# Patient Record
Sex: FEMALE | Race: WHITE | NOT HISPANIC OR LATINO | Employment: OTHER | URBAN - METROPOLITAN AREA
[De-identification: names, ages, dates, MRNs, and addresses within clinical notes are randomized per-mention and may not be internally consistent; named-entity substitution may affect disease eponyms.]

---

## 2019-04-24 RX ORDER — PHYTONADIONE 5 MG/1
TABLET ORAL
COMMUNITY
Start: 2017-04-24 | End: 2019-04-25 | Stop reason: SDUPTHER

## 2019-04-24 RX ORDER — CETIRIZINE HYDROCHLORIDE 10 MG/1
TABLET ORAL
COMMUNITY
Start: 2018-05-20 | End: 2019-04-25

## 2019-04-24 RX ORDER — DOXYCYCLINE 100 MG/1
TABLET ORAL
COMMUNITY
Start: 2018-08-15 | End: 2019-04-25

## 2019-04-24 RX ORDER — FLUTICASONE PROPIONATE 50 MCG
1 SPRAY, SUSPENSION (ML) NASAL
COMMUNITY
Start: 2017-02-03 | End: 2020-02-06

## 2019-04-24 RX ORDER — FAMOTIDINE 20 MG/1
TABLET, FILM COATED ORAL
COMMUNITY
Start: 2018-05-20 | End: 2019-04-25

## 2019-04-24 RX ORDER — FLUCONAZOLE 100 MG/1
TABLET ORAL
COMMUNITY
Start: 2018-05-29 | End: 2019-04-25

## 2019-04-24 RX ORDER — SODIUM CHLORIDE FOR INHALATION 7 %
VIAL, NEBULIZER (ML) INHALATION
COMMUNITY
Start: 2018-08-31 | End: 2019-06-13 | Stop reason: SDUPTHER

## 2019-04-24 RX ORDER — AZELASTINE 1 MG/ML
SPRAY, METERED NASAL
COMMUNITY
Start: 2018-05-16 | End: 2019-04-25

## 2019-04-24 RX ORDER — ACYCLOVIR 400 MG/1
TABLET ORAL
COMMUNITY
Start: 2018-08-24 | End: 2019-07-16 | Stop reason: SDUPTHER

## 2019-04-24 RX ORDER — SODIUM CHLORIDE FOR INHALATION 3 %
VIAL, NEBULIZER (ML) INHALATION
COMMUNITY
Start: 2017-11-21 | End: 2019-07-13

## 2019-04-24 RX ORDER — ALBUTEROL SULFATE 2.5 MG/3ML
SOLUTION RESPIRATORY (INHALATION)
COMMUNITY
Start: 2018-04-06 | End: 2019-06-13 | Stop reason: SDUPTHER

## 2019-04-24 RX ORDER — PREDNISONE 10 MG/1
TABLET ORAL
COMMUNITY
Start: 2018-05-20 | End: 2019-04-25

## 2019-04-24 RX ORDER — LEVALBUTEROL TARTRATE 45 UG/1
AEROSOL, METERED ORAL
COMMUNITY
Start: 2019-01-30 | End: 2019-04-25

## 2019-04-25 ENCOUNTER — OFFICE VISIT (OUTPATIENT)
Dept: PEDIATRIC PULMONOLOGY | Facility: MEDICAL CENTER | Age: 59
End: 2019-04-25
Payer: COMMERCIAL

## 2019-04-25 ENCOUNTER — HOSPITAL ENCOUNTER (OUTPATIENT)
Facility: MEDICAL CENTER | Age: 59
End: 2019-04-25
Attending: PEDIATRICS
Payer: COMMERCIAL

## 2019-04-25 ENCOUNTER — TELEPHONE (OUTPATIENT)
Dept: PEDIATRIC PULMONOLOGY | Facility: MEDICAL CENTER | Age: 59
End: 2019-04-25

## 2019-04-25 VITALS
RESPIRATION RATE: 9 BRPM | HEIGHT: 65 IN | OXYGEN SATURATION: 95 % | WEIGHT: 128.97 LBS | TEMPERATURE: 98.8 F | HEART RATE: 95 BPM | BODY MASS INDEX: 21.49 KG/M2

## 2019-04-25 DIAGNOSIS — E84.9 CF (CYSTIC FIBROSIS) (HCC): ICD-10-CM

## 2019-04-25 DIAGNOSIS — M85.80 OSTEOPENIA, UNSPECIFIED LOCATION: ICD-10-CM

## 2019-04-25 DIAGNOSIS — K86.81 EXOCRINE PANCREATIC INSUFFICIENCY: ICD-10-CM

## 2019-04-25 DIAGNOSIS — J32.9 SINUSITIS, UNSPECIFIED CHRONICITY, UNSPECIFIED LOCATION: ICD-10-CM

## 2019-04-25 DIAGNOSIS — Z22.8 CARRIER OF OTHER INFECTIOUS DISEASES: ICD-10-CM

## 2019-04-25 DIAGNOSIS — E84.0 CYSTIC FIBROSIS WITH PULMONARY EXACERBATION (HCC): ICD-10-CM

## 2019-04-25 PROCEDURE — 87070 CULTURE OTHR SPECIMN AEROBIC: CPT

## 2019-04-25 PROCEDURE — 99354 PR PROLONGED SVC OUTPATIENT SETTING 1ST HOUR: CPT | Mod: 25 | Performed by: PEDIATRICS

## 2019-04-25 PROCEDURE — 94010 BREATHING CAPACITY TEST: CPT | Performed by: PEDIATRICS

## 2019-04-25 PROCEDURE — 87181 SC STD AGAR DILUTION PER AGT: CPT

## 2019-04-25 PROCEDURE — 87015 SPECIMEN INFECT AGNT CONCNTJ: CPT

## 2019-04-25 PROCEDURE — 99245 OFF/OP CONSLTJ NEW/EST HI 55: CPT | Mod: 25 | Performed by: PEDIATRICS

## 2019-04-25 PROCEDURE — 99401 PREV MED CNSL INDIV APPRX 15: CPT | Mod: 25 | Performed by: PEDIATRICS

## 2019-04-25 PROCEDURE — 87205 SMEAR GRAM STAIN: CPT

## 2019-04-25 PROCEDURE — 87116 MYCOBACTERIA CULTURE: CPT

## 2019-04-25 PROCEDURE — 87102 FUNGUS ISOLATION CULTURE: CPT

## 2019-04-25 PROCEDURE — 87077 CULTURE AEROBIC IDENTIFY: CPT

## 2019-04-25 PROCEDURE — 87206 SMEAR FLUORESCENT/ACID STAI: CPT

## 2019-04-25 RX ORDER — CEFDINIR 300 MG/1
300 CAPSULE ORAL 2 TIMES DAILY
Qty: 28 CAP | Refills: 0 | Status: SHIPPED | OUTPATIENT
Start: 2019-04-25 | End: 2019-05-09

## 2019-04-25 RX ORDER — PHYTONADIONE 5 MG/1
5 TABLET ORAL DAILY
Qty: 30 TAB | Refills: 6 | Status: SHIPPED | OUTPATIENT
Start: 2019-04-25 | End: 2019-12-01 | Stop reason: SDUPTHER

## 2019-04-25 NOTE — PROGRESS NOTES
"Nutrition Screen & Progress Note for Adult CF Clinic  BMI: 21.28        Points: 1  IBW (kg): 56.8  % IBW: 103        Points: 0  Current weight (kg): 58.5   Weight last clinic visit: first clinic visit today  % weight change: up 0.6 kg compared to last clinic ([place]) Points: 0  FEV1 % predicted: 55       Points: 1              Total Points: 2              Nutrition Risk: moderate    BM: 1-2 per day, usually soft   PERT: Creon 6 (1-3 with meals, 0-1 with snacks) = avg of 6 per day  Lipase units/kg/meal: 103 - 308  CFTR modulator: Kalydeco (started about 6 years ago)  Typical diet: 3 meals and 0-2 snacks  Vitamins: general MVi, magnesium (400 mg), vitamin D (20,000 IU), vitamin K (5 mg), \"bone health supplement\" with extra K, vitamin A (10,000 IU), Pharmanac  Calorie supplements: -  Visit details: Kenia used to be seen at [place] [facility], moved to [place] and now recently moved to [place] about 2 weeks ago.  She was sick a lot in [place], it turns out her home was infested with mold.  She was having a very hard time eating d/t throat/mouth soreness and pain (r/t mold?) and her weight got down to 52.3 kg (her high school wt). She now reports that her weigh is \"the highest it has ever been\".  When she moved to [facility] she immediately felt better and started putting wt back on.  She is not currently exercising but does love to walk (likes to walk half marathons).    Her PERT dose is so low; however, she says it is adequate.  She does report occasional constipation - unsure if fluid/food related or may need more PERT.  She has been on this dose of PERT for quite some time.  She does take three of the Creon 6's when she takes her Kalydeco (takes it with a big serving of almonds).  Reports that she has always had a hard time with her vitamin A.  She used to take vitamin E supplement as well but was having \"lung bleeds\" so stopped the E and it has seemed to help.  Her only concern is getting current vitamin levels " checked, wants to update her supplements.    Discussed BMI and healthful diet.  May want to track food intake temporarily to help figure out constipation.    Ensure adequate salt and fluids.  May need a CF vitamin.     Recommendations/Plan: continue with healthful diet and increase daily activity.  Annual labs today  Follow-up: 3 months    Time spent: 23 minutes

## 2019-04-25 NOTE — PROCEDURES
"Pulmonary Function Test Results (PFT)    Spirometry Actual Predicted % Predicted   FVC (L) 2.56 3.50 73   FEV1 ((L) 1.51 2.72 55   FEV1/FVC (%) 59 78 75   FEF 25-75% (L/sec) 0.61 2.49 24       Please see  PFT in \"Media Tab\" of Notes activity  (EMR)    Provider Interpretation    Respiratory -  Cystic Fibrosis Airway Clearance Therapy (ACT)   Marina seen today at [facility]. Testing today included PFT and sputum culture.  Current plan for Respiratory medications and ACT is:  AM  Albuterol   Hypertonic Saline Mix 7%& 3% mixed to make 5%   ACT  Aerobika  PM  Albuterol   Hypertonic Saline  Mix 7%& 3% mixed to make 5%   ACT: Aerobika  Pt on Kalydeco  Exercise: walks  Changes to above plan:   After review with Physician / Nurse Practitioner, the following changes.    Goals: Stay healthy    Copy of PFT results given to client.    "

## 2019-04-25 NOTE — TELEPHONE ENCOUNTER
Call received from Kandice at [facility] requesting clarification on the dispense quantity for Cayston Rx.  Per Kandice, each box of Cayston contains 84 vials and they cannot separate boxes.  Kandice states each vial contains 75mg/75mL of Cayston.  Confirmed per Dr. Fleming that the dispense quantity on the Rx should be 84 vials which equals a 28 day supply.  Kandice states she will update the Rx and nothing else is needed from office at this time.

## 2019-04-25 NOTE — PROGRESS NOTES
CC: Cystic fibrosis, establish care at [facility]    ALLERGIES:  Levofloxacin and Tobramycin    Patient referred by:   Pcp Pt States None   No address on file     SUBJECTIVE:   Kenia Soto is a 58 y.o. female with Cystic Fibrosis,     There are no active problems to display for this patient.      HPI:  Kenia has experienced problems - Marina has had fever an stomach ache x 3 days. 2 days ago she started with dry cough and has a headache as well.     Previous cystic fibrosis center/doctor: [facility]  Last hospitalization: 2013  CF mutations: 3849+10kb C to T                          [id number]    Respiratory:   Cough frequency: frequent, increased  Cough character: productive  Sputum quantity: increased  Sputum color: green  Shortness of breath:intermittent  Chest Pain:rare  Hemoptysis:occasional but has had severe hemoptysis in the past   Doctor visits: none   Antibiotics:none  Pulmonary toilet:  Albuterol: 2/day  7%+3% (~5%) hypertonic saline: 2/day due to hemoptysis  Pulmozyme: 0/day  Chest Physiotherapy: flutter 2/day or aerobika 2/day  Oxygen: none  Respiratory assist: none   [facility]    Compliance: compliant most of the time     Sinus Symptoms:  Nasal Drainage: clear nasal discharge  Headache or sinus pressure: never   Treatments: nasal rinses twice a day with nutty pot      Activity / Energy: normal for age   Change in activity/energy: none  Emotional assessment: positive    GI: no problem   Appetite: normal  Enzymes:  daily  Creon 6K units1 per meal, 0-1 units per snack  Stool: 1-2/day, characteristics: formed      Medications:     Current Outpatient Prescriptions:   •  Ivacaftor, 150 mg, Oral, BID  •  aztreonam lysine, 75 mg, Inhalation, TID  •  phytonadione, 5 mg, Oral, DAILY  •  cefdinir, 300 mg, Oral, BID  •  albuterol, Inhale 1 vial via nebulizer every 4 hours as directed when needed for shortness of breath, Taking  •  pancrelipase (Lip-Prot-Amyl), 1 Cap, Oral, TID WITH MEALS,  "Taking  •  fluticasone, Spray  in nose., Taking  •  sodium chloride, Use 8 ml via nebulizer 2 times daily., Taking  •  sodium chloride 3%, Use 2 vials via nebulizer twice daily., Taking  •  acyclovir, Take 1 tablet orally 4 times a day for 14 days, then twice daily for 3 months, PRN  Other Medications: has cyaston for use when sick      No birth history on file.   History reviewed. No pertinent past medical history.   Respiratory hospitalizations?  Jan 2013      Past Surgical History:   Procedure Laterality Date   • DENTAL SURGERY     • SINUS WASHING          Family History   Problem Relation Age of Onset   • Other Daughter         CF carrier        Social History   Substance Use Topics   • Smoking status: Never Smoker   • Smokeless tobacco: Never Used   • Alcohol use Not on file       Home Environment   Lives with     Pet Exposures   • Dogs Yes    • Reptiles Yes      Tobacco use: never    Review of System:  Ears, nose, mouth, throat, and face: positive for nasal congestion  Cardiovascular: Negative  Gastrointestinal: Negative  Allergic/Immunologic: negative    All other systems reviewed and negative    OBJECTIVE:    Physical Exam:  Pulse 95   Temp 37.1 °C (98.8 °F) (Temporal)   Resp (!) 9   Ht 1.658 m (5' 5.28\")   Wt 58.5 kg (128 lb 15.5 oz)   SpO2 95%   BMI 21.28 kg/m²      GENERAL: well appearing, well nourished, no respiratory distress and normal affect   EYES: PERRL, EOMI, normal conjunctiva  EARS: bilateral TM's and external ear canals normal   NOSE: congested and clear discharge   MOUTH/THROAT: normal oropharynx   NECK: normal   CHEST: no chest wall deformities and normal A-P diameter   LUNGS: normal air exchange and coarse crackles present   HEART: regular rate and rhythm and no murmurs   ABDOMEN: soft, non-tender, non-distended and no hepatosplenomegaly  : not examined  BACK: not examined   SKIN: normal color   EXTREMITIES: clubbing 3+  NEURO: gross motor exam normal by " observation    Imaging:   CT on 1/1/17: I personally reviewed the image and per my personal interpretation: Bronchiectasis, worse in upper lobes bilaterally, Diffuse peribronchial thickening    Labs reviewed:   Review of Sputum cx: PA and MSSA in the past  No results found for: SIGIND, SOURCE, SITE, RESPCULTU]    ASSESSMENT:   1. CF (cystic fibrosis) (HCC)  - AFB Culture; Future  - Spirometry  - Fungal Culture; Future  - CF Respiratory Culture W/ Gram Stain; Future  - Ivacaftor (KALYDECO) 150 MG Tab; Take 150 mg by mouth 2 Times a Day for 90 days.  Dispense: 180 Tab; Refill: 2  - aztreonam lysine (CAYSTON) 75 MG Recon Soln; Inhale 75 mg by mouth 3 times a day for 28 days.  Dispense: 150 mL; Refill: 3  - phytonadione (MEPHYTON) 5 MG Tab; Take 1 Tab by mouth every day for 30 days.  Dispense: 30 Tab; Refill: 6    2. Sinusitis, unspecified chronicity, unspecified location  Will treat with cefdinir x 14 days  Continue flonase and nasal saline rinses  - cefdinir (OMNICEF) 300 MG Cap; Take 1 Cap by mouth 2 times a day for 14 days.  Dispense: 28 Cap; Refill: 0    3. Cystic fibrosis with pulmonary exacerbation (HCC)  Will increase airway clearance to 3 times/day   Currently on cayston, will continue that      4. Exocrine pancreatic insufficiency  Stable  Continue creon before meals and snacks    5. 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  Has grown pseudomonas most recently but has grown MSSA in the past.   Sputum cx obtained today, will f/u results    6. Osteopenia, unspecified location  Had dexa scan in Feb 2014.   Will need dexa scan this year. Discussed in depth with patient.   Will order at next visit after treating this pulmonary exacerbation.     Pulmonary Exacerbation: present moderate    Seen by Dietician:  Yes  Seen by Respiratory Therapy: Yes  Seen by :  Yes    Patient was seen today from 9.30am to 10.40am. Entire time spent in face to face contact. Counseling and coordination of care took place from  9.50am to the end of our visit. Topics reviewed as mentioned in the plan above. Discussed the sick and well plan for CF care. Also discussed all the medications with patient.     Follow Up:  Return in about 3 months (around 7/25/2019).    Electronically signed by   Arelis Fleming   Pediatric Pulmonology

## 2019-04-26 LAB
GRAM STN SPEC: NORMAL
RHODAMINE-AURAMINE STN SPEC: NORMAL
SIGNIFICANT IND 70042: NORMAL
SIGNIFICANT IND 70042: NORMAL
SITE SITE: NORMAL
SITE SITE: NORMAL
SOURCE SOURCE: NORMAL
SOURCE SOURCE: NORMAL

## 2019-05-03 ENCOUNTER — TELEPHONE (OUTPATIENT)
Dept: PEDIATRIC PULMONOLOGY | Facility: MEDICAL CENTER | Age: 59
End: 2019-05-03

## 2019-05-03 LAB
BACTERIA WND AEROBE CULT: ABNORMAL
BACTERIA WND AEROBE CULT: ABNORMAL
ETEST SENSITIVITY ETEST: NORMAL
GRAM STN SPEC: ABNORMAL
SIGNIFICANT IND 70042: ABNORMAL
SITE SITE: ABNORMAL
SOURCE SOURCE: ABNORMAL

## 2019-05-03 NOTE — TELEPHONE ENCOUNTER
"Message received yesterday from kim Chavez's [facility] .  Kathy states pt was previously seen in [place] before seeing Dr. Fleming for the first time last week on 4/25/19.  Kathy states she spoke with pt and \"she was really happy and very impressed\" with this clinic and will continue to be following up on an ongoing basis.  Per Kathy, pt did not have any needs for assistance with any services or issues with equipment at this time.  Kathy states pt \"is stable\" and Ktahy is planning to \"close out her file\" in a week or two.  Kathy states this office can contact her with any needs.  Kathy's phone number is [phone] ext. [phone].  Per Kathy, pt also has her number, and she knows she can call Kathy directly if she needs any assistance.  Kathy states they need a treatment plan completed which she will fax to this office for completion.    [facility]  Care Plan documentation completed per Dr. Fleming.  Faxed Care Plan back to Kathy at [facility] (see Media).     Nothing else needed from office at this time.  "

## 2019-05-07 ENCOUNTER — HOSPITAL ENCOUNTER (OUTPATIENT)
Dept: RADIOLOGY | Facility: MEDICAL CENTER | Age: 59
End: 2019-05-07

## 2019-05-08 ENCOUNTER — HOSPITAL ENCOUNTER (OUTPATIENT)
Dept: RADIOLOGY | Facility: MEDICAL CENTER | Age: 59
End: 2019-05-08

## 2019-05-22 LAB
FUNGUS SPEC CULT: NORMAL
SIGNIFICANT IND 70042: NORMAL
SITE SITE: NORMAL
SOURCE SOURCE: NORMAL

## 2019-05-31 ENCOUNTER — TELEPHONE (OUTPATIENT)
Dept: SCHEDULING | Facility: IMAGING CENTER | Age: 59
End: 2019-05-31

## 2019-05-31 ENCOUNTER — TELEPHONE (OUTPATIENT)
Dept: PEDIATRIC PULMONOLOGY | Facility: MEDICAL CENTER | Age: 59
End: 2019-05-31

## 2019-05-31 NOTE — TELEPHONE ENCOUNTER
Received notice from [facility] that [facility] required prior authorization.  PA request was submitted via fax to [employer] (see Media).    Received response from [employer] that Cayston does not require a prior authorization (see Media).  Called [facility] and spoke to Lissett.  Notified her that Cayston does not require prior authorization per [employer] [facility].  Lissett states they ran the Rx again yesterday and got a payable claim.  Lissett states it wasn't going through insurance before, but now it is.  Nothing else needed from office at this time per Lissett.

## 2019-06-07 ENCOUNTER — TELEPHONE (OUTPATIENT)
Dept: PEDIATRIC PULMONOLOGY | Facility: MEDICAL CENTER | Age: 59
End: 2019-06-07

## 2019-06-07 NOTE — TELEPHONE ENCOUNTER
"Called pt back.  Pt states she had \"another small bleed during lunch,\" but she is okay.  Explained Dr. Payne's recommendations in detail to pt.  Pt states she is not currently on Cayston, so she will start it this weekend per Dr. Payne's recommendation.  Pt will also start 3% sodium chloride treatments by tomorrow morning unless hemoptysis becomes more severe.  Pt states she has been taking Mephyton 5mg daily but today she doubled the dose and took 10mg because of the bleeding.  Pt verbalizes understanding that Dr. Payne doesn't prescribe tranexamic acid and states, \"I was nervous to try it anyway.\"  Scheduled an appt for pt next week during CF clinic with Dr. Payne.  Encouraged pt to please call this RN if she needs to come in sooner than Thursday next week.  Also encouraged pt to go to the ER and/or call the [phone] number she was given for the on-call pediatric pulmonologist over the weekend if needed.  Pt verbalizes understanding, has no questions, and agrees with plan.  "

## 2019-06-07 NOTE — TELEPHONE ENCOUNTER
"1124 - Message received from pt stating, \"last night I had a lung bleed and then another one again this morning.\"  Pt reports she is not doing treatments today due to the bleeding.  She wanted to let pulmonary MD's know just in case she needed to come in over the weekend.  Pt states, \"usually I'm pretty good at getting these things stopped, but it doesn't seem to want to stop today.\"  Pt reports she usually gets them when it is windy and dry, so she is \"really trying to stay hydrated today too.\"    1319 - Called pt back and discussed.  Pt states she was \"coughing up pure blood last night and did an albuterol treatment but nothing else.\"  Then pt states this morning she \"started bleeding again.\"  Pt reports she has not been doing any treatments today and she has been trying to drink \"a ton of water\" because she thinks she is dehydrated.  Pt states the blood is a little better now than earlier this morning.  Asked pt if she has been drinking any Gatorade, and she declined but stated she would have her  get her some to improve hydration.  Asked pt if she feels she needs to be seen today by pulmonary MDs, and she stated she did not think she needed to be seen but \"just wanted them to know in case I have to come to the ER this weekend.\"  Pt has the phone number to call and request the on-call pulmonologist over the weekend if needed.  Pt reports she has been feeling good since her last appt with Dr. Fleming and she thinks she may have been feeling badly during that appt due to the flu.  Pt states the [facility] \"cleared me up.\"  Pt reports she has not had any issues and has been feeling great until these hemoptysis episodes.  Pt also states she has a friend in [place] with CF who also gets \"lung bleeds,\" and she has a Rx for tranexamic acid which helps her bleeds.  Pt stated her previous CF center would not prescribe that for her, but she wanted this RN to ask Dr. Payne or Dr. Fleming about it.    Told pt this RN will " discuss with Dr. Payne and call her back today.

## 2019-06-07 NOTE — TELEPHONE ENCOUNTER
Most cases of hemoptysis will resolve, she should only need to go to ED if feeling a lot worse, SOB or chest pain or coughing up more than 8 oz of blood in one day.   Most cases of hemoptysis are due to airway infection.  Is she on her cayston currently? If not, restart it this weekend.  Don't stop CPT entirely, just make it more gentle. So maybe decreased vest settings, even more dilute on the sodium chloride, start that back up by tomorrow morning unless hemoptysis becomes more severe.  Has she ever been on vitamin K? Those treatments may help. I have never prescribed tranexamic acid.  We're happy to see her next week if needed.

## 2019-06-10 NOTE — TELEPHONE ENCOUNTER
"Called pt to check on her current status and confirm her CF appt on Thursday.  Pt reports, \"the bleeding stopped.\"  Pt states she started the sodium chloride 3% on Friday night, and she \"did not have any bleeds other than just streaking over the weekend.\"  Pt reports she is now back to using sodium chloride 7% last night and today with no problems.    Pt confirmed her CF appt on Thursday 6/13/19 at 1:00pm.  Asked pt if there is anything specific she would like to discuss at the visit.  Pt states she knows she is due for labs soon.  Since pt has never had labs done during her care with this clinic, explained to her that sometimes patients choose to get their OGTT and annual labs completed at a lab and other times patients get them done by this RN during their clinic visit.  Pt will think about her preference and discuss with this RN and CF team on Thursday.  No further needs at this time.  "

## 2019-06-13 ENCOUNTER — HOSPITAL ENCOUNTER (OUTPATIENT)
Dept: RADIOLOGY | Facility: MEDICAL CENTER | Age: 59
End: 2019-06-13
Attending: PEDIATRICS
Payer: COMMERCIAL

## 2019-06-13 ENCOUNTER — HOSPITAL ENCOUNTER (OUTPATIENT)
Facility: MEDICAL CENTER | Age: 59
End: 2019-06-13
Attending: PEDIATRICS
Payer: COMMERCIAL

## 2019-06-13 ENCOUNTER — OFFICE VISIT (OUTPATIENT)
Dept: PEDIATRIC PULMONOLOGY | Facility: MEDICAL CENTER | Age: 59
End: 2019-06-13
Payer: COMMERCIAL

## 2019-06-13 VITALS
BODY MASS INDEX: 21.82 KG/M2 | WEIGHT: 130.95 LBS | TEMPERATURE: 98.3 F | HEART RATE: 88 BPM | RESPIRATION RATE: 10 BRPM | HEIGHT: 65 IN | OXYGEN SATURATION: 97 %

## 2019-06-13 DIAGNOSIS — R04.2 HEMOPTYSIS: ICD-10-CM

## 2019-06-13 DIAGNOSIS — E84.9 CF (CYSTIC FIBROSIS) (HCC): ICD-10-CM

## 2019-06-13 DIAGNOSIS — R94.2 ABNORMAL LUNG FUNCTION TEST: ICD-10-CM

## 2019-06-13 DIAGNOSIS — K86.81 EXOCRINE PANCREATIC INSUFFICIENCY: ICD-10-CM

## 2019-06-13 DIAGNOSIS — B96.5 PSEUDOMONAS RESPIRATORY INFECTION: ICD-10-CM

## 2019-06-13 DIAGNOSIS — J98.8 PSEUDOMONAS RESPIRATORY INFECTION: ICD-10-CM

## 2019-06-13 DIAGNOSIS — Z87.39 HISTORY OF OSTEOPENIA: ICD-10-CM

## 2019-06-13 DIAGNOSIS — E56.1 VITAMIN K DEFICIENCY: ICD-10-CM

## 2019-06-13 PROCEDURE — 99215 OFFICE O/P EST HI 40 MIN: CPT | Mod: 25 | Performed by: PEDIATRICS

## 2019-06-13 PROCEDURE — 99401 PREV MED CNSL INDIV APPRX 15: CPT | Performed by: PEDIATRICS

## 2019-06-13 PROCEDURE — 87181 SC STD AGAR DILUTION PER AGT: CPT

## 2019-06-13 PROCEDURE — 71046 X-RAY EXAM CHEST 2 VIEWS: CPT

## 2019-06-13 PROCEDURE — 87077 CULTURE AEROBIC IDENTIFY: CPT

## 2019-06-13 PROCEDURE — 94010 BREATHING CAPACITY TEST: CPT | Performed by: PEDIATRICS

## 2019-06-13 PROCEDURE — 87070 CULTURE OTHR SPECIMN AEROBIC: CPT

## 2019-06-13 PROCEDURE — 87205 SMEAR GRAM STAIN: CPT

## 2019-06-13 PROCEDURE — 87186 SC STD MICRODIL/AGAR DIL: CPT

## 2019-06-13 RX ORDER — PHYTONADIONE 5 MG/1
5 TABLET ORAL DAILY
COMMUNITY
Start: 2019-06-12 | End: 2019-06-13 | Stop reason: SDUPTHER

## 2019-06-13 RX ORDER — PANCRELIPASE 30000; 6000; 19000 [USP'U]/1; [USP'U]/1; [USP'U]/1
1-2 CAPSULE, DELAYED RELEASE PELLETS ORAL 4 TIMES DAILY PRN
Qty: 540 CAP | Refills: 3 | Status: SHIPPED | OUTPATIENT
Start: 2019-06-13 | End: 2020-06-22

## 2019-06-13 RX ORDER — SODIUM CHLORIDE FOR INHALATION 7 %
4 VIAL, NEBULIZER (ML) INHALATION 4 TIMES DAILY
Qty: 1440 ML | Refills: 3 | Status: SHIPPED | OUTPATIENT
Start: 2019-06-13 | End: 2019-06-27 | Stop reason: SDUPTHER

## 2019-06-13 RX ORDER — ALBUTEROL SULFATE 2.5 MG/3ML
2.5 SOLUTION RESPIRATORY (INHALATION) 2 TIMES DAILY
Qty: 450 ML | Refills: 3 | Status: SHIPPED | OUTPATIENT
Start: 2019-06-13 | End: 2020-03-12 | Stop reason: SDUPTHER

## 2019-06-13 RX ORDER — PHYTONADIONE 5 MG/1
5 TABLET ORAL DAILY
Qty: 90 TAB | Refills: 3 | Status: SHIPPED | OUTPATIENT
Start: 2019-06-13 | End: 2019-07-13

## 2019-06-13 NOTE — PROGRESS NOTES
"Nutrition Screen & Progress Note for Adult CF Clinic  BMI: 21.58       Points: 1  IBW (kg): 56.8  % IBW: 105       Points: 0  Current weight (kg): 59.4   Weight last clinic visit: 58.5 kg on 4/25/19  % weight change: stable (up 0.9 kg)    Points: 0  FEV1 % predicted: 62      Points: 1             Total Points: 2             Nutrition Risk: moderate    BM: 1-2 per day, soft  PERT: Creon 6 (2 with B, 1 with L and 1-2 with D; 0-1 with snacks) = avg of 7 per day  Lipase units/kg/meal: 101 - 202  CFTR modulator: Kalydeco  Typical diet: 3 meals and 0-2 snacks  Vitamins: general MVi (from [employer]), magnesium (400 mg), vitamin D (20,000 IU), vitamin K (5 mg), vitamin A (10,000 IU), \"bone health\" supplement with extra K.  Not currently taking Pharmanac (out of stock)  Calorie supplements: -  Visit details: Kenia is here today for follow up. She is feeling good and looking great.  She says she has never weighed this much.  In the past when she used to try and eat, she would get full and have a hard time breathing.  This does not happen to her anymore so she eats more and probably snacks more.  Selling house in [facility], now living in [place] since April and is happy here.   Not currently exercising as still moving stuff into the new home (from storage in [place]).  However, will have her treadmill soon. Also hopes to walk outdoors this summer.  She used to walk half marathons.   Makes sure she gets enough fluids and salt in the hot weather.     Recommendations/Plan: continue with adequate diet for wt maintenance.  Begin regular exercise program or simply walk daily.   Follow-up: 3 months    Time spent: 17 minutes    "

## 2019-06-13 NOTE — PROCEDURES
"Pulmonary Function Test Results (PFT)    Spirometry Actual Predicted % Predicted   FVC (L) 2.85 3.50 81   FEV1 ((L) 1.69 2.72 62   FEV1/FVC (%) 59 78 75   FEF 25-75% (L/sec) 0.67 2.48 26     Please see  PFT in \"Media Tab\" of Notes activity  (EMR)    Respiratory -  Cystic Fibrosis Airway Clearance Therapy (ACT)     Marina seen today at [facility]. Testing today included PFT and sputum culture.  Current plan for Respiratory medications and ACT is:    AM  Albuterol   Hypertonic Saline  ACT  Aerobika  PM  Albuterol   Hypertonic Saline  Mix 7%& 3% mixed to make 5%   ACT: Aerobika  Pt on Kalydeco    Exercise: walks    Changes to above plan:   After review with Physician / Nurse Practitioner, the following changes.     Goals: Stay healthy     Copy of PFT results given to client.  Provider Interpretation moderate obstruction   "

## 2019-06-13 NOTE — PROGRESS NOTES
PCP:  Pcp Pt States None   No address on file     SUBJECTIVE:   Kenia Soto is a 58 y.o. female with Cystic Fibrosis    Patient Active Problem List   Diagnosis   • CF (3849+10k bC>T, L7650E) ([facility])   • Exocrine pancreatic insufficiency   • Hemoptysis   • Vitamin K deficiency       Since the last CF clinic visit chief complaint:  Kenia has experienced hemoptysis last week and 3 weeks ago. Now better.  Last hospitalization:   Mutations: 3849+10k bC>T, [id number]    Respiratory:   Cough frequency: episodic, baseline mostly with treatments and 1-2 times sporadically during the day  Cough character: productive  Sputum quantity: baseline  Sputum color: yellow not green last week  Shortness of breath:never  Chest Pain: occasional.   Hemoptysis:yes, last week, occured randomly with no other signs of illness. Several days last week, up to several tablespoons.  Drinking ice water always helps bleeding.   Associated with dry mouth and using a cream lotion on hands and feet.   Skipped saline treatments x 24 hours then slowly to 3% saline, then 5% the 7%. Used 10 mg of vit K x 1 day  Doctor visits: previous CF Center: [facility]   Antibiotics: cayston prn with mild-moderate exacerbations, used in April. Used only 2 rounds last year.  Pulmonary toilet:   Albuterol: 2/day  7% hypertonic saline: 2/day uses 8 ml with each treatment  Pulmozyme: none/day, had more hemoptysis on it  Chest Physiotherapy: Vest: 2/day and aerobika  Oxygen: none  Respiratory assist: none   Kalydeco: now on 6 years, no IV antibiotics since then. Prior to kalydeco, FEV1 was in 50's, now in 60's. Goal is 65    Compliance: compliant most of the time     Sinus symptoms:  Nasal Congestion: never   Nasal Drainage: clear nasal discharge  Headache/sinus pressure: never   Does sinus rinses    Activity / Energy: limited   Change in activity/energy: none not currently exercising regularly but has done walking half marathons in the past.  School/ Work  "attendance: not in school, not working     GI: no problem   Appetite: normal  Enzymes:  daily  6000 units 1-2 per meal, 1 per snack  Stool: 1-2/day, characteristics: soft  G-Tube: No      Medications:     Current Outpatient Prescriptions:   •  phytonadione, 5 mg, Oral, DAILY, Taking  •  Ivacaftor, 150 mg, Oral, BID, Taking  •  albuterol, Inhale 1 vial via nebulizer every 4 hours as directed when needed for shortness of breath, Taking  •  pancrelipase (Lip-Prot-Amyl), 1 Cap, Oral, TID WITH MEALS, Taking  •  fluticasone, Spray  in nose., Taking  •  sodium chloride, Use 8 ml via nebulizer 2 times daily., Taking  •  acyclovir, Take 1 tablet orally 4 times a day for 14 days, then twice daily for 3 months, PRN  •  sodium chloride 3%, Use 2 vials via nebulizer twice daily., PRN    ALLERGIES:  Levofloxacin and Tobramycin    Review of System:    Cardiovascular: Negative  Gastrointestinal: Negative  Allergic/Immunologic: none, per patient tested previously for ABPA, negative  Hives to tobramycin  All other systems reviewed and negative or as above    Social/Environmental:    Tobacco use: no  Pets: 2 dogs    OBJECTIVE:  Physical Exam:  Pulse 88   Temp 36.8 °C (98.3 °F) (Temporal)   Resp (!) 10   Ht 1.659 m (5' 5.32\")   Wt 59.4 kg (130 lb 15.3 oz)   SpO2 97%   BMI 21.58 kg/m²      GENERAL: well appearing, well nourished, no respiratory distress and normal affect   EARS: bilateral TM's and external ear canals normal   NOSE: clear discharge   MOUTH/THROAT: normal oropharynx, normal tonsils and mucous membranes moist   NECK: normal, supple full range of motion and no thyroid enlargement   CHEST: no chest wall deformities and normal A-P diameter   LUNGS: clear to auscultation   HEART: regular rate and rhythm and no murmurs   ABDOMEN: soft, non-tender, non-distended and no hepatosplenomegaly  : not examined  BACK: normal   SKIN: normal color   EXTREMITIES: clubbing noted: 1+   NEURO: gross motor exam normal by observation " "    PFT's:  Pulmonary Function Test Results (PFT)    Spirometry Actual Predicted % Predicted   FVC (L) 2.85 3.50 81   FEV1 ((L) 1.69 2.72 62   FEV1/FVC (%) 59 78 75   FEF 25-75% (L/sec) 0.67 2.48 26     Please see  PFT in \"Media Tab\" of Notes activity  (EMR)    Respiratory -  Cystic Fibrosis Airway Clearance Therapy (ACT)     Marina seen today at [facility]. Testing today included PFT and sputum culture.  Current plan for Respiratory medications and ACT is:    AM  Albuterol   Hypertonic Saline  ACT  Aerobika  PM  Albuterol   Hypertonic Saline  Mix 7%& 3% mixed to make 5%   ACT: Aerobika  Pt on Kalydeco    Exercise: walks    Changes to above plan:   After review with Physician / Nurse Practitioner, the following changes.     Goals: Stay healthy     Copy of PFT results given to client.  Provider Interpretation moderate obstruction         Labs reviewed:     Last sputum culture date: 4/25/19  Chronic colonization of:  Pseudomonas aeruginosa light growth, mucoid    Respiratory Culture: Lab Results   Component Value Date/Time    SIGIND NEG 04/25/2019 10:12 AM    SIGIND POS (POS) 04/25/2019 10:12 AM    SIGIND NEG 04/25/2019 10:12 AM    SIGIND . 04/25/2019 10:12 AM    SIGIND NEG 04/25/2019 10:12 AM    SOURCE RESP 04/25/2019 10:12 AM    SOURCE RESP 04/25/2019 10:12 AM    SOURCE RESP 04/25/2019 10:12 AM    SOURCE RESP 04/25/2019 10:12 AM    SOURCE RESP 04/25/2019 10:12 AM    SITE Sputum (coughed) 04/25/2019 10:12 AM    SITE Sputum (coughed) 04/25/2019 10:12 AM    SITE Sputum (coughed) 04/25/2019 10:12 AM    SITE Sputum (coughed) 04/25/2019 10:12 AM    SITE Sputum (coughed) 04/25/2019 10:12 AM   ]    Annual labs done date: due at next visit      ASSESSMENT/PLAN:   1. CF (3849+10k bC>T, H1097K) ([facility])  All annual labs ordered, will be done by patient before next visit  This includes CXR and DXA scan  Will continue BID pulmonary toilet, increase to 3-4 times per day if sick with more cough or sputum production.  Continue " "BID kalydeco  Goal FEV1 is 60-65%    - Spirometry; Future  - [facility] - CF Resp Culture w/ Gram Stain; Future  - Spirometry  - [facility] - CF Resp Culture w/ Gram Stain  - DS-BONE DENSITY STUDY (DEXA); Future  - DX-CHEST-2 VIEWS; Future  - sodium chloride (HYPER-SAL) 7 % Nebu Soln; 4 mL by Nebulization route 4 times a day.  Dispense: 1440 mL; Refill: 3  - albuterol (PROVENTIL) 2.5mg/3ml Nebu Soln solution for nebulization; 3 mL by Nebulization route 2 Times a Day.  Dispense: 450 mL; Refill: 3  - CBC WITH DIFFERENTIAL; Future  - Comp Metabolic Panel; Future  - GAMMA GT (GGT); Future  - HEMOGLOBIN A1C; Future  - Prothrombin Time; Future  - APTT; Future  - VITAMIN A; Future  - VITAMIN D,25 HYDROXY; Future  - VITAMIN E; Future  - Lipid Profile; Future  - MISCELLANEOUS LAB TEST ([facility]/Other); Future    2. History of osteopenia  DXA scan ordered    - DS-BONE DENSITY STUDY (DEXA); Future    3. Exocrine pancreatic insufficiency  Continue current dosing of creon    - CREON 6000 units Cap DR Particles; Take 1-2 Caps by mouth 4 times a day as needed. With meals or snacks  Dispense: 540 Cap; Refill: 3    4. Vitamin K deficiency  Continue daily mephyton, PT will be checked before next visit.    - phytonadione (MEPHYTON) 5 MG Tab; Take 1 Tab by mouth every day.  Dispense: 90 Tab; Refill: 3    5. Hemoptysis  Recurrent problem, had significant hemoptysis last week, now resolved.    6. Abnormal lung function test  Improved from last testing, results discussed and reviewed.     7. Pseudomonas respiratory infection  Continue using cayston \"prn\", will follow sputum cultures and lung function closely.      Pulmonary Exacerbation: absent    Seen by Dietician:  Yes  Seen by Respiratory Therapy: Yes  Seen by :  No    Face-to-face total Attending time: 50 minutes  Care coordination and counseling time:  40 minutes regarding all above issues    Follow up in 3 month(s)    Electronically signed by   Marjan Payne "   Pediatric Pulmonology

## 2019-06-14 LAB
GRAM STN SPEC: NORMAL
SIGNIFICANT IND 70042: NORMAL
SITE SITE: NORMAL
SOURCE SOURCE: NORMAL

## 2019-06-18 LAB
BACTERIA WND AEROBE CULT: ABNORMAL
ETEST SENSITIVITY ETEST: NORMAL
GRAM STN SPEC: ABNORMAL
SIGNIFICANT IND 70042: ABNORMAL
SITE SITE: ABNORMAL
SOURCE SOURCE: ABNORMAL

## 2019-06-21 LAB
MYCOBACTERIUM SPEC CULT: NORMAL
RHODAMINE-AURAMINE STN SPEC: NORMAL
SIGNIFICANT IND 70042: NORMAL
SITE SITE: NORMAL
SOURCE SOURCE: NORMAL

## 2019-06-27 DIAGNOSIS — E84.9 CF (CYSTIC FIBROSIS) (HCC): ICD-10-CM

## 2019-06-27 RX ORDER — SODIUM CHLORIDE FOR INHALATION 7 %
4 VIAL, NEBULIZER (ML) INHALATION 4 TIMES DAILY
Qty: 1440 ML | Refills: 3 | Status: SHIPPED | OUTPATIENT
Start: 2019-06-27 | End: 2020-03-12 | Stop reason: SDUPTHER

## 2019-07-10 ENCOUNTER — HOSPITAL ENCOUNTER (OUTPATIENT)
Dept: RADIOLOGY | Facility: MEDICAL CENTER | Age: 59
End: 2019-07-10
Attending: FAMILY MEDICINE
Payer: COMMERCIAL

## 2019-07-10 ENCOUNTER — HOSPITAL ENCOUNTER (OUTPATIENT)
Dept: RADIOLOGY | Facility: MEDICAL CENTER | Age: 59
End: 2019-07-10
Attending: PEDIATRICS
Payer: COMMERCIAL

## 2019-07-10 DIAGNOSIS — E84.9 CF (CYSTIC FIBROSIS) (HCC): ICD-10-CM

## 2019-07-10 DIAGNOSIS — Z12.31 VISIT FOR SCREENING MAMMOGRAM: ICD-10-CM

## 2019-07-10 DIAGNOSIS — Z87.39 HISTORY OF OSTEOPENIA: ICD-10-CM

## 2019-07-10 PROCEDURE — 77067 SCR MAMMO BI INCL CAD: CPT

## 2019-07-10 PROCEDURE — 77080 DXA BONE DENSITY AXIAL: CPT

## 2019-07-12 ENCOUNTER — TELEPHONE (OUTPATIENT)
Dept: MEDICAL GROUP | Facility: LAB | Age: 59
End: 2019-07-12

## 2019-07-12 NOTE — TELEPHONE ENCOUNTER
NEW PATIENT VISIT PRE-VISIT PLANNING    1.  EpicCare Patient is checked in Patient Demographics? YES    2.  Immunizations were updated in Epic using WebIZ?: No WebIZ record       •  Web Iz Recommendations:     3.  Is this appointment scheduled as a Hospital Follow-Up? No    4.  Patient is due for the following Health Maintenance Topics:   Health Maintenance Due   Topic Date Due   • HEPATITIS C SCREENING  1960   • PAP SMEAR  10/20/1981   • COLONOSCOPY  10/20/2010   • IMM ZOSTER VACCINES (2 of 3) 06/17/2015           5. Orders for overdue Health Maintenance topics pended in Pre-Charting? N\A    6.  Reviewed/Updated the following with patient:   •   Preferred Pharmacy? NO       •   Preferred Lab? NO       •   Preferred Communication? NO       •   Allergies? NO       •   Medications? NO       •   Social History? NO       •   Family History (document living status of immediate family members and if + hx of cancer, diabetes, hypertension, hyperlipidemia, heart attack, stroke) NO    7.  Updated Care Team?       •   DME Company (gait device, O2, CPAP, etc.) NO       •   Other Specialists (eye doctor, derm, GYN, cardiology, endo, etc): NO    8.  Patient was informed to arrive 15 min prior to their   scheduled appointment and bring in their medication bottles.

## 2019-07-13 ENCOUNTER — HOSPITAL ENCOUNTER (EMERGENCY)
Facility: MEDICAL CENTER | Age: 59
End: 2019-07-13
Attending: EMERGENCY MEDICINE
Payer: COMMERCIAL

## 2019-07-13 ENCOUNTER — APPOINTMENT (OUTPATIENT)
Dept: RADIOLOGY | Facility: MEDICAL CENTER | Age: 59
End: 2019-07-13
Attending: EMERGENCY MEDICINE
Payer: COMMERCIAL

## 2019-07-13 VITALS
DIASTOLIC BLOOD PRESSURE: 70 MMHG | OXYGEN SATURATION: 92 % | TEMPERATURE: 97.9 F | SYSTOLIC BLOOD PRESSURE: 128 MMHG | BODY MASS INDEX: 21.19 KG/M2 | WEIGHT: 131.84 LBS | RESPIRATION RATE: 16 BRPM | HEIGHT: 66 IN | HEART RATE: 83 BPM

## 2019-07-13 DIAGNOSIS — R04.2 HEMOPTYSIS: ICD-10-CM

## 2019-07-13 LAB
ALBUMIN SERPL BCP-MCNC: 4.2 G/DL (ref 3.2–4.9)
ALBUMIN/GLOB SERPL: 1.8 G/DL
ALP SERPL-CCNC: 130 U/L (ref 30–99)
ALT SERPL-CCNC: 20 U/L (ref 2–50)
ANION GAP SERPL CALC-SCNC: 7 MMOL/L (ref 0–11.9)
APTT PPP: 38.4 SEC (ref 24.7–36)
AST SERPL-CCNC: 22 U/L (ref 12–45)
BASOPHILS # BLD AUTO: 1 % (ref 0–1.8)
BASOPHILS # BLD: 0.07 K/UL (ref 0–0.12)
BILIRUB SERPL-MCNC: 0.3 MG/DL (ref 0.1–1.5)
BUN SERPL-MCNC: 13 MG/DL (ref 8–22)
CALCIUM SERPL-MCNC: 9.3 MG/DL (ref 8.5–10.5)
CHLORIDE SERPL-SCNC: 103 MMOL/L (ref 96–112)
CO2 SERPL-SCNC: 27 MMOL/L (ref 20–33)
CREAT SERPL-MCNC: 0.61 MG/DL (ref 0.5–1.4)
EOSINOPHIL # BLD AUTO: 0.35 K/UL (ref 0–0.51)
EOSINOPHIL NFR BLD: 5 % (ref 0–6.9)
ERYTHROCYTE [DISTWIDTH] IN BLOOD BY AUTOMATED COUNT: 42.9 FL (ref 35.9–50)
GLOBULIN SER CALC-MCNC: 2.4 G/DL (ref 1.9–3.5)
GLUCOSE SERPL-MCNC: 129 MG/DL (ref 65–99)
HCT VFR BLD AUTO: 42.5 % (ref 37–47)
HGB BLD-MCNC: 13.7 G/DL (ref 12–16)
IMM GRANULOCYTES # BLD AUTO: 0.02 K/UL (ref 0–0.11)
IMM GRANULOCYTES NFR BLD AUTO: 0.3 % (ref 0–0.9)
INR PPP: 0.98 (ref 0.87–1.13)
LYMPHOCYTES # BLD AUTO: 1.95 K/UL (ref 1–4.8)
LYMPHOCYTES NFR BLD: 27.9 % (ref 22–41)
MCH RBC QN AUTO: 29.5 PG (ref 27–33)
MCHC RBC AUTO-ENTMCNC: 32.2 G/DL (ref 33.6–35)
MCV RBC AUTO: 91.6 FL (ref 81.4–97.8)
MONOCYTES # BLD AUTO: 0.71 K/UL (ref 0–0.85)
MONOCYTES NFR BLD AUTO: 10.1 % (ref 0–13.4)
NEUTROPHILS # BLD AUTO: 3.9 K/UL (ref 2–7.15)
NEUTROPHILS NFR BLD: 55.7 % (ref 44–72)
NRBC # BLD AUTO: 0 K/UL
NRBC BLD-RTO: 0 /100 WBC
PLATELET # BLD AUTO: 357 K/UL (ref 164–446)
PMV BLD AUTO: 10 FL (ref 9–12.9)
POTASSIUM SERPL-SCNC: 4.2 MMOL/L (ref 3.6–5.5)
PROT SERPL-MCNC: 6.6 G/DL (ref 6–8.2)
PROTHROMBIN TIME: 13.1 SEC (ref 12–14.6)
RBC # BLD AUTO: 4.64 M/UL (ref 4.2–5.4)
SODIUM SERPL-SCNC: 137 MMOL/L (ref 135–145)
WBC # BLD AUTO: 7 K/UL (ref 4.8–10.8)

## 2019-07-13 PROCEDURE — 96374 THER/PROPH/DIAG INJ IV PUSH: CPT

## 2019-07-13 PROCEDURE — 99285 EMERGENCY DEPT VISIT HI MDM: CPT

## 2019-07-13 PROCEDURE — 700111 HCHG RX REV CODE 636 W/ 250 OVERRIDE (IP): Performed by: EMERGENCY MEDICINE

## 2019-07-13 PROCEDURE — 80053 COMPREHEN METABOLIC PANEL: CPT

## 2019-07-13 PROCEDURE — 85025 COMPLETE CBC W/AUTO DIFF WBC: CPT

## 2019-07-13 PROCEDURE — 71275 CT ANGIOGRAPHY CHEST: CPT

## 2019-07-13 PROCEDURE — 85730 THROMBOPLASTIN TIME PARTIAL: CPT

## 2019-07-13 PROCEDURE — 85610 PROTHROMBIN TIME: CPT

## 2019-07-13 PROCEDURE — 71045 X-RAY EXAM CHEST 1 VIEW: CPT

## 2019-07-13 PROCEDURE — 700117 HCHG RX CONTRAST REV CODE 255: Performed by: EMERGENCY MEDICINE

## 2019-07-13 RX ORDER — DIPHENHYDRAMINE HYDROCHLORIDE 50 MG/ML
50 INJECTION INTRAMUSCULAR; INTRAVENOUS ONCE
Status: COMPLETED | OUTPATIENT
Start: 2019-07-13 | End: 2019-07-13

## 2019-07-13 RX ORDER — PHYTONADIONE 5 MG/1
5-10 TABLET ORAL DAILY
COMMUNITY
End: 2020-02-06

## 2019-07-13 RX ORDER — SODIUM CHLORIDE FOR INHALATION 3 %
3 VIAL, NEBULIZER (ML) INHALATION 2 TIMES DAILY
COMMUNITY
End: 2020-04-19

## 2019-07-13 RX ORDER — MAGNESIUM OXIDE 400 MG/1
200 TABLET ORAL 2 TIMES DAILY
COMMUNITY

## 2019-07-13 RX ORDER — MULTIVIT WITH MINERALS/LUTEIN
1000 TABLET ORAL 2 TIMES DAILY
COMMUNITY

## 2019-07-13 RX ORDER — DOXYCYCLINE HYCLATE 100 MG
100 TABLET ORAL 2 TIMES DAILY
COMMUNITY
End: 2019-07-16

## 2019-07-13 RX ADMIN — IOHEXOL 56 ML: 350 INJECTION, SOLUTION INTRAVENOUS at 22:30

## 2019-07-13 RX ADMIN — DIPHENHYDRAMINE HYDROCHLORIDE 50 MG: 50 INJECTION INTRAMUSCULAR; INTRAVENOUS at 22:09

## 2019-07-14 NOTE — ED NOTES
Pt sitting up in bed talking to  at bedside. No distress is noted at this time and pt's breaths are even and unlabored. Pt pre-medicated for CT scan contrast dye with Benadryl, tolerated well. Will continue to monitor pt while awaiting test results and further orders.

## 2019-07-14 NOTE — ED PROVIDER NOTES
ED Provider Note    CHIEF COMPLAINT  Chief Complaint   Patient presents with   • Blood in Sputum     since yesterday, bright red blood with clots, hx. CF       HPI  Kenia Soto is a 58 y.o. female who presents with hemoptysis for the past day and a half.  Has a known history of cystic fibrosis.  Is followed by a pediatric pulmonology clinic here in [place].  She states that she moved here in April and that was the last time she had any sort of pulmonary infection.  She states that she has obvious streaks of blood in her sputum and some clots.  Denies any lightheadedness or weakness.  No chest pain or trouble breathing.  Has had some hemoptysis intermittently in the past however it has typically been transient lasting a few hours and then spontaneously resolving.  Patient is not on any anticoagulation medications.  Denies taking any NSAIDs or other medications such as Plavix.    The patient was in contact with her physician, Dr. Fleming who is on-call for pulmonology and it was suggested that she reports to the emergency department and that we contact her regarding further work-up for this patient.    REVIEW OF SYSTEMS  See HPI for further details. All other systems are negative.     PAST MEDICAL HISTORY   has a past medical history of CF (cystic fibrosis) (HCC); Herpes simplex; and Shoulder fracture.    SOCIAL HISTORY  Social History     Social History Main Topics   • Smoking status: Never Smoker   • Smokeless tobacco: Never Used   • Alcohol use No   • Drug use: No   • Sexual activity: Not on file       SURGICAL HISTORY   has a past surgical history that includes sinus washing and dental surgery.    CURRENT MEDICATIONS  Home Medications     Reviewed by Soni Em R.N. (Registered Nurse) on 07/13/19 at 1942  Med List Status: Complete   Medication Last Dose Status   acyclovir (ZOVIRAX) 400 MG tablet  Active   albuterol (PROVENTIL) 2.5mg/3ml Nebu Soln solution for nebulization  Active   Ascorbic Acid  "(VITAMIN C) 1000 MG Tab  Active   CREON 6000 units Cap DR Particles  Active   fluticasone (FLONASE) 50 MCG/ACT nasal spray  Active   Ivacaftor (KALYDECO) 150 MG Tab  Active   magnesium oxide (MAG-OX) 400 MG Tab  Active   multivitamin (THERAGRAN) Tab  Active   phytonadione (MEPHYTON) 5 MG Tab  Active   Probiotic Product (PROBIOTIC-10 PO)  Active   sodium chloride (HYPER-SAL) 7 % Nebu Soln  Active   sodium chloride 3% 3 % nebulizer solution  Active   vitamin A 09061 UNIT capsule  Active   vitamin D (CHOLECALCIFEROL) 1000 UNIT Tab  Active                ALLERGIES  Allergies   Allergen Reactions   • Levofloxacin      Unknown reaction  Possibly myalgias, arthralgias, nightmares   • Tobramycin Hives     Hives       PHYSICAL EXAM  VITAL SIGNS: /70   Pulse 81   Temp 36.6 °C (97.9 °F) (Temporal)   Resp 17   Ht 1.676 m (5' 6\")   Wt 59.8 kg (131 lb 13.4 oz)   SpO2 95%   BMI 21.28 kg/m²   Pulse ox interpretation: I interpret this pulse ox as normal.  Constitutional: Alert in no apparent distress.  HENT: No signs of trauma, Bilateral external ears normal, Nose normal.   Eyes: Pupils are equal and reactive, Conjunctiva normal, Non-icteric.   Neck: Normal range of motion, No tenderness, Supple, No stridor.   Cardiovascular: Regular rate and rhythm.   Thorax & Lungs: Normal breath sounds, No respiratory distress, No wheezing, No chest tenderness.   Abdomen: Bowel sounds normal, Soft, No tenderness, No masses, No pulsatile masses. No peritoneal signs.  Skin: Warm, Dry, No erythema, No rash.   Back: No bony tenderness, No CVA tenderness.   Extremities: Intact distal pulses, No edema, No tenderness, No cyanosis  Musculoskeletal: Good range of motion in all major joints. No tenderness to palpation or major deformities noted.   Neurologic: Alert, Normal motor function and gait, Normal sensory function, No focal deficits noted.       DIAGNOSTIC STUDIES / PROCEDURES    EKG - Physician interpretation  No results found for this " or any previous visit.      LABS  Labs Reviewed   CBC WITH DIFFERENTIAL - Abnormal; Notable for the following:        Result Value    MCHC 32.2 (*)     All other components within normal limits    Narrative:     Indicate which anticoagulants the patient is on:->NONE   COMP METABOLIC PANEL - Abnormal; Notable for the following:     Glucose 129 (*)     Alkaline Phosphatase 130 (*)     All other components within normal limits    Narrative:     Indicate which anticoagulants the patient is on:->NONE   APTT - Abnormal; Notable for the following:     APTT 38.4 (*)     All other components within normal limits    Narrative:     Indicate which anticoagulants the patient is on:->NONE   PROTHROMBIN TIME    Narrative:     Indicate which anticoagulants the patient is on:->NONE   ESTIMATED GFR    Narrative:     Indicate which anticoagulants the patient is on:->NONE       RADIOLOGY  CT-CTA CHEST PULMONARY ARTERY W/ RECONS   Final Result         1.  Bilateral bronchiectasis in keeping with history of cystic fibrosis. Increased mucous plugging in comparison to prior CT. Correlate clinically for evidence of infection.   2.  No evidence of pulmonary embolism.   3.  Hepatic steatosis.   4.  Bilateral hilar adenopathy which is likely reactive.   5.  Fatty pancreatic atrophy.            DX-CHEST-PORTABLE (1 VIEW)   Final Result      Stable findings of cystic fibrosis. No new abnormality.            COURSE & MEDICAL DECISION MAKING    Medications   diphenhydrAMINE (BENADRYL) injection 50 mg (50 mg Intravenous Given 7/13/19 2209)   iohexol (OMNIPAQUE) 350 mg/mL (56 mL Intravenous Given 7/13/19 2230)       Pertinent Labs & Imaging studies reviewed. (See chart for details)  58 y.o. female presented with hemoptysis.  She has a history of cystic fibrosis.  No shortness of breath.  No fevers.  Has occasional hemoptysis from time to time however never lasting as long as this.  Has had hemoptysis for over a day now.  Notes blood-streaked sputum.  " No historical factors to suspect massive hemoptysis.  No hematemesis.  No bloody stool or black stool.  No vaginal bleeding or hematuria.  No anticoagulation.  No NSAID use.    Patient is followed by Dr. Fleming from pediatric pulmonology.  I was able to contact her at the patient's request.  She is recommending CT pulmonary angiogram for further evaluation.  If unremarkable for acute hemorrhage, she will schedule an outpatient bronchoscopy for further evaluation.    CT pulmonary Darleen Lucian was performed that was found to be largely unremarkable.  No obvious signs of pneumonia.  Has bronchiectasis consistent with her cystic fibrosis history.  No significant clinical factors to suggest pneumonia.  No leukocytosis.  No hypoxia.  No tachycardia.  No hypotension.  No respiratory distress.  No active cough with productive sputum other than streaky hemoptysis.    Patient was informed of the results.  Remains hemodynamically stable.  No anemia.  No coagulopathy.  Patient appears stable for discharge at this time.  Given strict return precautions for any signs of massive hemoptysis or worsening shortness of breath or fevers.    The patient was instructed to follow-up with primary care physician for further management.  To return immediately for any worsening symptoms or development of any other concerning signs or symptoms. The patient verbalizes understanding in their own words.    /70   Pulse 81   Temp 36.6 °C (97.9 °F) (Temporal)   Resp 17   Ht 1.676 m (5' 6\")   Wt 59.8 kg (131 lb 13.4 oz)   SpO2 95%   BMI 21.28 kg/m²     The patient was referred to primary care where they will receive further BP management.      [facility], Emergency Dept  [address] [address] [phone]    As needed, If symptoms worsen    Arelis Fleming M.D.  [address] [address] [phone]    Schedule an appointment as soon as possible for a visit         FINAL " IMPRESSION  1. Hemoptysis            Electronically signed by: Fito Olivarez, 7/13/2019 7:56 PM

## 2019-07-14 NOTE — ED NOTES
The Medication Reconciliation process has been completed by interviewing the patient    Allergies have been reviewed  Antibiotic use in 30 days - doxycycline completed Monday 7/8    Home Pharmacy:  [facility]

## 2019-07-14 NOTE — ED NOTES
Pt and  educated regarding discharge instructions and demonstrated understanding. Pt ambulatory upon discharge and does not appear to be in any distress at this time. Pt's discharge paperwork and belongings sent home with pt and .

## 2019-07-14 NOTE — ED NOTES
Pt sitting up in bed talking to  at bedside with even and unlabored breaths. No distress noted and will continue to monitor.

## 2019-07-14 NOTE — ED NOTES
Pt resting in bed with eyes closed. No distress is noted at this time and breaths are even and unlabored. Will continue to monitor.

## 2019-07-14 NOTE — ED TRIAGE NOTES
Kenia Soto  58 y.o.  Chief Complaint   Patient presents with   • Blood in Sputum     since yesterday, bright red blood with clots, hx. CF     Patient ambulatory to triage with steady gait for above. NO SOB/dyspnea noted with ambulation.    States that symptoms started yesterday, with intermittent bright red blood in sputum. Has progressed to continual coughing with bright red blood in sputum with clots. Denies SOB/nausea/dizziness/lightheadedness.    Patient with a history of cystic fibrosis. Takes Vitamin K 5 mg PO daily at home - took an extra 5 mg PO yesterday and today for a total of Vitamin K 10 mg PO daily x 2 days.    Spoke with Dr. Arelis Fleming, Pediatric Pulmonologist [phone] who recommended that she present to the ED for a chest x-ray and CT chest to rule out a possible arterial bleed in her lungs.     History of prior bleeding in lungs related to CF.    Charge SHEELA Panda notified of patient.    Patient roomed from triage to [facility].

## 2019-07-15 ENCOUNTER — TELEPHONE (OUTPATIENT)
Dept: PEDIATRIC PULMONOLOGY | Facility: MEDICAL CENTER | Age: 59
End: 2019-07-15

## 2019-07-15 NOTE — TELEPHONE ENCOUNTER
"Per Dr. Fleming's request, I called patient to ask her if she was still spitting up blood.  Patient states she is no longer spitting up \"pure blood\" but is still spitting some.    Per Dr. Fleming (verbal), Informed patient to continue with the ice cold water, continue using the vest and albuterol. Also informed patient to not take hypertonic saline 7% and pulmozyme, patient understood.    Patient also requested if she could schedule a bronchoscopy?   "

## 2019-07-16 ENCOUNTER — TELEPHONE (OUTPATIENT)
Dept: PEDIATRIC PULMONOLOGY | Facility: MEDICAL CENTER | Age: 59
End: 2019-07-16

## 2019-07-16 ENCOUNTER — OFFICE VISIT (OUTPATIENT)
Dept: MEDICAL GROUP | Facility: LAB | Age: 59
End: 2019-07-16
Payer: COMMERCIAL

## 2019-07-16 VITALS
HEIGHT: 66 IN | HEART RATE: 84 BPM | RESPIRATION RATE: 14 BRPM | TEMPERATURE: 98.6 F | WEIGHT: 130 LBS | DIASTOLIC BLOOD PRESSURE: 54 MMHG | OXYGEN SATURATION: 95 % | SYSTOLIC BLOOD PRESSURE: 106 MMHG | BODY MASS INDEX: 20.89 KG/M2

## 2019-07-16 DIAGNOSIS — M85.89 OSTEOPENIA OF MULTIPLE SITES: ICD-10-CM

## 2019-07-16 DIAGNOSIS — Z11.59 NEED FOR HEPATITIS C SCREENING TEST: ICD-10-CM

## 2019-07-16 DIAGNOSIS — B00.9 HERPES SIMPLEX: ICD-10-CM

## 2019-07-16 DIAGNOSIS — E84.9 CF (CYSTIC FIBROSIS) (HCC): ICD-10-CM

## 2019-07-16 DIAGNOSIS — Z91.09 ENVIRONMENTAL ALLERGIES: ICD-10-CM

## 2019-07-16 PROCEDURE — 99202 OFFICE O/P NEW SF 15 MIN: CPT | Performed by: FAMILY MEDICINE

## 2019-07-16 RX ORDER — ACYCLOVIR 400 MG/1
TABLET ORAL
Qty: 90 TAB | Refills: 3 | Status: SHIPPED | OUTPATIENT
Start: 2019-07-16 | End: 2020-04-19

## 2019-07-16 NOTE — PROGRESS NOTES
Subjective:     CC: Est Care    HPI:   Kenia presents today with     CF:  This is a chronic controlled issue, new to me.  Patient was diagnosed with cystic fibrosis in her teenage years.  Of note she had a sister diagnosed with cystic fibrosis at age 19 who was passed on.  She was seen by [place] [facility] and has transferred over to the cystic fibrosis clinic here in [place] and is seen by Dr. Fleming and Dr. Payne.  She is currently on Kalydeco and was part of that trial for some time.  She is on vitamin supplementations along with Creon for pancreatic insufficiency.  Patient states she actually gets very limited respiratory infections but is colonized by pseudomonas.  She states that some of her vitamins tend to cause hemoptysis so she tries to limit them.  She does a respiratory regimen including aerobic and HyperSal.  She has moderate obstruction on her PFTs.  She is not on antibiotics currently.  She believes she has her cystic fibrosis stable.    Osteopenia:  This is a chronic controlled issue, new to me.  Patient's most recent DEXA scan shows osteopenia which is stable from previous DEXA scans.  She is currently on vitamin D and she does exercise however since moving she has been putting off exercising at this time.  She denies any pathologic fractures or falls.    Herpes Simplex:  This is a chronic controlled issue, new to me.  Patient has a history of herpes simplex outbreaks orolabial.  She understands prodromal symptoms.  She uses acyclovir as needed.    Allergies:  This is a chronic controlled issue, new to me.  Patient has a history of seasonal allergies and a history of atopic allergies.  She states once moving to [place] her allergies have not been bothering her as much.  She uses nasal spray as needed.    Fleabites:  This is an acute issue.  Patient states that bug bites on her lower legs.  She had her dogs treated for fleabites and she is washed all her sheets and clean her  home.  She states that they are itchy however it is improving.  She denies any systemic issues.    Past Medical History:   Diagnosis Date   • CF (cystic fibrosis) (HCC)    • Herpes simplex    • Shoulder fracture        Social History   Substance Use Topics   • Smoking status: Never Smoker   • Smokeless tobacco: Never Used   • Alcohol use No       Current Outpatient Prescriptions Ordered in [facility]   Medication Sig Dispense Refill   • acyclovir (ZOVIRAX) 400 MG tablet Take 1 tablet orally 4 times a day for 14 days, then twice daily for 3 months 90 Tab 3   • multivitamin (THERAGRAN) Tab Take 1 Tab by mouth every day.     • magnesium oxide (MAG-OX) 400 MG Tab Take 400 mg by mouth 2 times a day.     • Ascorbic Acid (VITAMIN C) 1000 MG Tab Take 1,000 mg by mouth.     • Probiotic Product (PROBIOTIC-10 PO) Take  by mouth.     • phytonadione (MEPHYTON) 5 MG Tab Take 5-10 mg by mouth every day. Daily dose is 5 mg but at times she takes an extra dose     • sodium chloride 3% 3 % nebulizer solution 3 mL by Nebulization route 2 Times a Day. 4 ml mixed with 4 ml of the 7% sodium chloride     • Vitamin A 86946 UNIT Cap Take 25,000 Units by mouth every day.     • Cholecalciferol 5000 units Tab Take 20,000 Units by mouth every day.     • sodium chloride (HYPER-SAL) 7 % Nebu Soln 4 mL by Nebulization route 4 times a day. (Patient taking differently: 4 mL by Nebulization route 2 Times a Day. Mixes with 4 ml's of the 3% saline) 1440 mL 3   • albuterol (PROVENTIL) 2.5mg/3ml Nebu Soln solution for nebulization 3 mL by Nebulization route 2 Times a Day. 450 mL 3   • CREON 6000 units Cap DR Particles Take 1-2 Caps by mouth 4 times a day as needed. With meals or snacks 540 Cap 3   • Ivacaftor (KALYDECO) 150 MG Tab Take 150 mg by mouth 2 Times a Day for 90 days. 180 Tab 2   • ASCORBIC ACID PO Take 500 mg by mouth 2 Times a Day. Powder from     • fluticasone (FLONASE) 50 MCG/ACT nasal spray Spray 1 Spray in nose 1 time daily as needed.       No  "current [facility]-ordered facility-administered medications on file.        Allergies:  Levofloxacin and Tobramycin    ROS:  Gen: no fevers/chill, no changes in weight  Eyes: no changes in vision  ENT: no sore throat, no hearing loss, no bloody nose  Pulm: no sob, no cough  CV: no chest pain, no palpitations  GI: no nausea/vomiting, no diarrhea  : no dysuria  MSk: no myalgias  Skin: no rash, +bites  Neuro: no headaches, no numbness/tingling  Heme/Lymph: no easy bruising      Objective:       Exam:  /54 (BP Location: Left arm, Patient Position: Sitting, BP Cuff Size: Adult)   Pulse 84   Temp 37 °C (98.6 °F) (Temporal)   Resp 14   Ht 1.665 m (5' 5.55\")   Wt 59 kg (130 lb)   SpO2 95%   BMI 21.27 kg/m²  Body mass index is 21.27 kg/m².    Gen: Alert and oriented, No apparent distress.  Neck: Neck is supple without lymphadenopathy.  Lungs: Normal effort, CTA bilaterally, no wheezes, rhonchi, or rales  CV: Regular rate and rhythm. No murmurs, rubs, or gallops.               Ext: No clubbing, cyanosis, edema.    Assessment & Plan:     58 y.o. female with the following -     1. CF (3849+10k bC>T, I0516T) (HCC)  Continue surveillance with pulmonology.  Continue pulmonary toileting daily as well as medications.  [place] with infection control with her which patient is aware of.  Continue monitor.    2. Environmental allergies  Continue nasal saline and nasal spray as needed.  Continue monitor.    3. Herpes simplex  Patient to continue acyclovir as needed for herpes simplex orolabial outbreaks.  Continue monitor.    4. Need for hepatitis C screening test  Ordered today  - HEP C VIRUS ANTIBODY; Future    5. Osteopenia of multiple sites  Continue calcium and vitamin D supplementation.  Discussed weighted exercises.  Will repeat DEXA every 3 years.  Continue monitor.    Please note that this dictation was created using voice recognition software. I have made every reasonable attempt to correct obvious errors, but I " expect that there are errors of grammar and possibly content that I did not discover before finalizing the note.

## 2019-07-16 NOTE — TELEPHONE ENCOUNTER
0900 - [place] JIMÉNEZ MA called [employer] insurance this morning for prior authorization of bronchoscopy procedure.  Per Maraquia, no authorization is required for the bronchoscopy with sedation.  Reference # for this call is [id number].    1133 - Called [facility] surgery scheduling and spoke to Krystal to confirm when the surgery is blocked for.  [facility] states the bronch is blocked for 7/22/19 at 1000 in [facility] ([place] [address]).  Per Krystal, the bronch is scheduled to be with sedation, not anesthesia, so that will need to be clarified with Dr. Fleming whether sedation is okay or if pt needs anesthesia.    1200 - Confirmed with Dr. Fleming that bronchoscopy needs to be with sedation (anesthesia is not required).    1310 - Faxed Surgery Scheduling Form and Pre-Op orders to [facility] surgery scheduling per Dr. Fleming (see Media).    1320 - Called [facility] surgery scheduling and spoke to Temitope.  Confirmed per Dr. Fleming that sedation is needed for the bronchoscopy, not anesthesia, and explained that the forms were faxed to them this afternoon.  Per Temitope, the bronch will take place in [facility] ([place] [address]) on 7/22/19 at 1000.  Per Temitope, they will review the Surgery Scheduling Form and Pre-Op orders, and nothing else is needed from this office at this time.    1321 - Called RT and spoke to Sarah to set up equipment and a respiratory tech for the bronch.  Per Dr. Fleming, informed Sarah that the largest available bronchoscope will be needed for the procedure.  Provided pt's MRN to Sarah.  Sarah states they now have the bronch on their RT board for 7/22/19 at 1000 in [place] [address].  Per Sarah, RT does not need anything additional from office at this time.    Will await fax confirmation from [facility] and then contact pt with preparation instructions.

## 2019-07-17 NOTE — TELEPHONE ENCOUNTER
Call received from [facility] ([phone]) - where the bronchoscopy is taking place on Monday.  This RN asked where pt needs to go for check-in on Monday.  Per Endoscopy, the pt will need to check-in at [facility] Day Surgery ([facility]) two hours prior to the bronch.  Then endoscopy will come get the pt an hour before the procedure.  Explained that this RN is waiting for the confirmation from [facility] surgery scheduling which they said should be sent to this office before end of today.  Will follow up with [facility] on Friday to confirm all details are set up for bronch on Monday.

## 2019-07-17 NOTE — TELEPHONE ENCOUNTER
Scheduled for bronch on Monday. Please inform patient.   Soni working on all the required paperwork

## 2019-07-17 NOTE — TELEPHONE ENCOUNTER
Have not received confirmation from [facility] Surgery Scheduling yet for bronchoscopy on 7/22/19.  Called surgery scheduling and spoke to Krystal who states they have received the Surgery Scheduling Form and Pre-Op Orders that were sent to them yesterday, so they will review those and send confirmation to this office by the end of the day today.    Will update pt once confirmation is received from surgery scheduling.  Dr. Fleming updated and aware of status.

## 2019-07-18 NOTE — TELEPHONE ENCOUNTER
Pt has been notified of bronchoscopy procedure on 7/22/19 at 10:00am.  Please see telephone encounter by this RN dated 7/16/19 for further documentation regarding this bronchoscopy.

## 2019-07-18 NOTE — TELEPHONE ENCOUNTER
Per Dr. Fleming, pt can do a treatment of albuterol and vest, but she should NOT do sodium chloride or Pulmozyme the morning of the bronchoscopy.     Called pt back and notified her of Dr. Fleming's recommendations.  Pt verbalizes understanding and agrees with plan.  Encouraged pt to call this RN with any further questions or concerns between now and Monday.    Will follow up with [facility] tomorrow to confirm once more that all details are set for the bronch on Monday.

## 2019-07-18 NOTE — TELEPHONE ENCOUNTER
Received confirmation from [facility] surgery scheduling that bronchoscopy is scheduled for 7/22/19 at 1000 (see Media).    Called pt and notified her of bronch date and time.  Instructed pt to arrive 2 hours prior at 8:00am on Monday in [address] [facility].  Provided directions for how to get to that location, and pt verbalized understanding.  Instructed pt per Dr. Fleming to have nothing by mouth (nothing to eat or drink) after midnight the night before.  Pt verbalizes understanding of all information.  Pt asked if she should do any airway clearance treatments the morning of the bronch.  Told pt this RN will clarify this with Dr. Fleming and call her back.  Pt has no additional questions at this time.  Told pt that [facility] may be calling her for pre-registration information between now and Monday, but if she doesn't receive a call, they can complete this during her pre-admission that day.  Pt verbalizes understanding.

## 2019-07-19 ENCOUNTER — TELEPHONE (OUTPATIENT)
Dept: MEDICAL GROUP | Facility: LAB | Age: 59
End: 2019-07-19

## 2019-07-19 NOTE — TELEPHONE ENCOUNTER
Called [facility] to confirm all details are finalized.  Spoke to Cherie who states everything is set and ready to go for the pt's bronch on Monday.  Provided Dr. Fleming's phone number to Cherie per her request so they can contact Dr. Fleming when the pt is prepped and ready on Monday.    Nothing else needed for bronch from this office at this time.

## 2019-07-22 ENCOUNTER — HOSPITAL ENCOUNTER (OUTPATIENT)
Facility: MEDICAL CENTER | Age: 59
End: 2019-07-22
Attending: PEDIATRICS | Admitting: PEDIATRICS
Payer: COMMERCIAL

## 2019-07-22 VITALS
TEMPERATURE: 98.2 F | WEIGHT: 128.09 LBS | OXYGEN SATURATION: 98 % | RESPIRATION RATE: 24 BRPM | BODY MASS INDEX: 20.96 KG/M2 | HEART RATE: 65 BPM

## 2019-07-22 LAB
CYTOLOGY REG CYTOL: NORMAL
GRAM STN SPEC: NORMAL
RHODAMINE-AURAMINE STN SPEC: NORMAL
SIGNIFICANT IND 70042: NORMAL
SIGNIFICANT IND 70042: NORMAL
SITE SITE: NORMAL
SITE SITE: NORMAL
SOURCE SOURCE: NORMAL
SOURCE SOURCE: NORMAL

## 2019-07-22 PROCEDURE — 99152 MOD SED SAME PHYS/QHP 5/>YRS: CPT | Performed by: PEDIATRICS

## 2019-07-22 PROCEDURE — 87077 CULTURE AEROBIC IDENTIFY: CPT

## 2019-07-22 PROCEDURE — 87015 SPECIMEN INFECT AGNT CONCNTJ: CPT

## 2019-07-22 PROCEDURE — 88305 TISSUE EXAM BY PATHOLOGIST: CPT

## 2019-07-22 PROCEDURE — 99153 MOD SED SAME PHYS/QHP EA: CPT | Performed by: PEDIATRICS

## 2019-07-22 PROCEDURE — 302978 HCHG BRONCHOSCOPY-DIAGNOSTIC

## 2019-07-22 PROCEDURE — 31624 DX BRONCHOSCOPE/LAVAGE: CPT | Performed by: PEDIATRICS

## 2019-07-22 PROCEDURE — 87102 FUNGUS ISOLATION CULTURE: CPT

## 2019-07-22 PROCEDURE — 87186 SC STD MICRODIL/AGAR DIL: CPT

## 2019-07-22 PROCEDURE — 700111 HCHG RX REV CODE 636 W/ 250 OVERRIDE (IP): Performed by: PEDIATRICS

## 2019-07-22 PROCEDURE — 88112 CYTOPATH CELL ENHANCE TECH: CPT

## 2019-07-22 PROCEDURE — 87116 MYCOBACTERIA CULTURE: CPT

## 2019-07-22 PROCEDURE — 160048 HCHG OR STATISTICAL LEVEL 1-5: Performed by: PEDIATRICS

## 2019-07-22 PROCEDURE — 87206 SMEAR FLUORESCENT/ACID STAI: CPT

## 2019-07-22 PROCEDURE — 160002 HCHG RECOVERY MINUTES (STAT): Performed by: PEDIATRICS

## 2019-07-22 PROCEDURE — 87205 SMEAR GRAM STAIN: CPT

## 2019-07-22 PROCEDURE — 87070 CULTURE OTHR SPECIMN AEROBIC: CPT

## 2019-07-22 PROCEDURE — 87181 SC STD AGAR DILUTION PER AGT: CPT

## 2019-07-22 RX ORDER — MIDAZOLAM HYDROCHLORIDE 1 MG/ML
INJECTION INTRAMUSCULAR; INTRAVENOUS
Status: DISCONTINUED | OUTPATIENT
Start: 2019-07-22 | End: 2019-07-22 | Stop reason: HOSPADM

## 2019-07-22 RX ORDER — MIDAZOLAM HYDROCHLORIDE 1 MG/ML
.5-2 INJECTION INTRAMUSCULAR; INTRAVENOUS PRN
Status: DISCONTINUED | OUTPATIENT
Start: 2019-07-22 | End: 2019-07-22 | Stop reason: HOSPADM

## 2019-07-22 RX ORDER — SODIUM CHLORIDE 9 MG/ML
500 INJECTION, SOLUTION INTRAVENOUS
Status: DISCONTINUED | OUTPATIENT
Start: 2019-07-22 | End: 2019-07-22 | Stop reason: HOSPADM

## 2019-07-22 RX ORDER — SODIUM CHLORIDE, SODIUM LACTATE, POTASSIUM CHLORIDE, CALCIUM CHLORIDE 600; 310; 30; 20 MG/100ML; MG/100ML; MG/100ML; MG/100ML
INJECTION, SOLUTION INTRAVENOUS CONTINUOUS
Status: CANCELLED | OUTPATIENT
Start: 2019-07-22 | End: 2019-07-22

## 2019-07-22 NOTE — DISCHARGE INSTRUCTIONS
Bronchoscopy Discharge Instructions  Home Care Instructions    ACTIVITY: Rest and take it easy for the first 24 hours.  A responsible adult is recommended to remain with you during that time.  It is normal to feel sleepy.  We encourage you to not do anything that requires balance, judgment or coordination.    The medicine you had during the bronchoscopy will make you sleepy.    FOR 24 HOURS DO NOT:  Drive, operate machinery or run household appliances.  Drink beer or alcoholic beverages.  Make important decisions or sign legal documents.  Engage in activity that requires sharp judgment and reflexes for 24 hours    SPECIAL INSTRUCTIONS: none    Bronchoscopy is a procedure to look inside your windpipe and bronchial tubes.  An anesthetic solution is sprayed in your throat to make it numb.    You may experience a mild sore throat, hoarseness, fever up to 101?F, and /or coughing up small amounts of blood immediately following your bronchoscopy, especially if a biopsy was performed.  The discomfort should subside in 24-48 hours.    Do not smoke for 6-8 hours after the procedure to decrease your risk of coughing and /or bleeding.    Do not drink fluids or eat until your gag reflex returns, for two hours after the bronchoscopy.  Otherwise you will not feel the food or fluid in your throat, and it may go down your windpipe and cause you to choke.    Take ice chips or slowly sip cool fluids to make sure your gag reflex has returned.  Avoid hot fluids from the microwave for several hours.    After 2 hours or when you get home you may take throat lozenges or gargle with salt water if your throat is sore.  Drink liquids to help dryness in your mouth and throat.    Resume your normal activities the following day.    MEDICATIONS: Resume taking daily medication as directed by your doctor.  Other Medications: none  A follow-up appointment should be arranged with your doctor in 1 week to get the results of the bronchoscopy  and any tests done during the procedure; call to schedule. [phone]     You should CALL YOUR PHYSICIAN if you develop:  Fever greater than 101?F  You cough up more than a teaspoon of blood other than blood-tinged mucus  You have increasing amounts of bleeding from coughing after the bronchoscopy  You are wheezing  You develop any unusual signs or symptoms or have any questions                You should call 911 if you develop problems with breathing or chest pain.    If you are unable to contact your doctor or surgical center, you should go to the nearest emergency room or urgent care center.  Physician's telephone #: [phone]      If any questions arise, call your doctor.  If your doctor is not available, please feel free to call the Surgical Center at Surgical Dept Numbers [phone].  The Center is open Monday through Friday from 7AM to 7PM.  You can also call the [facility] HOTLINE open 24 hours/day, 7 days/week and speak to a nurse at [phone], or toll free at [phone].    You may receive a survey in the mail within the next two weeks and we ask that you take a few moments to complete the survey and return it to us.  Our goal is to provide you with very good care and we value your comments.

## 2019-07-22 NOTE — OR NURSING
1042-Received from OR via [facility]. S/P Bronchoscopy.  Bedside report received from RN. And Anesthesiologist.    1120-sitting up drinking water. Denies pain or sob. vss.    1140--meets discharge criteria. Iv removed. Instructions explained to  and patient.  All questions answered. Discharged home with responsible party. Escorted to car via wheelchair.

## 2019-07-22 NOTE — PROCEDURES
Flexible Bronchoscopy Procedure Note      Name: Kenia Soto MRN: [id number]   : 1960 Bed/Room: RPERIPOOL/NONE   Date: 2019 Time: 11:54 AM       Procedure Type: Flexible Bronchoscopy, with Bronchoalveolar lavage (BAL)    Location procedure performed: RPERIPOOL/NONE    Indication: evaluation for infection, to obtain fluid or tissue for diagnostic testing and to assess airway anatomy    Procedure Date/Time: 2019 at 11:54 AM     1: Arelis Fleming M.D.      Time Out Performed: Yes   All team members actively participated in the time out before the start of the procedure identifying the correct patient, correct site, and the correct procedure to be done.     Consent: Written consent obtained from patient after procedure discussed, risks and benefits of procedure discussed.    Anagelsia/Sedation/Meds: Sedation and/or analgesia provided- See MAR: Sedation and/or analgesia provided by separate service-please review the notes.     Description of Procedure/Findings:  The patient was in stable condition prior to the procedure. The patient was not on oxygen supplementation. The patient was not on ventilatory support prior to the procedure. The patient was NPO per protocol prior to the procedure. The patient was positioned and prepped according to protocol.   O2 supplementation was administered during the procedure by Nasal Cannula.     Bronchoscopy was performed with a Video Bronchoscope 10v. The bronchoscope was passed through the transnasal - right without difficulty.  The nasopharynx showed no abnormalities. The supraglottis showed no abnormalities. The glottis showed no abnormalities with mobile vocal cords. The bronchoscope was passed through the vocal cords without difficulty. The subglottis showed no abnormality. The trachea and nani had no abnormalities. Examination of the right and left mainstem bronchi and all lobal and segmental bronchi revealed the following notable findings:     1.  Normal anatomy  2. Moderate amount of thick yellow mucus with occasional old dried blood mixed with mucus seen, easy to suction  3. No active bleeding seen    Topical anesthesia was instilled onto the vocal cords with 3 mL of 2% lidocaine.     BAL:   Bronchoalveolar lavage (BAL) was performed with the bronchoscope wedged in the RLL and LLL. Number of lavages: 2. Approximately 20 mL of nonbacteriostatic saline was instilled and 2 mL was recovered via aspiration into a specimen trap by suction. The BAL specimen was set aside to be sent to the laboratory for appropriate studies at the end of the procedure.      SaO2 was monitored and there were no no desaturations below 90% SaO22 during the procedure. At the conclusion of the procedure, the bronchoscope was removed. Condition after procedure was stable. Oxygen was weaned off to keep SaO2 greater than or equal to 92%.    Dr. Fleming spoke with the patient about the findings. The patient and family were provided with instructions as to possible symptoms that might be experienced after the procedure and how to reach a pulmonary physician on call.    Estimated Blood Loss: <1mL    Complications: None, Bleeding estimated <1mL total    Specimens Collected/Tests Ordered: BAL fluid    Disposition: After procedure, the patient was discharged to home      Arelis Fleming M.D.

## 2019-07-23 ENCOUNTER — PATIENT MESSAGE (OUTPATIENT)
Dept: PEDIATRIC PULMONOLOGY | Facility: MEDICAL CENTER | Age: 59
End: 2019-07-23

## 2019-07-23 NOTE — TELEPHONE ENCOUNTER
From: Kenia Soto  To: Arelis Fleming M.D.  Sent: 7/23/2019 11:01 AM PDT  Subject: Procedure Question    Hi Dr. Fleming,    Since I don't remember anything after the bronch, I was wondering if you found anything that could be causing my bleeds!    Thanks,  Marina Soto

## 2019-07-24 ENCOUNTER — HOSPITAL ENCOUNTER (OUTPATIENT)
Dept: LAB | Facility: MEDICAL CENTER | Age: 59
End: 2019-07-24
Attending: PEDIATRICS
Payer: COMMERCIAL

## 2019-07-24 ENCOUNTER — HOSPITAL ENCOUNTER (OUTPATIENT)
Dept: LAB | Facility: MEDICAL CENTER | Age: 59
End: 2019-07-24
Attending: FAMILY MEDICINE
Payer: COMMERCIAL

## 2019-07-24 DIAGNOSIS — E84.9 CF (CYSTIC FIBROSIS) (HCC): ICD-10-CM

## 2019-07-24 DIAGNOSIS — Z11.59 NEED FOR HEPATITIS C SCREENING TEST: ICD-10-CM

## 2019-07-24 LAB
25(OH)D3 SERPL-MCNC: 54 NG/ML (ref 30–100)
ALBUMIN SERPL BCP-MCNC: 4 G/DL (ref 3.2–4.9)
ALBUMIN/GLOB SERPL: 1.5 G/DL
ALP SERPL-CCNC: 125 U/L (ref 30–99)
ALT SERPL-CCNC: 19 U/L (ref 2–50)
ANION GAP SERPL CALC-SCNC: 8 MMOL/L (ref 0–11.9)
APTT PPP: 42.9 SEC (ref 24.7–36)
AST SERPL-CCNC: 19 U/L (ref 12–45)
BASOPHILS # BLD AUTO: 1.4 % (ref 0–1.8)
BASOPHILS # BLD: 0.07 K/UL (ref 0–0.12)
BILIRUB SERPL-MCNC: 0.4 MG/DL (ref 0.1–1.5)
BUN SERPL-MCNC: 12 MG/DL (ref 8–22)
CALCIUM SERPL-MCNC: 9.3 MG/DL (ref 8.5–10.5)
CHLORIDE SERPL-SCNC: 105 MMOL/L (ref 96–112)
CHOLEST SERPL-MCNC: 154 MG/DL (ref 100–199)
CO2 SERPL-SCNC: 26 MMOL/L (ref 20–33)
CREAT SERPL-MCNC: 0.72 MG/DL (ref 0.5–1.4)
EOSINOPHIL # BLD AUTO: 0.3 K/UL (ref 0–0.51)
EOSINOPHIL NFR BLD: 6 % (ref 0–6.9)
ERYTHROCYTE [DISTWIDTH] IN BLOOD BY AUTOMATED COUNT: 45.1 FL (ref 35.9–50)
EST. AVERAGE GLUCOSE BLD GHB EST-MCNC: 128 MG/DL
FASTING STATUS PATIENT QL REPORTED: NORMAL
GGT SERPL-CCNC: 12 U/L (ref 7–34)
GLOBULIN SER CALC-MCNC: 2.7 G/DL (ref 1.9–3.5)
GLUCOSE SERPL-MCNC: 106 MG/DL (ref 65–99)
HBA1C MFR BLD: 6.1 % (ref 0–5.6)
HCT VFR BLD AUTO: 42.7 % (ref 37–47)
HCV AB SER QL: NEGATIVE
HDLC SERPL-MCNC: 38 MG/DL
HGB BLD-MCNC: 13.8 G/DL (ref 12–16)
IMM GRANULOCYTES # BLD AUTO: 0.01 K/UL (ref 0–0.11)
IMM GRANULOCYTES NFR BLD AUTO: 0.2 % (ref 0–0.9)
INR PPP: 1.05 (ref 0.87–1.13)
LDLC SERPL CALC-MCNC: 90 MG/DL
LYMPHOCYTES # BLD AUTO: 1.49 K/UL (ref 1–4.8)
LYMPHOCYTES NFR BLD: 29.8 % (ref 22–41)
MCH RBC QN AUTO: 29.9 PG (ref 27–33)
MCHC RBC AUTO-ENTMCNC: 32.3 G/DL (ref 33.6–35)
MCV RBC AUTO: 92.4 FL (ref 81.4–97.8)
MONOCYTES # BLD AUTO: 0.62 K/UL (ref 0–0.85)
MONOCYTES NFR BLD AUTO: 12.4 % (ref 0–13.4)
NEUTROPHILS # BLD AUTO: 2.51 K/UL (ref 2–7.15)
NEUTROPHILS NFR BLD: 50.2 % (ref 44–72)
NRBC # BLD AUTO: 0 K/UL
NRBC BLD-RTO: 0 /100 WBC
PLATELET # BLD AUTO: 356 K/UL (ref 164–446)
PMV BLD AUTO: 10.3 FL (ref 9–12.9)
POTASSIUM SERPL-SCNC: 4.2 MMOL/L (ref 3.6–5.5)
PROT SERPL-MCNC: 6.7 G/DL (ref 6–8.2)
PROTHROMBIN TIME: 14 SEC (ref 12–14.6)
RBC # BLD AUTO: 4.62 M/UL (ref 4.2–5.4)
SODIUM SERPL-SCNC: 139 MMOL/L (ref 135–145)
TRIGL SERPL-MCNC: 131 MG/DL (ref 0–149)
TSH SERPL DL<=0.005 MIU/L-ACNC: 2.3 UIU/ML (ref 0.38–5.33)
WBC # BLD AUTO: 5 K/UL (ref 4.8–10.8)

## 2019-07-24 PROCEDURE — 85730 THROMBOPLASTIN TIME PARTIAL: CPT

## 2019-07-24 PROCEDURE — 80053 COMPREHEN METABOLIC PANEL: CPT

## 2019-07-24 PROCEDURE — 36415 COLL VENOUS BLD VENIPUNCTURE: CPT

## 2019-07-24 PROCEDURE — 82977 ASSAY OF GGT: CPT

## 2019-07-24 PROCEDURE — 82951 GLUCOSE TOLERANCE TEST (GTT): CPT

## 2019-07-24 PROCEDURE — 85025 COMPLETE CBC W/AUTO DIFF WBC: CPT

## 2019-07-24 PROCEDURE — 84443 ASSAY THYROID STIM HORMONE: CPT

## 2019-07-24 PROCEDURE — 86803 HEPATITIS C AB TEST: CPT

## 2019-07-24 PROCEDURE — 83525 ASSAY OF INSULIN: CPT | Mod: 91

## 2019-07-24 PROCEDURE — 82952 GTT-ADDED SAMPLES: CPT

## 2019-07-24 PROCEDURE — 82306 VITAMIN D 25 HYDROXY: CPT

## 2019-07-24 PROCEDURE — 85610 PROTHROMBIN TIME: CPT

## 2019-07-24 PROCEDURE — 80061 LIPID PANEL: CPT

## 2019-07-24 PROCEDURE — 84446 ASSAY OF VITAMIN E: CPT

## 2019-07-24 PROCEDURE — 84590 ASSAY OF VITAMIN A: CPT

## 2019-07-24 PROCEDURE — 83036 HEMOGLOBIN GLYCOSYLATED A1C: CPT

## 2019-07-26 LAB
GLUCOSE 1H P CHAL SERPL-MCNC: 204 MG/DL (ref 65–199)
GLUCOSE 2H P CHAL SERPL-MCNC: 222 MG/DL (ref 65–139)
GLUCOSE BS SERPL-MCNC: 108 MG/DL (ref 65–99)
INSULIN 1H P CHAL SERPL-ACNC: 34 UIU/ML (ref 29–88)
INSULIN 2H P CHAL SERPL-ACNC: 64 UIU/ML (ref 22–79)
INSULIN P FAST SERPL-ACNC: 5 UIU/ML (ref 3–19)

## 2019-07-27 LAB
A-TOCOPHEROL VIT E SERPL-MCNC: 11.9 MG/L (ref 5.5–18)
ANNOTATION COMMENT IMP: NORMAL
BACTERIA SPEC RESP CULT: ABNORMAL
BETA+GAMMA TOCOPHEROL SERPL-MCNC: 0.2 MG/L (ref 0–6)
ETEST SENSITIVITY ETEST: NORMAL
GRAM STN SPEC: ABNORMAL
RETINYL PALMITATE SERPL-MCNC: 0.03 MG/L (ref 0–0.1)
SIGNIFICANT IND 70042: ABNORMAL
SITE SITE: ABNORMAL
SOURCE SOURCE: ABNORMAL
VIT A SERPL-MCNC: 0.46 MG/L (ref 0.3–1.2)

## 2019-08-01 ENCOUNTER — TELEPHONE (OUTPATIENT)
Dept: PEDIATRIC PULMONOLOGY | Facility: MEDICAL CENTER | Age: 59
End: 2019-08-01

## 2019-08-01 DIAGNOSIS — R73.02 IMPAIRED GLUCOSE TOLERANCE: ICD-10-CM

## 2019-08-01 NOTE — TELEPHONE ENCOUNTER
----- Message from Arelis Fleming M.D. sent at 7/31/2019  3:00 PM PDT -----  Ok lets discuss this at the team meeting  ----- Message -----  From: Little Bender R.D.  Sent: 7/31/2019  12:30 PM PDT  To: Arelis Fleming M.D., Soni Sawyer R.N.    Since her two hour glucose was over 200 I would recommend we refer her to [facility].  We can give her a blood glucose meter (Soni, do you remember how to teach this?) so she can start checking at home and then she will have more info for endo.  Maybe I can reach out to [facility]on MD's and ask what they prefer?  We could discuss at Team meeting on Thursday.      ----- Message -----  From: Arelis Fleming M.D.  Sent: 7/31/2019   9:40 AM PDT  To: Soni Sawyer R.N., Little Bender R.D.    Normal vit levels. Impaired glucose tolerance test. Will need to monitor glucose levels at home.

## 2019-08-01 NOTE — TELEPHONE ENCOUNTER
Per Dr. Fleming, pt will be referred to endocrinology for further evaluation of impaired glucose tolerance.  In the meantime, pt will be given a glucose monitor from this office to check her blood sugars at home before seeing endocrinology.  Dr. Fleming spoke with endocrinologist Dr. Torres who recommended pt check her sugars fasting in the morning and 2 hours post meal (breakfast, lunch, or dinner).  Pt needs to check sugars like this every day for several days and then at least 2-3 days per week after that until she sees Dr. Torres.  Per Dr. Fleming, pt should schedule a follow up with the CF team in August, and during that appt she will be given the glucose monitor and instructions for its use.    Called pt to discuss all above information but she did not answer.  Left a message asking pt to call this RN back to discuss.  Will continue to follow up.

## 2019-08-02 ENCOUNTER — TELEPHONE (OUTPATIENT)
Dept: PEDIATRIC PULMONOLOGY | Facility: MEDICAL CENTER | Age: 59
End: 2019-08-02

## 2019-08-02 DIAGNOSIS — R04.2 HEMOPTYSIS: ICD-10-CM

## 2019-08-02 NOTE — TELEPHONE ENCOUNTER
"RD spoke with Triny re: her concerns for dehydration.  Triny states that she drinks over a gallon of water per day but she does not feel hydrated, she feels she needs more.    She used to get \"lung bleeds\" when she previously lived in a dry climate.  She understands that it is very dry here in [place].  She has also had some nose bleeds.   Triny tried taking her 's salt tablets, but they made her feel sick/shaky.   Triny does not really eat salty food.  She does not add salt to her food.    She may be drinking too much water and not enough salt.  Salt tablets may have been hard on her stomach?    She does not want to drink sports drinks because of the results of her OGTT; she is afraid she has DM now.    Pt should be able to stay hydrated with 2500 - 3000 mL fluids per day. Suggest she eat high potassium fruits every day, and add 1/4 teaspoon of salt BID (can mix in small amount of water and drink). This would add 1200 mg of salt to her diet.   Discussed higher salt foods that she does like and encouraged them.   Triny will call if she has any questions/concerns.    RD will follow up with her next week when she comes to clinic to learn SMBG.   "

## 2019-08-02 NOTE — TELEPHONE ENCOUNTER
"Message received this morning from pt who states she made an appt with the endocrinologist which is at the end of November.  Pt states she also wanted to inform Dr. Fleming that she has had \"3 small lung bleeds since the bronch\" and had one again last night.  Pt reports \"it feels like they're coming from the upper part of my chest.\"  Pt states \"it feels like what happens when I get dehydrated.\"  Pt asked if she could talk with Little KRAUSE RD regarding how to stay hydrated because she currently drinks \"over a gallon of water a day\" and is still dehydrated.  Pt also asked if it is possible to get an \"over the counter glucose meter\" or a Rx for one.  Pt states she has already spent max out-of-pocket for her insurance.  Pt would like to be able to start testing her blood glucose because she is very curious what her levels are.    Discussed in detail with Dr. Fleming and Little KRAUSE RD.  Per Dr. Fleming, pt will be referred to ENT for further evaluation of her bleeds.  Pt could be scheduled for an appt next week with Dr. Fleming instead of at the end of the month to get the glucose monitor and education then instead of needing a Rx for the glucose monitor.  Little will also discuss salt supplementation with pt regarding dehydration.    Called pt back and discussed Dr. Fleming's recommendations in detail.  Pt would like to change her appt to next week and cancel the appt on 8/29/19 so she can get the glucose monitor sooner.  Will update CF team.  Pt was also pleased about the ENT referral.  Pt had no further questions/concerns for this RN.  Transferred call to Little KRAUSE RD so pt could discuss her questions about dehydration with her.  "

## 2019-08-08 ENCOUNTER — OFFICE VISIT (OUTPATIENT)
Dept: PEDIATRIC PULMONOLOGY | Facility: MEDICAL CENTER | Age: 59
End: 2019-08-08
Payer: COMMERCIAL

## 2019-08-08 ENCOUNTER — HOSPITAL ENCOUNTER (OUTPATIENT)
Facility: MEDICAL CENTER | Age: 59
End: 2019-08-08
Attending: PEDIATRICS
Payer: COMMERCIAL

## 2019-08-08 VITALS
BODY MASS INDEX: 21.63 KG/M2 | HEIGHT: 65 IN | OXYGEN SATURATION: 97 % | TEMPERATURE: 97.7 F | HEART RATE: 96 BPM | RESPIRATION RATE: 10 BRPM | WEIGHT: 129.85 LBS

## 2019-08-08 DIAGNOSIS — E84.0 CYSTIC FIBROSIS WITH PULMONARY MANIFESTATIONS (HCC): ICD-10-CM

## 2019-08-08 DIAGNOSIS — R73.02 IMPAIRED GLUCOSE TOLERANCE: ICD-10-CM

## 2019-08-08 DIAGNOSIS — R04.2 HEMOPTYSIS: ICD-10-CM

## 2019-08-08 DIAGNOSIS — Z22.8 CARRIER OF OTHER INFECTIOUS DISEASES: ICD-10-CM

## 2019-08-08 DIAGNOSIS — K86.81 EXOCRINE PANCREATIC INSUFFICIENCY: ICD-10-CM

## 2019-08-08 LAB
GRAM STN SPEC: NORMAL
SIGNIFICANT IND 70042: NORMAL
SITE SITE: NORMAL
SOURCE SOURCE: NORMAL

## 2019-08-08 PROCEDURE — 94010 BREATHING CAPACITY TEST: CPT | Performed by: PEDIATRICS

## 2019-08-08 PROCEDURE — 99215 OFFICE O/P EST HI 40 MIN: CPT | Mod: 25 | Performed by: PEDIATRICS

## 2019-08-08 PROCEDURE — 87116 MYCOBACTERIA CULTURE: CPT

## 2019-08-08 PROCEDURE — 87070 CULTURE OTHR SPECIMN AEROBIC: CPT

## 2019-08-08 PROCEDURE — 87205 SMEAR GRAM STAIN: CPT

## 2019-08-08 PROCEDURE — 87102 FUNGUS ISOLATION CULTURE: CPT

## 2019-08-08 PROCEDURE — 87206 SMEAR FLUORESCENT/ACID STAI: CPT

## 2019-08-08 PROCEDURE — 87181 SC STD AGAR DILUTION PER AGT: CPT

## 2019-08-08 PROCEDURE — 99401 PREV MED CNSL INDIV APPRX 15: CPT | Mod: 25 | Performed by: PEDIATRICS

## 2019-08-08 PROCEDURE — 87015 SPECIMEN INFECT AGNT CONCNTJ: CPT

## 2019-08-08 RX ORDER — AZTREONAM 75 MG/ML
75 KIT INHALATION
COMMUNITY
Start: 2019-06-25 | End: 2020-04-19

## 2019-08-08 RX ORDER — SODIUM CHLORIDE FOR INHALATION 3 %
4 VIAL, NEBULIZER (ML) INHALATION
Qty: 250 ML | Refills: 2 | Status: SHIPPED | OUTPATIENT
Start: 2019-08-08 | End: 2019-09-07

## 2019-08-08 RX ORDER — IVACAFTOR 150 MG/1
TABLET, FILM COATED ORAL
COMMUNITY
Start: 2019-07-30 | End: 2019-12-20 | Stop reason: SDUPTHER

## 2019-08-08 NOTE — PROGRESS NOTES
"Nutrition Screen & Progress Note for Adult CF Clinic  BMI: 21.5       Points: 1  IBW (kg): 56.8  % IBW: 104       Points: 0  Current weight (kg): 58.9   Weight last clinic visit: 59.4 kg on 6/13/19  % weight change: down 0.5 kg    Points: 0  FEV1 % predicted: 67 (up from 62)    Points: 1             Total Points: 2             Nutrition Risk: moderate      BM: 1-2 per day, soft  PERT: Creon 6 (2 with B, 1 with L, 1-2 with D, 0-1 with snacks) = avg of 7-8 per day  Lipase units/kg/meal: 102 - 204  CFTR modulator: Kalydeco  Typical diet: 3 meals and 0-2 snacks  Vitamins: General MVi, magnesium (400 mg), vitamin D (20,000 IU), vitamin K (5 mg), vitamin A (10,000 IU), \"bone health\" supplement with extra K  Calorie supplements: -  Visit details: Kenia is here today with her  for CF follow up and to learn how to check her blood sugars at home.    She is now drinking a little less water, has added high K+ fruits daily and is now adding salt.  She is doing much better with this and finally feels hydrated. She does not eat a lot of salt, so she is taking Nuun and also a salt tablet with her breakfast.    Has not had a \"lung bleed\" since last Thursday, so she is happy.    Will see the endocrinologist in November.  Leaving for [place] on Saturday.   Kenia looks great today.  Weight slightly down, but BMI is good.  She has been trying to be more active now that they are settling into their [place] home.    Mariel, RN/CDE from the hospital came over to meet with Triny and teach her SMBG with the Contour Next One meter.    Triny will check her blood sugar fasting in the morning, and then 2 hours after a meal (rotate which meal).  She will do this for a few days and then reduce to only checking a few times per week (per endo).  She will modify the times of the day that she checks so she has good data for endo when she sees them.    Recommendations/Plan: continue with adequate diet for weight maintenance, increase physical " activity and start SMBG.    Follow-up: 2-3 months    Time spent: 27 minutes

## 2019-08-08 NOTE — PROGRESS NOTES
CC: follow up cystic fibrosis    PCP:  Kelin Donohue M.D.   [address] [id number] / [place] [id number]     SUBJECTIVE:   Kenia Soto is a 58 y.o. female with Cystic Fibrosis, accompanied by her mother.    Patient Active Problem List    Diagnosis Date Noted   • Herpes simplex 07/16/2019   • Osteopenia of multiple sites 07/16/2019   • CF (3849+10k bC>T, E7933Z) ([facility]) 06/13/2019   • Exocrine pancreatic insufficiency 06/13/2019   • Hemoptysis 06/13/2019   • Vitamin K deficiency 06/13/2019   • Pseudomonas respiratory infection 06/13/2019       Since the last CF clinic visit chief complaint:  Kenia has experienced no problems. No hemoptysis since last week. She is taking more electrolytes now instead of just water and taking salt tablets with food as well.      Last hospitalization: [7/22/19]    Respiratory:   Cough frequency: episodic, baseline  Cough character: productive  Sputum quantity: baseline  Sputum color: clear  Shortness of breath:never  Chest Pain:never  Hemoptysis:never   Doctor visits: none   Antibiotics:none  Pulmonary toilet:   Albuterol: 2/day  7% hypertonic saline: 2/day, mixing 7% and 3% hypertonic to make 8ml bid  Pulmozyme: 1/day  Chest Physiotherapy: Vest: 2/day and aerobika  Oxygen: none  Respiratory assist: none    Compliance: compliant most of the time     Sinus symptoms:  Nasal Congestion: never   Nasal Drainage: clear nasal discharge  Headache/sinus pressure: never   Treatments: does sinus rinses bid    Activity / Energy: normal for age   Change in activity/energy: none   Emotional assessment: positive  Going to [place] this weekend, very excited     GI: no problem   Appetite: normal  Enzymes:  daily  Stool: 1-2/day, characteristics: formed  G-Tube: No      Medications:     Current Outpatient Medications:   •  Multiple Vitamins-Minerals (DEKAS PLUS PO), 1 Cap, Oral, BID, Taking  •  CAYSTON, , Taking  •  KALYDECO, , Taking  •  sodium chloride 3%, 4 mL, Nebulization, BID  •   acyclovir, Take 1 tablet orally 4 times a day for 14 days, then twice daily for 3 months, PRN  •  multivitamin, 1 Tab, Oral, DAILY, Taking  •  magnesium oxide, 400 mg, Oral, BID, Taking  •  Vitamin C, Take 1,000 mg by mouth., Taking  •  Probiotic Product (PROBIOTIC-10 PO), Take  by mouth., Taking  •  phytonadione, 5-10 mg, Oral, DAILY, Taking  •  sodium chloride 3%, 3 mL, Nebulization, BID, Taking  •  Vitamin A, 25,000 Units, Oral, DAILY, Taking  •  Cholecalciferol, 20,000 Units, Oral, DAILY, Taking  •  sodium chloride, 4 mL, Nebulization, 4X/DAY (Patient taking differently: 4 mL, Nebulization, 2 TIMES DAILY, Mixes with 4 ml's of the 3% saline), Taking  •  albuterol, 2.5 mg, Nebulization, BID, Taking  •  CREON, 1-2 Cap, Oral, 4X/DAY PRN, Taking  •  fluticasone, 1 Spray, Nasal, QDAY PRN, PRN  Other Medications: none  Central Line: none    ALLERGIES:  Levofloxacin and Tobramycin    Review of System:  All other systems reviewed and negative    Ears, nose, mouth, throat, and face: negative  Cardiovascular: Negative  Gastrointestinal: Negative  Allergic/Immunologic: negative         Social/Environmental:  Social History     Tobacco Use   • Smoking status: Never Smoker   • Smokeless tobacco: Never Used   Substance Use Topics   • Alcohol use: Yes     Comment: Socially   • Drug use: No       Home Environment       Pet Exposures   • Dogs Yes    • Reptiles Yes        Tobacco use: never  Pets: none    Past Medical History:  Past Medical History:   Diagnosis Date   • Breath shortness    • Bronchitis    • CF (cystic fibrosis) ([facility])    • Coughing blood    • Hemorrhagic disorder ([facility])    • Herpes simplex    • Pneumonia    • Shoulder fracture      Respiratory hospitalizations: [7/22/19]      Past surgical History:  Past Surgical History:   Procedure Laterality Date   • BRONCHOSCOPY-ENDO  7/22/2019    Procedure: BRONCHOSCOPY - W/BAL;  Surgeon: Arelis Fleming M.D.;  Location: [facility];  Service: Ent   • DENTAL  "SURGERY     • SINUS WASHING           Family History:   Family History   Problem Relation Age of Onset   • Other Daughter         CF carrier   • Alcohol/Drug Father    • Heart Disease Father    • Cystic Fibrosis Sister        OBJECTIVE:  Physical Exam:  Pulse 96   Temp 36.5 °C (97.7 °F) (Temporal)   Resp (!) 10   Ht 1.656 m (5' 5.2\")   Wt 58.9 kg (129 lb 13.6 oz)   SpO2 97%   BMI 21.48 kg/m²      GENERAL: well appearing, well nourished, no respiratory distress and normal affect   EYES: PERRL, EOMI, normal conjunctiva  EARS: bilateral TM's and external ear canals normal   NOSE: no audible congestion and no discharge   MOUTH/THROAT: normal oropharynx   NECK: normal   CHEST: no chest wall deformities and normal A-P diameter   LUNGS: clear to auscultation and normal air exchange   HEART: regular rate and rhythm and no murmurs   ABDOMEN: soft, non-tender, non-distended and no hepatosplenomegaly  : not examined  BACK: not examined   SKIN: normal color   EXTREMITIES: 1-2+ clubbing, no cyanosis, or inflammation   NEURO: gross motor exam normal by observation     PFT's:  Pulmonary Function Test Results (PFT)    Spirometry Actual Predicted % Predicted   FVC (L) 2.89 3.49 82   FEV1 ((L) 1.82 2.70 67   FEV1/FVC (%) 63 78 80   FEF 25-75% (L/sec) 0.80 2.47 32     Please see  PFT in \"Media Tab\" of Notes activity  (EMR)    Provider Interpretation: Moderate obstruction    Respiratory -  Cystic Fibrosis Airway Clearance Therapy (ACT)     Kenia seen today at [facility]. Testing today included sputum sample .  Current plan for Respiratory medications and ACT is:  AM  Albuterol  Hypertonic Saline 5%    ACT vest          PM  Albuterol   Hypertonic Saline 5%    ACT: vest      Exercise: yes    Changes to above plan:   After review with Physician / Nurse Practitioner, the following changes .    Goals:    Copy of PFT results given to client.        Labs reviewed:       Lab Results   Component Value Date/Time    SIGIND POS " (POS) 07/22/2019 10:12 AM    SIGIND NEG 07/22/2019 10:12 AM    SIGIND NEG 07/22/2019 10:12 AM    SIGIND . 07/22/2019 10:12 AM    SIGIND NEG 07/22/2019 10:12 AM    SOURCE RESP 07/22/2019 10:12 AM    SOURCE RESP 07/22/2019 10:12 AM    SOURCE RESP 07/22/2019 10:12 AM    SOURCE RESP 07/22/2019 10:12 AM    SOURCE RESP 07/22/2019 10:12 AM    SITE Bronchoalveolar Lavage 07/22/2019 10:12 AM    SITE Bronchoalveolar Lavage 07/22/2019 10:12 AM    SITE Bronchoalveolar Lavage 07/22/2019 10:12 AM    SITE Bronchoalveolar Lavage 07/22/2019 10:12 AM    SITE Bronchoalveolar Lavage 07/22/2019 10:12 AM         ASSESSMENT/PLAN:   1. Cystic fibrosis with pulmonary manifestations (HCC)  Stable  Continue current airway clearance  - sodium chloride 3% 3 % nebulizer solution; 4 mL by Nebulization route 2 Times a Day for 30 days.  Dispense: 250 mL; Refill: 2  - Spirometry  - Fungal Culture; Future  - CF Respiratory Culture W/ Gram Stain; Future  - AFB Culture; Future    2. Exocrine pancreatic insufficiency  Stable   Continue enzymes before meals and snacks    3. Hemoptysis  No hemoptysis since 1 week  ENT referral for upper airway evaluation    4. 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  Sputum cx obtained, will f/u results    5. Impaired glucose tolerance  Glucometer given today.   Diabetes educator taught her how to use the glucometer and instructions given to keep record of her glucose testing for her endocrine visit.       Pulmonary Exacerbation: absent    Seen by Dietician:  Yes  Seen by Respiratory Therapy: Yes          Follow Up:  Return in about 3 months (around 11/8/2019).    Electronically signed by   Arelis Fleming   Pediatric Pulmonology

## 2019-08-08 NOTE — PROCEDURES
"Pulmonary Function Test Results (PFT)    Spirometry Actual Predicted % Predicted   FVC (L) 2.89 3.49 82   FEV1 ((L) 1.82 2.70 67   FEV1/FVC (%) 63 78 80   FEF 25-75% (L/sec) 0.80 2.47 32     Please see  PFT in \"Media Tab\" of Notes activity  (EMR)    Provider Interpretation: Moderate obstruction    Respiratory -  Cystic Fibrosis Airway Clearance Therapy (ACT)     Kenia seen today at [facility]. Testing today included sputum sample .  Current plan for Respiratory medications and ACT is:  AM  Albuterol  Hypertonic Saline 5%    ACT vest          PM  Albuterol   Hypertonic Saline 5%    ACT: vest      Exercise: yes    Changes to above plan:   After review with Physician / Nurse Practitioner, the following changes .    Goals:    Copy of PFT results given to client.    "

## 2019-08-09 LAB
RHODAMINE-AURAMINE STN SPEC: NORMAL
SIGNIFICANT IND 70042: NORMAL
SITE SITE: NORMAL
SOURCE SOURCE: NORMAL

## 2019-08-20 LAB
FUNGUS SPEC CULT: NORMAL
SIGNIFICANT IND 70042: NORMAL
SITE SITE: NORMAL
SOURCE SOURCE: NORMAL

## 2019-08-21 ENCOUNTER — TELEPHONE (OUTPATIENT)
Dept: PEDIATRIC PULMONOLOGY | Facility: MEDICAL CENTER | Age: 59
End: 2019-08-21

## 2019-08-21 NOTE — TELEPHONE ENCOUNTER
Call received from pt who states she ran out of the test strips for the glucometer that she was given at her last visit.  Pt is requesting more test strips.  Explained to pt that the test strips that were given to her during her last visit were from the diabetes nurse educator, and this office does not have the test strips in the office.  Encouraged pt to call the endocrinologist she is seeing in November to see if they have the test strips in their office or if they can give her information about where to get more of the strips.  Pt verbalizes understanding and agrees with plan.

## 2019-08-22 NOTE — TELEPHONE ENCOUNTER
Little Bender R.D.  You 5 minutes ago (11:25 AM)      Called Triny after speaking with SHEELA Floyd/CDE.   Triny can go to [facility] and buy the Rely On meter for only $9.  She can get 100 test strips (make sure they match the exact meter she chooses) for ~$11.     Informed Triny of this.  Triny likes the Contour meter because it links to her computer.  Triny appreciated the call/info but she has decided to buy the Contour test strips on her own so she can continue to use the same meter.  She found the strips online for ~$20 for 50.     Encouraged Triny to call prn with questions.  She sees the pelon KLEIN in November.

## 2019-09-04 LAB
FUNGUS SPEC CULT: NORMAL
SIGNIFICANT IND 70042: NORMAL
SITE SITE: NORMAL
SOURCE SOURCE: NORMAL

## 2019-09-10 ENCOUNTER — TELEPHONE (OUTPATIENT)
Dept: PEDIATRIC PULMONOLOGY | Facility: MEDICAL CENTER | Age: 59
End: 2019-09-10

## 2019-09-10 NOTE — TELEPHONE ENCOUNTER
[facility] called to verify Rx for [place] should be 1 month on / 1 month off, per Dr. Fleming that is  Correct. Spoke to Fabiana pharmacist at Pharmacy and verified .

## 2019-10-16 ENCOUNTER — HOSPITAL ENCOUNTER (OUTPATIENT)
Facility: MEDICAL CENTER | Age: 59
End: 2019-10-16
Attending: FAMILY MEDICINE
Payer: COMMERCIAL

## 2019-10-16 ENCOUNTER — OFFICE VISIT (OUTPATIENT)
Dept: MEDICAL GROUP | Facility: LAB | Age: 59
End: 2019-10-16
Payer: COMMERCIAL

## 2019-10-16 VITALS
OXYGEN SATURATION: 94 % | SYSTOLIC BLOOD PRESSURE: 102 MMHG | DIASTOLIC BLOOD PRESSURE: 50 MMHG | HEIGHT: 65 IN | BODY MASS INDEX: 22.19 KG/M2 | WEIGHT: 133.2 LBS | HEART RATE: 86 BPM | TEMPERATURE: 97.7 F

## 2019-10-16 DIAGNOSIS — Z01.419 WELL WOMAN EXAM WITH ROUTINE GYNECOLOGICAL EXAM: ICD-10-CM

## 2019-10-16 DIAGNOSIS — Z12.11 COLON CANCER SCREENING: ICD-10-CM

## 2019-10-16 PROCEDURE — 99396 PREV VISIT EST AGE 40-64: CPT | Performed by: FAMILY MEDICINE

## 2019-10-16 PROCEDURE — 88175 CYTOPATH C/V AUTO FLUID REDO: CPT

## 2019-10-16 PROCEDURE — 87624 HPV HI-RISK TYP POOLED RSLT: CPT

## 2019-10-16 ASSESSMENT — PATIENT HEALTH QUESTIONNAIRE - PHQ9: CLINICAL INTERPRETATION OF PHQ2 SCORE: 0

## 2019-10-16 NOTE — PROGRESS NOTES
Subjective:     CC:   Chief Complaint   Patient presents with   • Gynecologic Exam       HPI:   Kenia Soto is a 58 y.o. female who presents for gyn exam. She is feeling well and denies any complaints.    Patient has GYN provider: No  Last pap: +5 years  Last mammo: 2019  Last colonoscopy: Due for cologuard   Last bone density test: 2019, osteopenia   Qualify for hep C screen: Neg  Last Tdap: Done  Gardias[place]: Aged out   Hx. STD's: No  Birth control: PM    PM, Age 50   No significant bloating/fluid retention, pelvic pain, or dyspareunia. No vaginal discharge   No breast tenderness, mass, nipple discharge, changes in size or contour, or abnormal cyclic discomfort.  She does perform regular self breast exams.  Regular exercise: yes   Diet: Low sugar     She  has a past medical history of Breath shortness, Bronchitis, CF (cystic fibrosis) (HCC), Coughing blood, Hemorrhagic disorder (HCC), Herpes simplex, Pneumonia, and Shoulder fracture.  She  has a past surgical history that includes sinus washing; dental surgery; and bronchoscopy-endo (7/22/2019).    Family History   Problem Relation Age of Onset   • Other Daughter         CF carrier   • Alcohol/Drug Father    • Heart Disease Father    • Cystic Fibrosis Sister        Social History     Socioeconomic History   • Marital status:      Spouse name: Not on file   • Number of children: Not on file   • Years of education: Not on file   • Highest education level: Not on file   Occupational History   • Not on file   Social Needs   • Financial resource strain: Not on file   • Food insecurity:     Worry: Not on file     Inability: Not on file   • Transportation needs:     Medical: Not on file     Non-medical: Not on file   Tobacco Use   • Smoking status: Never Smoker   • Smokeless tobacco: Never Used   Substance and Sexual Activity   • Alcohol use: Yes     Comment: Socially   • Drug use: No   • Sexual activity: Yes     Partners: Male     Comment: PM    Lifestyle    • Physical activity:     Days per week: Not on file     Minutes per session: Not on file   • Stress: Not on file   Relationships   • Social connections:     Talks on phone: Not on file     Gets together: Not on file     Attends [employer] service: Not on file     Active member of club or organization: Not on file     Attends meetings of clubs or organizations: Not on file     Relationship status: Not on file   • Intimate partner violence:     Fear of current or ex partner: Not on file     Emotionally abused: Not on file     Physically abused: Not on file     Forced sexual activity: Not on file   Other Topics Concern   • Not on file   Social History Narrative    Has 2 daughters who are both CF carriers. Enjoys staying active.     Retired .     Recently moved to [place] from [place]       Patient Active Problem List    Diagnosis Date Noted   • Herpes simplex 07/16/2019   • Osteopenia of multiple sites 07/16/2019   • CF (3849+10k bC>T, W3397K) ([facility]) 06/13/2019   • Exocrine pancreatic insufficiency 06/13/2019   • Hemoptysis 06/13/2019   • Vitamin K deficiency 06/13/2019   • Pseudomonas respiratory infection 06/13/2019         Current Outpatient Medications   Medication Sig Dispense Refill   • Multiple Vitamins-Minerals (DEKAS PLUS PO) Take 1 Cap by mouth 2 times a day.     • CAYSTON 75 MG Recon Soln      • KALYDECO 150 MG Tab      • acyclovir (ZOVIRAX) 400 MG tablet Take 1 tablet orally 4 times a day for 14 days, then twice daily for 3 months 90 Tab 3   • multivitamin (THERAGRAN) Tab Take 1 Tab by mouth every day.     • magnesium oxide (MAG-OX) 400 MG Tab Take 400 mg by mouth 2 times a day.     • Ascorbic Acid (VITAMIN C) 1000 MG Tab Take 1,000 mg by mouth.     • Probiotic Product (PROBIOTIC-10 PO) Take  by mouth.     • phytonadione (MEPHYTON) 5 MG Tab Take 5-10 mg by mouth every day. Daily dose is 5 mg but at times she takes an extra dose     • sodium chloride 3% 3 % nebulizer solution 3 mL by  "Nebulization route 2 Times a Day. 4 ml mixed with 4 ml of the 7% sodium chloride     • Vitamin A 12933 UNIT Cap Take 25,000 Units by mouth every day.     • Cholecalciferol 5000 units Tab Take 20,000 Units by mouth every day.     • sodium chloride (HYPER-SAL) 7 % Nebu Soln 4 mL by Nebulization route 4 times a day. (Patient taking differently: 4 mL by Nebulization route 2 Times a Day. Mixes with 4 ml's of the 3% saline) 1440 mL 3   • albuterol (PROVENTIL) 2.5mg/3ml Nebu Soln solution for nebulization 3 mL by Nebulization route 2 Times a Day. 450 mL 3   • CREON 6000 units Cap DR Particles Take 1-2 Caps by mouth 4 times a day as needed. With meals or snacks 540 Cap 3   • fluticasone (FLONASE) 50 MCG/ACT nasal spray Spray 1 Spray in nose 1 time daily as needed.       No current facility-administered medications for this visit.      Allergies   Allergen Reactions   • Levofloxacin      Unknown reaction  Possibly myalgias, arthralgias, nightmares   • Tobramycin Hives     Hives       Review of Systems   Constitutional: Negative for fever, chills and malaise/fatigue.   HENT: Negative for congestion.    Eyes: Negative for pain.   Respiratory: +cough and shortness of breath.    Cardiovascular: Negative for leg swelling.   Gastrointestinal: Negative for nausea, vomiting, abdominal pain and diarrhea.   Genitourinary: Negative for dysuria and hematuria.   Skin: Negative for rash.   Neurological: Negative for dizziness, focal weakness and headaches.   Endo/Heme/Allergies: Does not bruise/bleed easily.   Psychiatric/Behavioral: Negative for depression.  The patient is not nervous/anxious.      Objective:     /50 (BP Location: Left arm, Patient Position: Sitting)   Pulse 86   Temp 36.5 °C (97.7 °F) (Temporal)   Ht 1.656 m (5' 5.2\")   Wt 60.4 kg (133 lb 3.2 oz)   SpO2 94%   BMI 22.03 kg/m²   Body mass index is 22.03 kg/m².  Wt Readings from Last 4 Encounters:   10/16/19 60.4 kg (133 lb 3.2 oz)   08/08/19 58.9 kg (129 lb " 13.6 oz)   07/22/19 58.1 kg (128 lb 1.4 oz)   07/16/19 59 kg (130 lb)       Physical Exam:  Constitutional: Well-developed and well-nourished. Not diaphoretic. No distress.   Skin: Skin is warm and dry. No rash noted.  Head: Atraumatic without lesions.  Eyes: Conjunctivae and extraocular motions are normal. Pupils are equal, round, and reactive to light. No scleral icterus.   Ears:  External ears unremarkable. Tympanic membranes clear and intact.  Nose: Nares patent. Septum midline. Turbinates without erythema nor edema. No discharge.   Mouth/Throat: Dentition is good. Tongue normal. Oropharynx is clear and moist. Posterior pharynx without erythema or exudates.  Neck: Supple, trachea midline. Normal range of motion. No thyromegaly present. No lymphadenopathy--cervical or supraclavicular.  Cardiovascular: Regular rate and rhythm, S1 and S2 without murmur, rubs, or gallops.    Breast: Breasts examined seated and supine. No skin changes, peau d'orange or nipple retraction. No discharge. No axillary or supraclavicular adenopathy. No masses or nodularity palpable.   Abdomen: Soft, non tender, and without distention. Active bowel sounds in all four quadrants. No rebound, guarding, masses or HSM.  :Perineum and external genitalia normal without rash. Vagina with normal and physiologic discharge. Cervix without visible lesions or discharge. Bimanual exam without adnexal masses or cervical motion tenderness.  Extremities: No cyanosis, clubbing, erythema, nor edema. Distal pulses intact and symmetric.   Musculoskeletal: All major joints AROM full in all directions without pain.  Neurological: Alert and oriented x 3  Psychiatric:  Behavior, mood, and affect are appropriate.    Assessment and Plan:   1. Well woman exam with routine gynecological exam  Up-to-date on vaccines, due for shingles however we have an insufficient supply, up-to-date on labs, up-to-date on mammogram, and now up-to-date on GYN care  - THINPREP PAP WITH  HPV; Future    2. Colon cancer screening  Ordered today  - COLOGUARD (FIT DNA)    Sees dentist and eye doctor   Labs per orders  Immunizations per orders  Patient counseled about skin care, diet, supplements, vitamins, safe sex and exercise.    Follow-up: Return in about 3 months (around 1/16/2020).    Please note that this dictation was created using voice recognition software. I have made every reasonable attempt to correct obvious errors, but I expect that there are errors of grammar and possibly content that I did not discover before finalizing the note.

## 2019-10-18 DIAGNOSIS — Z01.419 WELL WOMAN EXAM WITH ROUTINE GYNECOLOGICAL EXAM: ICD-10-CM

## 2019-10-21 LAB
CYTOLOGY REG CYTOL: NORMAL
HPV HR 12 DNA CVX QL NAA+PROBE: NEGATIVE
HPV16 DNA SPEC QL NAA+PROBE: NEGATIVE
HPV18 DNA SPEC QL NAA+PROBE: NEGATIVE
SPECIMEN SOURCE: NORMAL

## 2019-10-23 ENCOUNTER — OFFICE VISIT (OUTPATIENT)
Dept: MEDICAL GROUP | Facility: LAB | Age: 59
End: 2019-10-23
Payer: COMMERCIAL

## 2019-10-23 ENCOUNTER — HOSPITAL ENCOUNTER (OUTPATIENT)
Facility: MEDICAL CENTER | Age: 59
End: 2019-10-23
Attending: FAMILY MEDICINE
Payer: COMMERCIAL

## 2019-10-23 VITALS
OXYGEN SATURATION: 96 % | HEIGHT: 66 IN | DIASTOLIC BLOOD PRESSURE: 58 MMHG | TEMPERATURE: 98 F | WEIGHT: 133.6 LBS | BODY MASS INDEX: 21.47 KG/M2 | HEART RATE: 96 BPM | SYSTOLIC BLOOD PRESSURE: 104 MMHG

## 2019-10-23 DIAGNOSIS — Z12.4 SCREENING FOR CERVICAL CANCER: ICD-10-CM

## 2019-10-23 PROCEDURE — 99212 OFFICE O/P EST SF 10 MIN: CPT | Performed by: FAMILY MEDICINE

## 2019-10-23 PROCEDURE — 88175 CYTOPATH C/V AUTO FLUID REDO: CPT

## 2019-10-23 PROCEDURE — 87624 HPV HI-RISK TYP POOLED RSLT: CPT

## 2019-10-23 NOTE — PROGRESS NOTES
Subjective:     CC: Pap smear    HPI:   Kenia presents today with    Pap smear:  Patient had a Pap smear with a full well woman exam.  The cytology report stated unsatisfactory for evaluation of epithelial abnormality due to partially obscuring inflammation with scant cellularity.  It was however negative for HPV.      Past Medical History:   Diagnosis Date   • Breath shortness    • Bronchitis    • CF (cystic fibrosis) (HCC)    • Coughing blood    • Hemorrhagic disorder (HCC)    • Herpes simplex    • Pneumonia    • Shoulder fracture        Social History     Tobacco Use   • Smoking status: Never Smoker   • Smokeless tobacco: Never Used   Substance Use Topics   • Alcohol use: Yes     Comment: Socially   • Drug use: No       Current Outpatient Medications Ordered in Epic   Medication Sig Dispense Refill   • Multiple Vitamins-Minerals (DEKAS PLUS PO) Take 1 Cap by mouth 2 times a day.     • CAYSTON 75 MG Recon Soln      • KALYDECO 150 MG Tab      • acyclovir (ZOVIRAX) 400 MG tablet Take 1 tablet orally 4 times a day for 14 days, then twice daily for 3 months 90 Tab 3   • multivitamin (THERAGRAN) Tab Take 1 Tab by mouth every day.     • magnesium oxide (MAG-OX) 400 MG Tab Take 400 mg by mouth 2 times a day.     • Ascorbic Acid (VITAMIN C) 1000 MG Tab Take 1,000 mg by mouth.     • Probiotic Product (PROBIOTIC-10 PO) Take  by mouth.     • phytonadione (MEPHYTON) 5 MG Tab Take 5-10 mg by mouth every day. Daily dose is 5 mg but at times she takes an extra dose     • sodium chloride 3% 3 % nebulizer solution 3 mL by Nebulization route 2 Times a Day. 4 ml mixed with 4 ml of the 7% sodium chloride     • Vitamin A 14530 UNIT Cap Take 25,000 Units by mouth every day.     • Cholecalciferol 5000 units Tab Take 20,000 Units by mouth every day.     • sodium chloride (HYPER-SAL) 7 % Nebu Soln 4 mL by Nebulization route 4 times a day. (Patient taking differently: 4 mL by Nebulization route 2 Times a Day. Mixes with 4 ml's of the 3%  "saline) 1440 mL 3   • albuterol (PROVENTIL) 2.5mg/3ml Nebu Soln solution for nebulization 3 mL by Nebulization route 2 Times a Day. 450 mL 3   • CREON 6000 units Cap DR Particles Take 1-2 Caps by mouth 4 times a day as needed. With meals or snacks 540 Cap 3   • fluticasone (FLONASE) 50 MCG/ACT nasal spray Spray 1 Spray in nose 1 time daily as needed.       No current Epic-ordered facility-administered medications on file.        Allergies:  Levofloxacin and Tobramycin  ROS:  Gen: no fevers/chill, no changes in weight  Eyes: no changes in vision  ENT: no sore throat, no hearing loss, no bloody nose  Pulm: no sob, no cough  CV: no chest pain, no palpitations  GI: no nausea/vomiting, no diarrhea  : no dysuria  MSk: no myalgias  Skin: no rash  Neuro: no headaches, no numbness/tingling  Heme/Lymph: no easy bruising      Objective:       Exam:  /58 (BP Location: Left arm, Patient Position: Sitting)   Pulse 96   Temp 36.7 °C (98 °F) (Temporal)   Ht 1.664 m (5' 5.5\")   Wt 60.6 kg (133 lb 9.6 oz)   SpO2 96%   BMI 21.89 kg/m²  Body mass index is 21.89 kg/m².    Gen: Alert and oriented, No apparent distress.  Neck: Neck is supple without lymphadenopathy.  Lungs: Normal effort, CTA bilaterally, no wheezes, rhonchi, or rales  CV: Regular rate and rhythm. No murmurs, rubs, or gallops.               Ext: No clubbing, cyanosis, edema.  : Perineum and external genitalia normal without rash. Vagina with normal and physiologic discharge. Cervix with evidence of LEEP procedure or discharge. Bimanual exam without adnexal masses or cervical motion tenderness.      Assessment & Plan:     59 y.o. female with the following -     1. Papanicolaou smear  Pap smear re-done today.        Please note that this dictation was created using voice recognition software. I have made every reasonable attempt to correct obvious errors, but I expect that there are errors of grammar and possibly content that I did not discover before " finalizing the note.

## 2019-10-24 DIAGNOSIS — Z12.4 SCREENING FOR CERVICAL CANCER: ICD-10-CM

## 2019-10-24 PROCEDURE — 88175 CYTOPATH C/V AUTO FLUID REDO: CPT

## 2019-11-07 ENCOUNTER — OFFICE VISIT (OUTPATIENT)
Dept: PEDIATRIC PULMONOLOGY | Facility: MEDICAL CENTER | Age: 59
End: 2019-11-07
Payer: COMMERCIAL

## 2019-11-07 ENCOUNTER — HOSPITAL ENCOUNTER (OUTPATIENT)
Facility: MEDICAL CENTER | Age: 59
End: 2019-11-07
Attending: PEDIATRICS
Payer: COMMERCIAL

## 2019-11-07 VITALS
BODY MASS INDEX: 21.89 KG/M2 | WEIGHT: 131.39 LBS | HEART RATE: 84 BPM | RESPIRATION RATE: 10 BRPM | OXYGEN SATURATION: 99 % | HEIGHT: 65 IN | TEMPERATURE: 97.8 F

## 2019-11-07 DIAGNOSIS — E84.9 CYSTIC FIBROSIS (HCC): ICD-10-CM

## 2019-11-07 DIAGNOSIS — E84.0 CYSTIC FIBROSIS WITH PULMONARY MANIFESTATIONS (HCC): ICD-10-CM

## 2019-11-07 DIAGNOSIS — R73.09 ABNORMAL GLUCOSE TOLERANCE TEST (GTT): ICD-10-CM

## 2019-11-07 DIAGNOSIS — K86.81 EXOCRINE PANCREATIC INSUFFICIENCY: ICD-10-CM

## 2019-11-07 DIAGNOSIS — Z22.8 CARRIER OF OTHER INFECTIOUS DISEASES: ICD-10-CM

## 2019-11-07 PROCEDURE — 87070 CULTURE OTHR SPECIMN AEROBIC: CPT

## 2019-11-07 PROCEDURE — 87181 SC STD AGAR DILUTION PER AGT: CPT

## 2019-11-07 PROCEDURE — 87186 SC STD MICRODIL/AGAR DIL: CPT

## 2019-11-07 PROCEDURE — 87205 SMEAR GRAM STAIN: CPT

## 2019-11-07 PROCEDURE — 87102 FUNGUS ISOLATION CULTURE: CPT

## 2019-11-07 PROCEDURE — 99214 OFFICE O/P EST MOD 30 MIN: CPT | Mod: 25 | Performed by: PEDIATRICS

## 2019-11-07 PROCEDURE — 87077 CULTURE AEROBIC IDENTIFY: CPT | Mod: 91

## 2019-11-07 PROCEDURE — 99401 PREV MED CNSL INDIV APPRX 15: CPT | Mod: 25 | Performed by: PEDIATRICS

## 2019-11-07 PROCEDURE — 87116 MYCOBACTERIA CULTURE: CPT

## 2019-11-07 PROCEDURE — 87206 SMEAR FLUORESCENT/ACID STAI: CPT

## 2019-11-07 PROCEDURE — 87015 SPECIMEN INFECT AGNT CONCNTJ: CPT

## 2019-11-07 PROCEDURE — 94010 BREATHING CAPACITY TEST: CPT | Performed by: PEDIATRICS

## 2019-11-07 NOTE — PROGRESS NOTES
"Nutrition Screen & Progress Note for Adult CF Clinic  BMI: 21.73       Points: 1  IBW (kg): 56.8  % IBW: 105       Points: 0  Current weight (kg): 59.6   Weight last clinic visit: 58.9 kg on 8/8/19  % weight change: up 1%     Points: 0       Points: 1             Total Points: 2             Nutrition Risk: moderate    BM: 1-2 per day, soft  PERT: Creon 6 (1-2 with meals, 0-1 with snacks) = avg of 6 per day  Lipase units/kg/meal: 101 - 201  CFTR modulator: Kalydeco  Typical diet: 3 meals and 2 snacks  Vitamins: general MVi, magnesium (400 mg), vitamin D (20,000 IU), vitamin K (5 mg), vitamin A (10,000 IU), \"bone health\" supplement  Calorie supplements: -  Visit details: Kenia is here for CF follow up.  She saw the ENT for her \"lung bleeds\" but no recommendations were made.  She has only had one \"bleed\" since going to [place] and it was d/t dehydration (had stomach ache and some emesis and got dehydrated).  She is doing well on her Nuun electrolyte supplement and salt tabs.    Her PERT dose is so very low, but she states her stools are good/soft/not greasy.  Would expect her to have much higher PERT needs.    She has been checking her blood sugars more often in preparation for her upcoming endo appt later this month.  Her morning fasting glu are fine.  Her two hour post meal checks are sometimes high.  She started checking her sugar one hour after the meal and 2 hours after.  Sometimes she is over 200 at the one hour but then back down to 76 at the 2 hour.  Other times she is in the 170's at the one hour and then over 200 at the two hour.  Will await endo recs but feel she may need a low dose of long acting insulin to help improve blood sugar control.  Kenia does report that when she is over 200 she sometimes has blurry vision and nausea.    She ate half of a baked potato and a piece of chocolate cake on her birthday and her two hour was over 200.    She feels she has changed her diet during this period of monitoring " "her blood sugars.  She would prefer to eat more carbs than she has been. If the lower carb diet is not something she feels she can sustain, do not want her doing that.  Reminded her about balancing carbs with protein and it sounds like she is doing that.  Feel she should eat more of a typical diet over the next 1-2 weeks so the endo can see blood sugar values on a more \"real diet\".  Kenia will do this.    She has been walking with her daughter for 45 minutes, doing squats and pushups, and now has her treadmill back again.    She found a program online called \"[employer]\" that is run by a diabetes educator who also has a child with CFRD.  She signed up for the educational program that includes diet and exercise guidelines. Two of her friends are also participating and she is excited about this program.  RD will check out this program.    Recommendations/Plan: continue to balance carbs with protein, but eat a more realistic diet (that she feels she can maintain) so she has more data for endo consult coming up soon.  Continue with exercise program.    Follow-up: 2-3 months    Time spent: 23 minutes      "

## 2019-11-07 NOTE — PROCEDURES
"Pulmonary Function Test Results (PFT)    Spirometry Actual Predicted % Predicted   FVC (L) 3.01 3.48 86   FEV1 ((L) 1.77 2.70 65   FEV1/FVC (%) 59 78 75   FEF 25-75% (L/sec) 0.60 2.46 24     Please see  PFT in \"Media Tab\" of Notes activity  (EMR)    Provider Interpretation: Moderate obstruction, FEV1 at baseline       Respiratory -  Cystic Fibrosis Airway Clearance Therapy (ACT)      Kenia seen today at [facility]. Testing today included sputum sample.Kenia had no concerns for RT regarding Respiratory therapy Tx/equipment. Current plan for Respiratory medications and ACT is:  AM  Albuterol  Hypertonic Saline 5%     ACT Vest     PM  Albuterol   Hypertonic Saline 5%     ACT: Vest        Exercise: As clemencia. Pt stated she just started \"working out again\".     Changes to above plan:   After review with Physician / Nurse Practitioner, the following changes: None at this time     Goals: Stay active/healthy!     Copy of PFT results given to client.  "

## 2019-11-08 NOTE — PROGRESS NOTES
"Medical Social Work    Referral: CF Clinic / Dr. Fleming    Intervention: Patient is 59 year old woman with Cystic Fibrosis who presents to the clinic by herself today. Patient has been attending this clinic since early this year (2019) however, this is the first meeting with this SW (SW out on maternity leave).     Patient is very pleasant and appears very healthy today. She was diagnosed with CF at 10 years old, and had a sister (1.5 years younger than her) with CF that  when she was 19. Patient grew up and primarily has lived in the [place] area with her  up until about a year ago. She has three adult daughters, one step-daughter, with her youngest living in [place] and her other daughter living here in [place].   Patient reports that she has really enjoyed [employer] so far and has begun to make friends in the area. She's \"Just figuring out\" what she likes to do in this area. She does not currently work and has not worked since her youngest was born. Her  works  (mostly at home) for [employer], of which her health insurance is currently through.   She has had no issues with insurance or access to her treatments and in fact \"is all caught up\" on medical bills now.     In  patient had four hospitalizations and a \"really bad infection\" and she did not feel that her Dr at the time was fighting aggressively enough. She fired that dr and went to another through [facility]. Shortly after, she was brought on as part of the [place] drug trials, and she has not had a hospitalization since.   She states she was on symdecko but experienced side effects.     Patient was screened via the PHQ9 and GAD7 today with no concerns noted, although did talk about anxiety in the context of her PFT today. She cites historical anxiety when driving to previous clinic visits in the [facility] as well anxiety this morning that her  picked up on.   SW validated and normalized these feelings given the " anticipation of her lung function number etc...   Encouraged patient to inform this SW or team of anything we can do to feel supportive.  Patient states she has had a very positive experience with this clinic so far.     Plan: continue to follow and assist as needed.

## 2019-11-13 PROBLEM — Z22.8 CARRIER OF OTHER INFECTIOUS DISEASES: Status: ACTIVE | Noted: 2019-11-13

## 2019-11-13 PROBLEM — E84.0 CYSTIC FIBROSIS WITH PULMONARY MANIFESTATIONS (HCC): Status: ACTIVE | Noted: 2019-11-13

## 2019-11-13 PROBLEM — E84.9 CYSTIC FIBROSIS (HCC): Status: ACTIVE | Noted: 2019-11-13

## 2019-11-13 PROBLEM — R73.09 ABNORMAL GLUCOSE TOLERANCE TEST (GTT): Status: ACTIVE | Noted: 2019-11-13

## 2019-11-13 LAB — ETEST SENSITIVITY ETEST: NORMAL

## 2019-11-13 NOTE — PROGRESS NOTES
CC: follow up cystic fibrosis    PCP:  Kelin Donohue M.D.   [id number] [id number] / [place] [phone]     SUBJECTIVE:   Kenia Soto is a 59 y.o. female with Cystic Fibrosis      Patient Active Problem List    Diagnosis Date Noted   • 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 11/13/2019   • Cystic fibrosis ([facility]) 11/13/2019   • Cystic fibrosis with pulmonary manifestations ([facility]) 11/13/2019   • Abnormal glucose tolerance test (GTT) 11/13/2019   • Herpes simplex 07/16/2019   • Osteopenia of multiple sites 07/16/2019   • CF (3849+10k bC>T, [id number]) ([facility]) 06/13/2019   • Exocrine pancreatic insufficiency 06/13/2019   • Hemoptysis 06/13/2019   • Vitamin K deficiency 06/13/2019   • Pseudomonas respiratory infection 06/13/2019       Since the last CF clinic visit chief complaint:  Kenia has experienced no problems. No more issues with hemoptysis. She did see ENT since her last visit but was told that her upper airway looked normal.      Last hospitalization: [7/22/19]    Respiratory:   Cough frequency: episodic, baseline  Cough character: productive  Sputum quantity: baseline  Sputum color: clear  Shortness of breath:never  Chest Pain:never  Hemoptysis: intermittently  Doctor visits: none   Antibiotics:none  Pulmonary toilet:   Albuterol: 2/day  7% hypertonic saline: 2/day  Pulmozyme: 1/day  Chest Physiotherapy: aerobika 2/day  Oxygen: none  Respiratory assist: none    Compliance: compliant most of the time     Sinus symptoms:  Nasal Congestion: never   Nasal Drainage: no  Headache/sinus pressure: never   Treatments: does sinus rinses bid    Activity / Energy: normal for age   Change in activity/energy: none   School/ Work attendance: no absences   School/ Work performance: no problem  Emotional assessment: positive       GI: no problem   Appetite: normal  Enzymes:  daily  Stool: 1-2/day, characteristics: formed  G-Tube: No      Medications:     Current Outpatient Medications:   •  Multiple Vitamins-Minerals (DEKAS  PLUS PO), 1 Cap, Oral, BID, Taking  •  CAYSTON, , PRN  •  KALYDECO, , Taking  •  acyclovir, Take 1 tablet orally 4 times a day for 14 days, then twice daily for 3 months, PRN  •  magnesium oxide, 400 mg, Oral, BID, Taking  •  Vitamin C, Take 1,000 mg by mouth., Taking  •  Probiotic Product (PROBIOTIC-10 PO), Take  by mouth., Taking  •  phytonadione, 5-10 mg, Oral, DAILY, Taking  •  Vitamin A, 25,000 Units, Oral, DAILY, Taking  •  Cholecalciferol, 20,000 Units, Oral, DAILY, Taking  •  sodium chloride, 4 mL, Nebulization, 4X/DAY (Patient taking differently: 4 mL, Nebulization, 2 TIMES DAILY, Mixes with 4 ml's of the 3% saline), Taking  •  albuterol, 2.5 mg, Nebulization, BID, Taking  •  CREON, 1-2 Cap, Oral, 4X/DAY PRN, Taking  •  multivitamin, 1 Tab, Oral, DAILY, Not Taking  •  sodium chloride 3%, 3 mL, Nebulization, BID, Not Taking  •  fluticasone, 1 Spray, Nasal, QDAY PRN, Not Taking  Other Medications: none  Central Line: none    ALLERGIES:  Levofloxacin and Tobramycin    Review of System:  All other systems reviewed and negative    Ears, nose, mouth, throat, and face: negative  Cardiovascular: Negative  Gastrointestinal: Negative  Allergic/Immunologic: negative         Social/Environmental:  Social History     Tobacco Use   • Smoking status: Never Smoker   • Smokeless tobacco: Never Used   Substance Use Topics   • Alcohol use: Yes     Comment: Socially   • Drug use: No       Home Environment       Pet Exposures   • Dogs Yes    • Reptiles Yes        Tobacco use: never  Pets: none    Past Medical History:  Past Medical History:   Diagnosis Date   • Breath shortness    • Bronchitis    • CF (cystic fibrosis) (HCC)    • Coughing blood    • Hemorrhagic disorder (HCC)    • Herpes simplex    • Pneumonia    • Shoulder fracture      Respiratory hospitalizations: [7/22/19]      Past surgical History:  Past Surgical History:   Procedure Laterality Date   • BRONCHOSCOPY-ENDO  7/22/2019    Procedure: BRONCHOSCOPY - W/BAL;   "Surgeon: Arelis Fleming M.D.;  Location: [facility];  Service: Ent   • DENTAL SURGERY     • SINUS WASHING           Family History:   Family History   Problem Relation Age of Onset   • Other Daughter         CF carrier   • Alcohol/Drug Father    • Heart Disease Father    • Cystic Fibrosis Sister        OBJECTIVE:  Physical Exam:  Pulse 84   Temp 36.6 °C (97.8 °F) (Temporal)   Resp (!) 10   Ht 1.656 m (5' 5.2\")   Wt 59.6 kg (131 lb 6.3 oz)   SpO2 99%   BMI 21.73 kg/m²      GENERAL: well appearing, well nourished, no respiratory distress and normal affect   EYES: PERRL, EOMI, normal conjunctiva  EARS: bilateral TM's and external ear canals normal   NOSE: no audible congestion and no discharge   MOUTH/THROAT: normal oropharynx   NECK: normal   CHEST: no chest wall deformities and normal A-P diameter   LUNGS: clear to auscultation and normal air exchange   HEART: regular rate and rhythm and no murmurs   ABDOMEN: soft, non-tender, non-distended and no hepatosplenomegaly  : not examined  BACK: not examined   SKIN: normal color   EXTREMITIES: no clubbing, cyanosis, or inflammation   NEURO: gross motor exam normal by observation     PFT's:  Pulmonary Function Test Results (PFT)    Spirometry Actual Predicted % Predicted   FVC (L) 3.01 3.48 86   FEV1 ((L) 1.77 2.70 65   FEV1/FVC (%) 59 78 75   FEF 25-75% (L/sec) 0.60 2.46 24     Please see  PFT in \"Media Tab\" of Notes activity  (EMR)    Provider Interpretation: Moderate obstruction, FEV1 at baseline       Respiratory -  Cystic Fibrosis Airway Clearance Therapy (ACT)      Kenia seen today at [facility]. Testing today included sputum sample.Kenia had no concerns for RT regarding Respiratory therapy Tx/equipment. Current plan for Respiratory medications and ACT is:  AM  Albuterol  Hypertonic Saline 5%     ACT  Vest     PM  Albuterol   Hypertonic Saline 5%     ACT:  Vest        Exercise: As clemencia. Pt stated she just started \"working out " "again\".     Changes to above plan:   After review with Physician / Nurse Practitioner, the following changes: None at this time     Goals: Stay active/healthy!     Copy of PFT results given to client.      Labs reviewed:     Lab Results   Component Value Date/Time    SIGIND NEG 11/07/2019 03:09 PM    SIGIND NEG 11/07/2019 03:09 PM    SIGIND POS (POS) 11/07/2019 03:09 PM    SIGIND . (POS) 11/07/2019 03:09 PM    SIGIND NEG 11/07/2019 03:09 PM    SOURCE RESP 11/07/2019 03:09 PM    SOURCE RESP 11/07/2019 03:09 PM    SOURCE RESP 11/07/2019 03:09 PM    SOURCE RESP 11/07/2019 03:09 PM    SOURCE RESP 11/07/2019 03:09 PM    SITE Sputum 11/07/2019 03:09 PM    SITE Sputum 11/07/2019 03:09 PM    SITE Sputum 11/07/2019 03:09 PM    SITE Sputum 11/07/2019 03:09 PM    SITE Sputum 11/07/2019 03:09 PM         ASSESSMENT/PLAN:   1. Cystic fibrosis (HCC)  - AFB Culture; Future  - CF Respiratory Culture W/ Gram Stain; Future  - Fungal Culture; Future  - Spirometry; Future  - Spirometry  - Fungal Culture  - CF Respiratory Culture W/ Gram Stain  - AFB Culture    2. Cystic fibrosis with pulmonary manifestations (HCC)  Stable  No further episodes of hemoptysis  Continue current airway clearance regimen    3. Exocrine pancreatic insufficiency  Stable  Continue enzymes before meals/snacks    4. 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  Sputum cx obtained today, will f/u results    5. Abnormal glucose tolerance test (GTT)  Currently checking sugars.   Has appointment scheduled with endocrine, Dr Torres on 11/26      Pulmonary Exacerbation: absent    Seen by Dietician:  Yes  Seen by Respiratory Therapy: Yes  Seen by :  Yes        Follow Up:  Return in about 3 months (around 2/7/2020).    Electronically signed by   Arelis Fleming   Pediatric Pulmonology   "

## 2019-11-14 LAB
BACTERIA WND AEROBE CULT: ABNORMAL
GRAM STN SPEC: ABNORMAL
SIGNIFICANT IND 70042: ABNORMAL
SITE SITE: ABNORMAL
SOURCE SOURCE: ABNORMAL

## 2019-11-24 NOTE — PROGRESS NOTES
New Patient Consult Note  Primary care physician: Kelin Donohue M.D.    Reason for consult: Cystic Fibrosis with exocrine pancreatic insufficiency and impaired glucose tolerance.  She has been testing blood sugars before meals, 1 hour after meals, and 2 hours after a meal.    HPI:  Kenia Soto is a 59 y.o. old patient who comes in today for evaluation of Cystic Fibrosis with impaired glucose tolerance.  She has been testing her blood sugars.  Results for KENIA SOTO (MRN [id number]) as of 11/23/2019 21:03   Ref. Range 7/24/2019 07:26   Glycohemoglobin Latest Ref Range: 0.0 - 5.6 % 6.1 (H)     Please see her scanned blood sugar readings.  They are usually high 1 to 2 hours post meals.  She has excellent fasting sugars most of them below 100    Exocrine Pancreatic Insufficiency:  Currently taking Creon 6000 units 1-2 caps before meals.    Vitamin D Deficiency:  Currently taking Vitamin D 20,000 units daily.  Results for KENIA SOTO (MRN [id number]) as of 11/23/2019 21:03   Ref. Range 7/24/2019 07:26   25-Hydroxy   Vitamin D 25 Latest Ref Range: 30 - 100 ng/mL 54       ROS:  Constitutional: No weight loss  Cardiac: No palpitations or racing heart  Resp: No shortness of breath  Neuro: No numbness or tinging in feet  Endo: No heat or cold intolerance, no polyuria or polydipsia  All other systems were reviewed and were negative.    Past Medical History:  Patient Active Problem List    Diagnosis Date Noted   • 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 11/13/2019   • Cystic fibrosis (HCC) 11/13/2019   • Cystic fibrosis with pulmonary manifestations ([facility]) 11/13/2019   • Abnormal glucose tolerance test (GTT) 11/13/2019   • Herpes simplex 07/16/2019   • Osteopenia of multiple sites 07/16/2019   • CF (3849+10k bC>T, L3578U) ([facility]) 06/13/2019   • Exocrine pancreatic insufficiency 06/13/2019   • Hemoptysis 06/13/2019   • Vitamin K deficiency 06/13/2019   • Pseudomonas respiratory infection 06/13/2019       Past  Surgical History:  Past Surgical History:   Procedure Laterality Date   • BRONCHOSCOPY-ENDO  7/22/2019    Procedure: BRONCHOSCOPY - W/BAL;  Surgeon: Arelis Fleming M.D.;  Location: ENDOSCOPY [facility];  Service: Ent   • DENTAL SURGERY     • SINUS WASHING         Allergies:  Levofloxacin and Tobramycin    Social History:  Social History     Socioeconomic History   • Marital status:      Spouse name: Not on file   • Number of children: Not on file   • Years of education: Not on file   • Highest education level: Not on file   Occupational History   • Not on file   Social Needs   • Financial resource strain: Not on file   • Food insecurity:     Worry: Not on file     Inability: Not on file   • Transportation needs:     Medical: Not on file     Non-medical: Not on file   Tobacco Use   • Smoking status: Never Smoker   • Smokeless tobacco: Never Used   Substance and Sexual Activity   • Alcohol use: Yes     Comment: Socially   • Drug use: No   • Sexual activity: Yes     Partners: Male     Comment: PM    Lifestyle   • Physical activity:     Days per week: Not on file     Minutes per session: Not on file   • Stress: Not on file   Relationships   • Social connections:     Talks on phone: Not on file     Gets together: Not on file     Attends Jewish service: Not on file     Active member of club or organization: Not on file     Attends meetings of clubs or organizations: Not on file     Relationship status: Not on file   • Intimate partner violence:     Fear of current or ex partner: Not on file     Emotionally abused: Not on file     Physically abused: Not on file     Forced sexual activity: Not on file   Other Topics Concern   • Not on file   Social History Narrative    Has 2 daughters who are both CF carriers. Enjoys staying active.     Retired .     Recently moved to [place] from [place]       Family History:  Family History   Problem Relation Age of Onset   • Other Daughter         CF  carrier   • Alcohol/Drug Father    • Heart Disease Father    • Cystic Fibrosis Sister        Medications:    Current Outpatient Medications:   •  Insulin Lispro (HUMALOG) 100 UNIT/ML Solution Cartridge, Inject 10 Units as instructed 3 times a day before meals., Disp: 15 mL, Rfl: 6  •  Insulin Pen Needle 32 G x 4 mm, 1 Each by Does not apply route 3 times a day before meals., Disp: 300 Each, Rfl: 3  •  Blood Glucose Test Strips, Test strips order: Test strips for [employer] Contour Next meter. Sig: use 6 times daily and prn ssx high or low sugar.  For insulin adjustment., Disp: 600 Strip, Rfl: 3  •  Multiple Vitamins-Minerals (DEKAS PLUS PO), Take 1 Cap by mouth 2 times a day., Disp: , Rfl:   •  CAYSTON 75 MG Recon Soln, , Disp: , Rfl:   •  KALYDECO 150 MG Tab, , Disp: , Rfl:   •  acyclovir (ZOVIRAX) 400 MG tablet, Take 1 tablet orally 4 times a day for 14 days, then twice daily for 3 months, Disp: 90 Tab, Rfl: 3  •  multivitamin (THERAGRAN) Tab, Take 1 Tab by mouth every day., Disp: , Rfl:   •  magnesium oxide (MAG-OX) 400 MG Tab, Take 400 mg by mouth 2 times a day., Disp: , Rfl:   •  Ascorbic Acid (VITAMIN C) 1000 MG Tab, Take 1,000 mg by mouth., Disp: , Rfl:   •  Probiotic Product (PROBIOTIC-10 PO), Take  by mouth., Disp: , Rfl:   •  phytonadione (MEPHYTON) 5 MG Tab, Take 5-10 mg by mouth every day. Daily dose is 5 mg but at times she takes an extra dose, Disp: , Rfl:   •  sodium chloride 3% 3 % nebulizer solution, 3 mL by Nebulization route 2 Times a Day. 4 ml mixed with 4 ml of the 7% sodium chloride, Disp: , Rfl:   •  Vitamin A 61904 UNIT Cap, Take 25,000 Units by mouth every day., Disp: , Rfl:   •  Cholecalciferol 5000 units Tab, Take 20,000 Units by mouth every day., Disp: , Rfl:   •  sodium chloride (HYPER-SAL) 7 % Nebu Soln, 4 mL by Nebulization route 4 times a day. (Patient taking differently: 4 mL by Nebulization route 2 Times a Day. Mixes with 4 ml's of the 3% saline), Disp: 1440 mL, Rfl: 3  •  albuterol  "(PROVENTIL) 2.5mg/3ml Nebu Soln solution for nebulization, 3 mL by Nebulization route 2 Times a Day., Disp: 450 mL, Rfl: 3  •  CREON 6000 units Cap DR Particles, Take 1-2 Caps by mouth 4 times a day as needed. With meals or snacks, Disp: 540 Cap, Rfl: 3  •  fluticasone (FLONASE) 50 MCG/ACT nasal spray, Spray 1 Spray in nose 1 time daily as needed., Disp: , Rfl:     Labs: Reviewed    Physical Examination:  Vital signs: /60   Pulse (!) 103   Ht 1.664 m (5' 5.5\")   Wt 60.3 kg (133 lb)   SpO2 97%   BMI 21.80 kg/m²  Body mass index is 21.8 kg/m². Patient's body mass index is 21.8 kg/m². Exercise and nutrition counseling were performed at this visit.  Treadmill 3 times/week.  Squats and push ups daily.  General: No apparent distress, cooperative  Eyes: No scleral icterus or discharge  ENMT: Normal on external inspection of nose, lips, normal thyroid exam  Neck: No abnormal masses on inspection  Resp: Normal effort, clear to auscultation bilaterally   CVS: Regular rate and rhythm, S1 S2 normal, no murmur   Extremities: No edema  Abdomen: abdominal obesity present  Neuro: Alert and oriented  Skin: No rash  Psych: Normal mood and affect, intact memory and able to make informed decisions    Assessment and Plan:    1. Exocrine pancreatic insufficiency  Continue Creon with food.    2. Vitamin D deficiency  Continue vitamin D supplementation.  Will check levels and adjust dose accordingly if needed    3. 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  Explained to her in simplistic terms that the basal insulin secretion is not impacted in CFR D.  It is the second phase insulin secretion which is the insulin release in response of the food that gets impacted.  And therefore will plan to start Humalog about 1 unit with breakfast, 1 unit with lunch, 1 unit with dinner and see if we can bring her postprandial " readings down    Return in about 3 months (around 2/26/2020).    Thank you for allowing me to participate in the care of this patient.    Diego Torres M.D.  11/26/19  This note was scribed by Maddi Stock RN CDE  CC:   Kelin Donohue M.D.    This note was created using voice recognition software (Dragon). The accuracy of the dictation is limited by the abilities of the software. I have reviewed the note prior to signing, however some errors in grammar and context are still possible. If you have any questions related to this note please do not hesitate to contact our office.

## 2019-11-26 ENCOUNTER — OFFICE VISIT (OUTPATIENT)
Dept: ENDOCRINOLOGY | Facility: MEDICAL CENTER | Age: 59
End: 2019-11-26
Payer: COMMERCIAL

## 2019-11-26 VITALS
HEIGHT: 66 IN | DIASTOLIC BLOOD PRESSURE: 60 MMHG | BODY MASS INDEX: 21.38 KG/M2 | SYSTOLIC BLOOD PRESSURE: 100 MMHG | WEIGHT: 133 LBS | HEART RATE: 103 BPM | OXYGEN SATURATION: 97 %

## 2019-11-26 DIAGNOSIS — E08.9 DIABETES MELLITUS RELATED TO CF (CYSTIC FIBROSIS) (HCC): ICD-10-CM

## 2019-11-26 DIAGNOSIS — K86.81 EXOCRINE PANCREATIC INSUFFICIENCY: ICD-10-CM

## 2019-11-26 DIAGNOSIS — E55.9 VITAMIN D DEFICIENCY: ICD-10-CM

## 2019-11-26 DIAGNOSIS — E84.9 CYSTIC FIBROSIS (HCC): ICD-10-CM

## 2019-11-26 DIAGNOSIS — E84.8 DIABETES MELLITUS RELATED TO CF (CYSTIC FIBROSIS) (HCC): ICD-10-CM

## 2019-11-26 DIAGNOSIS — R73.02 IMPAIRED GLUCOSE TOLERANCE: ICD-10-CM

## 2019-11-26 PROCEDURE — 99204 OFFICE O/P NEW MOD 45 MIN: CPT | Performed by: INTERNAL MEDICINE

## 2019-11-27 DIAGNOSIS — E84.9 CYSTIC FIBROSIS (HCC): ICD-10-CM

## 2019-11-27 DIAGNOSIS — K86.81 EXOCRINE PANCREATIC INSUFFICIENCY: ICD-10-CM

## 2019-11-27 DIAGNOSIS — R73.02 IMPAIRED GLUCOSE TOLERANCE: ICD-10-CM

## 2019-12-04 LAB
FUNGUS SPEC CULT: NORMAL
SIGNIFICANT IND 70042: NORMAL
SITE SITE: NORMAL
SOURCE SOURCE: NORMAL

## 2019-12-06 ENCOUNTER — TELEPHONE (OUTPATIENT)
Dept: PEDIATRIC PULMONOLOGY | Facility: MEDICAL CENTER | Age: 59
End: 2019-12-06

## 2019-12-06 NOTE — TELEPHONE ENCOUNTER
"[facility] to [facility]  Shantel earlier this week who reports they got a call from pt who said the pharmacy shipped her Kalydeco to her house but it was left on the porch in the cold weather (about 20 degrees Fahrenheit).  Pt was asking [facility] if the Kalydeco is ruined or if it's okay.  Shantel states she cannot give pt advise about this, but she recommended this office contact their [facility] Pharmacists at [phone] opt. 1 or [email or web address].    Called pt to ask how long the Kalydeco was out in the cold.  Per pt, the Rx was supposed to be delivered Wednesday last week, but UPS pushed it back to Friday (11/29/19).  Per pt, the medication was delivered sometime late Friday night and was left on their porch until Saturday morning - approximately 12 hours.  Per pt, the Kalydeco was wrapped in bubble wrap and when she opened it up, \"it looked fine.\"  Confirmed that temperatures that night were approximately 19-21 degrees Fahrenheit.  Pt states she does not need to start this next box of Kalydeco immediately because she still has some left in previous boxes.  Told pt this RN will contact [facility] and discuss with Dr. Payne then call her back.    [facility]ed [facility] and spoke to pharmacist Rita.  Explained all information from pt above.  Per Rita, excursion shipping studies on Kalydeco showed that the product is stable at -4 degrees Fahrenheit for 2 days.  Rita states they do not recommend storage at that temperature, but short term shipping is okay per these studies.  Rita also sent that information to this RN via email for review by Dr. Payne.  Rita said she does have to enter in a \"product complaint\" in which she indicated that pt is not requesting replacement of the drug at this time because based on these studies, the Kayldeco product is still safe and stable.  Per Rita, if replacement is requested later on, this office can call " [facility] Medical Advise line again.  They cannot guarantee replacement of the drug will be granted, but it is a possible option if needed.    Will discuss with Dr. Payne and then call pt back to notify her of information.  Pt states a call back next week is fine as she still has plenty of Kalydeco left in other boxes.

## 2019-12-07 NOTE — TELEPHONE ENCOUNTER
Discussed with Dr. Payne in detail.  Kalydeco still safe for pt to use.    Called pt and left a detailed message notifying her of information from [facility] pharmacist and Dr. Payne's recommendations.  Encouraged pt to call office with any further questions or concerns.

## 2019-12-07 NOTE — TELEPHONE ENCOUNTER
"Spoke to pt who wanted to update CF team that she saw endocrinologist Dr. Torres, and he started her on insulin.  Pt reports she is \"feeling so much better\" and has \"so much more energy.\"  Pt states she requested her previous glucose test records from [facility] and found that in 2010, her \"1 hr glucose was 235 and 2 hr was 180 but they never did anything about it.\"  Per pt, she is continuing to work with Dr. Torres's office to determine the best dosing for her insulin at this time.    Pt stated she is \"so happy\" with the CF team here at [facility] and asked this RN to pass that on to the team.  Will update CF team.  "

## 2019-12-20 DIAGNOSIS — E84.9 CYSTIC FIBROSIS (HCC): ICD-10-CM

## 2019-12-20 RX ORDER — IVACAFTOR 150 MG/1
1 TABLET, FILM COATED ORAL 2 TIMES DAILY
Qty: 56 TAB | Refills: 11 | Status: SHIPPED | OUTPATIENT
Start: 2019-12-20 | End: 2020-10-28

## 2019-12-20 NOTE — TELEPHONE ENCOUNTER
Received email from [facility] that they need a new Rx for pt's Kalydeco.    Was the patient seen in the last year in this department? Yes    Does patient have an active prescription for medications requested? Yes    Received Request Via: Pharmacy

## 2020-01-14 DIAGNOSIS — E08.9 DIABETES MELLITUS RELATED TO CF (CYSTIC FIBROSIS) (HCC): ICD-10-CM

## 2020-01-14 DIAGNOSIS — E84.8 DIABETES MELLITUS RELATED TO CF (CYSTIC FIBROSIS) (HCC): ICD-10-CM

## 2020-02-04 NOTE — PATIENT INSTRUCTIONS
"Kenia Brittany  1960     -CF Mutations: 3849+10kbC->T, B2251N     -CFTR modulator: Kalydeco     -Ht: 5 feet, 5 inches   Wt: 134 pounds    Respiratory     -Baseline FEV1: 63%    Today’s FEV1:__71_     -Airway Clearance Routine:        --Albuterol ( 2 x day)/(_3-4_x day when sick)        --Hypertonic Saline ( 2 x day)/(_3-4_x day when sick)        --Pulmozyme 0        --ACT Vest and/or Acapella ( 2 x day)/(_3-4_x day when sick)        --Inhaled antibiotics Cayston if sick     -Exercise: cardio 3 days/week + strength training 2 days/week     -Equipment Check (Annually) Date scheduled: if needed, if 2 years old, get new compressor    Nutrition/Gastrointestinal     -BMI: Current = 22.2, Goal = 22 - 24        -Enzymes: Creon 6 (1-4 with meals, 0-1 with snacks)      -Vitamins: MVi, magnesium, vitamin D, vitamin K, vitamin A, \"bone health\" supplement, Nuun electrolytes      - fast acting insulin with meals and long acting insulin at lunch    Social     -Insurance: Tern     -Transportation:     -Mental health screening: done 11/13/2019    Goals     -Annual Labs: done last 7/24/2019, due again by 6/2020     -LFTs: done last 7/24/2019, due again by 6/2020  -Oral Glucose tolerance test: not needed     -Sputum cultures: 1___ 2___ 3___ 4____ (Dates)     -DEXA scan: done last 7/10/2019, due again by mid to end 2020     -Chest X-ray: done last 7/13/2019, due again by 6/2020     -Eye Exam: last done 9/25/2019, due again by 9/2020 (Dorothy Glaser)  "

## 2020-02-06 ENCOUNTER — HOSPITAL ENCOUNTER (OUTPATIENT)
Facility: MEDICAL CENTER | Age: 60
End: 2020-02-06
Attending: PEDIATRICS
Payer: COMMERCIAL

## 2020-02-06 ENCOUNTER — OFFICE VISIT (OUTPATIENT)
Dept: PEDIATRIC PULMONOLOGY | Facility: MEDICAL CENTER | Age: 60
End: 2020-02-06
Payer: COMMERCIAL

## 2020-02-06 VITALS
TEMPERATURE: 98.4 F | OXYGEN SATURATION: 96 % | WEIGHT: 134.8 LBS | RESPIRATION RATE: 18 BRPM | SYSTOLIC BLOOD PRESSURE: 118 MMHG | DIASTOLIC BLOOD PRESSURE: 72 MMHG | BODY MASS INDEX: 22.46 KG/M2 | HEART RATE: 96 BPM | HEIGHT: 65 IN

## 2020-02-06 DIAGNOSIS — E84.8 DIABETES MELLITUS RELATED TO CF (CYSTIC FIBROSIS) (HCC): ICD-10-CM

## 2020-02-06 DIAGNOSIS — R94.2 ABNORMAL LUNG FUNCTION TEST: ICD-10-CM

## 2020-02-06 DIAGNOSIS — B96.5 PSEUDOMONAS RESPIRATORY INFECTION: ICD-10-CM

## 2020-02-06 DIAGNOSIS — E84.9 CF (CYSTIC FIBROSIS) (HCC): ICD-10-CM

## 2020-02-06 DIAGNOSIS — K86.81 EXOCRINE PANCREATIC INSUFFICIENCY: ICD-10-CM

## 2020-02-06 DIAGNOSIS — J98.8 PSEUDOMONAS RESPIRATORY INFECTION: ICD-10-CM

## 2020-02-06 DIAGNOSIS — E08.9 DIABETES MELLITUS RELATED TO CF (CYSTIC FIBROSIS) (HCC): ICD-10-CM

## 2020-02-06 PROBLEM — R73.09 ABNORMAL GLUCOSE TOLERANCE TEST (GTT): Status: RESOLVED | Noted: 2019-11-13 | Resolved: 2020-02-06

## 2020-02-06 LAB
GRAM STN SPEC: NORMAL
SIGNIFICANT IND 70042: NORMAL
SITE SITE: NORMAL
SOURCE SOURCE: NORMAL

## 2020-02-06 PROCEDURE — 87015 SPECIMEN INFECT AGNT CONCNTJ: CPT

## 2020-02-06 PROCEDURE — 87070 CULTURE OTHR SPECIMN AEROBIC: CPT

## 2020-02-06 PROCEDURE — 87181 SC STD AGAR DILUTION PER AGT: CPT

## 2020-02-06 PROCEDURE — 87077 CULTURE AEROBIC IDENTIFY: CPT

## 2020-02-06 PROCEDURE — 87186 SC STD MICRODIL/AGAR DIL: CPT

## 2020-02-06 PROCEDURE — 87205 SMEAR GRAM STAIN: CPT

## 2020-02-06 PROCEDURE — 99401 PREV MED CNSL INDIV APPRX 15: CPT | Mod: 25 | Performed by: PEDIATRICS

## 2020-02-06 PROCEDURE — 87102 FUNGUS ISOLATION CULTURE: CPT

## 2020-02-06 PROCEDURE — 99215 OFFICE O/P EST HI 40 MIN: CPT | Mod: 25 | Performed by: PEDIATRICS

## 2020-02-06 PROCEDURE — 94010 BREATHING CAPACITY TEST: CPT | Performed by: PEDIATRICS

## 2020-02-06 PROCEDURE — 87206 SMEAR FLUORESCENT/ACID STAI: CPT

## 2020-02-06 PROCEDURE — 87116 MYCOBACTERIA CULTURE: CPT

## 2020-02-06 NOTE — PROGRESS NOTES
PCP:  Kelin Donohue M.D.   [id number] [id number] / [place] [id number]     SUBJECTIVE:   Kenia Soto is a 59 y.o. female with Cystic Fibrosis    Patient Active Problem List    Diagnosis Date Noted   • 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 11/13/2019   • Cystic fibrosis (HCC) 11/13/2019   • Cystic fibrosis with pulmonary manifestations ([facility]) 11/13/2019   • Abnormal glucose tolerance test (GTT) 11/13/2019   • Herpes simplex 07/16/2019   • Osteopenia of multiple sites 07/16/2019   • CF (3849+10k bC>T, W8750P) ([facility]) 06/13/2019   • Exocrine pancreatic insufficiency 06/13/2019   • Hemoptysis 06/13/2019   • Vitamin K deficiency 06/13/2019   • Pseudomonas respiratory infection 06/13/2019       Since the last CF clinic visit chief complaint:  Kenia has experienced new diagnosis of CFRD, no new pulmonary issues   Last hospitalization: [7/22/19]  Last seen in clinic by Dr. Fleming 11/7/19, referred to endocrinology, diagnosed with CFRD, started on humalog    Respiratory:   Cough frequency: episodic, baseline  Cough character: productive  Sputum quantity: baseline and decreased  Sputum color: yellow  Shortness of breath:rare with exertion only  Chest Pain:never  Hemoptysis:minimal 4 days ago, no significant this year unlike in the past.   Antibiotics:last cayston in October, uses when sick only  Pulmonary toilet:   Albuterol: 2/day nebulizer  7% hypertonic saline: 2/day 8 ml, mixed with glutathione  Pulmozyme: 0/day  Chest Physiotherapy: aerobika intermittent during neb treatment which lasts 1 hour  Oxygen: none  Modulator: kalydeco    Compliance: compliant most of the time     Sinus symptoms:  Nasal Congestion: never   Nasal Drainage: rare to none  Headache/sinus pressure: never     Activity / Energy: normal for age   Change in activity/energy: increased exercising 5 days per week  School/ Work attendance: not in school, not working   Emotional assessment: positive      GI: no problem   Appetite: normal,  increased  Enzymes:  daily  6000 units 1-4 per meal, 1 per snack      Medications:     Current Outpatient Medications:   •  Kalydeco, 1 Tab, Oral, BID, Taking  •  phytonadione, 5 mg, Oral, DAILY, Taking  •  insulin lispro (Human), 10 Units, Subcutaneous, TID AC, Taking  •  Blood Glucose Test Strips, Test strips order: Test strips for One Touch Verio. Sig: use 6 times daily and prn ssx high or low sugar.  For insulin adjustment., Taking  •  Blood Glucose Monitoring Suppl, Meter: Dispense One touch Verio Flex meter., Taking  •  Insulin Pen Needle 32 G x 4 mm, 1 Each, Does not apply, TID AC, Taking  •  Multiple Vitamins-Minerals (DEKAS PLUS PO), 1 Cap, Oral, BID, Taking  •  Cayston, , PRN  •  acyclovir, Take 1 tablet orally 4 times a day for 14 days, then twice daily for 3 months, PRN  •  multivitamin, 1 Tab, Oral, DAILY, Taking  •  magnesium oxide, 400 mg, Oral, BID, Taking  •  Vitamin C, Take 1,000 mg by mouth., Taking  •  Probiotic Product (PROBIOTIC-10 PO), Take  by mouth., Taking  •  sodium chloride 3%, 3 mL, Nebulization, BID, Taking  •  Vitamin A, 25,000 Units, Oral, DAILY, Taking  •  Cholecalciferol, 20,000 Units, Oral, DAILY, Taking  •  sodium chloride, 4 mL, Nebulization, 4X/DAY (Patient taking differently: 4 mL, Nebulization, 2 TIMES DAILY, Mixes with 4 ml's of the 3% saline), Taking  •  albuterol, 2.5 mg, Nebulization, BID, Taking  •  Creon, 1-2 Cap, Oral, 4X/DAY PRN, Taking  •  insulin glargine, 25 Units, Subcutaneous, Q EVENING (Patient not taking: Reported on 2/6/2020), Not Taking  •  Insulin Lispro, 10 Units, Subcutaneous, TID AC (Patient not taking: Reported on 2/6/2020), Not Taking    ALLERGIES:  Levofloxacin and Tobramycin    Review of System:    Cardiovascular: Negative  Gastrointestinal: Negative  Allergic/Immunologic: negative  Now on insulin, having more lows down to 60's so dose adjusted  All other systems reviewed and negative or as above    Social/Environmental:    Tobacco use: never  Pets:  "dogs    OBJECTIVE:  Physical Exam:  /72 (BP Location: Right arm, Patient Position: Sitting)   Pulse 96   Temp 36.9 °C (98.4 °F) (Temporal)   Resp 18   Ht 1.655 m (5' 5.16\")   Wt 61.1 kg (134 lb 12.8 oz)   SpO2 96%   BMI 22.32 kg/m²      GENERAL: well appearing, well nourished, no respiratory distress and normal affect   EARS: bilateral TM's and external ear canals normal   NOSE: no audible congestion and no discharge   MOUTH/THROAT: normal oropharynx, normal tonsils and mucous membranes moist   NECK: normal, supple full range of motion and no thyroid enlargement   CHEST: no chest wall deformities and normal A-P diameter   LUNGS: mild rales in left lung   HEART: regular rate and rhythm and no murmurs   ABDOMEN: soft, non-tender, non-distended and no hepatosplenomegaly  : not examined  BACK: not examined   SKIN: normal color   EXTREMITIES: clubbing noted: 1+   NEURO: gross motor exam normal by observation     PFT's:  Pulmonary Function Test Results (PFT)    Spirometry Actual Predicted % Predicted   FVC (L) 3.24 3.47 93   FEV1 ((L) 1.91 2.69 71   FEV1/FVC (%) 59 78 75   FEF 25-75% (L/sec) 0.64 2.45 26     Please see  PFT in \"Media Tab\" of Notes activity  (EMR)    Provider Interpretation: mild to moderate obstruction, stable         Respiratory -  Cystic Fibrosis Airway Clearance Therapy (ACT)      Kenia seen today at [facility]. Testing today included sputum sample.Kenia had no concerns for RT regarding Respiratory therapy Tx/equipment. Current plan for Respiratory medications and ACT is:  AM  Albuterol  Hypertonic Saline 5%     ACT Vest     PM  Albuterol   Hypertonic Saline 5%     ACT: Vest        Exercise: As clemencia. Pt stated she is running on treadmill and doing light weight lifting.      Changes to above plan:   After review with Physician / Nurse Practitioner, the following changes: None at this time     Goals: Stay active/healthy!     Copy of PFT results given to client.      Labs reviewed: "     Respiratory Culture:   Lab Results   Component Value Date/Time    SIGIND NEG 11/07/2019 03:09 PM    SIGIND NEG 11/07/2019 03:09 PM    SIGIND POS (POS) 11/07/2019 03:09 PM    SIGIND . 11/07/2019 03:09 PM    SIGIND NEG 11/07/2019 03:09 PM    SOURCE RESP 11/07/2019 03:09 PM    SOURCE RESP 11/07/2019 03:09 PM    SOURCE RESP 11/07/2019 03:09 PM    SOURCE RESP 11/07/2019 03:09 PM    SOURCE RESP 11/07/2019 03:09 PM    SITE Sputum 11/07/2019 03:09 PM    SITE Sputum 11/07/2019 03:09 PM    SITE Sputum 11/07/2019 03:09 PM    SITE Sputum 11/07/2019 03:09 PM    SITE Sputum 11/07/2019 03:09 PM   2 strains of pseudomonas sensitive to aztreonam      ASSESSMENT/PLAN:   1. CF (3849+10k bC>T, R3645V) ([facility])  Lung function and clinical status very stable  Continue pulmonary toilet BID, continue kalydeco  Surveillance sputum culture obtained today    - AFB Culture; Future  - CF Respiratory Culture W/ Gram Stain; Future  - Fungal Culture; Future  - Spirometry; Future  - Spirometry  - Fungal Culture  - CF Respiratory Culture W/ Gram Stain  - AFB Culture    2. Abnormal lung function test  Mild to moderate obstruction but very stable    3. Pseudomonas respiratory infection  Chronic pseudomonas colonization but since lung function and clinical symptoms are stable, will continue cayston prn only    4. Exocrine pancreatic insufficiency  Continue low dose pancreatic enzymes    5. Diabetes mellitus related to CF (cystic fibrosis) ([facility])  Continue insulin and close follow up with endocrinology      Pulmonary Exacerbation: absent    Seen by Dietician:  Yes  Seen by Respiratory Therapy: Yes  Seen by :  Yes    Face-to-face total Attending time: 40 minutes  Care coordination and counseling time:  30 minutes regarding all above issues, all test results reviewed and action plan completed    Follow up in 3 months    Electronically signed by   Marjan Payne M.D.   Pediatric Pulmonology

## 2020-02-06 NOTE — PROCEDURES
"Pulmonary Function Test Results (PFT)    Spirometry Actual Predicted % Predicted   FVC (L) 3.24 3.47 93   FEV1 ((L) 1.91 2.69 71   FEV1/FVC (%) 59 78 75   FEF 25-75% (L/sec) 0.64 2.45 26     Please see  PFT in \"Media Tab\" of Notes activity  (EMR)    Provider Interpretation: mild to moderate obstruction, stable         Respiratory -  Cystic Fibrosis Airway Clearance Therapy (ACT)      Kenia seen today at [facility]. Testing today included sputum sample.Kenia had no concerns for RT regarding Respiratory therapy Tx/equipment. Current plan for Respiratory medications and ACT is:  AM  Albuterol  Hypertonic Saline 5%     ACT Vest     PM  Albuterol   Hypertonic Saline 5%     ACT: Vest        Exercise: As clemencia. Pt stated she is running on treadmill and doing light weight lifting.      Changes to above plan:   After review with Physician / Nurse Practitioner, the following changes: None at this time     Goals: Stay active/healthy!     Copy of PFT results given to client.  "

## 2020-02-06 NOTE — PROGRESS NOTES
"Medical Social Work    Referral: CF Clinic / Dr. Payne    Intervention: Patient is a 59 year old woman who presents to the clinic by herself today. Patient is well-groomed and very pleasant. She was eating a zone bar to treat low blood sugar, but feels great now that she is on insulin. She is excited today because her PFT was above 70, which \"has only happened 3 times\" in her life. She attributes this to her exercise routine. She now has a home gym in her garage and uses the treadmill and lifts weights 5x per week.   She reports her moods as great as well.   She states she overall feels better \"living here\" than she has in 20 years.   They recently moved her 's mother into an assisted living facility locally, and they are feeling more settled in the area.   Patient reports no issues with insurance or medication. Her Kaleydeco medication helps her meet her insurance deductible very quickly. She is enrolled in CF care forward for her enzymes and supplements. She does pay $200 for her vitamin K supplements but is not concerned enough to apply for [employer] to cover this and does not think she would qualify.     Plan: continue to follow and assist as needed.   "

## 2020-02-07 NOTE — PROGRESS NOTES
Nutrition Screen & Progress Note for [facility]  BMI: 22.3       Points: 0  IBW (kg): 56.8  % IBW: 108       Points: 0  Current weight (kg): 61.1   Weight last clinic visit: 59.6 kg on 11/7/19  % weight change: up 3%     Points: 0  FEV1 % predicted: 75 (up from 65)    Points: 0             Total Points: 0             Nutrition Risk: low      BM: 1-2 per day, soft  PERT: Creon 6 (1-2 with meals, 0-1 with snacks)  Lipase units/kg/meal: 98 - 196  CFTR modulator: Kalydeco  Typical diet: 3 meals and 0-2 snacks  Vitamins: general MVi, magnesium (400 mg), vitamin D (20,000 IU), vitamin K (5 mg), vitamin A (10,000 IU), bone health supplement, Nuun electrolytes  Calorie supplements: -  Visit details: Kenia is here today for follow up.  She looks fantastic and she is feeling so much better since on insulin.  She is now on 10 units of humalog with meals and she also takes a long acting insulin at lunch.  Her fasting sugars in the morning are in the 110's.   She will follow up with the endocrinologist soon.  Encouraged her to also discuss her DEXA results with pelon.  She may be a candidate for bisphosphonates?  She has osteopenia.  Recommend repeat DEXA in July, but would ask pelon.    She continues to participate in [employer], which is a program for people with CFRD.  She has a nutrition and exercise  that motivate her.  She is exercising 5 days per week and has lost fat and gained muscle.  She does 3 days per week cardio and 2 days per week strength training which is perfect.    She tries to balance her CHO with protein, she has added protein to her lunch.  She has a hard time with snacks because she wants CHO but does not take insulin with snacks.  So she tries to stick to protein but that is not what she craves.  She found a Keto ice cream that she loves.  She enjoys fruit and yogurt but sometimes that does not satisfy her sweet tooth.  A small piece of dark chocolate works for her.  Discussed ideas for  "snacks.  She enjoys nuts and cheese.   Kenia is thriving and is so happy she feels better. She states she wishes she was put on insulin \"years ago\".  She says she looked back at her OGTT's from the past and she was having IGT back in 2011.  Encouraged her to focus on the now and future.      Recommendations/Plan: continue with CFRD diet, try to limit CHO at snacks.  See endo soon for follow up and also discuss DEXA results/plan.  Continue with exercise program.    Follow-up: 3 months    Time spent: 22 minutes      "

## 2020-02-09 LAB — ETEST SENSITIVITY ETEST: NORMAL

## 2020-03-04 ENCOUNTER — OFFICE VISIT (OUTPATIENT)
Dept: ENDOCRINOLOGY | Facility: MEDICAL CENTER | Age: 60
End: 2020-03-04
Payer: COMMERCIAL

## 2020-03-04 VITALS
SYSTOLIC BLOOD PRESSURE: 100 MMHG | OXYGEN SATURATION: 99 % | HEIGHT: 65 IN | DIASTOLIC BLOOD PRESSURE: 58 MMHG | BODY MASS INDEX: 22.11 KG/M2 | WEIGHT: 132.7 LBS | HEART RATE: 100 BPM | RESPIRATION RATE: 16 BRPM

## 2020-03-04 DIAGNOSIS — E55.9 VITAMIN D DEFICIENCY: ICD-10-CM

## 2020-03-04 DIAGNOSIS — E16.2 HYPOGLYCEMIA: ICD-10-CM

## 2020-03-04 DIAGNOSIS — R73.02 IMPAIRED GLUCOSE TOLERANCE: ICD-10-CM

## 2020-03-04 DIAGNOSIS — E10.65 UNCONTROLLED TYPE 1 DIABETES MELLITUS WITH HYPERGLYCEMIA (HCC): ICD-10-CM

## 2020-03-04 DIAGNOSIS — E84.8 DIABETES MELLITUS RELATED TO CF (CYSTIC FIBROSIS) (HCC): ICD-10-CM

## 2020-03-04 DIAGNOSIS — E08.9 DIABETES MELLITUS RELATED TO CF (CYSTIC FIBROSIS) (HCC): ICD-10-CM

## 2020-03-04 LAB
FUNGUS SPEC CULT: NORMAL
SIGNIFICANT IND 70042: NORMAL
SITE SITE: NORMAL
SOURCE SOURCE: NORMAL

## 2020-03-04 PROCEDURE — 99214 OFFICE O/P EST MOD 30 MIN: CPT | Performed by: INTERNAL MEDICINE

## 2020-03-04 RX ORDER — GLUCAGON 3 MG/1
1 POWDER NASAL PRN
Qty: 2 EACH | Refills: 8 | Status: SHIPPED | OUTPATIENT
Start: 2020-03-04 | End: 2020-04-19

## 2020-03-04 RX ORDER — PROCHLORPERAZINE 25 MG/1
1 SUPPOSITORY RECTAL
Qty: 9 EACH | Refills: 3 | Status: SHIPPED | OUTPATIENT
Start: 2020-03-04 | End: 2020-04-19

## 2020-03-04 RX ORDER — PROCHLORPERAZINE 25 MG/1
1 SUPPOSITORY RECTAL CONTINUOUS
Qty: 1 EACH | Refills: 3 | Status: SHIPPED | OUTPATIENT
Start: 2020-03-04 | End: 2020-04-19

## 2020-03-04 ASSESSMENT — FIBROSIS 4 INDEX: FIB4 SCORE: 0.72

## 2020-03-04 NOTE — PROGRESS NOTES
New Patient Consult Note  Primary care physician: Kelin Donohue M.D.    Reason for consult: Cystic Fibrosis with exocrine pancreatic insufficiency and impaired glucose tolerance.  She has been testing blood sugars before meals, 1 hour after meals, and 2 hours after a meal.    HPI:  Kenia Soto is a 59 y.o. old patient who comes in today for evaluation of Cystic Fibrosis with impaired glucose tolerance.  She has been testing her blood sugars.  Please see the scanned logs for detail    Please see her scanned blood sugar readings.      She is currently requiring 7 to 9 units of NovoLog with meals.  Since starting insulin she feels remarkably better.  She is also been able to gain about 4 to 5 pounds since then.  She does have very fluctuant blood sugars and she gets into hypoglycemia very quickly    Exocrine Pancreatic Insufficiency:  Currently taking Creon 6000 units 1-2 caps before meals.    Vitamin D Deficiency:  Currently taking Vitamin D 20,000 units daily.  Results for KENIA SOTO (MRN [id number]) as of 11/23/2019 21:03   Ref. Range 7/24/2019 07:26   25-Hydroxy   Vitamin D 25 Latest Ref Range: 30 - 100 ng/mL 54       ROS:  Constitutional: No weight loss  Cardiac: No palpitations or racing heart  Resp: No shortness of breath  Neuro: No numbness or tinging in feet  Endo: No heat or cold intolerance, no polyuria or polydipsia  All other systems were reviewed and were negative.    Past Medical History:  Patient Active Problem List    Diagnosis Date Noted   • 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 11/13/2019   • Cystic fibrosis ([facility]) 11/13/2019   • Cystic fibrosis with pulmonary manifestations ([facility]) 11/13/2019   • Herpes simplex 07/16/2019   • Osteopenia of multiple sites 07/16/2019   • CF (3849+10k bC>T, M8257D) ([facility]) 06/13/2019   • Exocrine pancreatic insufficiency 06/13/2019   • Hemoptysis 06/13/2019   • Vitamin K deficiency 06/13/2019   • Pseudomonas respiratory infection 06/13/2019       Past Surgical  History:  Past Surgical History:   Procedure Laterality Date   • BRONCHOSCOPY-ENDO  7/22/2019    Procedure: BRONCHOSCOPY - W/BAL;  Surgeon: Arelis Fleming M.D.;  Location: ENDOSCOPY [facility];  Service: Ent   • DENTAL SURGERY     • SINUS WASHING         Allergies:  Levofloxacin and Tobramycin    Social History:  Social History     Socioeconomic History   • Marital status:      Spouse name: Not on file   • Number of children: Not on file   • Years of education: Not on file   • Highest education level: Not on file   Occupational History   • Not on file   Social Needs   • Financial resource strain: Not on file   • Food insecurity     Worry: Not on file     Inability: Not on file   • Transportation needs     Medical: Not on file     Non-medical: Not on file   Tobacco Use   • Smoking status: Never Smoker   • Smokeless tobacco: Never Used   Substance and Sexual Activity   • Alcohol use: Yes     Comment: Socially   • Drug use: No   • Sexual activity: Yes     Partners: Male     Comment: PM    Lifestyle   • Physical activity     Days per week: Not on file     Minutes per session: Not on file   • Stress: Not on file   Relationships   • Social connections     Talks on phone: Not on file     Gets together: Not on file     Attends Mosque service: Not on file     Active member of club or organization: Not on file     Attends meetings of clubs or organizations: Not on file     Relationship status: Not on file   • Intimate partner violence     Fear of current or ex partner: Not on file     Emotionally abused: Not on file     Physically abused: Not on file     Forced sexual activity: Not on file   Other Topics Concern   • Not on file   Social History Narrative    Has 2 daughters who are both CF carriers. Enjoys staying active.     Retired .     Recently moved to [place] from [place]       Family History:  Family History   Problem Relation Age of Onset   • Other Daughter         CF carrier   •  Alcohol/Drug Father    • Heart Disease Father    • Cystic Fibrosis Sister        Medications:    Current Outpatient Medications:   •  Continuous Blood Gluc  (DEXCOM  KIT) Device, 1 Each by Does not apply route Continuous., Disp: 1 Device, Rfl: 1  •  Continuous Blood Gluc Transmit (DEXCOM G6 TRANSMITTER) Misc, 1 Each by Does not apply route Continuous., Disp: 1 Each, Rfl: 3  •  Continuous Blood Gluc Sensor (DEXCOM G6 SENSOR) Misc, Inject 1 Each as instructed every 10 days., Disp: 9 Each, Rfl: 3  •  Glucagon (BAQSIMI TWO PACK) 3 MG/DOSE Powder, Spray 1 Each in nose as needed., Disp: 2 Each, Rfl: 8  •  KALYDECO 150 MG Tab, Take 1 Tab by mouth 2 times a day. With fat containing food., Disp: 56 Tab, Rfl: 11  •  phytonadione (MEPHYTON) 5 MG Tab, Take 1 Tab by mouth every day., Disp: 30 Tab, Rfl: 6  •  insulin lispro, Human, (HUMALOG KWIKPEN) 100 UNIT/ML injection PEN, Inject 10 Units as instructed 3 times a day before meals., Disp: 15 mL, Rfl: 11  •  Blood Glucose Test Strips, Test strips order: Test strips for One Touch Verio. Sig: use 6 times daily and prn ssx high or low sugar.  For insulin adjustment., Disp: 600 Strip, Rfl: 3  •  Blood Glucose Monitoring Suppl Device, Meter: Dispense One touch Verio Flex meter., Disp: 1 Device, Rfl: 0  •  Insulin Pen Needle 32 G x 4 mm, 1 Each by Does not apply route 3 times a day before meals., Disp: 300 Each, Rfl: 3  •  Multiple Vitamins-Minerals (DEKAS PLUS PO), Take 1 Cap by mouth 2 times a day., Disp: , Rfl:   •  CAYSTON 75 MG Recon Soln, , Disp: , Rfl:   •  acyclovir (ZOVIRAX) 400 MG tablet, Take 1 tablet orally 4 times a day for 14 days, then twice daily for 3 months, Disp: 90 Tab, Rfl: 3  •  magnesium oxide (MAG-OX) 400 MG Tab, Take 400 mg by mouth 2 times a day., Disp: , Rfl:   •  Ascorbic Acid (VITAMIN C) 1000 MG Tab, Take 1,000 mg by mouth., Disp: , Rfl:   •  Probiotic Product (PROBIOTIC-10 PO), Take  by mouth., Disp: , Rfl:   •  sodium chloride 3% 3 %  "nebulizer solution, 3 mL by Nebulization route 2 Times a Day. 4 ml mixed with 4 ml of the 7% sodium chloride, Disp: , Rfl:   •  Vitamin A 46113 UNIT Cap, Take 25,000 Units by mouth every day., Disp: , Rfl:   •  Cholecalciferol 5000 units Tab, Take 20,000 Units by mouth every day., Disp: , Rfl:   •  sodium chloride (HYPER-SAL) 7 % Nebu Soln, 4 mL by Nebulization route 4 times a day. (Patient taking differently: 4 mL by Nebulization route 2 Times a Day. Mixes with 4 ml's of the 3% saline), Disp: 1440 mL, Rfl: 3  •  albuterol (PROVENTIL) 2.5mg/3ml Nebu Soln solution for nebulization, 3 mL by Nebulization route 2 Times a Day., Disp: 450 mL, Rfl: 3  •  CREON 6000 units Cap DR Particles, Take 1-2 Caps by mouth 4 times a day as needed. With meals or snacks, Disp: 540 Cap, Rfl: 3  •  insulin glargine (LANTUS SOLOSTAR) 100 UNIT/ML Solution Pen-injector injection, Inject 25 Units as instructed every evening. Titrate as instructed (Patient not taking: Reported on 2/6/2020), Disp: 15 mL, Rfl: 6  •  Insulin Lispro (HUMALOG) 100 UNIT/ML Solution Cartridge, Inject 10 Units as instructed 3 times a day before meals. (Patient not taking: Reported on 2/6/2020), Disp: 15 mL, Rfl: 6  •  multivitamin (THERAGRAN) Tab, Take 1 Tab by mouth every day., Disp: , Rfl:     Labs: Reviewed    Physical Examination:  Vital signs: /58 (BP Location: Left arm, Patient Position: Sitting, BP Cuff Size: Adult)   Pulse 100   Resp 16   Ht 1.651 m (5' 5\")   Wt 60.2 kg (132 lb 11.2 oz)   SpO2 99%   BMI 22.08 kg/m²  Body mass index is 22.08 kg/m². Patient's body mass index is 22.08 kg/m². Exercise and nutrition counseling were performed at this visit.  Treadmill 3 times/week.  Squats and push ups daily.  General: No apparent distress, cooperative,thin  Eyes: No scleral icterus or discharge  ENMT: Normal on external inspection of nose, lips, normal thyroid exam  Neck: No abnormal masses on inspection  Resp: Normal effort, clear to auscultation " bilaterally   CVS: Regular rate and rhythm, S1 S2 normal, no murmur   Extremities: No edema  Neuro: Alert and oriented  Skin: No rash  Psych: Normal mood and affect, intact memory and able to make informed decisions      Assessment and Plan:    1. Exocrine pancreatic insufficiency  Continue Creon with food.    2. Vitamin D deficiency  Continue vitamin D supplementation.  Will check levels and adjust dose accordingly if needed    3. 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  She will need back see me i.e. nasal glucagon for hypoglycemia as and when needed.  She will also benefit greatly from continuous glucose monitoring system like Dexcom G6.  This is actually medically necessary for her.  It is very important from the safety standpoint.  This continuous glucose monitoring system will elevate the patient at least 30 minutes ahead of time if she is headed towards hypoglycemia.  We will also alert the patient about hypoglycemia.  It is a Dexcom share feature which also allows her to authorize up to 6 other people to monitor her blood glucose readings on their smart phone.    Return in about 3 months (around 6/4/2020).    Thank you for allowing me to participate in the care of this patient.    Diego Torres M.D.  11/26/19  This note was scribed by Maddi Stock RN CDE  CC:   Kelin Donohue M.D.    This note was created using voice recognition software (Dragon). The accuracy of the dictation is limited by the abilities of the software. I have reviewed the note prior to signing, however some errors in grammar and context are still possible. If you have any questions related to this note please do not hesitate to contact our office.

## 2020-03-12 ENCOUNTER — OFFICE VISIT (OUTPATIENT)
Dept: URGENT CARE | Facility: CLINIC | Age: 60
End: 2020-03-12
Payer: COMMERCIAL

## 2020-03-12 VITALS
SYSTOLIC BLOOD PRESSURE: 110 MMHG | WEIGHT: 132 LBS | TEMPERATURE: 99.2 F | DIASTOLIC BLOOD PRESSURE: 72 MMHG | HEIGHT: 65 IN | OXYGEN SATURATION: 94 % | HEART RATE: 80 BPM | RESPIRATION RATE: 16 BRPM | BODY MASS INDEX: 21.99 KG/M2

## 2020-03-12 DIAGNOSIS — S61.216A LACERATION OF RIGHT LITTLE FINGER WITHOUT FOREIGN BODY WITHOUT DAMAGE TO NAIL, INITIAL ENCOUNTER: Primary | ICD-10-CM

## 2020-03-12 DIAGNOSIS — S61.259A DOG BITE OF FINGER, INITIAL ENCOUNTER: ICD-10-CM

## 2020-03-12 DIAGNOSIS — E84.9 CF (CYSTIC FIBROSIS) (HCC): ICD-10-CM
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
## 2020-03-12 PROBLEM — R79.1 PARTIAL THROMBOPLASTIN TIME INCREASED: Status: ACTIVE | Noted: 2017-08-22

## 2020-03-12 PROCEDURE — 12002 RPR S/N/AX/GEN/TRNK2.6-7.5CM: CPT | Mod: F9 | Performed by: PHYSICIAN ASSISTANT

## 2020-03-12 RX ORDER — PHYTONADIONE 5 MG/1
5 TABLET ORAL DAILY
Qty: 90 TAB | Refills: 3 | Status: SHIPPED | OUTPATIENT
Start: 2020-03-12 | End: 2021-03-12

## 2020-03-12 RX ORDER — BLOOD SUGAR DIAGNOSTIC
STRIP MISCELLANEOUS
COMMUNITY
Start: 2019-12-26 | End: 2020-04-19

## 2020-03-12 RX ORDER — ALBUTEROL SULFATE 2.5 MG/3ML
2.5 SOLUTION RESPIRATORY (INHALATION) 2 TIMES DAILY
Qty: 540 ML | Refills: 3 | Status: SHIPPED | OUTPATIENT
Start: 2020-03-12 | End: 2021-02-22

## 2020-03-12 RX ORDER — SODIUM CHLORIDE FOR INHALATION 7 %
4 VIAL, NEBULIZER (ML) INHALATION 4 TIMES DAILY
Qty: 1440 ML | Refills: 3 | Status: SHIPPED | OUTPATIENT
Start: 2020-03-12 | End: 2020-09-02

## 2020-03-12 RX ORDER — INSULIN LISPRO 100 [IU]/ML
INJECTION, SOLUTION INTRAVENOUS; SUBCUTANEOUS
COMMUNITY
Start: 2020-02-25 | End: 2020-04-17 | Stop reason: SDUPTHER

## 2020-03-12 ASSESSMENT — FIBROSIS 4 INDEX: FIB4 SCORE: 0.72

## 2020-03-12 NOTE — PROGRESS NOTES
Subjective:   Pt is a 59 y.o. female who presents with Laceration (rt last finger )            HPI  This is a new problem. Pt notes playing with friend's puppy she sits for and went to throw a ball and puppy tooth caught right little finger causing long tear in skin with bleeding about 1 hour ago. Pt notes tetanus updated in 2017. Pt has not taken any Rx medications for this condition. Pt states the pain is a 2/10, aching in nature and worse at time of injury. Pt denies CP, SOB, NVD, paresthesias, headaches, dizziness, change in vision, hives, or other joint pain. The pt's medication list, problem list, and allergies have been evaluated and reviewed during today's visit.    PMH:  Past Medical History:   Diagnosis Date   • Breath shortness    • Bronchitis    • CF (cystic fibrosis) ([facility])    • Coughing blood    • Hemorrhagic disorder (HCC)    • Herpes simplex    • Pneumonia    • Shoulder fracture        PSH:  Past Surgical History:   Procedure Laterality Date   • BRONCHOSCOPY-ENDO  2019    Procedure: BRONCHOSCOPY - W/BAL;  Surgeon: Arelis Fleming M.D.;  Location: [facility];  Service: Ent   • DENTAL SURGERY     • SINUS WASHING         Fam Hx:    family history includes Alcohol/Drug in her father; Cystic Fibrosis in her sister; Heart Disease in her father; Other in her daughter.  Family Status   Relation Name Status   • Jakob  (Not Specified)   • Mo  Alive   • Fa     • Sis         Soc HX:  Social History     Socioeconomic History   • Marital status:      Spouse name: Not on file   • Number of children: Not on file   • Years of education: Not on file   • Highest education level: Not on file   Occupational History   • Not on file   Social Needs   • Financial resource strain: Not on file   • Food insecurity     Worry: Not on file     Inability: Not on file   • Transportation needs     Medical: Not on file     Non-medical: Not on file   Tobacco Use   • Smoking status: Never Smoker      • Smokeless tobacco: Never Used   Substance and Sexual Activity   • Alcohol use: Yes     Comment: Socially   • Drug use: No   • Sexual activity: Yes     Partners: Male     Comment: PM    Lifestyle   • Physical activity     Days per week: Not on file     Minutes per session: Not on file   • Stress: Not on file   Relationships   • Social connections     Talks on phone: Not on file     Gets together: Not on file     Attends Moravian service: Not on file     Active member of club or organization: Not on file     Attends meetings of clubs or organizations: Not on file     Relationship status: Not on file   • Intimate partner violence     Fear of current or ex partner: Not on file     Emotionally abused: Not on file     Physically abused: Not on file     Forced sexual activity: Not on file   Other Topics Concern   • Not on file   Social History Narrative    Has 2 daughters who are both CF carriers. Enjoys staying active.     Retired .     Recently moved to [place] from [place]         Medications:    Current Outpatient Medications:   •  albuterol (PROVENTIL) 2.5mg/3ml Nebu Soln solution for nebulization, 3 mL by Nebulization route 2 Times a Day for 364 days., Disp: 540 mL, Rfl: 3  •  sodium chloride (HYPER-SAL) 7 % Nebu Soln, 4 mL by Nebulization route 4 times a day., Disp: 1440 mL, Rfl: 3  •  phytonadione (MEPHYTON) 5 MG Tab, Take 1 Tab by mouth every day., Disp: 90 Tab, Rfl: 3  •  ONETOUCH VERIO strip, TEST 6 XD AND PRF SYMPTOMS OF HIGH OR LOW BLOOD SUGAR, Disp: , Rfl:   •  INPEN 100-PINK-VITOR Device, , Disp: , Rfl:   •  HUMALOG KWIKPEN 100 UNIT/ML Solution Pen-injector injection PEN, INJECT 10 UNITS SUBCUTANEOUSLY  TID AC, Disp: , Rfl:   •  Continuous Blood Gluc  (DEXCOM  KIT) Device, 1 Each by Does not apply route Continuous., Disp: 1 Device, Rfl: 1  •  Continuous Blood Gluc Transmit (DEXCOM G6 TRANSMITTER) Misc, 1 Each by Does not apply route Continuous., Disp: 1 Each, Rfl: 3  •   Continuous Blood Gluc Sensor (DEXCOM G6 SENSOR) Misc, Inject 1 Each as instructed every 10 days., Disp: 9 Each, Rfl: 3  •  Glucagon (BAQSIMI TWO PACK) 3 MG/DOSE Powder, Spray 1 Each in nose as needed., Disp: 2 Each, Rfl: 8  •  KALYDECO 150 MG Tab, Take 1 Tab by mouth 2 times a day. With fat containing food., Disp: 56 Tab, Rfl: 11  •  insulin lispro, Human, (HUMALOG KWIKPEN) 100 UNIT/ML injection PEN, Inject 10 Units as instructed 3 times a day before meals., Disp: 15 mL, Rfl: 11  •  Blood Glucose Test Strips, Test strips order: Test strips for One Touch Verio. Sig: use 6 times daily and prn ssx high or low sugar.  For insulin adjustment., Disp: 600 Strip, Rfl: 3  •  Blood Glucose Monitoring Suppl Device, Meter: Dispense One touch Verio Flex meter., Disp: 1 Device, Rfl: 0  •  Insulin Pen Needle 32 G x 4 mm, 1 Each by Does not apply route 3 times a day before meals., Disp: 300 Each, Rfl: 3  •  Multiple Vitamins-Minerals (DEKAS PLUS PO), Take 1 Cap by mouth 2 times a day., Disp: , Rfl:   •  CAYSTON 75 MG Recon Soln, , Disp: , Rfl:   •  acyclovir (ZOVIRAX) 400 MG tablet, Take 1 tablet orally 4 times a day for 14 days, then twice daily for 3 months, Disp: 90 Tab, Rfl: 3  •  multivitamin (THERAGRAN) Tab, Take 1 Tab by mouth every day., Disp: , Rfl:   •  magnesium oxide (MAG-OX) 400 MG Tab, Take 400 mg by mouth 2 times a day., Disp: , Rfl:   •  Ascorbic Acid (VITAMIN C) 1000 MG Tab, Take 1,000 mg by mouth., Disp: , Rfl:   •  Probiotic Product (PROBIOTIC-10 PO), Take  by mouth., Disp: , Rfl:   •  sodium chloride 3% 3 % nebulizer solution, 3 mL by Nebulization route 2 Times a Day. 4 ml mixed with 4 ml of the 7% sodium chloride, Disp: , Rfl:   •  Vitamin A 29675 UNIT Cap, Take 25,000 Units by mouth every day., Disp: , Rfl:   •  Cholecalciferol 5000 units Tab, Take 20,000 Units by mouth every day., Disp: , Rfl:   •  CREON 6000 units Cap DR Particles, Take 1-2 Caps by mouth 4 times a day as needed. With meals or snacks, Disp:  540 Cap, Rfl: 3      Allergies:  Levofloxacin and Tobramycin    ROS  Constitutional: Negative for fever, chills and malaise/fatigue.   HENT: Negative for congestion and sore throat.    Eyes: Negative for blurred vision, double vision and photophobia.   Respiratory: Negative for cough and shortness of breath.  Cardiovascular: Negative for chest pain and palpitations.   Gastrointestinal: Negative for heartburn, nausea, vomiting, abdominal pain, diarrhea and constipation.   Genitourinary: Negative for dysuria and flank pain.   Musculoskeletal:  +right little finger lac/dog bite.   Neurological: Negative for dizziness, tingling and headaches.   Endo/Heme/Allergies: Does not bruise/bleed easily.   Psychiatric/Behavioral: Negative for depression. The patient is not nervous/anxious.           Objective:     Vitals:    03/12/20 1645   BP: 110/72   Pulse: 80   Resp: 16   Temp: 37.3 °C (99.2 °F)   SpO2: 94%            Physical Exam  Musculoskeletal:      Right hand: She exhibits decreased range of motion, tenderness, laceration and swelling. She exhibits no bony tenderness, normal two-point discrimination, normal capillary refill and no deformity. Normal sensation noted. Normal strength noted.        Hands:             Constitutional: PT is oriented to person, place, and time. PT appears well-developed and well-nourished. No distress.   HENT:   Head: Normocephalic and atraumatic.   Mouth/Throat: Oropharynx is clear and moist. No oropharyngeal exudate.   Eyes: Conjunctivae normal and EOM are normal. Pupils are equal, round, and reactive to light.   Neck: Normal range of motion. Neck supple. No thyromegaly present.   Cardiovascular: Normal rate, regular rhythm, normal heart sounds and intact distal pulses.  Exam reveals no gallop and no friction rub.    No murmur heard.  Pulmonary/Chest: Effort normal and breath sounds normal. No respiratory distress. PT has no wheezes. PT has no rales. Pt exhibits no tenderness.   Abdominal:  "Soft. Bowel sounds are normal. PT exhibits no distension and no mass. There is no tenderness. There is no rebound and no guarding.   Neurological: PT is alert and oriented to person, place, and time. PT has normal reflexes. No cranial nerve deficit.   Skin: Skin is warm and dry. No rash noted. PT is not diaphoretic. No erythema.       Psychiatric: PT has a normal mood and affect. PT behavior is normal. Judgment and thought content normal.        Assessment/Plan:       1. Laceration of right little finger without foreign body without damage to nail, initial encounter      2. Dog bite of finger, initial encounter    Laceration repair  Wound cleansed, dermabond, dressing, finger splint  Pt has \"leftover unexpired doxycycline\" and will use that x 5 days for infection prophylaxis  Pt will not require sutures today  RICE therapy discussed  Gentle ROM exercises discussed  WBAT right hand  Ice/heat therapy discussed  OTC ibuprofen for pain control  Rest, fluids encouraged.  AVS with medical info given.  Pt was in full understanding and agreement with the plan.  Follow-up in 5 days for wound check or as needed if symptoms worsen or fail to improve.      "

## 2020-03-12 NOTE — PROCEDURES
Laceration Repair  Date/Time: 3/12/2020 4:51 PM  Performed by: Demario Tong P.A.-C.  Authorized by: Demario Tong P.A.-C.   Body area: upper extremity  Location details: right small finger  Laceration length: 4 cm  Foreign bodies: no foreign bodies  Tendon involvement: none  Nerve involvement: none  Vascular damage: no    Sedation:  Patient sedated: no    Irrigation solution: saline  Irrigation method: syringe  Amount of cleaning: standard  Debridement: none  Degree of undermining: none  Skin closure: glue  Approximation: close  Approximation difficulty: simple  Dressing: 4x4 sterile gauze, antibiotic ointment and pressure dressing  Patient tolerance: Patient tolerated the procedure well with no immediate complications

## 2020-04-07 NOTE — TELEPHONE ENCOUNTER
Parent has been informed and requested a new RX be sent to [facility], Rx request sent to provider.   
Tl Aquino,     Your insurance company has requested an additional authorization for your HYPER SAL. We have initiated a prior authorization process, and we expect to complete this in 7-10 business days. On rare occasions prior authorizations may take longer than expected.     The status of your prior authorization for  is: APPROVED  on 6/27/2019     Here is the information we have for you on file:     Insurance company: [employer]  Pharmacy:   [place], [place] - [address] [place] [id number]  Phone: [phone] Fax: [phone]    [place], [place] - [address]  [address] [facility] [address] [address]  Phone: [phone] Fax: [phone]    [id number] - [place], [place] - [address] [facility]  [address] [address]  Phone: [phone] Fax: [phone]    [id number] - [place], [place] - [address]  [address]  [place] [address]  Phone: [phone] Fax: [phone]       If you have any questions or if any of this information is incorrect, please do not hesitate to contact us through [employer] or at [phone].  
English

## 2020-04-17 DIAGNOSIS — R73.02 IMPAIRED GLUCOSE TOLERANCE: ICD-10-CM

## 2020-04-17 DIAGNOSIS — K86.81 EXOCRINE PANCREATIC INSUFFICIENCY: ICD-10-CM

## 2020-04-17 DIAGNOSIS — E84.9 CYSTIC FIBROSIS (HCC): ICD-10-CM

## 2020-04-17 RX ORDER — INSULIN LISPRO 100 [IU]/ML
10 INJECTION, SOLUTION INTRAVENOUS; SUBCUTANEOUS
Qty: 30 ML | Refills: 3 | Status: SHIPPED | OUTPATIENT
Start: 2020-04-17 | End: 2021-03-30 | Stop reason: SDUPTHER

## 2020-04-17 RX ORDER — INSULIN DEGLUDEC 100 U/ML
30 INJECTION, SOLUTION SUBCUTANEOUS DAILY
Qty: 30 ML | Refills: 3 | Status: SHIPPED | OUTPATIENT
Start: 2020-04-17 | End: 2020-04-19

## 2020-04-19 ENCOUNTER — HOSPITAL ENCOUNTER (OUTPATIENT)
Facility: MEDICAL CENTER | Age: 60
End: 2020-04-21
Attending: EMERGENCY MEDICINE | Admitting: FAMILY MEDICINE
Payer: COMMERCIAL

## 2020-04-19 ENCOUNTER — APPOINTMENT (OUTPATIENT)
Dept: RADIOLOGY | Facility: MEDICAL CENTER | Age: 60
End: 2020-04-19
Attending: EMERGENCY MEDICINE
Payer: COMMERCIAL

## 2020-04-19 DIAGNOSIS — R04.2 HEMOPTYSIS: ICD-10-CM

## 2020-04-19 PROBLEM — Z20.822 SUSPECTED COVID-19 VIRUS INFECTION: Status: ACTIVE | Noted: 2020-04-19

## 2020-04-19 LAB
ALBUMIN SERPL BCP-MCNC: 4 G/DL (ref 3.2–4.9)
ALBUMIN/GLOB SERPL: 1.5 G/DL
ALP SERPL-CCNC: 115 U/L (ref 30–99)
ALT SERPL-CCNC: 21 U/L (ref 2–50)
ANION GAP SERPL CALC-SCNC: 13 MMOL/L (ref 7–16)
APTT PPP: 44.2 SEC (ref 24.7–36)
AST SERPL-CCNC: 23 U/L (ref 12–45)
BASOPHILS # BLD AUTO: 1.1 % (ref 0–1.8)
BASOPHILS # BLD: 0.06 K/UL (ref 0–0.12)
BILIRUB SERPL-MCNC: 0.4 MG/DL (ref 0.1–1.5)
BUN SERPL-MCNC: 14 MG/DL (ref 8–22)
CALCIUM SERPL-MCNC: 9.4 MG/DL (ref 8.5–10.5)
CHLORIDE SERPL-SCNC: 102 MMOL/L (ref 96–112)
CO2 SERPL-SCNC: 23 MMOL/L (ref 20–33)
COVID ORDER STATUS COVID19: NORMAL
CREAT SERPL-MCNC: 0.66 MG/DL (ref 0.5–1.4)
CRP SERPL HS-MCNC: 0.23 MG/DL (ref 0–0.75)
EKG IMPRESSION: NORMAL
EOSINOPHIL # BLD AUTO: 0.11 K/UL (ref 0–0.51)
EOSINOPHIL NFR BLD: 2.1 % (ref 0–6.9)
ERYTHROCYTE [DISTWIDTH] IN BLOOD BY AUTOMATED COUNT: 44 FL (ref 35.9–50)
ERYTHROCYTE [SEDIMENTATION RATE] IN BLOOD BY WESTERGREN METHOD: 5 MM/HOUR (ref 0–30)
GLOBULIN SER CALC-MCNC: 2.6 G/DL (ref 1.9–3.5)
GLUCOSE BLD-MCNC: 142 MG/DL (ref 65–99)
GLUCOSE SERPL-MCNC: 116 MG/DL (ref 65–99)
HCT VFR BLD AUTO: 42 % (ref 37–47)
HGB BLD-MCNC: 13.8 G/DL (ref 12–16)
IMM GRANULOCYTES # BLD AUTO: 0 K/UL (ref 0–0.11)
IMM GRANULOCYTES NFR BLD AUTO: 0 % (ref 0–0.9)
INR PPP: 0.99 (ref 0.87–1.13)
LACTATE BLD-SCNC: 1.4 MMOL/L (ref 0.5–2)
LACTATE BLD-SCNC: 1.8 MMOL/L (ref 0.5–2)
LYMPHOCYTES # BLD AUTO: 1.21 K/UL (ref 1–4.8)
LYMPHOCYTES NFR BLD: 22.6 % (ref 22–41)
MCH RBC QN AUTO: 29.5 PG (ref 27–33)
MCHC RBC AUTO-ENTMCNC: 32.9 G/DL (ref 33.6–35)
MCV RBC AUTO: 89.7 FL (ref 81.4–97.8)
MONOCYTES # BLD AUTO: 0.53 K/UL (ref 0–0.85)
MONOCYTES NFR BLD AUTO: 9.9 % (ref 0–13.4)
NEUTROPHILS # BLD AUTO: 3.44 K/UL (ref 2–7.15)
NEUTROPHILS NFR BLD: 64.3 % (ref 44–72)
NRBC # BLD AUTO: 0 K/UL
NRBC BLD-RTO: 0 /100 WBC
PLATELET # BLD AUTO: 320 K/UL (ref 164–446)
PMV BLD AUTO: 10 FL (ref 9–12.9)
POTASSIUM SERPL-SCNC: 4.3 MMOL/L (ref 3.6–5.5)
PROCALCITONIN SERPL-MCNC: <0.05 NG/ML
PROT SERPL-MCNC: 6.6 G/DL (ref 6–8.2)
PROTHROMBIN TIME: 13.3 SEC (ref 12–14.6)
RBC # BLD AUTO: 4.68 M/UL (ref 4.2–5.4)
SARS-COV-2 RNA RESP QL NAA+PROBE: NEGATIVE
SODIUM SERPL-SCNC: 138 MMOL/L (ref 135–145)
SPECIMEN SOURCE: NORMAL
WBC # BLD AUTO: 5.4 K/UL (ref 4.8–10.8)

## 2020-04-19 PROCEDURE — U0004 COV-19 TEST NON-CDC HGH THRU: HCPCS

## 2020-04-19 PROCEDURE — 85610 PROTHROMBIN TIME: CPT

## 2020-04-19 PROCEDURE — 85730 THROMBOPLASTIN TIME PARTIAL: CPT

## 2020-04-19 PROCEDURE — 80053 COMPREHEN METABOLIC PANEL: CPT

## 2020-04-19 PROCEDURE — 84145 PROCALCITONIN (PCT): CPT

## 2020-04-19 PROCEDURE — 700111 HCHG RX REV CODE 636 W/ 250 OVERRIDE (IP): Performed by: FAMILY MEDICINE

## 2020-04-19 PROCEDURE — 82962 GLUCOSE BLOOD TEST: CPT

## 2020-04-19 PROCEDURE — 83605 ASSAY OF LACTIC ACID: CPT

## 2020-04-19 PROCEDURE — G2023 SPECIMEN COLLECT COVID-19: HCPCS | Performed by: EMERGENCY MEDICINE

## 2020-04-19 PROCEDURE — 96375 TX/PRO/DX INJ NEW DRUG ADDON: CPT

## 2020-04-19 PROCEDURE — 96366 THER/PROPH/DIAG IV INF ADDON: CPT

## 2020-04-19 PROCEDURE — G0378 HOSPITAL OBSERVATION PER HR: HCPCS

## 2020-04-19 PROCEDURE — 700105 HCHG RX REV CODE 258: Performed by: FAMILY MEDICINE

## 2020-04-19 PROCEDURE — 96365 THER/PROPH/DIAG IV INF INIT: CPT

## 2020-04-19 PROCEDURE — 700102 HCHG RX REV CODE 250 W/ 637 OVERRIDE(OP): Performed by: FAMILY MEDICINE

## 2020-04-19 PROCEDURE — 700111 HCHG RX REV CODE 636 W/ 250 OVERRIDE (IP): Performed by: EMERGENCY MEDICINE

## 2020-04-19 PROCEDURE — 700117 HCHG RX CONTRAST REV CODE 255: Performed by: EMERGENCY MEDICINE

## 2020-04-19 PROCEDURE — 99285 EMERGENCY DEPT VISIT HI MDM: CPT

## 2020-04-19 PROCEDURE — 71260 CT THORAX DX C+: CPT

## 2020-04-19 PROCEDURE — 85025 COMPLETE CBC W/AUTO DIFF WBC: CPT

## 2020-04-19 PROCEDURE — 99205 OFFICE O/P NEW HI 60 MIN: CPT | Performed by: INTERNAL MEDICINE

## 2020-04-19 PROCEDURE — 96374 THER/PROPH/DIAG INJ IV PUSH: CPT

## 2020-04-19 PROCEDURE — 86140 C-REACTIVE PROTEIN: CPT

## 2020-04-19 PROCEDURE — 93005 ELECTROCARDIOGRAM TRACING: CPT | Performed by: EMERGENCY MEDICINE

## 2020-04-19 PROCEDURE — A9270 NON-COVERED ITEM OR SERVICE: HCPCS | Performed by: FAMILY MEDICINE

## 2020-04-19 PROCEDURE — 85652 RBC SED RATE AUTOMATED: CPT

## 2020-04-19 PROCEDURE — 99220 PR INITIAL OBSERVATION CARE,LEVL III: CPT | Performed by: FAMILY MEDICINE

## 2020-04-19 PROCEDURE — 71045 X-RAY EXAM CHEST 1 VIEW: CPT

## 2020-04-19 PROCEDURE — 87040 BLOOD CULTURE FOR BACTERIA: CPT | Mod: 91

## 2020-04-19 RX ORDER — BISACODYL 10 MG
10 SUPPOSITORY, RECTAL RECTAL
Status: DISCONTINUED | OUTPATIENT
Start: 2020-04-19 | End: 2020-04-21 | Stop reason: HOSPADM

## 2020-04-19 RX ORDER — ONDANSETRON 2 MG/ML
4 INJECTION INTRAMUSCULAR; INTRAVENOUS EVERY 6 HOURS PRN
Status: DISCONTINUED | OUTPATIENT
Start: 2020-04-19 | End: 2020-04-21 | Stop reason: HOSPADM

## 2020-04-19 RX ORDER — PREDNISONE 20 MG/1
40 TABLET ORAL DAILY
Status: DISCONTINUED | OUTPATIENT
Start: 2020-04-19 | End: 2020-04-21 | Stop reason: HOSPADM

## 2020-04-19 RX ORDER — ACETAMINOPHEN 325 MG/1
650 TABLET ORAL EVERY 6 HOURS PRN
Status: DISCONTINUED | OUTPATIENT
Start: 2020-04-19 | End: 2020-04-21 | Stop reason: HOSPADM

## 2020-04-19 RX ORDER — POLYETHYLENE GLYCOL 3350 17 G/17G
1 POWDER, FOR SOLUTION ORAL
Status: DISCONTINUED | OUTPATIENT
Start: 2020-04-19 | End: 2020-04-21 | Stop reason: HOSPADM

## 2020-04-19 RX ORDER — ONDANSETRON 4 MG/1
4 TABLET, ORALLY DISINTEGRATING ORAL EVERY 6 HOURS PRN
Status: DISCONTINUED | OUTPATIENT
Start: 2020-04-19 | End: 2020-04-21 | Stop reason: HOSPADM

## 2020-04-19 RX ORDER — DEXTROSE MONOHYDRATE 25 G/50ML
50 INJECTION, SOLUTION INTRAVENOUS
Status: DISCONTINUED | OUTPATIENT
Start: 2020-04-19 | End: 2020-04-21 | Stop reason: HOSPADM

## 2020-04-19 RX ORDER — AMOXICILLIN 250 MG
2 CAPSULE ORAL 2 TIMES DAILY
Status: DISCONTINUED | OUTPATIENT
Start: 2020-04-19 | End: 2020-04-21 | Stop reason: HOSPADM

## 2020-04-19 RX ORDER — ASCORBIC ACID 500 MG
1000 TABLET ORAL DAILY
Status: DISCONTINUED | OUTPATIENT
Start: 2020-04-20 | End: 2020-04-21 | Stop reason: HOSPADM

## 2020-04-19 RX ORDER — METHYLPREDNISOLONE SODIUM SUCCINATE 125 MG/2ML
125 INJECTION, POWDER, LYOPHILIZED, FOR SOLUTION INTRAMUSCULAR; INTRAVENOUS ONCE
Status: COMPLETED | OUTPATIENT
Start: 2020-04-19 | End: 2020-04-19

## 2020-04-19 RX ORDER — BENZONATATE 100 MG/1
100 CAPSULE ORAL 3 TIMES DAILY
Status: DISCONTINUED | OUTPATIENT
Start: 2020-04-19 | End: 2020-04-21 | Stop reason: HOSPADM

## 2020-04-19 RX ORDER — PHYTONADIONE 5 MG/1
5 TABLET ORAL DAILY
Status: DISCONTINUED | OUTPATIENT
Start: 2020-04-20 | End: 2020-04-21 | Stop reason: HOSPADM

## 2020-04-19 RX ORDER — DIPHENHYDRAMINE HYDROCHLORIDE 50 MG/ML
50 INJECTION INTRAMUSCULAR; INTRAVENOUS ONCE
Status: COMPLETED | OUTPATIENT
Start: 2020-04-19 | End: 2020-04-19

## 2020-04-19 RX ORDER — INSULIN DEGLUDEC INJECTION 100 U/ML
3 INJECTION, SOLUTION SUBCUTANEOUS DAILY
COMMUNITY
End: 2020-04-24 | Stop reason: SDUPTHER

## 2020-04-19 RX ADMIN — IOHEXOL 100 ML: 350 INJECTION, SOLUTION INTRAVENOUS at 13:52

## 2020-04-19 RX ADMIN — PIPERACILLIN AND TAZOBACTAM 4.5 G: 4; .5 INJECTION, POWDER, LYOPHILIZED, FOR SOLUTION INTRAVENOUS; PARENTERAL at 20:21

## 2020-04-19 RX ADMIN — BENZONATATE 100 MG: 100 CAPSULE ORAL at 15:40

## 2020-04-19 RX ADMIN — PIPERACILLIN AND TAZOBACTAM 4.5 G: 4; .5 INJECTION, POWDER, LYOPHILIZED, FOR SOLUTION INTRAVENOUS; PARENTERAL at 15:40

## 2020-04-19 RX ADMIN — DIPHENHYDRAMINE HYDROCHLORIDE 50 MG: 50 INJECTION, SOLUTION INTRAMUSCULAR; INTRAVENOUS at 12:44

## 2020-04-19 RX ADMIN — METHYLPREDNISOLONE SODIUM SUCCINATE 125 MG: 125 INJECTION, POWDER, FOR SOLUTION INTRAMUSCULAR; INTRAVENOUS at 12:45

## 2020-04-19 RX ADMIN — PANCRELIPASE 12000 UNITS: 30000; 6000; 19000 CAPSULE, DELAYED RELEASE PELLETS ORAL at 19:03

## 2020-04-19 ASSESSMENT — LIFESTYLE VARIABLES
TOTAL SCORE: 0
TOTAL SCORE: 0
HAVE PEOPLE ANNOYED YOU BY CRITICIZING YOUR DRINKING: NO
HAVE YOU EVER FELT YOU SHOULD CUT DOWN ON YOUR DRINKING: NO
ALCOHOL_USE: YES
EVER FELT BAD OR GUILTY ABOUT YOUR DRINKING: NO
TOTAL SCORE: 0
AVERAGE NUMBER OF DAYS PER WEEK YOU HAVE A DRINK CONTAINING ALCOHOL: 1
HOW MANY TIMES IN THE PAST YEAR HAVE YOU HAD 5 OR MORE DRINKS IN A DAY: 0
CONSUMPTION TOTAL: NEGATIVE
DOES PATIENT WANT TO STOP DRINKING: NO
EVER_SMOKED: NEVER
EVER HAD A DRINK FIRST THING IN THE MORNING TO STEADY YOUR NERVES TO GET RID OF A HANGOVER: NO
ON A TYPICAL DAY WHEN YOU DRINK ALCOHOL HOW MANY DRINKS DO YOU HAVE: 1

## 2020-04-19 ASSESSMENT — ENCOUNTER SYMPTOMS
SORE THROAT: 0
BLURRED VISION: 0
HEARTBURN: 0
WEAKNESS: 1
COUGH: 1
DIZZINESS: 0
VOMITING: 0
SHORTNESS OF BREATH: 0
EYES NEGATIVE: 1
MUSCULOSKELETAL NEGATIVE: 1
CARDIOVASCULAR NEGATIVE: 1
WHEEZING: 0
CHILLS: 0
SPUTUM PRODUCTION: 1
PALPITATIONS: 0
NECK PAIN: 0
BACK PAIN: 0
ABDOMINAL PAIN: 0
FEVER: 0
DIAPHORESIS: 0
SPEECH CHANGE: 0
PSYCHIATRIC NEGATIVE: 1
HEMOPTYSIS: 1
NAUSEA: 0
FLANK PAIN: 0
DIARRHEA: 0
MYALGIAS: 0
HEADACHES: 0
SENSORY CHANGE: 0
WEAKNESS: 0
NERVOUS/ANXIOUS: 1
FOCAL WEAKNESS: 0
GASTROINTESTINAL NEGATIVE: 1

## 2020-04-19 ASSESSMENT — PATIENT HEALTH QUESTIONNAIRE - PHQ9
1. LITTLE INTEREST OR PLEASURE IN DOING THINGS: NOT AT ALL
2. FEELING DOWN, DEPRESSED, IRRITABLE, OR HOPELESS: NOT AT ALL
SUM OF ALL RESPONSES TO PHQ9 QUESTIONS 1 AND 2: 0
SUM OF ALL RESPONSES TO PHQ9 QUESTIONS 1 AND 2: 0
1. LITTLE INTEREST OR PLEASURE IN DOING THINGS: NOT AT ALL

## 2020-04-19 ASSESSMENT — FIBROSIS 4 INDEX
FIB4 SCORE: 0.93
FIB4 SCORE: 0.72

## 2020-04-19 ASSESSMENT — PAIN SCALES - WONG BAKER: WONGBAKER_NUMERICALRESPONSE: DOESN'T HURT AT ALL

## 2020-04-19 NOTE — ED PROVIDER NOTES
"ED Provider Note    Scribed for Chip Loja M.D. by Keyon Epstein. 4/19/2020  10:33 AM    Primary care provider: Kelin Donohue M.D.  Means of arrival: Walk-in  History obtained from: Patient  History limited by: None    CHIEF COMPLAINT  Chief Complaint   Patient presents with   • Blood in Sputum       HPI  Kenia Soto is a 59 y.o. female with a history of cystic fibrosis who presents to the Emergency Department for acute, worsening hemotysis onset 4 days ago. Patient states that since last Wednesday she had been coughing up blood once a day until last night she started coughing up more and more blood. This morning she reports coughing up \"a quarter of a cup of blood\".  She notes that she ceased her CF chest/breathing treatments last Wednesday due to the hemoptysis and has sinus congested due to that. There are no known alleviating or exacerbating factors. Patient denies any associated shortness of breath, fevers, or exposure to COVID-19.     PPE Note: I personally donned full PPE for all patient encounters during this visit, including being clean-shaven with an N95 respirator mask and gloves.      Scribe remained outside the patient's room and did not have any contact with the patient for the duration of patient encounter.     REVIEW OF SYSTEMS  Pertinent positives include hemoptysis and sinus congestion. Pertinent negatives include no shortness of breath, fever, or COVID-19 exposure.  All other systems reviewed and negative.    PAST MEDICAL HISTORY   has a past medical history of Breath shortness, Bronchitis, CF (cystic fibrosis) (HCC), Coughing blood, Hemorrhagic disorder (HCC), Herpes simplex, Pneumonia, and Shoulder fracture.    SURGICAL HISTORY   has a past surgical history that includes sinus washing; dental surgery; and bronchoscopy-endo (7/22/2019).    SOCIAL HISTORY  Social History     Tobacco Use   • Smoking status: Never Smoker   • Smokeless tobacco: Never Used   Substance Use Topics   • " Alcohol use: Yes     Comment: Socially   • Drug use: No      Social History     Substance and Sexual Activity   Drug Use No       FAMILY HISTORY  Family History   Problem Relation Age of Onset   • Other Daughter         CF carrier   • Alcohol/Drug Father    • Heart Disease Father    • Cystic Fibrosis Sister        CURRENT MEDICATIONS  Current Outpatient Medications:   •  Insulin Degludec (TRESIBA FLEXTOUCH) 100 UNIT/ML Solution Pen-injector, Inject 30 Units as instructed every day., Disp: 30 mL, Rfl: 3  •  Insulin Pen Needle 32 G x 4 mm, 1 Each by Does not apply route 4 Times a Day,Before Meals and at Bedtime., Disp: 400 Each, Rfl: 3  •  HUMALOG KWIKPEN 100 UNIT/ML Solution Pen-injector injection PEN, Inject 10 Units as instructed 3 times a day before meals. Needs Humalog cartridges for use in the INPEN, Disp: 30 mL, Rfl: 3  •  albuterol (PROVENTIL) 2.5mg/3ml Nebu Soln solution for nebulization, 3 mL by Nebulization route 2 Times a Day for 364 days., Disp: 540 mL, Rfl: 3  •  sodium chloride (HYPER-SAL) 7 % Nebu Soln, 4 mL by Nebulization route 4 times a day., Disp: 1440 mL, Rfl: 3  •  phytonadione (MEPHYTON) 5 MG Tab, Take 1 Tab by mouth every day., Disp: 90 Tab, Rfl: 3  •  ONETOUCH VERIO strip, TEST 6 XD AND PRF SYMPTOMS OF HIGH OR LOW BLOOD SUGAR, Disp: , Rfl:   •  INPEN 100-PINK-VITOR Device, , Disp: , Rfl:   •  Continuous Blood Gluc  (DEXCOM  KIT) Device, 1 Each by Does not apply route Continuous., Disp: 1 Device, Rfl: 1  •  Continuous Blood Gluc Transmit (DEXCOM G6 TRANSMITTER) Misc, 1 Each by Does not apply route Continuous., Disp: 1 Each, Rfl: 3  •  Continuous Blood Gluc Sensor (DEXCOM G6 SENSOR) Misc, Inject 1 Each as instructed every 10 days., Disp: 9 Each, Rfl: 3  •  Glucagon (BAQSIMI TWO PACK) 3 MG/DOSE Powder, Spray 1 Each in nose as needed., Disp: 2 Each, Rfl: 8  •  KALYDECO 150 MG Tab, Take 1 Tab by mouth 2 times a day. With fat containing food., Disp: 56 Tab, Rfl: 11  •  insulin  "lispro, Human, (HUMALOG KWIKPEN) 100 UNIT/ML injection PEN, Inject 10 Units as instructed 3 times a day before meals., Disp: 15 mL, Rfl: 11  •  Blood Glucose Test Strips, Test strips order: Test strips for One Touch Verio. Sig: use 6 times daily and prn ssx high or low sugar.  For insulin adjustment., Disp: 600 Strip, Rfl: 3  •  Blood Glucose Monitoring Suppl Device, Meter: Dispense One touch Verio Flex meter., Disp: 1 Device, Rfl: 0  •  Multiple Vitamins-Minerals (DEKAS PLUS PO), Take 1 Cap by mouth 2 times a day., Disp: , Rfl:   •  CAYSTON 75 MG Recon Soln, , Disp: , Rfl:   •  acyclovir (ZOVIRAX) 400 MG tablet, Take 1 tablet orally 4 times a day for 14 days, then twice daily for 3 months, Disp: 90 Tab, Rfl: 3  •  multivitamin (THERAGRAN) Tab, Take 1 Tab by mouth every day., Disp: , Rfl:   •  magnesium oxide (MAG-OX) 400 MG Tab, Take 400 mg by mouth 2 times a day., Disp: , Rfl:   •  Ascorbic Acid (VITAMIN C) 1000 MG Tab, Take 1,000 mg by mouth., Disp: , Rfl:   •  Probiotic Product (PROBIOTIC-10 PO), Take  by mouth., Disp: , Rfl:   •  sodium chloride 3% 3 % nebulizer solution, 3 mL by Nebulization route 2 Times a Day. 4 ml mixed with 4 ml of the 7% sodium chloride, Disp: , Rfl:   •  Vitamin A 31040 UNIT Cap, Take 25,000 Units by mouth every day., Disp: , Rfl:   •  Cholecalciferol 5000 units Tab, Take 20,000 Units by mouth every day., Disp: , Rfl:   •  CREON 6000 units Cap DR Particles, Take 1-2 Caps by mouth 4 times a day as needed. With meals or snacks, Disp: 540 Cap, Rfl: 3      ALLERGIES  Allergies   Allergen Reactions   • Levofloxacin      Unknown reaction  Possibly myalgias, arthralgias, nightmares   • Tobramycin Hives     Hives       PHYSICAL EXAM  VITAL SIGNS: Pulse 86   Temp 37.1 °C (98.8 °F) (Temporal)   Resp 16   Ht 1.664 m (5' 5.5\")   Wt 59 kg (130 lb)   SpO2 95%   BMI 21.30 kg/m²     Constitutional: Well developed, Well nourished, mild distress, Non-toxic appearance.   HENT: Normocephalic, " Atraumatic, Bilateral external ears normal, Oropharynx moist, No oral exudates.   Eyes: PERRLA, EOMI, Conjunctiva normal, No discharge.   Neck: No tenderness, Supple, No stridor.   Lymphatic: No lymphadenopathy noted.   Cardiovascular: Normal heart rate, Normal rhythm.   Thorax & Lungs: Slight rhonchi to bilateral bases, No respiratory distress, No wheezing, No crackles.   Abdomen: Soft, No tenderness, No masses, No pulsatile masses.   Skin: Warm, Dry, No erythema, No rash.   Extremities:, No edema No cyanosis.   Musculoskeletal: No tenderness to palpation or major deformities noted.  Intact distal pulses  Neurologic: Awake, alert. Moves all extremities spontaneously.  Psychiatric: Affect normal, Judgment normal, Mood normal.     I verified that the patient was wearing a mask and I was wearing appropriate PPE every time I entered the room. The patient's mask was on the patient at all times during my encounter except for a brief view of the oropharynx.    LABS  Results for orders placed or performed during the hospital encounter of 04/19/20   CBC with Differential   Result Value Ref Range    WBC 5.4 4.8 - 10.8 K/uL    RBC 4.68 4.20 - 5.40 M/uL    Hemoglobin 13.8 12.0 - 16.0 g/dL    Hematocrit 42.0 37.0 - 47.0 %    MCV 89.7 81.4 - 97.8 fL    MCH 29.5 27.0 - 33.0 pg    MCHC 32.9 (L) 33.6 - 35.0 g/dL    RDW 44.0 35.9 - 50.0 fL    Platelet Count 320 164 - 446 K/uL    MPV 10.0 9.0 - 12.9 fL    Neutrophils-Polys 64.30 44.00 - 72.00 %    Lymphocytes 22.60 22.00 - 41.00 %    Monocytes 9.90 0.00 - 13.40 %    Eosinophils 2.10 0.00 - 6.90 %    Basophils 1.10 0.00 - 1.80 %    Immature Granulocytes 0.00 0.00 - 0.90 %    Nucleated RBC 0.00 /100 WBC    Neutrophils (Absolute) 3.44 2.00 - 7.15 K/uL    Lymphs (Absolute) 1.21 1.00 - 4.80 K/uL    Monos (Absolute) 0.53 0.00 - 0.85 K/uL    Eos (Absolute) 0.11 0.00 - 0.51 K/uL    Baso (Absolute) 0.06 0.00 - 0.12 K/uL    Immature Granulocytes (abs) 0.00 0.00 - 0.11 K/uL    NRBC (Absolute)  0.00 K/uL   Comp Metabolic Panel   Result Value Ref Range    Sodium 138 135 - 145 mmol/L    Potassium 4.3 3.6 - 5.5 mmol/L    Chloride 102 96 - 112 mmol/L    Co2 23 20 - 33 mmol/L    Anion Gap 13.0 7.0 - 16.0    Glucose 116 (H) 65 - 99 mg/dL    Bun 14 8 - 22 mg/dL    Creatinine 0.66 0.50 - 1.40 mg/dL    Calcium 9.4 8.5 - 10.5 mg/dL    AST(SGOT) 23 12 - 45 U/L    ALT(SGPT) 21 2 - 50 U/L    Alkaline Phosphatase 115 (H) 30 - 99 U/L    Total Bilirubin 0.4 0.1 - 1.5 mg/dL    Albumin 4.0 3.2 - 4.9 g/dL    Total Protein 6.6 6.0 - 8.2 g/dL    Globulin 2.6 1.9 - 3.5 g/dL    A-G Ratio 1.5 g/dL   Lactic Acid   Result Value Ref Range    Lactic Acid 1.8 0.5 - 2.0 mmol/L   Lactic Acid   Result Value Ref Range    Lactic Acid 1.4 0.5 - 2.0 mmol/L   Procalcitonin   Result Value Ref Range    Procalcitonin <0.05 <0.25 ng/mL   CRP Quantitative (Non-Cardiac)   Result Value Ref Range    Stat C-Reactive Protein 0.23 0.00 - 0.75 mg/dL   Sed Rate   Result Value Ref Range    Sed Rate Westergren 5 0 - 30 mm/hour   COVID/SARS CoV-2   Result Value Ref Range    COVID Order Status Received    aPTT   Result Value Ref Range    APTT 44.2 (H) 24.7 - 36.0 sec   Prothrombin Time   Result Value Ref Range    PT 13.3 12.0 - 14.6 sec    INR 0.99 0.87 - 1.13   SARS-CoV-2, PCR (In-House)   Result Value Ref Range    SARS-CoV-2 Source NP Swab     SARS-CoV-2 by PCR Negative Negative   ESTIMATED GFR   Result Value Ref Range    GFR If African American >60 >60 mL/min/1.73 m 2    GFR If Non African American >60 >60 mL/min/1.73 m 2   EKG   Result Value Ref Range    Report       [facility] Emergency Dept.    Test Date:  2020  Pt Name:    MARYJO CHASE                 Department: ER  MRN:        [id number]                      Room:       [facility]  Gender:     Female                       Technician: [id number]  :        1960                   Requested By:MARSHA WHITE  Order #:    [id number]                    Al MD: MARSHA DAVID  MD CHRISTOPHER    Measurements  Intervals                                Axis  Rate:       75                           P:          73  SC:         144                          QRS:        200  QRSD:       90                           T:          67  QT:         388  QTc:        434    Interpretive Statements  SINUS RHYTHM  RIGHT AXIS DEVIATION  BASELINE WANDER IN LEAD(S) II,III,aVF  No previous ECG available for comparison  Electronically Signed On 4- 12:07:19 PDT by MARSHA WHITE MD          RADIOLOGY  CT-CHEST (THORAX) WITH   Final Result      Diffuse bronchiectasis with bronchial wall thickening and mucous plugging once again noted consistent with history of cystic fibrosis with slight improvement in mucous plugging.      New patchy groundglass opacities in the left lower lobe and medial right lower lobe suggestive of pneumonia or pneumonitis.      Persistent prominent mediastinal and hilar lymph nodes likely reactive related to history of cystic fibrosis.      Prominent bronchial arteries related to history of cystic fibrosis.            DX-CHEST-PORTABLE (1 VIEW)   Final Result         1.  No change in scattered interstitial and fibrotic opacifications.      2.  No new infiltrates or consolidations are identified.        The radiologist's interpretation of all radiological studies have been reviewed by me.      COURSE & MEDICAL DECISION MAKING  Pertinent Labs & Imaging studies reviewed. (See chart for details)    I reviewed the patient's medical records which showed she has a history of cystic fibrosis; Followed by Dr. Payne    10:33 AM - Patient seen and examined at bedside. Ordered DX-chest, lactic acid, APTT, PTT, CBC with diff, CMP, procalcitonin, CRP quant, Sed rate, COVID/SARS, blood culture, and EKg to evaluate her symptoms. The differential diagnoses include but are not limited to: pneumonia, COVID-19, and hemoptysis. Pulmonary paged.     10:49 AM - I discussed the patient's case and the  above findings with Dr. Fleming (Pulmonology) who she has scoped the patient before and couldn't see anything and felt it was appropriate to get adult pulmonologist involved to discuss need for CT vs bronchoscopy.    12:03 PM - I discussed the patient's case and the above findings with Dr. Cameron (Pulmonolgy) who agrees to consult on the patient and requests a CT of the chest with contrast.      12:08 PM - I discussed the patient's case and the above findings with Dr. Gore (Hospitalist) who agrees to evaluate the patient for hospitalization.     12:31 PM - Patient was reevaluated at bedside; she informed me that she is allergic to IV contrast; she will be pretreated with Solumedrol and Benadryl. Discussed lab and radiology results with the patient and informed them that I would like them to be evaluated for hospitalization. They were understanding and agreeable to updated plan of care.     Decision Making:  Patient with cystic fibrosis, he is having hemoptysis, is in no respiratory stress now.  Discussed the case with Dr. Fleming pediatric pulmonologist recommends the adult pulmonologist see the patient.  Discussed the case with Dr. cameron who recommended a CT chest with IV contrast, discussed the case with the hospitalist for hospitalization.    DISPOSITION:  Patient will be hospitalized by Dr. Gore in guarded condition.      FINAL IMPRESSION  1. Hemoptysis          Keyon RIZO (Scribe), am scribing for, and in the presence of, Chip Loja M.D..    Electronically signed by: Keyon Epstein (Joan), 4/19/2020    Chip RIZO M.D. personally performed the services described in this documentation, as scribed by Keyon Epstein in my presence, and it is both accurate and complete. C.    The note accurately reflects work and decisions made by me.  Chip Loja M.D.  4/19/2020  3:25 PM

## 2020-04-19 NOTE — H&P
Hospital Medicine History & Physical Note    Date of Service  4/19/2020    Primary Care Physician  Kelin Donohue M.D.    Code Status  Full Code    Chief Complaint  Chief Complaint   Patient presents with   • Blood in Sputum       History of Presenting Illness  59 y.o. female who presented 4/19/2020 with mopped assist.  Patient states she has history of cystic fibrosis, she has occasional hemoptysis which usually resolves quickly.  However the past 5 days she has had continued cough and hemoptysis.  This is lasted more than her usual.  She denies having any fever or chills, chest pain, palpitations, shortness of breath or diaphoresis.  She denies any abdominal pain, nausea or vomiting or diarrhea.  Chest x-ray shows no acute findings.  CT scan of the chest is pending.  She was ruled out for COVID-19 which is negative.  Pulmonology was consulted on the case and I have discussed the case with them.  She has history of Pseudomonas infection in the past and we will cover this empirically for now.    Review of Systems  Review of Systems   Constitutional: Negative for chills, diaphoresis, fever and malaise/fatigue.   HENT: Negative for congestion, hearing loss and sore throat.    Eyes: Negative for blurred vision.   Respiratory: Positive for cough and hemoptysis. Negative for shortness of breath and wheezing.    Cardiovascular: Negative for chest pain, palpitations and leg swelling.   Gastrointestinal: Negative for abdominal pain, diarrhea, heartburn, nausea and vomiting.   Genitourinary: Negative for dysuria, flank pain and hematuria.   Musculoskeletal: Negative for back pain, joint pain, myalgias and neck pain.   Skin: Negative for rash.   Neurological: Negative for dizziness, sensory change, speech change, focal weakness, weakness and headaches.   Psychiatric/Behavioral: The patient is nervous/anxious.        Past Medical History   has a past medical history of Breath shortness, Bronchitis, CF (cystic fibrosis) ([facility]),  Coughing blood, Hemorrhagic disorder (HCC), Herpes simplex, Pneumonia, and Shoulder fracture.    Surgical History   has a past surgical history that includes sinus washing; dental surgery; and bronchoscopy-endo (7/22/2019).     Family History  family history includes Alcohol/Drug in her father; Cystic Fibrosis in her sister; Heart Disease in her father; Other in her daughter.     Social History   reports that she has never smoked. She has never used smokeless tobacco. She reports current alcohol use. She reports that she does not use drugs.    Allergies  Allergies   Allergen Reactions   • Levofloxacin      Unknown reaction  Possibly myalgias, arthralgias, nightmares   • Tobramycin Hives     Hives       Medications  Prior to Admission Medications   Prescriptions Last Dose Informant Patient Reported? Taking?   Ascorbic Acid (VITAMIN C) 1000 MG Tab 4/19/2020 at AM Patient Yes No   Sig: Take 1,000 mg by mouth every day.   CREON 6000 units Cap DR Particles 4/19/2020 at AM Patient No No   Sig: Take 1-2 Caps by mouth 4 times a day as needed. With meals or snacks   Cholecalciferol 5000 units Tab 4/19/2020 at AM Patient Yes No   Sig: Take 20,000 Units by mouth every day.   HUMALOG KWIKPEN 100 UNIT/ML Solution Pen-injector injection PEN 4/19/2020 at AM Patient No No   Sig: Inject 10 Units as instructed 3 times a day before meals. Needs Humalog cartridges for use in the INPEN   Insulin Degludec (TRESIBA) 100 UNIT/ML Solution 4/18/2020 at AFTERNOON Patient Yes Yes   Sig: Inject 3 Units as instructed every day.   KALYDECO 150 MG Tab 4/19/2020 at AM Patient No No   Sig: Take 1 Tab by mouth 2 times a day. With fat containing food.   Multiple Vitamins-Minerals (DEKAS PLUS PO) 4/19/2020 at AM Patient Yes No   Sig: Take 1 Cap by mouth 2 times a day.   Probiotic Product (PROBIOTIC-10 PO) 4/19/2020 at AM Patient Yes No   Sig: Take 1 Tab by mouth every day.   albuterol (PROVENTIL) 2.5mg/3ml Nebu Soln solution for nebulization 4/15/2020  at PM Patient No No   Sig: 3 mL by Nebulization route 2 Times a Day for 364 days.   magnesium oxide (MAG-OX) 400 MG Tab 2020 at AM Patient Yes No   Sig: Take 200 mg by mouth every day.   phytonadione (MEPHYTON) 5 MG Tab 2020 at AM Patient No No   Sig: Take 1 Tab by mouth every day.   sodium chloride (HYPER-SAL) 7 % Nebu Soln 4/15/2020 at UNK Patient No No   Si mL by Nebulization route 4 times a day.      Facility-Administered Medications: None       Physical Exam  Temp:  [37.1 °C (98.8 °F)] 37.1 °C (98.8 °F)  Pulse:  [83-86] 84  Resp:  [16] 16  BP: (112-142)/(61-65) 142/65  SpO2:  [94 %-97 %] 97 %    Physical Exam  Vitals signs and nursing note reviewed.   HENT:      Head: Normocephalic and atraumatic.      Nose: No congestion.      Mouth/Throat:      Mouth: Mucous membranes are moist.   Eyes:      Conjunctiva/sclera: Conjunctivae normal.      Pupils: Pupils are equal, round, and reactive to light.   Neck:      Musculoskeletal: No muscular tenderness.   Cardiovascular:      Rate and Rhythm: Normal rate and regular rhythm.   Pulmonary:      Effort: Pulmonary effort is normal.      Comments: Coarse breath sounds  Abdominal:      General: Bowel sounds are normal. There is no distension.      Palpations: Abdomen is soft.      Tenderness: There is no abdominal tenderness. There is no guarding or rebound.   Musculoskeletal:      Right lower leg: No edema.      Left lower leg: No edema.   Lymphadenopathy:      Cervical: No cervical adenopathy.   Skin:     General: Skin is warm and dry.   Neurological:      General: No focal deficit present.      Mental Status: She is alert and oriented to person, place, and time.      Cranial Nerves: No cranial nerve deficit.         Laboratory:  Recent Labs     20  1101   WBC 5.4   RBC 4.68   HEMOGLOBIN 13.8   HEMATOCRIT 42.0   MCV 89.7   MCH 29.5   MCHC 32.9*   RDW 44.0   PLATELETCT 320   MPV 10.0     Recent Labs     20  1101   SODIUM 138   POTASSIUM 4.3    CHLORIDE 102   CO2 23   GLUCOSE 116*   BUN 14   CREATININE 0.66   CALCIUM 9.4     Recent Labs     04/19/20  1101   ALTSGPT 21   ASTSGOT 23   ALKPHOSPHAT 115*   TBILIRUBIN 0.4   GLUCOSE 116*     Recent Labs     04/19/20  1101   APTT 44.2*   INR 0.99     No results for input(s): NTPROBNP in the last 72 hours.      No results for input(s): TROPONINT in the last 72 hours.    Imaging:  DX-CHEST-PORTABLE (1 VIEW)   Final Result         1.  No change in scattered interstitial and fibrotic opacifications.      2.  No new infiltrates or consolidations are identified.      CT-CHEST (THORAX) WITH    (Results Pending)         Assessment/Plan:      * Hemoptysis- (present on admission)  Assessment & Plan  Start Prednisone, IV Zosyn    Suspected COVID-19 virus infection- (present on admission)  Assessment & Plan  COVID-19 ruled out  Repeat COVID-19 in 48 hours  Follow CBC, CMP, LDH, CRP, ferritin, d-dimer    Cystic fibrosis (HCC)- (present on admission)  Assessment & Plan  Continue Ivacaftor  RT protocol    Exocrine pancreatic insufficiency- (present on admission)  Assessment & Plan  Continue pancrealipase, SSI    Vitamin K deficiency- (present on admission)  Assessment & Plan  Continue replacement

## 2020-04-19 NOTE — PROGRESS NOTES
Pt arrived to unit via gurney. Pt oriented to room, unit, and plan of care. All questions answered at this time. Call light within reach, fall precautions in place, will continue to monitor.

## 2020-04-19 NOTE — ED NOTES
Gave patient ice water per rn okay. Placed cold lunch box meal at bedside. rn talked with patient to not eat it until results of ct came back

## 2020-04-19 NOTE — CARE PLAN
Problem: Infection  Goal: Will remain free from infection  Outcome: PROGRESSING AS EXPECTED  Isolation precautions in place     Problem: Psychosocial Needs:  Goal: Level of anxiety will decrease  Outcome: PROGRESSING AS EXPECTED   Therapeutic communication provided.     Problem: Respiratory:  Goal: Respiratory status will improve  Outcome: PROGRESSING AS EXPECTED

## 2020-04-19 NOTE — PROGRESS NOTES
D/W Dr. Loja  H/O CF followed by Dr. Longo  Intermittent hemoptysis for 3 days; as much as 1/4 cup this morning  Congested but not sob  No fevers, not on O2  Dr. Fleming (peds pulmonologist) wants adult pulmonologist to see her (Nisha will see) for possible bronch  CT with IV contrast pending  Looks otherwise good per ERP, hemoglobin fine  COVID test ordered and pending    Will place observation admission order to non critical COVID unit.  Dr. Lopez admitting.

## 2020-04-19 NOTE — CONSULTS
Pulmonary Care Consultation    Date of consult: 4/19/2020    Referring Physician  Chip Loja M.D.    Reason for Consultation  hemoptysis    History of Presenting Illness  59 y.o. female who presented 4/19/2020 with cystic fibrosis (3849+10k bC>T, K0007C) with mild obstruction FEV 1 71%  and FEV1/FVC ratio 75%, colonized with pseudomonas taking cayston prn and pulmonary toilet (vest - hypertonic 5% saline) , DM associated with diabetes, and pancreatic insufficiency due to CF.    Follows up with pediatric pulmonologist Dr. Payne last seen 2/6/20.    She developed hemoptysis four days ago but it was minimal and today she coughed a half cup per patient. She denies fever or chills or worsening cough.  CF ped pulmonologist contacted by ER who recommended assessment for a bronchoscopy.  Pt states hemoptysis reoccurs several times in the past 10 yrs    Last pseudomonas on 2/20 sensitive to zosyn    Code Status  Full Code    Review of Systems  Review of Systems   Constitutional: Positive for malaise/fatigue.   HENT: Negative.    Eyes: Negative.    Respiratory: Positive for cough, hemoptysis and sputum production.    Cardiovascular: Negative.    Gastrointestinal: Negative.    Genitourinary: Negative.    Musculoskeletal: Negative.    Skin: Negative.    Neurological: Positive for weakness.   Endo/Heme/Allergies: Negative.    Psychiatric/Behavioral: Negative.        Past Medical History   has a past medical history of Breath shortness, Bronchitis, CF (cystic fibrosis) (HCC), Coughing blood, Hemorrhagic disorder (HCC), Herpes simplex, Pneumonia, and Shoulder fracture.    Surgical History   has a past surgical history that includes sinus washing; dental surgery; and bronchoscopy-endo (7/22/2019).    Family History  family history includes Alcohol/Drug in her father; Cystic Fibrosis in her sister; Heart Disease in her father; Other in her daughter.    Social History   reports that she has never smoked. She has never used  smokeless tobacco. She reports current alcohol use. She reports that she does not use drugs.    Medications  Home Medications     Reviewed by Kalli Fontenot R.N. (Registered Nurse) on 04/19/20 at 1613  Med List Status: Complete   Medication Last Dose Status   albuterol (PROVENTIL) 2.5mg/3ml Nebu Soln solution for nebulization 4/15/2020 Active   Ascorbic Acid (VITAMIN C) 1000 MG Tab 4/19/2020 Active   Cholecalciferol 5000 units Tab 4/19/2020 Active   CREON 6000 units Cap DR Particles 4/19/2020 Active   HUMALOG KWIKPEN 100 UNIT/ML Solution Pen-injector injection PEN 4/19/2020 Active   Insulin Degludec (TRESIBA) 100 UNIT/ML Solution 4/18/2020 Active   KALYDECO 150 MG Tab 4/19/2020 Active   magnesium oxide (MAG-OX) 400 MG Tab 4/19/2020 Active   Multiple Vitamins-Minerals (DEKAS PLUS PO) 4/19/2020 Active   phytonadione (MEPHYTON) 5 MG Tab 4/19/2020 Active   Probiotic Product (PROBIOTIC-10 PO) 4/19/2020 Active   sodium chloride (HYPER-SAL) 7 % Nebu Soln 4/15/2020 Active              Current Facility-Administered Medications   Medication Dose Route Frequency Provider Last Rate Last Dose   • albuterol (PROVENTIL) 2.5mg/0.5ml nebulizer solution 2.5 mg  2.5 mg Nebulization Q4H PRN (RT) Arley Lopez M.D.       • [START ON 4/20/2020] ascorbic acid tablet 1,000 mg  1,000 mg Oral DAILY Arley Lopez M.D.       • pancrelipase (Lip-Prot-Amyl) (CREON 6000) 6000 units capsule 6,000-12,000 Units  1-2 Cap Oral TID WITH MEALS Arley Lopez M.D.       • [START ON 4/20/2020] magnesium oxide (MAG-OX) tablet 200 mg  200 mg Oral DAILY Arley Lopez M.D.       • [START ON 4/20/2020] phytonadione (MEPHYTON) tablet 5 mg  5 mg Oral DAILY Arley Lopez M.D.       • senna-docusate (PERICOLACE or SENOKOT S) 8.6-50 MG per tablet 2 Tab  2 Tab Oral BID Arley Lopez M.D.        And   • polyethylene glycol/lytes (MIRALAX) PACKET 1 Packet  1 Packet Oral QDAY PRN Arley Lopez M.D.        And   • magnesium hydroxide (MILK  OF MAGNESIA) suspension 30 mL  30 mL Oral QDAY PRN Arley Lopez M.D.        And   • bisacodyl (DULCOLAX) suppository 10 mg  10 mg Rectal QDAY PRN Arley Lopez M.D.       • ondansetron (ZOFRAN) syringe/vial injection 4 mg  4 mg Intravenous Q6HRS PRN Arley Lopez M.D.       • ondansetron (ZOFRAN ODT) dispertab 4 mg  4 mg Oral Q6HRS PRN Arley Lopez M.D.       • Respiratory Therapy Consult   Nebulization Continuous RT Arley Lopez M.D.       • acetaminophen (TYLENOL) tablet 650 mg  650 mg Oral Q6HRS PRN Arley Lopez M.D.       • insulin regular (HUMULIN R) injection 2-9 Units  2-9 Units Subcutaneous 4X/DAY ACHJOANNA Lopez M.D.        And   • glucose 4 g chewable tablet 16 g  16 g Oral Q15 MIN PRN Arley Lopez M.D.        And   • dextrose 50% (D50W) injection 50 mL  50 mL Intravenous Q15 MIN PRN Arley Lopez M.D.       • predniSONE (DELTASONE) tablet 40 mg  40 mg Oral DAILY Arley Lopez M.D.   Stopped at 04/19/20 1539   • benzonatate (TESSALON) capsule 100 mg  100 mg Oral TID Arley Lopez M.D.   100 mg at 04/19/20 1540   • piperacillin-tazobactam (ZOSYN) 4.5 g in  mL IVPB  4.5 g Intravenous Q8HRS Arley Lopez M.D.           Allergies  Allergies   Allergen Reactions   • Levofloxacin      Unknown reaction  Possibly myalgias, arthralgias, nightmares   • Tobramycin Hives     Hives       Vital Signs last 24 hours  Temp:  [36.9 °C (98.5 °F)-37.4 °C (99.3 °F)] 36.9 °C (98.5 °F)  Pulse:  [76-95] 95  Resp:  [16-21] 18  BP: (112-142)/(61-69) 135/69  SpO2:  [92 %-97 %] 92 %    Physical Exam  Physical Exam  Constitutional:       Appearance: She is ill-appearing.   HENT:      Head: Normocephalic and atraumatic.      Nose: Nose normal.      Mouth/Throat:      Mouth: Mucous membranes are dry.   Eyes:      Extraocular Movements: Extraocular movements intact.      Conjunctiva/sclera: Conjunctivae normal.      Pupils: Pupils are equal, round, and reactive to light.    Neck:      Musculoskeletal: Normal range of motion.   Cardiovascular:      Rate and Rhythm: Normal rate.      Pulses: Normal pulses.      Heart sounds: Normal heart sounds.   Pulmonary:      Effort: Pulmonary effort is normal. No respiratory distress.      Breath sounds: Normal breath sounds. No stridor. No wheezing, rhonchi or rales.   Chest:      Chest wall: No tenderness.   Abdominal:      General: Abdomen is flat. Bowel sounds are normal.      Palpations: Abdomen is soft.   Musculoskeletal: Normal range of motion.      Comments: clubbing   Skin:     General: Skin is warm.   Neurological:      General: No focal deficit present.      Mental Status: She is oriented to person, place, and time.   Psychiatric:         Mood and Affect: Mood normal.         Behavior: Behavior normal.         Thought Content: Thought content normal.         Judgment: Judgment normal.         Fluids  No intake or output data in the 24 hours ending 04/19/20 1839    Laboratory  Labs:  Recent Labs     04/19/20  1101   WBC 5.4   RBC 4.68   HEMOGLOBIN 13.8   HEMATOCRIT 42.0   MCV 89.7   MCH 29.5   MCHC 32.9*   RDW 44.0   PLATELETCT 320   MPV 10.0     Recent Labs     04/19/20  1101   APTT 44.2*   INR 0.99             Recent Labs     04/19/20  1101   SODIUM 138   POTASSIUM 4.3   CHLORIDE 102   CO2 23   GLUCOSE 116*   BUN 14     Recent Labs     04/19/20  1101   SODIUM 138   POTASSIUM 4.3   CHLORIDE 102   CO2 23   BUN 14   CREATININE 0.66   CALCIUM 9.4     No results found for this or any previous visit.      Imaging:   CT-CHEST (THORAX) WITH   Final Result      Diffuse bronchiectasis with bronchial wall thickening and mucous plugging once again noted consistent with history of cystic fibrosis with slight improvement in mucous plugging.      New patchy groundglass opacities in the left lower lobe and medial right lower lobe suggestive of pneumonia or pneumonitis.      Persistent prominent mediastinal and hilar lymph nodes likely reactive related  to history of cystic fibrosis.      Prominent bronchial arteries related to history of cystic fibrosis.            DX-CHEST-PORTABLE (1 VIEW)   Final Result         1.  No change in scattered interstitial and fibrotic opacifications.      2.  No new infiltrates or consolidations are identified.          Imaging  CT scan personally reviewed personally  Diffuse bronchiectasis upper lobe predominant with bronchial thickening   Some GG in LLL and RML    Assessment/Plan  * Hemoptysis- (present on admission)  Assessment & Plan  R/o COVID  Rx for pseudomonas and start steroids 40 mg qday  Attain sputum culture  Reviewed with colleague with CF experience and at this time no indication for bronchoscopy as no hx of aspiration or overt mucous plugging  Goal is to decrease inflammation and that should help with hemoptysis  Reviewed with patient who would like to discuss further with her CF pulmonologist.  Dr. Shah will follow tomorrow      Cystic fibrosis (HCC)- (present on admission)  Assessment & Plan  RT protocol  Pancreatic insufficiency - restart home med  CF associated diabetes - retstart home med and may need more coverage due to steroids      Discussed patient with

## 2020-04-19 NOTE — ED TRIAGE NOTES
Amb to G27 from the lobby.  Pt c/o hemoptysis x 5 days, worse last night.  States hx of hemoptysis secondary to CF hx.  Denies sob, fever.  Denies pain.   No distress noted.  Denies exposure to any positive covid.  Pt states she has been self quarantining x 3 weeks. ERP to the bedside.

## 2020-04-20 ENCOUNTER — TELEPHONE (OUTPATIENT)
Dept: PEDIATRIC PULMONOLOGY | Facility: MEDICAL CENTER | Age: 60
End: 2020-04-20

## 2020-04-20 PROBLEM — D72.829 LEUKOCYTOSIS: Status: ACTIVE | Noted: 2020-04-20

## 2020-04-20 LAB
ALBUMIN SERPL BCP-MCNC: 3.6 G/DL (ref 3.2–4.9)
ALBUMIN/GLOB SERPL: 1.5 G/DL
ALP SERPL-CCNC: 103 U/L (ref 30–99)
ALT SERPL-CCNC: 19 U/L (ref 2–50)
ANION GAP SERPL CALC-SCNC: 13 MMOL/L (ref 7–16)
AST SERPL-CCNC: 18 U/L (ref 12–45)
BASOPHILS # BLD AUTO: 0.3 % (ref 0–1.8)
BASOPHILS # BLD: 0.03 K/UL (ref 0–0.12)
BILIRUB SERPL-MCNC: 0.4 MG/DL (ref 0.1–1.5)
BUN SERPL-MCNC: 15 MG/DL (ref 8–22)
CALCIUM SERPL-MCNC: 8.8 MG/DL (ref 8.5–10.5)
CHLORIDE SERPL-SCNC: 104 MMOL/L (ref 96–112)
CO2 SERPL-SCNC: 23 MMOL/L (ref 20–33)
CREAT SERPL-MCNC: 0.7 MG/DL (ref 0.5–1.4)
CRP SERPL HS-MCNC: 0.13 MG/DL (ref 0–0.75)
D DIMER PPP IA.FEU-MCNC: 0.41 UG/ML (FEU) (ref 0–0.5)
EOSINOPHIL # BLD AUTO: 0 K/UL (ref 0–0.51)
EOSINOPHIL NFR BLD: 0 % (ref 0–6.9)
ERYTHROCYTE [DISTWIDTH] IN BLOOD BY AUTOMATED COUNT: 45.1 FL (ref 35.9–50)
FERRITIN SERPL-MCNC: 63 NG/ML (ref 10–291)
GLOBULIN SER CALC-MCNC: 2.4 G/DL (ref 1.9–3.5)
GLUCOSE BLD-MCNC: 119 MG/DL (ref 65–99)
GLUCOSE BLD-MCNC: 164 MG/DL (ref 65–99)
GLUCOSE BLD-MCNC: 75 MG/DL (ref 65–99)
GLUCOSE SERPL-MCNC: 130 MG/DL (ref 65–99)
GRAM STN SPEC: NORMAL
HCT VFR BLD AUTO: 38.4 % (ref 37–47)
HGB BLD-MCNC: 12.4 G/DL (ref 12–16)
IMM GRANULOCYTES # BLD AUTO: 0.05 K/UL (ref 0–0.11)
IMM GRANULOCYTES NFR BLD AUTO: 0.4 % (ref 0–0.9)
LDH SERPL L TO P-CCNC: 158 U/L (ref 107–266)
LYMPHOCYTES # BLD AUTO: 1.22 K/UL (ref 1–4.8)
LYMPHOCYTES NFR BLD: 10.8 % (ref 22–41)
MCH RBC QN AUTO: 29.5 PG (ref 27–33)
MCHC RBC AUTO-ENTMCNC: 32.3 G/DL (ref 33.6–35)
MCV RBC AUTO: 91.4 FL (ref 81.4–97.8)
MONOCYTES # BLD AUTO: 0.66 K/UL (ref 0–0.85)
MONOCYTES NFR BLD AUTO: 5.8 % (ref 0–13.4)
NEUTROPHILS # BLD AUTO: 9.36 K/UL (ref 2–7.15)
NEUTROPHILS NFR BLD: 82.7 % (ref 44–72)
NRBC # BLD AUTO: 0 K/UL
NRBC BLD-RTO: 0 /100 WBC
PLATELET # BLD AUTO: 350 K/UL (ref 164–446)
PMV BLD AUTO: 10.4 FL (ref 9–12.9)
POTASSIUM SERPL-SCNC: 4.1 MMOL/L (ref 3.6–5.5)
PROT SERPL-MCNC: 6 G/DL (ref 6–8.2)
RBC # BLD AUTO: 4.2 M/UL (ref 4.2–5.4)
RHODAMINE-AURAMINE STN SPEC: NORMAL
SIGNIFICANT IND 70042: NORMAL
SIGNIFICANT IND 70042: NORMAL
SITE SITE: NORMAL
SITE SITE: NORMAL
SODIUM SERPL-SCNC: 140 MMOL/L (ref 135–145)
SOURCE SOURCE: NORMAL
SOURCE SOURCE: NORMAL
WBC # BLD AUTO: 11.3 K/UL (ref 4.8–10.8)

## 2020-04-20 PROCEDURE — 87206 SMEAR FLUORESCENT/ACID STAI: CPT

## 2020-04-20 PROCEDURE — 700102 HCHG RX REV CODE 250 W/ 637 OVERRIDE(OP): Performed by: FAMILY MEDICINE

## 2020-04-20 PROCEDURE — 87181 SC STD AGAR DILUTION PER AGT: CPT

## 2020-04-20 PROCEDURE — G0378 HOSPITAL OBSERVATION PER HR: HCPCS

## 2020-04-20 PROCEDURE — 36415 COLL VENOUS BLD VENIPUNCTURE: CPT

## 2020-04-20 PROCEDURE — 80053 COMPREHEN METABOLIC PANEL: CPT

## 2020-04-20 PROCEDURE — 87102 FUNGUS ISOLATION CULTURE: CPT

## 2020-04-20 PROCEDURE — 99226 PR SUBSEQUENT OBSERVATION CARE,LEVEL III: CPT | Performed by: INTERNAL MEDICINE

## 2020-04-20 PROCEDURE — 86140 C-REACTIVE PROTEIN: CPT

## 2020-04-20 PROCEDURE — 85379 FIBRIN DEGRADATION QUANT: CPT

## 2020-04-20 PROCEDURE — 96366 THER/PROPH/DIAG IV INF ADDON: CPT

## 2020-04-20 PROCEDURE — 82728 ASSAY OF FERRITIN: CPT

## 2020-04-20 PROCEDURE — 700111 HCHG RX REV CODE 636 W/ 250 OVERRIDE (IP): Performed by: FAMILY MEDICINE

## 2020-04-20 PROCEDURE — A9270 NON-COVERED ITEM OR SERVICE: HCPCS | Performed by: FAMILY MEDICINE

## 2020-04-20 PROCEDURE — 87186 SC STD MICRODIL/AGAR DIL: CPT

## 2020-04-20 PROCEDURE — 87116 MYCOBACTERIA CULTURE: CPT

## 2020-04-20 PROCEDURE — 87015 SPECIMEN INFECT AGNT CONCNTJ: CPT

## 2020-04-20 PROCEDURE — 87070 CULTURE OTHR SPECIMN AEROBIC: CPT

## 2020-04-20 PROCEDURE — 83615 LACTATE (LD) (LDH) ENZYME: CPT

## 2020-04-20 PROCEDURE — 87077 CULTURE AEROBIC IDENTIFY: CPT

## 2020-04-20 PROCEDURE — 82962 GLUCOSE BLOOD TEST: CPT | Mod: 91

## 2020-04-20 PROCEDURE — 700105 HCHG RX REV CODE 258: Performed by: FAMILY MEDICINE

## 2020-04-20 PROCEDURE — 87205 SMEAR GRAM STAIN: CPT

## 2020-04-20 PROCEDURE — 99214 OFFICE O/P EST MOD 30 MIN: CPT | Performed by: PEDIATRICS

## 2020-04-20 PROCEDURE — 96372 THER/PROPH/DIAG INJ SC/IM: CPT

## 2020-04-20 PROCEDURE — 85025 COMPLETE CBC W/AUTO DIFF WBC: CPT

## 2020-04-20 RX ORDER — PREDNISONE 20 MG/1
TABLET ORAL
Qty: 1 TAB | Refills: 0 | Status: SHIPPED | OUTPATIENT
Start: 2020-04-21 | End: 2020-04-21

## 2020-04-20 RX ORDER — INSULIN GLARGINE 100 [IU]/ML
6 INJECTION, SOLUTION SUBCUTANEOUS EVERY EVENING
Status: DISCONTINUED | OUTPATIENT
Start: 2020-04-20 | End: 2020-04-21

## 2020-04-20 RX ADMIN — PIPERACILLIN AND TAZOBACTAM 4.5 G: 4; .5 INJECTION, POWDER, LYOPHILIZED, FOR SOLUTION INTRAVENOUS; PARENTERAL at 13:45

## 2020-04-20 RX ADMIN — PIPERACILLIN AND TAZOBACTAM 4.5 G: 4; .5 INJECTION, POWDER, LYOPHILIZED, FOR SOLUTION INTRAVENOUS; PARENTERAL at 05:19

## 2020-04-20 RX ADMIN — PANCRELIPASE 6000 UNITS: 30000; 6000; 19000 CAPSULE, DELAYED RELEASE PELLETS ORAL at 13:45

## 2020-04-20 RX ADMIN — PIPERACILLIN AND TAZOBACTAM 4.5 G: 4; .5 INJECTION, POWDER, LYOPHILIZED, FOR SOLUTION INTRAVENOUS; PARENTERAL at 20:28

## 2020-04-20 RX ADMIN — PANCRELIPASE 12000 UNITS: 30000; 6000; 19000 CAPSULE, DELAYED RELEASE PELLETS ORAL at 18:30

## 2020-04-20 RX ADMIN — INSULIN HUMAN 2 UNITS: 100 INJECTION, SOLUTION PARENTERAL at 13:44

## 2020-04-20 RX ADMIN — PHYTONADIONE 5 MG: 5 TABLET ORAL at 09:38

## 2020-04-20 RX ADMIN — Medication 200 MG: at 09:39

## 2020-04-20 RX ADMIN — INSULIN HUMAN 2 UNITS: 100 INJECTION, SOLUTION PARENTERAL at 20:44

## 2020-04-20 RX ADMIN — PANCRELIPASE 6000 UNITS: 30000; 6000; 19000 CAPSULE, DELAYED RELEASE PELLETS ORAL at 09:40

## 2020-04-20 RX ADMIN — Medication 1000 MG: at 09:38

## 2020-04-20 ASSESSMENT — ENCOUNTER SYMPTOMS
INSOMNIA: 0
BACK PAIN: 0
HEADACHES: 0
COUGH: 0
HEMOPTYSIS: 1
VOMITING: 0
NECK PAIN: 0
EYE PAIN: 0
STRIDOR: 0
WEIGHT LOSS: 0
FOCAL WEAKNESS: 0
CHILLS: 0
FEVER: 0
MYALGIAS: 0
HEARTBURN: 0
BLURRED VISION: 0
DEPRESSION: 0
ABDOMINAL PAIN: 0
DIZZINESS: 0
SPUTUM PRODUCTION: 0
NERVOUS/ANXIOUS: 0
EYE DISCHARGE: 0
ORTHOPNEA: 0
DIARRHEA: 0
SHORTNESS OF BREATH: 1
SEIZURES: 0
EYE REDNESS: 0
NAUSEA: 0
PALPITATIONS: 0

## 2020-04-20 ASSESSMENT — PATIENT HEALTH QUESTIONNAIRE - PHQ9
SUM OF ALL RESPONSES TO PHQ9 QUESTIONS 1 AND 2: 0
1. LITTLE INTEREST OR PLEASURE IN DOING THINGS: NOT AT ALL
SUM OF ALL RESPONSES TO PHQ9 QUESTIONS 1 AND 2: 0
1. LITTLE INTEREST OR PLEASURE IN DOING THINGS: NOT AT ALL
2. FEELING DOWN, DEPRESSED, IRRITABLE, OR HOPELESS: NOT AT ALL
2. FEELING DOWN, DEPRESSED, IRRITABLE, OR HOPELESS: NOT AT ALL

## 2020-04-20 ASSESSMENT — PAIN SCALES - WONG BAKER: WONGBAKER_NUMERICALRESPONSE: DOESN'T HURT AT ALL

## 2020-04-20 NOTE — DISCHARGE PLANNING
Care Transition Team Assessment    Chart reviewed. PCP Dr Donohue. Lives with spouse. Has spouse and family support. Anticipate no needs @ present time.    Information Source  Information Given By: Other (Comments)(Chart reviewed.)    Readmission Evaluation  Is this a readmission?: No    Interdisciplinary Discharge Planning  Primary Care Physician: TRA DONOHUE  Lives with - Patient's Self Care Capacity: Spouse  Patient or legal guardian wants to designate a caregiver (see row info): No  Support Systems: Children, Spouse / Significant Other  Housing / Facility: [address]  Do You Take your Prescribed Medications Regularly: Yes  Able to Return to Previous ADL's: Yes  Mobility Issues: No  Prior Services: None  Patient Expects to be Discharged to:: Home  Assistance Needed: No  Durable Medical Equipment: Not Applicable    Discharge Preparedness  What are your discharge supports?: Child, Spouse    Finances  Prescription Coverage: Yes    Anticipated Discharge Information  Anticipated discharge disposition: Home  Discharge Address: [facility] [address]  Discharge Contact Phone Number: [phone]

## 2020-04-20 NOTE — DISCHARGE SUMMARY
Discharge Summary    CHIEF COMPLAINT ON ADMISSION  Chief Complaint   Patient presents with   • Blood in Sputum       Reason for Admission  WALK-IN G-27     Admission Date  4/19/2020    CODE STATUS  Full Code    HPI & HOSPITAL COURSE  59 years old female with medical history of cystic fibrosis admitted for hemoptysis. She was noted to be hypoxemic and was empirically started on zosyn and prednisone. COVID PCR was negative x1. She continued to have intermittent episodes of hemoptysis that she believed to be secondary to triggers such as soaps but this stabilized and was only ~15-20ml daily. Her hemoglobin stabilized and there was no indication for intervention such as bronchoscopy, IR embolization or TXA (which the patient was advocating for). She was extensively counseled by myself and pulmonary on the indicated uses of TXA and the potential risks. Her pulmonologist recommended she discharge with inhaled aztreonam. She had been declining prednisone during her hospitalization but it was also recommended by her pulmonologist that she have a 5 day course so this was sent to her pharmacy.     Therefore, she is discharged in fair and stable condition to home with close outpatient follow-up.    Discharge Date  04/21/20    FOLLOW UP ITEMS POST DISCHARGE  Please follow up with your PCP and Pulmonologist.     Please follow up with your PCP regarding the results of your echocardiogram as well as work up for the elevated aPTT that was noticed on your labs.     DISCHARGE DIAGNOSES  Principal Problem:    Hemoptysis POA: Yes  Active Problems:    Cystic fibrosis (HCC) POA: Yes    Suspected COVID-19 virus infection POA: Yes    Exocrine pancreatic insufficiency POA: Yes    Leukocytosis POA: Unknown    Vitamin K deficiency POA: Yes  Resolved Problems:    * No resolved hospital problems. *      FOLLOW UP  Future Appointments   Date Time Provider Department Center   5/14/2020  1:40 PM Marjan Payne M.D. [facility]   6/17/2020  12:20 PM DARREL Rivas M.D.  [address] [facility]  [address]  [place] [address] [phone]    In 1 week      Marjan Payne M.D.  [address]  [address]  [place] [id number] [phone]            MEDICATIONS ON DISCHARGE     Medication List      START taking these medications      Instructions   Cayston 75 MG Solr  Generic drug:  aztreonam lysine   Inhale 75 mg by mouth 3 times a day for 28 days.  Dose:  75 mg     predniSONE 20 MG Tabs  Commonly known as:  DELTASONE   Take 2 Tabs by mouth every day for 5 days.  Dose:  40 mg     vitamin A 53895 UNIT capsule   Take 1 Cap by mouth every day.  Dose:  10,000 Units        CONTINUE taking these medications      Instructions   albuterol 2.5mg/3ml Nebu solution for nebulization  Commonly known as:  PROVENTIL   Doctor's comments:  Dispense 90 day supply please  3 mL by Nebulization route 2 Times a Day for 364 days.  Dose:  2.5 mg     Cholecalciferol 125 MCG (5000 UT) Tabs   Take 20,000 Units by mouth every day.  Dose:  20,000 Units     Creon 6000 units Cpep  Generic drug:  pancrelipase (Lip-Prot-Amyl)   Doctor's comments:  No generic, keep in original packaging please  Take 1-2 Caps by mouth 4 times a day as needed. With meals or snacks  Dose:  1-2 Cap     DEKAS PLUS PO   Take 1 Cap by mouth 2 times a day.  Dose:  1 Cap     HumaLOG KwikPen 100 UNIT/ML Sopn injection PEN  Generic drug:  insulin lispro   Inject 10 Units as instructed 3 times a day before meals. Needs Humalog cartridges for use in the INPEN  Dose:  10 Units     Kalydeco 150 MG Tabs  Generic drug:  Ivacaftor   Take 1 Tab by mouth 2 times a day. With fat containing food.  Dose:  1 Tab     magnesium oxide 400 MG Tabs tablet  Commonly known as:  MAG-OX   Take 200 mg by mouth every day.  Dose:  200 mg     phytonadione 5 MG Tabs  Commonly known as:  MEPHYTON   Doctor's comments:  Dispense 90 day supply please  Take 1 Tab by mouth every day.  Dose:  5 mg      PROBIOTIC-10 PO   Take 1 Tab by mouth every day.  Dose:  1 Tab     sodium chloride 7 % Nebu  Commonly known as:  HYPER-SAL   Doctor's comments:  Dispense 90 day supply please, please fill with the Nephron brand of Saline  4 mL by Nebulization route 4 times a day.  Dose:  4 mL     Tresiba 100 UNIT/ML Soln  Generic drug:  Insulin Degludec   Inject 3 Units as instructed every day.  Dose:  3 Units     Vitamin C 1000 MG Tabs   Take 1,000 mg by mouth every day.  Dose:  1,000 mg            Allergies  Allergies   Allergen Reactions   • Levofloxacin      Unknown reaction  Possibly myalgias, arthralgias, nightmares   • Tobramycin Hives     Hives       DIET  Orders Placed This Encounter   Procedures   • Diet Order Diabetic     Standing Status:   Standing     Number of Occurrences:   1     Order Specific Question:   Diet:     Answer:   Diabetic [3]       ACTIVITY  As tolerated.  Weight bearing as tolerated    CONSULTATIONS  Pulmonary    PROCEDURES  EC-ECHOCARDIOGRAM COMPLETE W/O CONT   Final Result      CT-CHEST (THORAX) WITH   Final Result      Diffuse bronchiectasis with bronchial wall thickening and mucous plugging once again noted consistent with history of cystic fibrosis with slight improvement in mucous plugging.      New patchy groundglass opacities in the left lower lobe and medial right lower lobe suggestive of pneumonia or pneumonitis.      Persistent prominent mediastinal and hilar lymph nodes likely reactive related to history of cystic fibrosis.      Prominent bronchial arteries related to history of cystic fibrosis.            DX-CHEST-PORTABLE (1 VIEW)   Final Result         1.  No change in scattered interstitial and fibrotic opacifications.      2.  No new infiltrates or consolidations are identified.            LABORATORY  Lab Results   Component Value Date    SODIUM 140 04/20/2020    POTASSIUM 4.1 04/20/2020    CHLORIDE 104 04/20/2020    CO2 23 04/20/2020    GLUCOSE 130 (H) 04/20/2020    BUN 15 04/20/2020     CREATININE 0.70 04/20/2020        Lab Results   Component Value Date    WBC 11.3 (H) 04/20/2020    HEMOGLOBIN 12.4 04/20/2020    HEMATOCRIT 38.4 04/20/2020    PLATELETCT 350 04/20/2020        Total time of the discharge process exceeds 40 minutes.

## 2020-04-20 NOTE — ASSESSMENT & PLAN NOTE
RT protocol  Pancreatic insufficiency - restart home med  CF associated diabetes - retstart home med and may need more coverage due to steroids

## 2020-04-20 NOTE — PROGRESS NOTES
Spoke to ID for possible de-escalation. Per ID, pt needs 2nd covid test due to CT results before de-escalation possible.

## 2020-04-20 NOTE — CARE PLAN
Problem: Knowledge Deficit  Goal: Knowledge of disease process/condition, treatment plan, diagnostic tests, and medications will improve  Outcome: PROGRESSING AS EXPECTED    Pt educated regarding plan of care and medications. All questions answered.

## 2020-04-20 NOTE — PROGRESS NOTES
Patient seen and evaluate Dr. Payne today whom will take over care. Case discussed Dr. Cuenca. Will sign off. Please call with questions.     Jimmie Shah MD   Pulmonary Critical Care   [facility]

## 2020-04-20 NOTE — CARE PLAN
Problem: Infection  Goal: Will remain free from infection  Outcome: PROGRESSING AS EXPECTED  Intervention: Implement standard precautions and perform hand washing before and after patient contact  Note: Practice aseptic techniques.     Problem: Respiratory:  Goal: Respiratory status will improve  Outcome: PROGRESSING AS EXPECTED  Intervention: Educate and encourage coughing and deep breathing  Note: RT protocol in place.

## 2020-04-20 NOTE — PROGRESS NOTES
Spoke with Dr. Cuenca regarding long acting insulin. Lantus to be started. Pt requesting Tranexanic acid to be added to MAR, no orders at this time.

## 2020-04-20 NOTE — PROGRESS NOTES
Sputum sample sent to lab  Pt states she does not want to be discharge due to pt still coughing blood. Dr. Cuenca made aware. Pt to stay the night for monitoring

## 2020-04-20 NOTE — PROGRESS NOTES
[facility] Medicine Daily Progress Note    Date of Service  4/20/2020    Chief Complaint  59 y.o. female admitted 4/19/2020 with hemoptysis    Hospital Course    59 years old female with past medical history of cystic fibrosis admitted for hemoptysis      Interval Problem Update  Still have a little bit of shortness of breath.  Patient is on 2 L oxygen.  Continues to have hemoptysis.  Patient is on IV Zosyn.  No fever vitals stable.  Patient is awaiting to be seen by pulmonologist today.  I reviewed labs with sodium 140, potassium 4.1, BUN 15, creatinine 0.7, WBC 11.3, hemoglobin 12.4, platelet 350.  I saw and examined the patient today.    Consultants/Specialty  Pulmonary    Code Status  Full    Disposition  Remain on the floor    Review of Systems  Review of Systems   Constitutional: Negative for chills, fever and weight loss.   HENT: Negative for congestion and nosebleeds.    Eyes: Negative for blurred vision, pain, discharge and redness.   Respiratory: Positive for hemoptysis and shortness of breath. Negative for cough, sputum production and stridor.    Cardiovascular: Negative for chest pain, palpitations and orthopnea.   Gastrointestinal: Negative for abdominal pain, diarrhea, heartburn, nausea and vomiting.   Genitourinary: Negative for dysuria, frequency and urgency.   Musculoskeletal: Negative for back pain, myalgias and neck pain.   Skin: Negative for itching and rash.   Neurological: Negative for dizziness, focal weakness, seizures and headaches.   Psychiatric/Behavioral: Negative for depression. The patient is not nervous/anxious and does not have insomnia.         Physical Exam  Temp:  [36.7 °C (98.1 °F)-37.4 °C (99.3 °F)] 36.7 °C (98.1 °F)  Pulse:  [67-95] 67  Resp:  [16-21] 20  BP: (110-142)/(56-69) 110/56  SpO2:  [92 %-97 %] 96 %    Physical Exam  Vitals signs reviewed.   Constitutional:       General: She is not in acute distress.     Appearance: Normal appearance.   HENT:      Head: Normocephalic and  atraumatic.      Nose: No congestion or rhinorrhea.   Eyes:      Extraocular Movements: Extraocular movements intact.      Pupils: Pupils are equal, round, and reactive to light.   Neck:      Musculoskeletal: Normal range of motion and neck supple.   Cardiovascular:      Rate and Rhythm: Normal rate and regular rhythm.      Pulses: Normal pulses.   Pulmonary:      Effort: Pulmonary effort is normal. No respiratory distress.      Breath sounds: Normal breath sounds.   Abdominal:      General: Bowel sounds are normal. There is no distension.      Palpations: Abdomen is soft.      Tenderness: There is no abdominal tenderness.   Musculoskeletal:         General: No swelling or tenderness.   Skin:     General: Skin is warm.      Findings: No erythema.   Neurological:      General: No focal deficit present.      Mental Status: She is alert and oriented to person, place, and time.         Fluids    Intake/Output Summary (Last 24 hours) at 4/20/2020 0717  Last data filed at 4/20/2020 0400  Gross per 24 hour   Intake --   Output 20 ml   Net -20 ml       Laboratory  Recent Labs     04/19/20  1101 04/20/20  0407   WBC 5.4 11.3*   RBC 4.68 4.20   HEMOGLOBIN 13.8 12.4   HEMATOCRIT 42.0 38.4   MCV 89.7 91.4   MCH 29.5 29.5   MCHC 32.9* 32.3*   RDW 44.0 45.1   PLATELETCT 320 350   MPV 10.0 10.4     Recent Labs     04/19/20  1101 04/20/20  0407   SODIUM 138 140   POTASSIUM 4.3 4.1   CHLORIDE 102 104   CO2 23 23   GLUCOSE 116* 130*   BUN 14 15   CREATININE 0.66 0.70   CALCIUM 9.4 8.8     Recent Labs     04/19/20  1101   APTT 44.2*   INR 0.99               Imaging  CT-CHEST (THORAX) WITH   Final Result      Diffuse bronchiectasis with bronchial wall thickening and mucous plugging once again noted consistent with history of cystic fibrosis with slight improvement in mucous plugging.      New patchy groundglass opacities in the left lower lobe and medial right lower lobe suggestive of pneumonia or pneumonitis.      Persistent prominent  mediastinal and hilar lymph nodes likely reactive related to history of cystic fibrosis.      Prominent bronchial arteries related to history of cystic fibrosis.            DX-CHEST-PORTABLE (1 VIEW)   Final Result         1.  No change in scattered interstitial and fibrotic opacifications.      2.  No new infiltrates or consolidations are identified.           Assessment/Plan  * Hemoptysis- (present on admission)  Assessment & Plan  Related to underlying cystic fibrosis  On IV Zosyn  Pulmonary consulted    Suspected COVID-19 virus infection- (present on admission)  Assessment & Plan  COVID-negative  Repeat COVID tomorrow  Follow CBC, CMP, LDH, CRP, ferritin, d-dimer  Pulmonary on    Cystic fibrosis (HCC)- (present on admission)  Assessment & Plan  Continue Ivacaftor  RT protocol  Pulmonary following    Exocrine pancreatic insufficiency- (present on admission)  Assessment & Plan  Continue pancrealipase, SSI    Leukocytosis  Assessment & Plan  Related to above  Follow CBC in the morning    Vitamin K deficiency- (present on admission)  Assessment & Plan  Continue replacement       VTE prophylaxis: scds

## 2020-04-20 NOTE — PROGRESS NOTES
Spoke to Dr. Cuenca regarding pt request for 9 units of insulin with meals. Pt contacted endocrinologist to call Dr. Cuenca. MD aware of patient's reasoning for patient request of insulin dose. Endocrinologist has not contacted treatment team. No new orders received. Awaiting PMA consult. Will update patient.

## 2020-04-20 NOTE — TELEPHONE ENCOUNTER
"Patient is currently admitted to CDU inpatient. Patient called our office to speak to Dr. Payne about wanting to take tranexamic acid to help her hemoptysis. I sent Dr. Payne a message who stated that the patient could take transexamic acid as long as she understood that \"recommended use is for no more than 5 days in a row and may increase the risk of blood clots.\"  Dr. Payne stated that the patient needs to speak with the hospitalist for the hospitalist to prescribe it and also is not sure that our hospital carries that medication.     I called patient back to inform her of Dr. Payne's recommendation.   "

## 2020-04-20 NOTE — TELEPHONE ENCOUNTER
Patient's  called and is very concerned because he and the patient feel like the patient's problem is being addressed. The  is frustrated that hospitalist doesn't understand the patient's CF and they feel like they aren't doing anything for the patient. Patient and  are both very concerned that the patient will get COVID while admitted.    I called the inpatient RN who suggested that Dr. Payne could call Dr. Cuenca.     I sent a message to Dr. Payne requesting she call Dr. Cuenca.

## 2020-04-21 ENCOUNTER — APPOINTMENT (OUTPATIENT)
Dept: CARDIOLOGY | Facility: MEDICAL CENTER | Age: 60
End: 2020-04-21
Attending: INTERNAL MEDICINE
Payer: COMMERCIAL

## 2020-04-21 VITALS
DIASTOLIC BLOOD PRESSURE: 62 MMHG | BODY MASS INDEX: 21.51 KG/M2 | OXYGEN SATURATION: 94 % | HEART RATE: 88 BPM | TEMPERATURE: 97.1 F | SYSTOLIC BLOOD PRESSURE: 108 MMHG | HEIGHT: 66 IN | RESPIRATION RATE: 18 BRPM | WEIGHT: 133.82 LBS

## 2020-04-21 PROBLEM — D72.829 LEUKOCYTOSIS: Status: RESOLVED | Noted: 2020-04-20 | Resolved: 2020-04-21

## 2020-04-21 LAB
ALBUMIN SERPL BCP-MCNC: 3.7 G/DL (ref 3.2–4.9)
ALBUMIN/GLOB SERPL: 1.8 G/DL
ALP SERPL-CCNC: 98 U/L (ref 30–99)
ALT SERPL-CCNC: 17 U/L (ref 2–50)
ANION GAP SERPL CALC-SCNC: 11 MMOL/L (ref 7–16)
AST SERPL-CCNC: 17 U/L (ref 12–45)
BASOPHILS # BLD AUTO: 1.5 % (ref 0–1.8)
BASOPHILS # BLD: 0.09 K/UL (ref 0–0.12)
BILIRUB SERPL-MCNC: 0.4 MG/DL (ref 0.1–1.5)
BUN SERPL-MCNC: 17 MG/DL (ref 8–22)
CALCIUM SERPL-MCNC: 8.7 MG/DL (ref 8.5–10.5)
CHLORIDE SERPL-SCNC: 106 MMOL/L (ref 96–112)
CO2 SERPL-SCNC: 24 MMOL/L (ref 20–33)
COVID ORDER STATUS COVID19: NORMAL
CREAT SERPL-MCNC: 0.73 MG/DL (ref 0.5–1.4)
EOSINOPHIL # BLD AUTO: 0.25 K/UL (ref 0–0.51)
EOSINOPHIL NFR BLD: 4.1 % (ref 0–6.9)
ERYTHROCYTE [DISTWIDTH] IN BLOOD BY AUTOMATED COUNT: 46 FL (ref 35.9–50)
GLOBULIN SER CALC-MCNC: 2.1 G/DL (ref 1.9–3.5)
GLUCOSE BLD-MCNC: 192 MG/DL (ref 65–99)
GLUCOSE BLD-MCNC: 91 MG/DL (ref 65–99)
GLUCOSE SERPL-MCNC: 101 MG/DL (ref 65–99)
HCT VFR BLD AUTO: 40 % (ref 37–47)
HGB BLD-MCNC: 12.8 G/DL (ref 12–16)
IMM GRANULOCYTES # BLD AUTO: 0.01 K/UL (ref 0–0.11)
IMM GRANULOCYTES NFR BLD AUTO: 0.2 % (ref 0–0.9)
LV EJECT FRACT  99904: 60
LV EJECT FRACT MOD 2C 99903: 56.62
LV EJECT FRACT MOD 4C 99902: 62.21
LV EJECT FRACT MOD BP 99901: 59.13
LYMPHOCYTES # BLD AUTO: 2.03 K/UL (ref 1–4.8)
LYMPHOCYTES NFR BLD: 33 % (ref 22–41)
MCH RBC QN AUTO: 29.4 PG (ref 27–33)
MCHC RBC AUTO-ENTMCNC: 32 G/DL (ref 33.6–35)
MCV RBC AUTO: 92 FL (ref 81.4–97.8)
MONOCYTES # BLD AUTO: 0.66 K/UL (ref 0–0.85)
MONOCYTES NFR BLD AUTO: 10.7 % (ref 0–13.4)
NEUTROPHILS # BLD AUTO: 3.11 K/UL (ref 2–7.15)
NEUTROPHILS NFR BLD: 50.5 % (ref 44–72)
NRBC # BLD AUTO: 0 K/UL
NRBC BLD-RTO: 0 /100 WBC
PLATELET # BLD AUTO: 312 K/UL (ref 164–446)
PMV BLD AUTO: 10.2 FL (ref 9–12.9)
POTASSIUM SERPL-SCNC: 4.2 MMOL/L (ref 3.6–5.5)
PROT SERPL-MCNC: 5.8 G/DL (ref 6–8.2)
RBC # BLD AUTO: 4.35 M/UL (ref 4.2–5.4)
SARS-COV-2 RNA RESP QL NAA+PROBE: NEGATIVE
SODIUM SERPL-SCNC: 141 MMOL/L (ref 135–145)
SPECIMEN SOURCE: NORMAL
WBC # BLD AUTO: 6.2 K/UL (ref 4.8–10.8)

## 2020-04-21 PROCEDURE — 700102 HCHG RX REV CODE 250 W/ 637 OVERRIDE(OP): Performed by: STUDENT IN AN ORGANIZED HEALTH CARE EDUCATION/TRAINING PROGRAM

## 2020-04-21 PROCEDURE — 85025 COMPLETE CBC W/AUTO DIFF WBC: CPT

## 2020-04-21 PROCEDURE — 93306 TTE W/DOPPLER COMPLETE: CPT

## 2020-04-21 PROCEDURE — 80053 COMPREHEN METABOLIC PANEL: CPT

## 2020-04-21 PROCEDURE — G2023 SPECIMEN COLLECT COVID-19: HCPCS | Performed by: FAMILY MEDICINE

## 2020-04-21 PROCEDURE — 700105 HCHG RX REV CODE 258: Performed by: FAMILY MEDICINE

## 2020-04-21 PROCEDURE — 99215 OFFICE O/P EST HI 40 MIN: CPT | Mod: GC | Performed by: INTERNAL MEDICINE

## 2020-04-21 PROCEDURE — A9270 NON-COVERED ITEM OR SERVICE: HCPCS | Performed by: FAMILY MEDICINE

## 2020-04-21 PROCEDURE — U0004 COV-19 TEST NON-CDC HGH THRU: HCPCS

## 2020-04-21 PROCEDURE — 700102 HCHG RX REV CODE 250 W/ 637 OVERRIDE(OP): Performed by: HOSPITALIST

## 2020-04-21 PROCEDURE — 99217 PR OBSERVATION CARE DISCHARGE: CPT | Performed by: HOSPITALIST

## 2020-04-21 PROCEDURE — A9270 NON-COVERED ITEM OR SERVICE: HCPCS | Performed by: STUDENT IN AN ORGANIZED HEALTH CARE EDUCATION/TRAINING PROGRAM

## 2020-04-21 PROCEDURE — G0378 HOSPITAL OBSERVATION PER HR: HCPCS

## 2020-04-21 PROCEDURE — 96366 THER/PROPH/DIAG IV INF ADDON: CPT

## 2020-04-21 PROCEDURE — 96372 THER/PROPH/DIAG INJ SC/IM: CPT

## 2020-04-21 PROCEDURE — 700111 HCHG RX REV CODE 636 W/ 250 OVERRIDE (IP): Performed by: FAMILY MEDICINE

## 2020-04-21 PROCEDURE — 700102 HCHG RX REV CODE 250 W/ 637 OVERRIDE(OP): Performed by: FAMILY MEDICINE

## 2020-04-21 PROCEDURE — 93306 TTE W/DOPPLER COMPLETE: CPT | Mod: 26 | Performed by: INTERNAL MEDICINE

## 2020-04-21 PROCEDURE — 82962 GLUCOSE BLOOD TEST: CPT

## 2020-04-21 RX ORDER — PEDI MULTIVIT NO.128/VITAMIN K 500 MCG/ML
1 LIQUID (ML) ORAL 2 TIMES DAILY
Status: DISCONTINUED | OUTPATIENT
Start: 2020-04-21 | End: 2020-04-21

## 2020-04-21 RX ORDER — VITAMIN B COMPLEX
1000 TABLET ORAL DAILY
Status: DISCONTINUED | OUTPATIENT
Start: 2020-04-21 | End: 2020-04-21 | Stop reason: HOSPADM

## 2020-04-21 RX ORDER — INSULIN GLARGINE 100 [IU]/ML
6 INJECTION, SOLUTION SUBCUTANEOUS
Status: DISCONTINUED | OUTPATIENT
Start: 2020-04-21 | End: 2020-04-21 | Stop reason: HOSPADM

## 2020-04-21 RX ORDER — AZTREONAM 75 MG/ML
75 KIT INHALATION 3 TIMES DAILY
Qty: 84 ML | Refills: 0 | Status: SHIPPED | OUTPATIENT
Start: 2020-04-21 | End: 2020-05-19

## 2020-04-21 RX ADMIN — PHYTONADIONE 5 MG: 5 TABLET ORAL at 09:57

## 2020-04-21 RX ADMIN — Medication 10000 UNITS: at 09:59

## 2020-04-21 RX ADMIN — INSULIN GLARGINE 3 UNITS: 100 INJECTION, SOLUTION SUBCUTANEOUS at 14:36

## 2020-04-21 RX ADMIN — PANCRELIPASE 6000 UNITS: 30000; 6000; 19000 CAPSULE, DELAYED RELEASE PELLETS ORAL at 14:37

## 2020-04-21 RX ADMIN — Medication 200 MG: at 10:00

## 2020-04-21 RX ADMIN — PANCRELIPASE 12000 UNITS: 30000; 6000; 19000 CAPSULE, DELAYED RELEASE PELLETS ORAL at 09:58

## 2020-04-21 RX ADMIN — MELATONIN 1000 UNITS: at 12:09

## 2020-04-21 RX ADMIN — PIPERACILLIN AND TAZOBACTAM 4.5 G: 4; .5 INJECTION, POWDER, LYOPHILIZED, FOR SOLUTION INTRAVENOUS; PARENTERAL at 05:10

## 2020-04-21 RX ADMIN — INSULIN HUMAN 2 UNITS: 100 INJECTION, SOLUTION PARENTERAL at 14:35

## 2020-04-21 RX ADMIN — PIPERACILLIN AND TAZOBACTAM 4.5 G: 4; .5 INJECTION, POWDER, LYOPHILIZED, FOR SOLUTION INTRAVENOUS; PARENTERAL at 14:41

## 2020-04-21 RX ADMIN — Medication 1000 MG: at 09:56

## 2020-04-21 ASSESSMENT — PATIENT HEALTH QUESTIONNAIRE - PHQ9
SUM OF ALL RESPONSES TO PHQ9 QUESTIONS 1 AND 2: 0
1. LITTLE INTEREST OR PLEASURE IN DOING THINGS: NOT AT ALL
2. FEELING DOWN, DEPRESSED, IRRITABLE, OR HOPELESS: NOT AT ALL

## 2020-04-21 NOTE — TELEPHONE ENCOUNTER
" just called again and said that the hospitalist left for the day and now the Dr is Dr. Martines, according to the , Dr. Martines said \"it is up to Dr. Payne if Marina can go home or not.\" I explained that I am an outpatient RN and suggested the  call the charge nurse or supervisor of the unit Marina is on to voice his concerns. Also I gave him the phone number for [facility] if he feels like it is necessary to speak with them. Dr. Payne just sent the patient a message via [employer]     "

## 2020-04-21 NOTE — PROGRESS NOTES
"Charge RN: patient upset about her care. States hospitalist is not listening to her needs. \"I want him to order Tranexanic acid for my bleed, you guys aren't doing anything\". Apologized for the inconvenience and updated hospitalist. Patient states she will talk to her doctors and let us know wether she will stay or not. Educated her on benefits of staying and risks of leaving. Updated primary RNs, Thelma.  "

## 2020-04-21 NOTE — TELEPHONE ENCOUNTER
called this office again expressing his and his wife's frustration with their treatment in the hospital and not knowing the plan for her care. I explained to the  that the hospitalist is the doctor who coordinates her care in the hospital and should be able to give them a plan of care and conditions for discharge. I suggested that the  call Kenia's nurse and ask her if she knew the plan of care and if the nurse didn't know then request that the hospitalist speak to Kenia and put the  on speaker on her phone so that they can all be on the same page.

## 2020-04-21 NOTE — CARE PLAN
Problem: Infection  Goal: Will remain free from infection  Outcome: PROGRESSING AS EXPECTED     Problem: Knowledge Deficit  Goal: Knowledge of disease process/condition, treatment plan, diagnostic tests, and medications will improve  Outcome: PROGRESSING AS EXPECTED  Goal: Knowledge of the prescribed therapeutic regimen will improve  Outcome: PROGRESSING AS EXPECTED     Problem: Psychosocial Needs:  Goal: Level of anxiety will decrease  Outcome: PROGRESSING SLOWER THAN EXPECTED     Problem: Respiratory:  Goal: Respiratory status will improve  Outcome: PROGRESSING SLOWER THAN EXPECTED

## 2020-04-21 NOTE — PROGRESS NOTES
Spoke with patient spouse Darío with patient permission request. He would like a call from Dr. Gross regarding POC. Message given to Dr Gross.

## 2020-04-21 NOTE — PROGRESS NOTES
"Rounded on patient. Pt anxious stating \"She doesn't understand why we will not do something about this bleed and will not order the TSA medication she has been asking for\".      called and spoke with supervisor SHEELA Mueller. See supervisor progress note.       "

## 2020-04-21 NOTE — PROGRESS NOTES
"States only slept about 3 hours. Is awake now and crying. States, \"I do not want to get covid and die here alone in this hospital\". She seems to be convinced that all of her issues are related to her Cystic Fibrosis. She says that she has had the issues with coughing up blood before. Spoke with at length and appears to be feeling better.   "

## 2020-04-21 NOTE — PROGRESS NOTES
Patient called again stating she wants us to page pulmonology to update about current status.   Dr. Gross called back and will contact patient's pulmonologist to clarify discharge antibiotics.  Pt updated on POC.

## 2020-04-21 NOTE — CONSULTS
Pulmonary Consultation       Patient ID:   Name:             Kenia Soto   YOB: 1960  Age:                 59 y.o.  female   MRN:               [id number]                                                  Reason of Consult:   hemoptysis x 1 week    Requesting physician:  Shantel Gross MD    History of Present Illness:  59 yr old female with PMHx of cystic fibrosis on Ivacaftor, [place] neb prn, stable disease, colonized with pseudomonas with which she has had PNA in the past but now asymptomatic, bronchiectasis with CT on admission with prominent bronchial vessels, IGT, exocrine pancreatic insufficiency, with previous episodes of hemoptysis, mild, most recently in 7/2019 for which she underwent a bronchoscopy which was negative for actively bleeding vessels and which resolved spontaneously. Being seen as outpatient and managed for CF by Pediatric pulmonologist Dr Longo who is following her while inpatient.    Consult for pulmonology opinion on hemoptysis which is mild in quantity, since last Saturday night when she coughed up a quarter cup of blood, bright red, with tickle/ discomfort in her substernal region. Has had no chest pain otherwise. Has been coughing up blood intermittently since then, currently reduced in amount and turning into dark red blood with mucus. States her hemoptysis is precipitated by dry air, strong odors. She has a humidifier at home.    Of note, she had severe vaginal bleeding after the birth of her daughter several yrs ago and had to be hospitalized with D&C to stop the bleeding. At that time she also had passage of two large clots. She has been worked up for bleeding d/o at [facility] a few yrs ago, no records in [facility] however. She states she tested negative for vWD, had numerous other tests as well. Is not on blood thinners or NSAIDs. Also of note, her mother had menorrhagia. Sister has CF as well.    No hx of liver disease or hemophilia.    No infective sx, tested  negative for COVID on admission, on precautions. No sick contacts. Socially isolating.          ROS:  Complete ROS was done and was negative except what mentioned in HPI     Past Medical History:  Past Medical History:   Diagnosis Date   • Breath shortness    • Bronchitis    • CF (cystic fibrosis) (HCC)    • Coughing blood    • Hemorrhagic disorder (HCC)    • Herpes simplex    • Pneumonia    • Shoulder fracture        Past surgical history:  Past Surgical History:   Procedure Laterality Date   • BRONCHOSCOPY-ENDO  7/22/2019    Procedure: BRONCHOSCOPY - W/BAL;  Surgeon: Arelis Fleming M.D.;  Location: [facility];  Service: Ent   • DENTAL SURGERY     • SINUS WASHING         Family History:  Family History   Problem Relation Age of Onset   • Other Daughter         CF carrier   • Alcohol/Drug Father    • Heart Disease Father    • Cystic Fibrosis Sister        Hospital Medications:    Current Facility-Administered Medications:   •  vitamin D (cholecalciferol) tablet 1,000 Units, 1,000 Units, Oral, DAILY, Seth Rios M.D.  •  vitamin A capsule 10,000 Units, 10,000 Units, Oral, DAILY, Seth Rios M.D.  •  insulin glargine (LANTUS) injection 6 Units, 6 Units, Subcutaneous, Q EVENING, Kristopher Cuenca M.D.  •  albuterol (PROVENTIL) 2.5mg/0.5ml nebulizer solution 2.5 mg, 2.5 mg, Nebulization, Q4H PRN (RT), Arley Lopez M.D.  •  ascorbic acid tablet 1,000 mg, 1,000 mg, Oral, DAILY, Arley Lopez M.D., 1,000 mg at 04/21/20 0956  •  pancrelipase (Lip-Prot-Amyl) (CREON 6000) 6000 units capsule 6,000-12,000 Units, 1-2 Cap, Oral, TID WITH MEALS, Arley Lopez M.D., 12,000 Units at 04/20/20 1830  •  magnesium oxide (MAG-OX) tablet 200 mg, 200 mg, Oral, DAILY, Arley Lopez M.D., Stopped at 04/21/20 0600  •  phytonadione (MEPHYTON) tablet 5 mg, 5 mg, Oral, DAILY, Arley Lopez M.D., Stopped at 04/21/20 0600  •  senna-docusate (PERICOLACE or SENOKOT S) 8.6-50 MG per tablet 2 Tab, 2 Tab, Oral,  BID **AND** polyethylene glycol/lytes (MIRALAX) PACKET 1 Packet, 1 Packet, Oral, QDAY PRN **AND** magnesium hydroxide (MILK OF MAGNESIA) suspension 30 mL, 30 mL, Oral, QDAY PRN **AND** bisacodyl (DULCOLAX) suppository 10 mg, 10 mg, Rectal, QDAY PRN, Arley Lopez M.D.  •  ondansetron (ZOFRAN) syringe/vial injection 4 mg, 4 mg, Intravenous, Q6HRS PRN, Arley Lopez M.D.  •  ondansetron (ZOFRAN ODT) dispertab 4 mg, 4 mg, Oral, Q6HRS PRN, Arley Lopez M.D.  •  Respiratory Therapy Consult, , Nebulization, Continuous RT, Arley Lopez M.D.  •  acetaminophen (TYLENOL) tablet 650 mg, 650 mg, Oral, Q6HRS PRN, Arley Lopez M.D.  •  insulin regular (HUMULIN R) injection 2-9 Units, 2-9 Units, Subcutaneous, 4X/DAY ACHS, Stopped at 04/21/20 0700 **AND** POC Blood Glucose, , , Q AC AND BEDTIME(S) **AND** NOTIFY MD and PharmD, , , Once **AND** glucose 4 g chewable tablet 16 g, 16 g, Oral, Q15 MIN PRN **AND** dextrose 50% (D50W) injection 50 mL, 50 mL, Intravenous, Q15 MIN PRN, Arley Lopez M.D.  •  predniSONE (DELTASONE) tablet 40 mg, 40 mg, Oral, DAILY, Arley Lopez M.D., Stopped at 04/19/20 1539  •  benzonatate (TESSALON) capsule 100 mg, 100 mg, Oral, TID, Arley Lopez M.D., 100 mg at 04/19/20 1540  •  [COMPLETED] piperacillin-tazobactam (ZOSYN) 4.5 g in  mL IVPB, 4.5 g, Intravenous, Once, Stopped at 04/19/20 1610 **AND** piperacillin-tazobactam (ZOSYN) 4.5 g in  mL IVPB, 4.5 g, Intravenous, Q8HRS, Arley Lopez M.D., Last Rate: 25 mL/hr at 04/21/20 0510, 4.5 g at 04/21/20 0510  •  Ivacaftor TABS 150 mg, 150 mg, Oral, BID, Arley Lopez M.D., Stopped at 04/21/20 0600    Current Outpatient Medications:  Medications Prior to Admission   Medication Sig Dispense Refill Last Dose   • Insulin Degludec (TRESIBA) 100 UNIT/ML Solution Inject 3 Units as instructed every day.   4/18/2020 at AFTERNOON   • HUMALOG KWIKPEN 100 UNIT/ML Solution Pen-injector injection PEN Inject 10 Units  "as instructed 3 times a day before meals. Needs Humalog cartridges for use in the INPEN 30 mL 3 4/19/2020 at AM   • albuterol (PROVENTIL) 2.5mg/3ml Nebu Soln solution for nebulization 3 mL by Nebulization route 2 Times a Day for 364 days. 540 mL 3 4/15/2020 at PM   • sodium chloride (HYPER-SAL) 7 % Nebu Soln 4 mL by Nebulization route 4 times a day. 1440 mL 3 4/15/2020 at UNK   • phytonadione (MEPHYTON) 5 MG Tab Take 1 Tab by mouth every day. 90 Tab 3 4/19/2020 at AM   • KALYDECO 150 MG Tab Take 1 Tab by mouth 2 times a day. With fat containing food. 56 Tab 11 4/19/2020 at AM   • Multiple Vitamins-Minerals (DEKAS PLUS PO) Take 1 Cap by mouth 2 times a day.   4/19/2020 at AM   • magnesium oxide (MAG-OX) 400 MG Tab Take 200 mg by mouth every day.   4/19/2020 at AM   • Ascorbic Acid (VITAMIN C) 1000 MG Tab Take 1,000 mg by mouth every day.   4/19/2020 at AM   • Probiotic Product (PROBIOTIC-10 PO) Take 1 Tab by mouth every day.   4/19/2020 at AM   • Cholecalciferol 5000 units Tab Take 20,000 Units by mouth every day.   4/19/2020 at AM   • CREON 6000 units Cap DR Particles Take 1-2 Caps by mouth 4 times a day as needed. With meals or snacks 540 Cap 3 4/19/2020 at AM       Medication Allergy:  Allergies   Allergen Reactions   • Levofloxacin      Unknown reaction  Possibly myalgias, arthralgias, nightmares   • Tobramycin Hives     Hives             Objective:   Vitals/ General Appearance:   Weight/BMI: Body mass index is 21.93 kg/m².  /63   Pulse 82   Temp 36.9 °C (98.4 °F) (Temporal)   Resp 18   Ht 1.664 m (5' 5.5\")   Wt 60.7 kg (133 lb 13.1 oz)   SpO2 94%   Vitals:    04/20/20 1300 04/20/20 2015 04/21/20 0421 04/21/20 0950   BP: 127/76 133/61 113/59 144/63   Pulse: 78 85 74 82   Resp: 20 18 17 18   Temp:  36.7 °C (98.1 °F) 36.1 °C (97 °F) 36.9 °C (98.4 °F)   TempSrc: Oral Temporal Temporal Temporal   SpO2: 96% 95% 96% 94%   Weight:       Height:         Oxygen Therapy:  Pulse Oximetry: 94 %, O2 (LPM): 0, O2 " Delivery Device: None - Room Air    Constitutional:   Well developed, Well nourished, No acute distress  HENMT:  Normocephalic, Atraumatic, Oropharynx moist mucous membranes, No oral exudates, Nose normal.  No thyromegaly.  Eyes:  EOMI, Conjunctiva normal, No discharge.  Neck:  Normal range of motion, No cervical tenderness,  no JVD.  Cardiovascular:  Normal heart rate, Normal rhythm, No murmurs, No rubs, No gallops.   Extremitites with intact distal pulses, no cyanosis, or edema.  Lungs:  Normal breath sounds, breath sounds clear to auscultation bilaterally,  no crackles, no wheezing.   Abdomen: Bowel sounds normal, Soft, No tenderness, No guarding, No rebound, No masses, No hepatosplenomegaly.  Skin: Warm, Dry, No erythema, No rash, no induration.  Neurologic: Alert & oriented x 3, No focal deficits noted, cranial nerves II through X are grossly intact.  Psychiatric: Affect normal, Judgment normal, Mood normal.    Labs:  Recent Labs     04/19/20 1101 04/20/20 0407 04/21/20  0530   WBC 5.4 11.3* 6.2   RBC 4.68 4.20 4.35   HEMOGLOBIN 13.8 12.4 12.8   HEMATOCRIT 42.0 38.4 40.0   MCV 89.7 91.4 92.0   MCH 29.5 29.5 29.4   MCHC 32.9* 32.3* 32.0*   RDW 44.0 45.1 46.0   PLATELETCT 320 350 312   MPV 10.0 10.4 10.2     Recent Labs     04/19/20 1101   APTT 44.2*   INR 0.99             Recent Labs     04/19/20 1101 04/20/20 0407 04/21/20  0530   SODIUM 138 140 141   POTASSIUM 4.3 4.1 4.2   CHLORIDE 102 104 106   CO2 23 23 24   GLUCOSE 116* 130* 101*   BUN 14 15 17     Recent Labs     04/19/20 1101 04/20/20  0407 04/21/20  0530   SODIUM 138 140 141   POTASSIUM 4.3 4.1 4.2   CHLORIDE 102 104 106   CO2 23 23 24   BUN 14 15 17   CREATININE 0.66 0.70 0.73   CALCIUM 9.4 8.8 8.7     No results found for this or any previous visit.      Imaging:   CT-CHEST (THORAX) WITH   Final Result      Diffuse bronchiectasis with bronchial wall thickening and mucous plugging once again noted consistent with history of cystic fibrosis with  slight improvement in mucous plugging.      New patchy groundglass opacities in the left lower lobe and medial right lower lobe suggestive of pneumonia or pneumonitis.      Persistent prominent mediastinal and hilar lymph nodes likely reactive related to history of cystic fibrosis.      Prominent bronchial arteries related to history of cystic fibrosis.            DX-CHEST-PORTABLE (1 VIEW)   Final Result         1.  No change in scattered interstitial and fibrotic opacifications.      2.  No new infiltrates or consolidations are identified.              Assessment and Plan:    # Hemoptysis, mild  - for the past week, reducing in amount and severity, currently coughing up clots and mucus, likely oxidized blood, yesterday hemoptysis of 15-20 ml, bright, intermittent  - with discomfort sub-sternally  - hx of vaginal bleed after childbirth, passage of clots, underwent D&C for hemorrhage  - hx of menorrhagia in mother  - previous episodes of self limited hemoptysis in 2019, bronchoscopy negative for actively bleeding bronchial vessel  - CT on admission with prominent bronchial blood vessels  - has been previously worked up for bleeding d/o, tested negative for von Willebrand disease  - APTT prolonged on admission, states it has always been prolonged  - no intervention either bronchoscopy, Thromboxane or IR embolization indicated at this time as hemoptysis seems to be resolving and risks > benefits    Plan  - continue to monitor hemodynamics and H/H daily, transfuse if appropriate  - consider workup for prolonged APTT including factor VIII, IX, XI titer, vWF titer, mixing study as outpatient  - discussed with hospitalist Dr Gross, Dr Payne. Patient is in agreement with plan of care      # Cystic fibrosis  # Bronchiectasis  # IGT  # Pancreatic insufficiency    - stable, not exacerbated, continue Ivacaftor, Creon, [place] PRN  - appropriate to discontinue Zosyn      Thank you for this consult.

## 2020-04-21 NOTE — PROGRESS NOTES
" Darío asking to speak with supervisor regarding care and MD.   This RN spoke with pt  Darío via phone. He voiced concerns about wife POC, WIfe bleeding from lungs, Discussion with pulmonologist and medication orders per pt wants.   Following review of case and chart, Pulmonologist has been consulted and does not want to change POC in place and does not want to start new medication per pt request.  verbalized that prim RN has kept him updated on POC, which includes monitoring breathing, monitoring for active bleeding and continue home medication regimen.  would like to speak with MD. MD Gross updated. MD to speak with  tomorrow as there has been no further changes in POC. This RN updated  that pt remains stable and the POC is to monitor for acute bleeds, but at this time no s/s of bleed clinically. HE verbalized his frustrations, offered apologies. He states,\" well I will agree to disagree with your doctors choices\"   "

## 2020-04-21 NOTE — PROGRESS NOTES
"Patient up in bed watching television. When asked how she was she stated, \"Not very happy right now\". With having cared for herself with her diagnosis of cystic fibrosis for so long, she feels like no one is listening to her. She would like to be able to keep her regimen here as close as she can to the one she does at home. She continues to cough up a moderate amount of thin blood with some mucus plugs present. She can feel these plugs when they present themselves and is usually successful at coughing them up. Left upper lobe is clear, but diminished. All other lobes are clear. Both lower lobes are slightly diminished. States she would like to especially get back on her vitamin A.   "

## 2020-04-21 NOTE — PROGRESS NOTES
Assumed pt care at 0700. Received report from Merlyn COKER. Pt A&O x4. Pt c/o 0/10 pain at this time. Respirations are even and unlabored on room air.   Updated on POC, communication board updated. Bed locked and in lowest position. Call light and belongings within reach. Non-skid socks in place. Needs met, will continue to monitor.

## 2020-04-21 NOTE — PROGRESS NOTES
"Pt called and stated she wants to go home. She said she spoke with Dr. Payne and \"Dr. Payne said she would rather me be at home and is going to discharge me\". Pt educated that hospitalist needs to put in discharge order.   Paged Dr. Gross.   "

## 2020-04-22 RX ORDER — PREDNISONE 20 MG/1
40 TABLET ORAL DAILY
Qty: 10 TAB | Refills: 0 | Status: SHIPPED | OUTPATIENT
Start: 2020-04-22 | End: 2020-04-27

## 2020-04-22 NOTE — PROGRESS NOTES
PIV removed. Pt complained about bleeding from IV site after removal. Mild pressure applied and bleeding stopped. Transparent dressing applied.

## 2020-04-22 NOTE — DISCHARGE INSTRUCTIONS
Discharge Instructions    Discharged to home by car with relative. Discharged via wheelchair, hospital escort: Yes.  Special equipment needed: Not Applicable    Be sure to schedule a follow-up appointment with your primary care doctor or any specialists as instructed.     Discharge Plan:   Activity Level: Discussed  Confirmed Follow up Appointment: Patient to Call and Schedule Appointment  Confirmed Symptoms Management: Discussed  Medication Reconciliation Updated: Yes  Influenza Vaccine Indication: Not indicated: Previously immunized this influenza season and > 8 years of age    I understand that a diet low in cholesterol, fat, and sodium is recommended for good health. Unless I have been given specific instructions below for another diet, I accept this instruction as my diet prescription.   Other diet: Diabetic    Special Instructions:   Self-Isolating at Home  If it is determined that you do not need to be hospitalized and can be isolated at home please follow the follow the prevention steps below as based on CDC guidelines.  Stay home except to get medical care  People who are mildly ill with unconfirmed COVID-19 or have any other infectious respiratory illness are able to isolate at home during their illness. You should restrict activities outside your home, except for getting medical care. Do not go to work, school, or public areas. Avoid using public transportation, ride-sharing, or taxis.  Call ahead before visiting your doctor  If you have a medical appointment, call the healthcare provider and tell them that you have or may have unconfirmed COVID-19 or another possibly contagious respiratory illness. This will help the healthcare provider’s office take steps to keep other people from getting infected or exposed.  Separate yourself from other people and animals in your home  As much as possible, you should stay in a specific room and away from other people in your home. Also, you should use a separate  bathroom, if available.  You should restrict contact with pets and other animals while you are sick, just like you would around other people. When possible, have another member of your household care for your animals while you are sick. If you must care for your pet or be around animals while you are sick, wash your hands before and after you interact with pets.  Wear a facemask  You should wear a facemask when you are around other people (e.g., sharing a room or vehicle) or pets and before you enter a healthcare provider’s office. If you are not able to wear a facemask (for example, because it causes trouble breathing), then people who live with you should not stay in the same room with you, or they should wear a facemask if they enter your room.  Cover your coughs and sneezes  Cover your mouth and nose with a tissue when you cough or sneeze. Throw used tissues in a lined trash can. Immediately wash your hands with soap and water for at least 20 seconds or, if soap and water are not available, clean your hands with an alcohol-based hand  that contains at least 60% alcohol.  Clean your hands often  Wash your hands often with soap and water for at least 20 seconds, especially after blowing your nose, coughing, or sneezing; going to the bathroom; and before eating or preparing food. If soap and water are not readily available, use an alcohol-based hand  with at least 60% alcohol, covering all surfaces of your hands and rubbing them together until they feel dry.  Soap and water are the best option if hands are visibly dirty. Avoid touching your eyes, nose, and mouth with unwashed hands.  Avoid sharing personal household items  You should not share dishes, drinking glasses, cups, eating utensils, towels, or bedding with other people or pets in your home. After using these items, they should be washed thoroughly with soap and water.  Clean all “high-touch” surfaces everyday  High touch surfaces include  counters, tabletops, doorknobs, bathroom fixtures, toilets, phones, keyboards, tablets, and bedside tables. Also, clean any surfaces that may have blood, stool, or body fluids on them. Use a household cleaning spray or wipe, according to the label instructions. Labels contain instructions for safe and effective use of the cleaning product including precautions you should take when applying the product, such as wearing gloves and making sure you have good ventilation during use of the product.  Monitor your symptoms  Seek prompt medical attention if your illness is worsening (e.g., difficulty breathing). Before seeking care, call your healthcare provider and tell them that you have, or are being evaluated for, unconfirmed COVID-19 or another infectious respiratory illness. Put on a facemask before you enter the facility. These steps will help the healthcare provider’s office to keep other people in the office or waiting room from getting infected or exposed. Ask your healthcare provider to call the local or [facility] health department. Persons who are placed under active monitoring or facilitated self-monitoring should follow instructions provided by their local health department or occupational health professionals, as appropriate. When working with your local health department check their available hours.  If you have a medical emergency and need to call 911, notify the dispatch personnel that you have, or are being evaluated for unconfirmed COVID-19 or another infectious respiratory illness. If possible, put on a facemask before emergency medical services arrive.  Discontinuing home isolation  Patients with unconfirmed COVID-19 or other infectious respiratory illnesses should remain under home isolation precautions until the risk of secondary transmission to others is thought to be low. In general that means 72 hours after fever resolves without the use of fever reducing medications, AND symptoms have improved AND at  least 7 days since symptoms first appeared. If you have questions or concerns consult your healthcare providers or your local health department.  Per CDC guidelines, you are not required to provide a healthcare provider’s note to validate your illness or to return to work, as healthcare provider offices and medical facilities may be extremely busy and not able to provide such documentation in a timely way.        · Is patient discharged on Warfarin / Coumadin?   No     Depression / Suicide Risk    As you are discharged from this [facility] facility, it is important to learn how to keep safe from harming yourself.    Recognize the warning signs:  · Abrupt changes in personality, positive or negative- including increase in energy   · Giving away possessions  · Change in eating patterns- significant weight changes-  positive or negative  · Change in sleeping patterns- unable to sleep or sleeping all the time   · Unwillingness or inability to communicate  · Depression  · Unusual sadness, discouragement and loneliness  · Talk of wanting to die  · Neglect of personal appearance   · Rebelliousness- reckless behavior  · Withdrawal from people/activities they love  · Confusion- inability to concentrate     If you or a loved one observes any of these behaviors or has concerns about self-harm, here's what you can do:  · Talk about it- your feelings and reasons for harming yourself  · Remove any means that you might use to hurt yourself (examples: pills, rope, extension cords, firearm)  · Get professional help from the community (Mental Health, Substance Abuse, psychological counseling)  · Do not be alone:Call your Safe Contact- someone whom you trust who will be there for you.  · Call your local CRISIS HOTLINE 227-6144 or 444-111-0011  · Call your local Children's Mobile Crisis Response Team [place] (181) 568-0696 or www.Lvgou.com  · Call the toll free National Suicide Prevention Hotlines   · National Suicide  Prevention Lifeline 028-574-PVMI (7020)  · [facility] Line Network 800-SUICIDE (131-8030)      Discharge Instructions per Shantel Gross M.D.    Please follow up with your PCP and Pulmonary physicians. Please follow up with your PCP regarding the results of your echo.     DIET: diabetic diet    ACTIVITY: as tolerated    DIAGNOSIS: hemoptysis    Return to ER if you develop new or worsening symptoms            COVID-19 Patient Discharge Instructions  COVID-19 testing results negative (+Respiratory symptoms)    A. Return home and continue home isolation until:    1.  No fever for at least 72 hours without the use of fever reducing medications    2.  Other symptoms have improved or resolved (ex: cough or shortness of breath have improved)    B. Home isolation is defined as staying at home and monitoring your symptoms.    C. Even after the above are met, practice physical distancing from others (at least 6 feet). Avoid crowded places and wear home-made face mask in public. Practice frequent hand washing.

## 2020-04-22 NOTE — PROGRESS NOTES
Patient discharged. IV removed and discharge instructions provided to patient. Pt verbalized understanding. Pt denies questions. Copy of discharge paperwork provided to patient, signed copy in chart. Pt states all belongings are in possession. Pt escorted off the unit via wheelchair without incident.

## 2020-04-23 NOTE — PROGRESS NOTES
I have spoken to Kenia multiple times. Has had hemoptysis 25-30 ml per day for >1 week now. Not SOB. Not on any hormonal therapy, no history of DVT or PE.  I have done research on use of TXA in CF hemoptysis and spoken to many colleagues about it's use and safety.    Will go ahead and prescribe (called in to [facility]) Transexamic acid 650 mg tablets  2 tabs PO TID x 5 days.    Side effects including GI discomfort/nausea/diarrhea and rare risk of clots/DVT discussed with the patient.  She will only start the medication if hemoptysis continues.    Telehealth visit in 2 weeks suggested.

## 2020-04-24 DIAGNOSIS — K86.81 EXOCRINE PANCREATIC INSUFFICIENCY: ICD-10-CM

## 2020-04-24 LAB
BACTERIA BLD CULT: NORMAL
BACTERIA BLD CULT: NORMAL
SIGNIFICANT IND 70042: NORMAL
SIGNIFICANT IND 70042: NORMAL
SITE SITE: NORMAL
SITE SITE: NORMAL
SOURCE SOURCE: NORMAL
SOURCE SOURCE: NORMAL

## 2020-04-24 RX ORDER — INSULIN DEGLUDEC INJECTION 100 U/ML
30 INJECTION, SOLUTION SUBCUTANEOUS DAILY
Qty: 30 ML | Refills: 3 | Status: SHIPPED | OUTPATIENT
Start: 2020-04-24 | End: 2021-05-05

## 2020-04-25 LAB — ETEST SENSITIVITY ETEST: NORMAL

## 2020-05-04 ENCOUNTER — TELEMEDICINE (OUTPATIENT)
Dept: MEDICAL GROUP | Facility: LAB | Age: 60
End: 2020-05-04
Payer: COMMERCIAL

## 2020-05-04 DIAGNOSIS — R58 BLEEDING: ICD-10-CM

## 2020-05-04 DIAGNOSIS — K92.2 GASTROINTESTINAL HEMORRHAGE, UNSPECIFIED GASTROINTESTINAL HEMORRHAGE TYPE: ICD-10-CM

## 2020-05-04 PROCEDURE — 99214 OFFICE O/P EST MOD 30 MIN: CPT | Mod: 95,CR | Performed by: FAMILY MEDICINE

## 2020-05-04 NOTE — PROGRESS NOTES
"Telemedicine Visit: Established Patient     This encounter was conducted via Zoom .   Verbal consent was obtained. Patient's identity was verified.    Subjective:   CC: Hemoptysis  Kenia Soto is a 59 y.o. female presenting for evaluation and management of:    Hospital follow-up:  Patient with a history of CF admitted for hemoptysis.  She was ruled out coronavirus and her bleeding stabilized while inpatient and not require any further intervention.  Given she has cystic fibrosis she was started on TXA and discharged on inhaled aztreonam.  She was seen by her pulmonologist while inpatient.  Patient admits that she did have significant upper abdominal pain and that is when her \"hemoptysis\" supposedly started.  This started after taking a new form of vitamin D and other medications.  She took Pepcid the first day and the bleeding significantly improved and then the following day she took Pepcid and the bleeding also improved and eventually stopped.  She believes that this may be GI related rather than pulmonary and would like to see a GI doctor.  She has not had any episodes of hemoptysis or hematochezia since discharge.    ROS   Denies any recent fevers or chills. No nausea or vomiting. No chest pains or shortness of breath.     Allergies   Allergen Reactions   • Levofloxacin      Unknown reaction  Possibly myalgias, arthralgias, nightmares   • Tobramycin Hives     Hives       Current medicines (including changes today)  Current Outpatient Medications   Medication Sig Dispense Refill   • Insulin Degludec (TRESIBA) 100 UNIT/ML Solution Inject 30 Units as instructed every day. 30 mL 3   • vitamin A 55262 UNIT capsule Take 1 Cap by mouth every day. 30 Cap 3   • aztreonam lysine (CAYSTON) 75 MG Recon Soln Inhale 75 mg by mouth 3 times a day for 28 days. 84 mL 0   • HUMALOG KWIKPEN 100 UNIT/ML Solution Pen-injector injection PEN Inject 10 Units as instructed 3 times a day before meals. Needs Humalog cartridges for " use in the INPEN 30 mL 3   • albuterol (PROVENTIL) 2.5mg/3ml Nebu Soln solution for nebulization 3 mL by Nebulization route 2 Times a Day for 364 days. 540 mL 3   • sodium chloride (HYPER-SAL) 7 % Nebu Soln 4 mL by Nebulization route 4 times a day. 1440 mL 3   • phytonadione (MEPHYTON) 5 MG Tab Take 1 Tab by mouth every day. 90 Tab 3   • KALYDECO 150 MG Tab Take 1 Tab by mouth 2 times a day. With fat containing food. 56 Tab 11   • Multiple Vitamins-Minerals (DEKAS PLUS PO) Take 1 Cap by mouth 2 times a day.     • magnesium oxide (MAG-OX) 400 MG Tab Take 200 mg by mouth every day.     • Ascorbic Acid (VITAMIN C) 1000 MG Tab Take 1,000 mg by mouth every day.     • Probiotic Product (PROBIOTIC-10 PO) Take 1 Tab by mouth every day.     • Cholecalciferol 5000 units Tab Take 20,000 Units by mouth every day.     • CREON 6000 units Cap DR Particles Take 1-2 Caps by mouth 4 times a day as needed. With meals or snacks 540 Cap 3     No current facility-administered medications for this visit.        Patient Active Problem List    Diagnosis Date Noted   • Suspected COVID-19 virus infection 04/19/2020     Priority: High   • Cystic fibrosis ([facility]) 11/13/2019     Priority: High   • Hemoptysis 06/13/2019     Priority: High   • Exocrine pancreatic insufficiency 06/13/2019     Priority: Medium   • Vitamin K deficiency 06/13/2019     Priority: Low   • 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 11/13/2019   • Cystic fibrosis with pulmonary manifestations ([facility]) 11/13/2019   • Herpes simplex 07/16/2019   • Osteopenia of multiple sites 07/16/2019   • CF (3849+10k bC>T, Q3213F) ([facility]) 06/13/2019   • Pseudomonas respiratory infection 06/13/2019   • Partial thromboplastin time increased 08/22/2017   • Moderate COPD (chronic obstructive pulmonary disease) ([facility]) 04/07/2015   • 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  She  has a past surgical history that includes sinus washing; dental surgery; and bronchoscopy-endo (7/22/2019).       Objective:   Vitals obtained by patient:  There were no vitals taken for this visit.      Physical Exam:  Constitutional: Alert, no distress, well-groomed.  Skin: No rashes in visible areas.  Eye: Round. Conjunctiva clear, lids normal. No icterus.   ENMT: Lips pink without lesions, good dentition, moist mucous membranes. Phonation normal.  Neck: No masses, no thyromegaly. Moves freely without pain.  CV: Pulse as reported by patient  Respiratory: Unlabored respiratory effort, no cough or audible wheeze  Psych: Alert and oriented x3, normal affect and mood.       Assessment and Plan:   The following treatment plan was discussed:     1. Bleeding  Clinically her picture does fit a possible upper GI bleed although now stable.  I would like her to continue taking Pepcid and follow-up with GI for possible future upper endoscopy.  Strict ER precautions discussed.      Follow-up: No follow-ups on file.

## 2020-05-07 ENCOUNTER — TELEMEDICINE (OUTPATIENT)
Dept: PEDIATRIC PULMONOLOGY | Facility: MEDICAL CENTER | Age: 60
End: 2020-05-07
Payer: COMMERCIAL

## 2020-05-07 VITALS — WEIGHT: 132 LBS | HEART RATE: 90 BPM | OXYGEN SATURATION: 98 % | BODY MASS INDEX: 21.63 KG/M2

## 2020-05-07 DIAGNOSIS — E84.9 CYSTIC FIBROSIS (HCC): ICD-10-CM

## 2020-05-07 DIAGNOSIS — I05.9 MITRAL VALVE DISORDER: ICD-10-CM

## 2020-05-07 DIAGNOSIS — R04.2 HEMOPTYSIS: ICD-10-CM

## 2020-05-07 DIAGNOSIS — J98.8 PSEUDOMONAS RESPIRATORY INFECTION: ICD-10-CM

## 2020-05-07 DIAGNOSIS — B96.5 PSEUDOMONAS RESPIRATORY INFECTION: ICD-10-CM

## 2020-05-07 PROCEDURE — 99214 OFFICE O/P EST MOD 30 MIN: CPT | Mod: 95,CR | Performed by: PEDIATRICS

## 2020-05-07 RX ORDER — TRANEXAMIC ACID 650 MG/1
TABLET ORAL
COMMUNITY
Start: 2020-05-01 | End: 2020-10-22

## 2020-05-07 ASSESSMENT — FIBROSIS 4 INDEX: FIB4 SCORE: 0.78

## 2020-05-07 NOTE — PROGRESS NOTES
This encounter was conducted via [employer].   Verbal consent was obtained. Patient's identity was verified.    PCP:  Kelin Donohue M.D.   [id number] [id number] / [place] [id number]     SUBJECTIVE:   Kenia Soto is a 59 y.o. female with Cystic Fibrosis    Patient Active Problem List    Diagnosis Date Noted   • Suspected COVID-19 virus infection 04/19/2020     Priority: High   • Cystic fibrosis (HCC) 11/13/2019     Priority: High   • Hemoptysis 06/13/2019     Priority: High   • Exocrine pancreatic insufficiency 06/13/2019     Priority: Medium   • Vitamin K deficiency 06/13/2019     Priority: Low   • 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 11/13/2019   • Cystic fibrosis with pulmonary manifestations ([facility]) 11/13/2019   • Herpes simplex 07/16/2019   • Osteopenia of multiple sites 07/16/2019   • CF (3849+10k bC>T, C3880E) ([facility]) 06/13/2019   • Pseudomonas respiratory infection 06/13/2019   • Partial thromboplastin time increased 08/22/2017   • Moderate COPD (chronic obstructive pulmonary disease) ([facility]) 04/07/2015   • 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  Patient still thinks bleeding was from upper airway or esophagus. Has had a telehealth appointment with ENT.   Doctor visits: ENT as above. Has a cardiology and GI referral.   Antibiotics:completed 2 weeks of inhaled cayston  Pulmonary toilet:   Albuterol: 2/day  7% hypertonic saline: 2/day  Chest Physiotherapy: Vest: 2/day  Oxygen: none  Respiratory assist: none  Modulator: kalydeco BID    Compliance: compliant most of the time     Sinus  symptoms:  Nasal Congestion: rare   Nasal Drainage: rare to none  Headache/sinus pressure: none     Activity / Energy: normal for age   Change in activity/energy: increased   School/ Work attendance: not in school, not working   Emotional assessment: negative     GI: none, still taking pepcid  Has GI appointment   Appetite: normal  Enzymes:  daily      Medications:     Current Outpatient Medications:   •  Tranexamic Acid, , PRN  •  Tresiba, 30 Units, Subcutaneous, DAILY, Taking  •  vitamin A, 10,000 Units, Oral, DAILY, Taking  •  Cayston, 75 mg, Inhalation, TID, Taking  •  HumaLOG KwikPen, 10 Units, Subcutaneous, TID AC, Taking  •  albuterol, 2.5 mg, Nebulization, BID, Taking  •  sodium chloride, 4 mL, Nebulization, 4X/DAY, Taking  •  phytonadione, 5 mg, Oral, DAILY, Taking  •  Kalydeco, 1 Tab, Oral, BID, Taking  •  Multiple Vitamins-Minerals (DEKAS PLUS PO), 1 Cap, Oral, BID, Taking  •  magnesium oxide, 200 mg, Oral, DAILY, Taking  •  Vitamin C, 1,000 mg, Oral, DAILY, Taking  •  Probiotic Product (PROBIOTIC-10 PO), 1 Tab, Oral, DAILY, Taking  •  Cholecalciferol, 20,000 Units, Oral, DAILY, Taking  •  Creon, 1-2 Cap, Oral, 4X/DAY PRN, Taking      ALLERGIES:  Levofloxacin and Tobramycin    Review of System:    Cardiovascular: had echocardiogram 2 weeks ago, shows a mitral valve lesion, has been referred to cardiology  No fever or chills  Gastrointestinal: taking pepcid, has GI referral  Blood sugars under better control, seeing Endo  All other systems reviewed and negative or as above       OBJECTIVE:  Physical Exam:  Pulse 90   Wt 59.9 kg (132 lb) Comment: home scale  SpO2 98%   BMI 21.63 kg/m²      Vitals obtained by patient:      Physical Exam:  Constitutional: Alert, no distress, well-groomed.  Skin: No rashes in visible areas.  Eye: Round. Conjunctiva clear, lids normal. No icterus.   ENMT: Lips pink without lesions, good dentition, moist mucous membranes. Phonation normal.  Neck: No masses, no thyromegaly.  Moves freely without pain.  CV: Pulse as reported by patient  Respiratory: Unlabored respiratory effort, no cough or audible wheeze  Psych: Alert and oriented x3, normal affect and mood.     Labs reviewed:     Last sputum culture date: 4/20/20  Chronic colonization of:  Pseudomonas aeruginosa frequent    Respiratory Culture:   Lab Results   Component Value Date/Time    SIGIND POS (POS) 04/20/2020 01:37 PM    SIGIND NEG 04/20/2020 01:37 PM    SIGIND NEG 04/20/2020 01:37 PM    SIGIND NEG 04/20/2020 01:37 PM    SIGIND . 04/20/2020 01:37 PM    SOURCE RESP 04/20/2020 01:37 PM    SOURCE RESP 04/20/2020 01:37 PM    SOURCE RESP 04/20/2020 01:37 PM    SOURCE RESP 04/20/2020 01:37 PM    SOURCE RESP 04/20/2020 01:37 PM    SITE SPUTUM 04/20/2020 01:37 PM    SITE SPUTUM 04/20/2020 01:37 PM    SITE SPUTUM 04/20/2020 01:37 PM    SITE SPUTUM 04/20/2020 01:37 PM    SITE SPUTUM 04/20/2020 01:37 PM         ASSESSMENT/PLAN:   1. Cystic fibrosis (HCC)  Now back to baseline  Continue kalydeco, albuterol, 7% saline and vest BID    2. Hemoptysis  Now resolved  Has TXA available for prn use.  Will call us if hemoptysis recurs    3. Pseudomonas respiratory infection  Finish 28 day course of cayston    4. Mitral valve disorder  Has cardiology appointment    Follow up in 3 months        Follow up in 3 months    Electronically signed by   Marjan Payne M.D.   Pediatric Pulmonology

## 2020-05-07 NOTE — PATIENT INSTRUCTIONS
Kenia Soto                       CF Mutations: 3849+10kbC->T [id number]          CF Modulator: Kalydeco            Height:             Weight:                          Respiratory            Baseline FEV1: 63%           Today's FEV1:              Airway Clearance Routine:             When well: When Sick:          Albuterol 2x/day 3-4x/day          Hypertonic Saline 2x/day 3-4x/day          Pulmozyme 1x/day 3-4x/day          ACT Vest/Acapella 2x/day 3-4x/day                      Inhaled Antibiotics: Cayston            Exercise:              Equipment Check:                           Nutrition/Gastrointestinal           Current BMI:              Goal BMI:             Enzymes: Creon           1-2 per meal           0-1 per snack           Vitamins:                          Social            Insurance: [employer]            Annual Labs: Due: rest due 7/2020 Last Done: 7/24/2019 (some done on 4/19/2020) Needs: Vit A, D, E, lipid panel, & GGT for 2020   Liver function labs: Due: 4/1/2021 Last Done: 4/19/2020        Glucose tolerance: Not indicated (IDDM)              Chest X-Ray: Due: 4/1/2021 Last Done: 4/19/2020        Dexa Scan: Due: 7/1/2021 Last Done: 7/10/19 -  (2-4 years or every year)        Eye Exam: Due: 9/1/2020 Last Done: 9/25/19 - [facility] ([phone])        Sputum Cultures: 2/6/2020 4/20/2020     (dates done)

## 2020-05-20 LAB
FUNGUS SPEC CULT: NORMAL
SIGNIFICANT IND 70042: NORMAL
SITE SITE: NORMAL
SOURCE SOURCE: NORMAL

## 2020-06-10 ENCOUNTER — TELEPHONE (OUTPATIENT)
Dept: PEDIATRIC PULMONOLOGY | Facility: MEDICAL CENTER | Age: 60
End: 2020-06-10

## 2020-06-10 DIAGNOSIS — J98.8 PSEUDOMONAS RESPIRATORY INFECTION: ICD-10-CM

## 2020-06-10 DIAGNOSIS — B96.5 PSEUDOMONAS RESPIRATORY INFECTION: ICD-10-CM

## 2020-06-10 DIAGNOSIS — E84.9 CYSTIC FIBROSIS (HCC): ICD-10-CM

## 2020-06-10 RX ORDER — AZTREONAM 75 MG/ML
1 KIT INHALATION 3 TIMES DAILY
Qty: 84 ML | Refills: 3 | Status: SHIPPED | OUTPATIENT
Start: 2020-06-10 | End: 2020-07-08

## 2020-06-10 NOTE — TELEPHONE ENCOUNTER
Pharmacy emailed for a new [facility] Rx but there isn't one in the chart. Can you please send an Rx to [facility]?

## 2020-06-16 PROBLEM — E84.9: Status: ACTIVE | Noted: 2020-06-16

## 2020-06-16 PROBLEM — E08.9: Status: ACTIVE | Noted: 2020-06-16

## 2020-06-17 ENCOUNTER — TELEMEDICINE (OUTPATIENT)
Dept: ENDOCRINOLOGY | Facility: MEDICAL CENTER | Age: 60
End: 2020-06-17
Payer: COMMERCIAL

## 2020-06-17 DIAGNOSIS — E08.9 DIABETES MELLITUS DUE TO CYSTIC FIBROSIS (HCC): ICD-10-CM

## 2020-06-17 DIAGNOSIS — E84.9 DIABETES MELLITUS DUE TO CYSTIC FIBROSIS (HCC): ICD-10-CM

## 2020-06-17 DIAGNOSIS — E10.65 UNCONTROLLED TYPE 1 DIABETES MELLITUS WITH HYPERGLYCEMIA (HCC): ICD-10-CM

## 2020-06-17 DIAGNOSIS — E55.9 VITAMIN D DEFICIENCY: ICD-10-CM

## 2020-06-17 DIAGNOSIS — K86.81 EXOCRINE PANCREATIC INSUFFICIENCY: ICD-10-CM

## 2020-06-17 PROCEDURE — 99214 OFFICE O/P EST MOD 30 MIN: CPT | Mod: 95,CR | Performed by: INTERNAL MEDICINE

## 2020-06-17 NOTE — PROGRESS NOTES
Endocrinology Clinic Progress Note  PCP: Kelin Donohue M.D.  This encounter was conducted via Zoom .   Verbal consent was obtained. Patient's identity was verified.  HPI:  Kenia Soto is a 59 y.o. old patient who is seen today for review of her endocrine problems.   1. 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     Fasting blood sugar typically around 105 210 range.  Sometimes in the 90s.  Before lunch: Blood sugar around 95 200 range    Before dinner readings are around 90s to low 100s    She does have Dexcom G6 which is the continuous glucose monitoring system.  She is very happy with that.  The first 2 to 3 days the readings are not quite accurate and are way off and consistently shows that her blood sugars are in 40s despite that being in the 90s to 120 range.  After the first 3 days she gets better readings.    Hypoglycemia:  None    2. Exocrine pancreatic insufficiency: taking Creon 6000-19491 per meal.    3. Vitamin D deficiency: currently on Vitamin D supplementation of 5000 iu per day.       ROS:  Constitutional: No weight loss  Cardiac: No palpitations or racing heart  Resp: No shortness of breath  Neuro: No numbness or tinging in feet  Endo: No heat or cold intolerance, no polyuria or polydipsia  All other systems were reviewed and were negative.    Past Medical History:  Patient Active Problem List    Diagnosis Date Noted   • Suspected COVID-19 virus infection 04/19/2020     Priority: High   • Cystic fibrosis (HCC) 11/13/2019     Priority: High   • Hemoptysis 06/13/2019     Priority: High   • Exocrine pancreatic insufficiency 06/13/2019     Priority: Medium   • Vitamin K deficiency 06/13/2019     Priority: Low   • Diabetes mellitus due to cystic  fibrosis ([facility]) 06/16/2020   • 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 11/13/2019   • Cystic fibrosis with pulmonary manifestations ([facility]) 11/13/2019   • Herpes simplex 07/16/2019   • Osteopenia of multiple sites 07/16/2019   • CF (3849+10k bC>T, R6871F) ([facility]) 06/13/2019   • Pseudomonas respiratory infection 06/13/2019   • Partial thromboplastin time increased 08/22/2017   • Moderate COPD (chronic obstructive pulmonary disease) ([facility]) 04/07/2015   • 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   • Irritable bowel syndrome 03/08/2006       Past Surgical History:  Past Surgical History:   Procedure Laterality Date   • BRONCHOSCOPY-ENDO  7/22/2019    Procedure: BRONCHOSCOPY - W/BAL;  Surgeon: Arelis Fleming M.D.;  Location[facility];  Service: Ent   • DENTAL SURGERY     • SINUS WASHING         Allergies:  Levofloxacin and Tobramycin    Social History:  Social History     Socioeconomic History   • Marital status:      Spouse name: Not on file   • Number of children: Not on file   • Years of education: Not on file   • Highest education level: Not on file   Occupational History   • Not on file   Social Needs   • Financial resource strain: Not on file   • Food insecurity     Worry: Not on file     Inability: Not on file   • Transportation needs     Medical: Not on file     Non-medical: Not on file   Tobacco Use   • Smoking status: Never Smoker   • Smokeless tobacco: Never Used   Substance and Sexual Activity   • Alcohol use: Yes     Comment: Socially   • Drug use: No   • Sexual activity: Yes     Partners: Male     Comment: PM    Lifestyle   • Physical activity     Days per week: Not on file     Minutes per session: Not on file   • Stress: Not on file   Relationships   • Social connections     Talks on phone: Not on file     Gets together: Not on file     Attends Jew service: Not on file     Active member of club or organization: Not on file     Attends meetings of clubs or organizations: Not on file      Relationship status: Not on file   • Intimate partner violence     Fear of current or ex partner: Not on file     Emotionally abused: Not on file     Physically abused: Not on file     Forced sexual activity: Not on file   Other Topics Concern   • Not on file   Social History Narrative    Has 2 daughters who are both CF carriers. Enjoys staying active.     Retired .     Recently moved to [place] from [place]       Family History:  Family History   Problem Relation Age of Onset   • Other Daughter         CF carrier   • Alcohol/Drug Father    • Heart Disease Father    • Cystic Fibrosis Sister        Medications:    Current Outpatient Medications:   •  aztreonam lysine (CAYSTON) 75 MG Recon Soln, Inhale 1 mL by mouth 3 times a day for 28 days. Repeat every other month as needed, Disp: 84 mL, Rfl: 3  •  Tranexamic Acid 650 MG Tab, , Disp: , Rfl:   •  Insulin Degludec (TRESIBA) 100 UNIT/ML Solution, Inject 30 Units as instructed every day., Disp: 30 mL, Rfl: 3  •  vitamin A 20982 UNIT capsule, Take 1 Cap by mouth every day., Disp: 30 Cap, Rfl: 3  •  HUMALOG KWIKPEN 100 UNIT/ML Solution Pen-injector injection PEN, Inject 10 Units as instructed 3 times a day before meals. Needs Humalog cartridges for use in the INPEN, Disp: 30 mL, Rfl: 3  •  albuterol (PROVENTIL) 2.5mg/3ml Nebu Soln solution for nebulization, 3 mL by Nebulization route 2 Times a Day for 364 days., Disp: 540 mL, Rfl: 3  •  sodium chloride (HYPER-SAL) 7 % Nebu Soln, 4 mL by Nebulization route 4 times a day., Disp: 1440 mL, Rfl: 3  •  phytonadione (MEPHYTON) 5 MG Tab, Take 1 Tab by mouth every day., Disp: 90 Tab, Rfl: 3  •  KALYDECO 150 MG Tab, Take 1 Tab by mouth 2 times a day. With fat containing food., Disp: 56 Tab, Rfl: 11  •  Multiple Vitamins-Minerals (DEKAS PLUS PO), Take 1 Cap by mouth 2 times a day., Disp: , Rfl:   •  magnesium oxide (MAG-OX) 400 MG Tab, Take 200 mg by mouth every day., Disp: , Rfl:   •  Ascorbic Acid (VITAMIN C)  1000 MG Tab, Take 1,000 mg by mouth every day., Disp: , Rfl:   •  Probiotic Product (PROBIOTIC-10 PO), Take 1 Tab by mouth every day., Disp: , Rfl:   •  Cholecalciferol 5000 units Tab, Take 20,000 Units by mouth every day., Disp: , Rfl:   •  CREON 6000 units Cap DR Particles, Take 1-2 Caps by mouth 4 times a day as needed. With meals or snacks, Disp: 540 Cap, Rfl: 3    Labs: Reviewed    Physical Examination:  Vital signs: There were no vitals taken for this visit. There is no height or weight on file to calculate BMI.  General: No apparent distress, cooperative  Eyes: No scleral icterus or discharge  ENMT: Normal on external inspection of nose, lips, normal thyroid on inspection  Neck: No abnormal masses on inspection  Resp: Normal effort  Extremities: No visible edema  Abdomen: no visible abdominal obesity  Neuro: Alert and oriented  Skin: No visible rash  Psych: Normal mood and affect, intact memory and able to make informed decisions    Assessment and Plan:  1. Diabetes mellitus due to cystic fibrosis (HCC)  continue current regimen.  Also discussed the novel and cutting edge option of adding once weekly GLP-1 receptor agent called semaglutide at lower doses.  There is a possibility that if we do this she may not require prandial insulin at all.  She may use this along with daily Tresiba.  If she really has good response then there is a good chance that she may not need any insulin at all including Tresiba.  I have shared the initial proof of concept paper with her.    2. Exocrine pancreatic insufficiency  Continue creon with meals.     3. Vitamin D deficiency  Cont vit d with food.     Return in about 3 months (around 9/17/2020).    Thank you for allowing me to participate in the care of this patient.    Diego Torres M.D.  06/16/20    CC:   Kelin Donohue M.D.    This note was created using voice recognition software (Dragon). The accuracy of the dictation is limited by the abilities of the software. I  have reviewed the note prior to signing, however some errors in grammar and context are still possible. If you have any questions related to this note please do not hesitate to contact our office.

## 2020-08-18 DIAGNOSIS — E84.9 CYSTIC FIBROSIS (HCC): ICD-10-CM

## 2020-09-02 DIAGNOSIS — E84.9 CF (CYSTIC FIBROSIS) (HCC): ICD-10-CM

## 2020-09-02 RX ORDER — SODIUM CHLORIDE FOR INHALATION 7 %
VIAL, NEBULIZER (ML) INHALATION
Qty: 1440 ML | Refills: 3 | Status: SHIPPED | OUTPATIENT
Start: 2020-09-02 | End: 2021-05-05

## 2020-09-08 ENCOUNTER — TELEMEDICINE (OUTPATIENT)
Dept: PEDIATRIC PULMONOLOGY | Facility: MEDICAL CENTER | Age: 60
End: 2020-09-08
Payer: COMMERCIAL

## 2020-09-08 ENCOUNTER — HOSPITAL ENCOUNTER (OUTPATIENT)
Dept: LAB | Facility: MEDICAL CENTER | Age: 60
End: 2020-09-08
Attending: PEDIATRICS
Payer: COMMERCIAL

## 2020-09-08 VITALS — BODY MASS INDEX: 22.12 KG/M2 | WEIGHT: 135 LBS

## 2020-09-08 DIAGNOSIS — E08.9 DIABETES MELLITUS DUE TO CYSTIC FIBROSIS (HCC): ICD-10-CM

## 2020-09-08 DIAGNOSIS — E84.9 CYSTIC FIBROSIS (HCC): ICD-10-CM

## 2020-09-08 DIAGNOSIS — E84.9 DIABETES MELLITUS DUE TO CYSTIC FIBROSIS (HCC): ICD-10-CM

## 2020-09-08 DIAGNOSIS — B96.5 PSEUDOMONAS RESPIRATORY INFECTION: ICD-10-CM

## 2020-09-08 DIAGNOSIS — K86.81 EXOCRINE PANCREATIC INSUFFICIENCY: ICD-10-CM

## 2020-09-08 DIAGNOSIS — J98.8 PSEUDOMONAS RESPIRATORY INFECTION: ICD-10-CM

## 2020-09-08 LAB
25(OH)D3 SERPL-MCNC: 64 NG/ML (ref 30–100)
APTT PPP: 39.7 SEC (ref 24.7–36)
BASOPHILS # BLD AUTO: 1.1 % (ref 0–1.8)
BASOPHILS # BLD: 0.1 K/UL (ref 0–0.12)
EOSINOPHIL # BLD AUTO: 0.21 K/UL (ref 0–0.51)
EOSINOPHIL NFR BLD: 2.3 % (ref 0–6.9)
ERYTHROCYTE [DISTWIDTH] IN BLOOD BY AUTOMATED COUNT: 45.9 FL (ref 35.9–50)
EST. AVERAGE GLUCOSE BLD GHB EST-MCNC: 114 MG/DL
GGT SERPL-CCNC: 9 U/L (ref 7–34)
HBA1C MFR BLD: 5.6 % (ref 0–5.6)
HCT VFR BLD AUTO: 42.1 % (ref 37–47)
HGB BLD-MCNC: 13.7 G/DL (ref 12–16)
IMM GRANULOCYTES # BLD AUTO: 0.02 K/UL (ref 0–0.11)
IMM GRANULOCYTES NFR BLD AUTO: 0.2 % (ref 0–0.9)
INR PPP: 0.98 (ref 0.87–1.13)
LYMPHOCYTES # BLD AUTO: 1.89 K/UL (ref 1–4.8)
LYMPHOCYTES NFR BLD: 20.5 % (ref 22–41)
MCH RBC QN AUTO: 29.9 PG (ref 27–33)
MCHC RBC AUTO-ENTMCNC: 32.5 G/DL (ref 33.6–35)
MCV RBC AUTO: 91.9 FL (ref 81.4–97.8)
MONOCYTES # BLD AUTO: 0.81 K/UL (ref 0–0.85)
MONOCYTES NFR BLD AUTO: 8.8 % (ref 0–13.4)
NEUTROPHILS # BLD AUTO: 6.2 K/UL (ref 2–7.15)
NEUTROPHILS NFR BLD: 67.1 % (ref 44–72)
NRBC # BLD AUTO: 0 K/UL
NRBC BLD-RTO: 0 /100 WBC
PLATELET # BLD AUTO: 341 K/UL (ref 164–446)
PMV BLD AUTO: 10.2 FL (ref 9–12.9)
PROTHROMBIN TIME: 13.3 SEC (ref 12–14.6)
RBC # BLD AUTO: 4.58 M/UL (ref 4.2–5.4)
WBC # BLD AUTO: 9.2 K/UL (ref 4.8–10.8)

## 2020-09-08 PROCEDURE — 87181 SC STD AGAR DILUTION PER AGT: CPT

## 2020-09-08 PROCEDURE — 84590 ASSAY OF VITAMIN A: CPT

## 2020-09-08 PROCEDURE — 99214 OFFICE O/P EST MOD 30 MIN: CPT | Mod: 95,CR | Performed by: PEDIATRICS

## 2020-09-08 PROCEDURE — 87186 SC STD MICRODIL/AGAR DIL: CPT

## 2020-09-08 PROCEDURE — 87070 CULTURE OTHR SPECIMN AEROBIC: CPT

## 2020-09-08 PROCEDURE — 85610 PROTHROMBIN TIME: CPT

## 2020-09-08 PROCEDURE — 85025 COMPLETE CBC W/AUTO DIFF WBC: CPT

## 2020-09-08 PROCEDURE — 87205 SMEAR GRAM STAIN: CPT

## 2020-09-08 PROCEDURE — 83036 HEMOGLOBIN GLYCOSYLATED A1C: CPT

## 2020-09-08 PROCEDURE — 87077 CULTURE AEROBIC IDENTIFY: CPT

## 2020-09-08 PROCEDURE — 82977 ASSAY OF GGT: CPT

## 2020-09-08 PROCEDURE — 82306 VITAMIN D 25 HYDROXY: CPT

## 2020-09-08 PROCEDURE — 36415 COLL VENOUS BLD VENIPUNCTURE: CPT

## 2020-09-08 PROCEDURE — 85730 THROMBOPLASTIN TIME PARTIAL: CPT

## 2020-09-08 PROCEDURE — 84446 ASSAY OF VITAMIN E: CPT

## 2020-09-08 ASSESSMENT — FIBROSIS 4 INDEX: FIB4 SCORE: 0.78
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
## 2020-09-08 NOTE — PATIENT INSTRUCTIONS
Kenia Soto  1960     -CF Mutations: 3849+10kbC->T, M8494V     -CFTR modulator: Kalydeco     -Ht:____   Wt:____    Respiratory     -Baseline FEV1: 75%    Today’s FEV1:__70%__     -Airway Clearance Routine:        --Albuterol ( 2 x day)/(3-4x/day when sick)        --Hypertonic Saline ( 2 x day)/(3-4x/day when sick             --Pulmozyme ( 1 x day)/(2x/day when sick)        --ACT Vest and/or Acapella ( 2 x day)/(3-4x/day when sick)        --Inhaled antibiotics: Cayston, START IF CHEST STILL FEELING TIGHT AND IF LUNG FUNCTION STAYS BELOW 70%     -Equipment Check (Annually) Date scheduled:    Nutrition/Gastrointestinal     -BMI: Current ______, Goal______        -Enzymes: Creon 6 (1-2 with meals, 0-1 with snacks) = avg of 6 per day     -Vitamins:     -Exercise:    Social     -Insurance: [employer]     -Mental health screening: done 11/13/2019    Goals     -Annual Labs: done last 7/24/2019, due again by NOW     -LFTs: done last 7/24/2019, due again by NOW  -Oral Glucose tolerance test: CFRD     -Sputum cultures: 1(2-6-2020) 2 (4-) 3___ 4____ (Dates)     -DEXA scan: done last 7/10/2019, due again by 2021     -Chest X-ray: done last 7/13/2019, due again by 4/2021     -Eye Exam: last done 9/25/2019, due again by 9/2019 ([facility])

## 2020-09-09 LAB
GRAM STN SPEC: NORMAL
SIGNIFICANT IND 70042: NORMAL
SITE SITE: NORMAL
SOURCE SOURCE: NORMAL

## 2020-09-11 DIAGNOSIS — E84.9 CYSTIC FIBROSIS (HCC): ICD-10-CM

## 2020-09-11 LAB
A-TOCOPHEROL VIT E SERPL-MCNC: 16.5 MG/L (ref 5.5–18)
ANNOTATION COMMENT IMP: NORMAL
BETA+GAMMA TOCOPHEROL SERPL-MCNC: 1.3 MG/L (ref 0–6)
RETINYL PALMITATE SERPL-MCNC: 0.06 MG/L (ref 0–0.1)
VIT A SERPL-MCNC: 0.43 MG/L (ref 0.3–1.2)
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
## 2020-09-14 NOTE — PROGRESS NOTES
Endocrinology Clinic Progress Note  PCP: Kelin Donohue M.D.  This evaluation was conducted via Zoom using secure and encrypted videoconferencing technology. The patient was in a private location in the state of [place].    The patient's identity was confirmed and verbal consent was obtained for this virtual visit.  HPI:  Kenia Soto is a 59 y.o. old patient who comes in today for routine follow up of Diabetes due to cystic fibrosis, Exocrine pancreatic insufficiency, and Vitamin D Deficiency.    Exocrine Pancreatic Insufficiency:  Currently taking Creon 6000 units 1-2 caps before meals.     Vitamin D Deficiency:  Currently taking Vitamin D 20,000 units daily. Results for KENIA SOTO (MRN [id number]) as of 9/13/2020 17:21   Ref. Range 9/8/2020 15:15   25-Hydroxy   Vitamin D 25 Latest Ref Range: 30 - 100 ng/mL 64       HPI:  Kenia Soto is a 59 y.o. old patient who comes in today for evaluation of above stated problem.    Most Recent HbA1c:   Lab Results   Component Value Date/Time    HBA1C 5.6 09/08/2020 03:15 PM        Current Diabetes Regimen:  Tresiba 5 units per day  Humalog 11-12 units units with meals    Has a  Dexcom     Hypoglycemia:  only if doesn't eat enough or with exercising.    ROS:  Constitutional: No weight loss  Cardiac: No palpitations or racing heart  Resp: No shortness of breath  Neuro: No numbness or tinging in feet  Endo: No heat or cold intolerance, no polyuria or polydipsia  All other systems were reviewed and were negative.    Past Medical History:  Patient Active Problem List    Diagnosis Date Noted   • Suspected COVID-19 virus infection 04/19/2020     Priority: High   • Cystic fibrosis (HCC) 11/13/2019     Priority: High   • Hemoptysis 06/13/2019     Priority: High   • Exocrine pancreatic insufficiency 06/13/2019     Priority: Medium   • Vitamin K deficiency 06/13/2019     Priority: Low   • Diabetes mellitus due to cystic fibrosis (HCC) 06/16/2020   • Carrier of other  infectious diseases 11/13/2019   • Cystic fibrosis with pulmonary manifestations ([facility]) 11/13/2019   • Herpes simplex 07/16/2019   • Osteopenia of multiple sites 07/16/2019   • CF (3849+10k bC>T, D5758P) ([facility]) 06/13/2019   • Pseudomonas respiratory infection 06/13/2019   • Partial thromboplastin time increased 08/22/2017   • Moderate COPD (chronic obstructive pulmonary disease) ([facility]) 04/07/2015   • 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;  Service: Ent   • DENTAL SURGERY     • SINUS WASHING         Allergies:  Levofloxacin and Tobramycin    Social History:  Social History     Socioeconomic History   • Marital status:      Spouse name: Not on file   • Number of children: Not on file   • Years of education: Not on file   • Highest education level: Not on file   Occupational History   • Not on file   Social Needs   • Financial resource strain: Not on file   • Food insecurity     Worry: Not on file     Inability: Not on file   • Transportation needs     Medical: Not on file     Non-medical: Not on file   Tobacco Use   • Smoking status: Never Smoker   • Smokeless tobacco: Never Used   Substance and Sexual Activity   • Alcohol use: Yes     Comment: Socially   • Drug use: No   • Sexual activity: Yes     Partners: Male     Comment: PM    Lifestyle   • Physical activity     Days per week: Not on file     Minutes per session: Not on file   • Stress: Not on file   Relationships   • Social connections     Talks on phone: Not on file     Gets together: Not on file     Attends Hoahaoism service: Not on file     Active member of club or organization: Not on file     Attends meetings of clubs or organizations: Not on file     Relationship status: Not on file   • Intimate partner  violence     Fear of current or ex partner: Not on file     Emotionally abused: Not on file     Physically abused: Not on file     Forced sexual activity: Not on file   Other Topics Concern   • Not on file   Social History Narrative    Has 2 daughters who are both CF carriers. Enjoys staying active.     Retired .     Recently moved to [place] from [place]       Family History:  Family History   Problem Relation Age of Onset   • Other Daughter         CF carrier   • Alcohol/Drug Father    • Heart Disease Father    • Cystic Fibrosis Sister        Medications:    Current Outpatient Medications:   •  fluticasone-salmeterol (ADVAIR DISKUS) 250-50 MCG/DOSE AEROSOL POWDER, BREATH ACTIVATED, Inhale 1 Puff by mouth 2 times a day. Rinse mouth after each use., Disp: 1 Each, Rfl: 2  •  sodium chloride (HYPER-SAL) 7 % Nebu Soln, USE 4 ML BY NEBULIZATION ROUTE 4 TIMES A DAY (MAX 240ML - 1 BOX PER INS), Disp: 1440 mL, Rfl: 3  •  CREON 6000 units Cap DR Particles, TAKE 1-2 CAPSULES 4 TIMES A DAY AS NEEDED WITH MEALS OR SNACKS **KEEP IN ORIGINAL BOTTLE**, Disp: 500 Cap, Rfl: 6  •  Tranexamic Acid 650 MG Tab, , Disp: , Rfl:   •  Insulin Degludec (TRESIBA) 100 UNIT/ML Solution, Inject 30 Units as instructed every day., Disp: 30 mL, Rfl: 3  •  vitamin A 30182 UNIT capsule, Take 1 Cap by mouth every day. (Patient taking differently: Take 25,000 Units by mouth every day.), Disp: 30 Cap, Rfl: 3  •  HUMALOG KWIKPEN 100 UNIT/ML Solution Pen-injector injection PEN, Inject 10 Units as instructed 3 times a day before meals. Needs Humalog cartridges for use in the INPEN, Disp: 30 mL, Rfl: 3  •  albuterol (PROVENTIL) 2.5mg/3ml Nebu Soln solution for nebulization, 3 mL by Nebulization route 2 Times a Day for 364 days., Disp: 540 mL, Rfl: 3  •  phytonadione (MEPHYTON) 5 MG Tab, Take 1 Tab by mouth every day., Disp: 90 Tab, Rfl: 3  •  KALYDECO 150 MG Tab, Take 1 Tab by mouth 2 times a day. With fat containing food., Disp: 56 Tab,  Rfl: 11  •  Multiple Vitamins-Minerals (DEKAS PLUS PO), Take 1 Cap by mouth 2 times a day., Disp: , Rfl:   •  magnesium oxide (MAG-OX) 400 MG Tab, Take 200 mg by mouth every day., Disp: , Rfl:   •  Ascorbic Acid (VITAMIN C) 1000 MG Tab, Take 1,000 mg by mouth every day., Disp: , Rfl:   •  Probiotic Product (PROBIOTIC-10 PO), Take 1 Tab by mouth every day., Disp: , Rfl:   •  Cholecalciferol 5000 units Tab, Take 20,000 Units by mouth every day., Disp: , Rfl:     Labs: Reviewed    Physical Examination:  Vital signs: There were no vitals taken for this visit. There is no height or weight on file to calculate BMI.  General: No apparent distress, cooperative  Eyes: No scleral icterus or discharge  ENMT: Normal on external inspection of nose, lips  Neck: No abnormal masses on inspection  Resp: Normal effort  Extremities: No edema  Neuro: Alert and oriented  Skin: No rash  Psych: Normal mood and affect, intact memory and able to make informed decisions    Assessment and Plan:    1. Diabetes mellitus due to cystic fibrosis (HCC)  Discussed the idea of replacing humalog with afrezza due to simplicity and flexibility of use; she is interested and wants to learn more about it; we will set up more education for her regarding this.     2. Exocrine pancreatic insufficiency  Continue creon replacement.     3. Vitamin D deficiency  Recommend over the counter vit D 2 or D 3 : 8000-10,000 IU daily with food. Side effects and benefits discussed with patient in detail.Discussed immune function of vit D and its role in some protection against COVID-19 as per recent early evidence.     Return in about 3 months (around 12/15/2020).    Thank you for allowing me to participate in the care of this patient.    Dr. Diego Torres  This note was scribed by Maddi Stock RN, CDE      CC:   Kelin Donohue M.D.    This note was created using voice recognition software (Dragon). The accuracy of the dictation is limited by the abilities of the software.  I have reviewed the note prior to signing, however some errors in grammar and context are still possible. If you have any questions related to this note please do not hesitate to contact our office.

## 2020-09-15 ENCOUNTER — TELEMEDICINE (OUTPATIENT)
Dept: ENDOCRINOLOGY | Facility: MEDICAL CENTER | Age: 60
End: 2020-09-15
Attending: INTERNAL MEDICINE
Payer: COMMERCIAL

## 2020-09-15 DIAGNOSIS — K86.81 EXOCRINE PANCREATIC INSUFFICIENCY: ICD-10-CM

## 2020-09-15 DIAGNOSIS — E08.9 DIABETES MELLITUS DUE TO CYSTIC FIBROSIS (HCC): ICD-10-CM

## 2020-09-15 DIAGNOSIS — E84.9 DIABETES MELLITUS DUE TO CYSTIC FIBROSIS (HCC): ICD-10-CM

## 2020-09-15 DIAGNOSIS — E55.9 VITAMIN D DEFICIENCY: ICD-10-CM

## 2020-09-15 PROCEDURE — 99214 OFFICE O/P EST MOD 30 MIN: CPT | Mod: 95,CR | Performed by: INTERNAL MEDICINE

## 2020-09-17 ENCOUNTER — TELEPHONE (OUTPATIENT)
Dept: ENDOCRINOLOGY | Facility: MEDICAL CENTER | Age: 60
End: 2020-09-17

## 2020-09-17 NOTE — TELEPHONE ENCOUNTER
----- Message from Diego Torres M.D. sent at 9/15/2020  2:35 PM PDT -----  Regarding: plz connect her with [facility] . she has cystic fibrosis related diabetes. Thanks  plz connect her with [facility] for [facility] . she has cystic fibrosis related diabetes. Thanks

## 2020-09-18 DIAGNOSIS — E84.9 DIABETES MELLITUS DUE TO CYSTIC FIBROSIS (HCC): ICD-10-CM

## 2020-09-18 DIAGNOSIS — E08.9 DIABETES MELLITUS DUE TO CYSTIC FIBROSIS (HCC): ICD-10-CM

## 2020-09-18 RX ORDER — PROCHLORPERAZINE 25 MG/1
1 SUPPOSITORY RECTAL CONTINUOUS
Qty: 1 EACH | Refills: 4 | Status: SHIPPED | OUTPATIENT
Start: 2020-09-18 | End: 2020-09-18 | Stop reason: SDUPTHER

## 2020-09-18 RX ORDER — PROCHLORPERAZINE 25 MG/1
1 SUPPOSITORY RECTAL
Qty: 9 EACH | Refills: 4 | Status: SHIPPED | OUTPATIENT
Start: 2020-09-18 | End: 2021-12-06 | Stop reason: SDUPTHER

## 2020-09-18 RX ORDER — PROCHLORPERAZINE 25 MG/1
1 SUPPOSITORY RECTAL CONTINUOUS
Qty: 1 EACH | Refills: 4 | Status: SHIPPED
Start: 2020-09-18 | End: 2021-12-06 | Stop reason: SDUPTHER

## 2020-09-22 ENCOUNTER — TELEPHONE (OUTPATIENT)
Dept: PEDIATRIC PULMONOLOGY | Facility: MEDICAL CENTER | Age: 60
End: 2020-09-22

## 2020-09-22 DIAGNOSIS — E84.9 CYSTIC FIBROSIS (HCC): ICD-10-CM

## 2020-09-22 DIAGNOSIS — B96.5 PSEUDOMONAS RESPIRATORY INFECTION: ICD-10-CM

## 2020-09-22 DIAGNOSIS — J98.8 PSEUDOMONAS RESPIRATORY INFECTION: ICD-10-CM

## 2020-09-22 NOTE — TELEPHONE ENCOUNTER
Caller: Kenia  Chief complaint: Fever, Cough  Number of days: 2    Symptoms:  Increased cough: Yes, not bad  Sputum production (in infant rattly chest): Yes, only a little  Change in sputum color: No  Coughing up blood: No  Shortness of breath: No  Decreased energy: Yes  Fever: Yes, 99.7 this AM then 100.2   Severe congestion/sinus: No  Abdominal pain: No  Vomiting: No  SpO2 if available: 98%    Treatments:  Albuterol: Yes,   · How often: 3x/day  Hypertonic saline: Yes,   · How often: 3x/day  Pulmozyme: Yes,   · How often: 2x/day  Vest: Yes,   · How often: 3x/day  Oxygen: No  · How much:  Antibiotic inhaled or oral: Yes, [place]  · When started: on week 2    Right lung hurts at the base, FEV1 71, FVC 98, started steroids 9-21, coughed up a small plug this AM    Instructions:  Increase airway clearance to 3-4 times per day (add extra albuterol, hypertonic saline and vest). If short of breath, fever >102, SpO2 <92, coughing blood: same day appointment. If increased cough and sputum production, appointment within 72 hours. If severe SOB, severe vomiting or severe abdominal pain: ED    · Notify caller that if the patient's condition changes to please call our office asap so we can make other recommendations as appropriate.  · Notify caller that we may need to send the patient to the ER when they arrive in the event that we cannot manage their condition in the office.

## 2020-09-22 NOTE — TELEPHONE ENCOUNTER
I agree with everything she is doing.  Give us an update on Friday morning.  If still having fever, may need COVID test  If still not improving, may need oral antibiotic or appointment

## 2020-09-23 NOTE — TELEPHONE ENCOUNTER
I called Triny back and she said that she is still about the same and will call on Friday with an update.

## 2020-09-25 DIAGNOSIS — E84.0 CYSTIC FIBROSIS WITH PULMONARY EXACERBATION (HCC): ICD-10-CM

## 2020-09-25 RX ORDER — CIPROFLOXACIN 500 MG/1
500 TABLET, FILM COATED ORAL EVERY 12 HOURS
Status: DISCONTINUED | OUTPATIENT
Start: 2020-09-25 | End: 2020-09-30

## 2020-09-25 NOTE — TELEPHONE ENCOUNTER
Triny called back to let us know that her fever is getting better but her right lung is still hurting and she coughed up a large mucus plug this morning. She is wondering if we can send in a Rx for abx just in case it is needed over the weekend.    Dr. Payne said she thinks patient likely just needs more time to heal but will send in a Rx for abx to be take only if needed in case the patient worsens over this weekend.

## 2020-09-25 NOTE — TELEPHONE ENCOUNTER
Please make sure she knows that the only oral antibiotic that I can send in is cipro. This is related to levofloxacin which she previously had a reaction to.  Use only if needed and use only if she has not had a reaction with it in the past.  Possible that her lungs will feel better in 1-2 days after coughing up the plug.

## 2020-09-30 ENCOUNTER — HOSPITAL ENCOUNTER (OUTPATIENT)
Dept: LAB | Facility: MEDICAL CENTER | Age: 60
End: 2020-09-30
Attending: PEDIATRICS
Payer: COMMERCIAL

## 2020-09-30 DIAGNOSIS — E84.9 CYSTIC FIBROSIS (HCC): ICD-10-CM

## 2020-09-30 LAB
COVID ORDER STATUS COVID19: NORMAL
SARS-COV-2 RNA RESP QL NAA+PROBE: NOTDETECTED
SPECIMEN SOURCE: NORMAL

## 2020-09-30 PROCEDURE — U0003 INFECTIOUS AGENT DETECTION BY NUCLEIC ACID (DNA OR RNA); SEVERE ACUTE RESPIRATORY SYNDROME CORONAVIRUS 2 (SARS-COV-2) (CORONAVIRUS DISEASE [COVID-19]), AMPLIFIED PROBE TECHNIQUE, MAKING USE OF HIGH THROUGHPUT TECHNOLOGIES AS DESCRIBED BY CMS-2020-01-R: HCPCS

## 2020-09-30 PROCEDURE — C9803 HOPD COVID-19 SPEC COLLECT: HCPCS

## 2020-09-30 RX ORDER — CIPROFLOXACIN 500 MG/1
500 TABLET, FILM COATED ORAL 2 TIMES DAILY
Qty: 20 TAB | Refills: 0 | Status: SHIPPED | OUTPATIENT
Start: 2020-09-30 | End: 2020-10-08

## 2020-09-30 NOTE — TELEPHONE ENCOUNTER
Patient called back has a fever again, headache, congested, coughing. The Cipro Rx isn't at the pharmacy, I fixed the Rx and resent to [facility]. Patient said that she is now losing her sense of smell.     COVID test order placed.

## 2020-10-07 ENCOUNTER — TELEPHONE (OUTPATIENT)
Dept: PEDIATRIC PULMONOLOGY | Facility: MEDICAL CENTER | Age: 60
End: 2020-10-07

## 2020-10-07 NOTE — TELEPHONE ENCOUNTER
Kenia called and said her fever is finally gone but she is still very congested. Requesting more Cipro. She said she normally does at least 14 days and she only got 10 days. She said she thinks she does have COVID because of the test not being thorough.

## 2020-10-08 DIAGNOSIS — B96.5 PSEUDOMONAS RESPIRATORY INFECTION: ICD-10-CM

## 2020-10-08 DIAGNOSIS — E84.0 CYSTIC FIBROSIS WITH PULMONARY EXACERBATION (HCC): ICD-10-CM

## 2020-10-08 DIAGNOSIS — J98.8 PSEUDOMONAS RESPIRATORY INFECTION: ICD-10-CM

## 2020-10-08 RX ORDER — CIPROFLOXACIN 750 MG/1
750 TABLET, FILM COATED ORAL 2 TIMES DAILY
Qty: 14 TAB | Refills: 0 | Status: SHIPPED | OUTPATIENT
Start: 2020-10-08 | End: 2020-12-04

## 2020-10-08 NOTE — TELEPHONE ENCOUNTER
I called Kenia Back to let her know that Dr. Payne put in orders for a longer course of Cipro. Patient is doing better and will take the longer course of Cipro. If she does not improve or worsens she will call us back.

## 2020-10-19 ENCOUNTER — PATIENT MESSAGE (OUTPATIENT)
Dept: PEDIATRIC PULMONOLOGY | Facility: MEDICAL CENTER | Age: 60
End: 2020-10-19

## 2020-10-19 ENCOUNTER — TELEMEDICINE (OUTPATIENT)
Dept: PEDIATRIC PULMONOLOGY | Facility: MEDICAL CENTER | Age: 60
End: 2020-10-19
Payer: COMMERCIAL

## 2020-10-19 ENCOUNTER — HOSPITAL ENCOUNTER (OUTPATIENT)
Dept: RADIOLOGY | Facility: MEDICAL CENTER | Age: 60
End: 2020-10-19
Attending: PEDIATRICS
Payer: COMMERCIAL

## 2020-10-19 ENCOUNTER — TELEPHONE (OUTPATIENT)
Dept: PEDIATRIC PULMONOLOGY | Facility: MEDICAL CENTER | Age: 60
End: 2020-10-19

## 2020-10-19 VITALS — BODY MASS INDEX: 21.63 KG/M2 | WEIGHT: 132 LBS

## 2020-10-19 DIAGNOSIS — E84.0 CYSTIC FIBROSIS WITH PULMONARY EXACERBATION (HCC): ICD-10-CM

## 2020-10-19 DIAGNOSIS — J98.8 PSEUDOMONAS RESPIRATORY INFECTION: ICD-10-CM

## 2020-10-19 DIAGNOSIS — B96.5 PSEUDOMONAS RESPIRATORY INFECTION: ICD-10-CM

## 2020-10-19 DIAGNOSIS — J01.40 ACUTE PANSINUSITIS, RECURRENCE NOT SPECIFIED: ICD-10-CM

## 2020-10-19 PROCEDURE — 99214 OFFICE O/P EST MOD 30 MIN: CPT | Mod: 95,CR | Performed by: PEDIATRICS

## 2020-10-19 PROCEDURE — 71046 X-RAY EXAM CHEST 2 VIEWS: CPT

## 2020-10-19 ASSESSMENT — FIBROSIS 4 INDEX: FIB4 SCORE: 0.71

## 2020-10-19 NOTE — TELEPHONE ENCOUNTER
From: Kenia Soto  To: Marjan Payne M.D.  Sent: 10/19/2020 2:44 PM PDT  Subject: Test Result Question    Just took temp, 100.2°.

## 2020-10-19 NOTE — PROGRESS NOTES
This evaluation was conducted via Zoom using secure and encrypted videoconferencing technology. The patient was in a private location in the state of [place].    The patient's identity was confirmed and verbal consent was obtained for this virtual visit.     PCP:  Kelin Donohue M.D.   [id number] [id number] / [place] [id number]     SUBJECTIVE:   Kenia Soto is a 59 y.o. 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) 11/13/2019   • Herpes simplex 07/16/2019   • Osteopenia of multiple sites 07/16/2019   • CF (3849+10k bC>T, [id number]) ([facility]) 06/13/2019   • Pseudomonas respiratory infection 06/13/2019   • Partial thromboplastin time increased 08/22/2017   • Moderate COPD (chronic obstructive pulmonary disease) (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 Initially somewhat better since taking cipro, now symptoms returned 3 days ago.  Last hospitalization: [4/19/20]    Respiratory:   Cough frequency: episodic, increased overall but not as bad as 2 weeks ago  Cough character: productive  Sputum quantity: less than 2 weeks ago, nearer to baseline but chest is still   Sputum color: white to light yellow initially, now thick/darker yellow  Shortness of breath:mild SOB  Chest Pain:4 weeks ago right anterior chest pain  SpO2 94-98%  Hemoptysis:no    Antibiotics: completed ciprofloxacin last week, last used cayston 1 week ago  Pulmonary toilet:   Albuterol: 3/day  7% hypertonic saline: 3/day  Glutathione TID  Pulmozyme: none/day  Chest Physiotherapy: aerobika and vest TID  Oxygen: none  Respiratory assist: none  Modulator: kalydeco BID   Cough and sinuses not waking her up at night      Sinus symptoms:  Nasal Congestion: frequent   Nasal Drainage: unable to drain  Headache/sinus pressure: frequent especially left side of face  Treatments: sinus rinse BID, afrin BID x 2 days, flonase x 2 weeks    Activity / Energy: normal for age   Change in activity/energy: decreased   School/ Work attendance: not in school, not working   School/ Work performance: no problem  Emotional assessment: positive       GI: no problem   Appetite: normal  Enzymes:  daily  Gets constipated with cipro  Stool: several/day, characteristics: small pellets    Medications:     Current Outpatient Medications:   •  ciprofloxacin, 750 mg, Oral, BID  •  Dexcom G6 Sensor, 1 Each, Does not apply, Q10 DAYS  •  Dexcom G6 Transmitter, 1 Each, Does not apply, Continuous  •  fluticasone-salmeterol, 1 Puff, Inhalation, BID  •  sodium chloride, USE 4 ML BY NEBULIZATION ROUTE 4 TIMES A DAY (MAX 240ML - 1 BOX PER INS)  •  Creon, TAKE 1-2 CAPSULES 4 TIMES A DAY AS NEEDED WITH MEALS OR SNACKS **KEEP IN ORIGINAL BOTTLE**  •  Tranexamic Acid,   •  Tresiba, 30 Units, Subcutaneous, DAILY  •  vitamin A, 10,000 Units, Oral, DAILY (Patient taking differently: 25,000 Units, Oral, DAILY)  •  HumaLOG KwikPen, 10 Units, Subcutaneous, TID AC  •  albuterol, 2.5 mg, Nebulization, BID  •  phytonadione, 5 mg, Oral, DAILY  •  Kalydeco, 1 Tab, Oral, BID  •  Multiple Vitamins-Minerals (DEKAS PLUS PO), 1 Cap, Oral, BID  •  magnesium oxide, 200 mg, Oral, DAILY  •  Vitamin C, 1,000 mg, Oral, DAILY  •  Probiotic Product (PROBIOTIC-10 PO), 1 Tab, Oral, DAILY  •  Cholecalciferol, 20,000 Units, Oral, DAILY    ALLERGIES:  Levofloxacin  and Tobramycin    Review of System:    Cardiovascular: Negative  Gastrointestinal: as above  Allergic/Immunologic: negative  Fever  on and off x 4 weeks  COVID test negative 9/30/20  All other systems reviewed and negative or as above      OBJECTIVE:  Physical Exam:  Wt 59.9 kg (132 lb)   BMI 21.63 kg/m²      Vitals obtained by patient:    Physical Exam:  Constitutional: Alert, no distress, well-groomed.  Skin: No rashes in visible areas.  Eye: Round. Conjunctiva clear, lids normal. No icterus.   ENMT: Lips pink without lesions, good dentition, moist mucous membranes. Phonation normal.  Neck: No masses, no thyromegaly. Moves freely without pain.  CV: Pulse as reported by patient  Respiratory: Unlabored respiratory effort, no cough or audible wheeze  Psych: Alert and oriented x3, normal affect and mood.     PFT's:  Patient has not done home spirometry    Imaging: CXR images from 10/19/20 reviewed and images personally viewed:  Bilateral chronic interstitial infiltrates unchanged, central opacities/mucus plugging with possible hilar adenopathy appears increased bilaterally.    Labs reviewed:     Respiratory Culture:   9/8/20: pseudomonas aeruginosa      ASSESSMENT/PLAN:     1. Cystic fibrosis with pulmonary exacerbation (HCC)  Patient will obtain a sputum sample and take to the lab  In the meantime, will start back up on inhaled cayston TID  Continue pulmonary toilet TID  Patient will obtain home lung function and message us with the result    - [facility] - CF Resp Culture w/ Gram Stain; Future  - REFERRAL TO ENT    2. Acute pansinusitis, recurrence not specified  Symptoms most consistent with severe sinusitis  Will refer to ENT and order maxillofacial CT scan    - CT-MAXILLOFACIAL W/O; Future  - REFERRAL TO ENT    3. Pseudomonas respiratory infection  Has already completed a course of ciprofloxacin  Will start inhaled cayston  Will obtain another sputum sample  If FEV1 is very low, can consider IV  antibiotics, perhaps out patient PICC line.      Follow up as needed    Electronically signed by   Marjan Payne M.D.   Pediatric Pulmonology     Patient did home PFT later and sent message: FEV1 69-71%.  Will continue with above plan.

## 2020-10-19 NOTE — TELEPHONE ENCOUNTER
Patient called saying she fever came back, sinuses are very congested, now is coughing a lot and it is productive, feeling awful, she said she is starting to feel it in her lungs.     I spoke to Dr. Payne and sent in an order for a CXR to be done today before her 1340 telehealth appt that is now scheduled.

## 2020-10-21 ENCOUNTER — NURSE TRIAGE (OUTPATIENT)
Dept: HEALTH INFORMATION MANAGEMENT | Facility: OTHER | Age: 60
End: 2020-10-21

## 2020-10-21 ENCOUNTER — HOSPITAL ENCOUNTER (INPATIENT)
Facility: MEDICAL CENTER | Age: 60
LOS: 5 days | DRG: 871 | End: 2020-10-26
Attending: EMERGENCY MEDICINE | Admitting: HOSPITALIST
Payer: COMMERCIAL

## 2020-10-21 ENCOUNTER — APPOINTMENT (OUTPATIENT)
Dept: RADIOLOGY | Facility: MEDICAL CENTER | Age: 60
DRG: 871 | End: 2020-10-21
Attending: EMERGENCY MEDICINE
Payer: COMMERCIAL

## 2020-10-21 DIAGNOSIS — E84.9 CF (CYSTIC FIBROSIS) (HCC): ICD-10-CM

## 2020-10-21 DIAGNOSIS — J18.9 PNEUMONIA OF LEFT LOWER LOBE DUE TO INFECTIOUS ORGANISM: ICD-10-CM

## 2020-10-21 LAB
ALBUMIN SERPL BCP-MCNC: 3.8 G/DL (ref 3.2–4.9)
ALBUMIN/GLOB SERPL: 1.2 G/DL
ALP SERPL-CCNC: 105 U/L (ref 30–99)
ALT SERPL-CCNC: 14 U/L (ref 2–50)
ANION GAP SERPL CALC-SCNC: 12 MMOL/L (ref 7–16)
APPEARANCE UR: CLEAR
AST SERPL-CCNC: 18 U/L (ref 12–45)
BACTERIA #/AREA URNS HPF: NEGATIVE /HPF
BASOPHILS # BLD AUTO: 0.5 % (ref 0–1.8)
BASOPHILS # BLD: 0.07 K/UL (ref 0–0.12)
BILIRUB SERPL-MCNC: 0.4 MG/DL (ref 0.1–1.5)
BILIRUB UR QL STRIP.AUTO: NEGATIVE
BUN SERPL-MCNC: 11 MG/DL (ref 8–22)
CALCIUM SERPL-MCNC: 9.2 MG/DL (ref 8.4–10.2)
CHLORIDE SERPL-SCNC: 102 MMOL/L (ref 96–112)
CO2 SERPL-SCNC: 26 MMOL/L (ref 20–33)
COLOR UR: YELLOW
COVID ORDER STATUS COVID19: NORMAL
CREAT SERPL-MCNC: 0.68 MG/DL (ref 0.5–1.4)
EOSINOPHIL # BLD AUTO: 0.1 K/UL (ref 0–0.51)
EOSINOPHIL NFR BLD: 0.8 % (ref 0–6.9)
EPI CELLS #/AREA URNS HPF: NEGATIVE /HPF
ERYTHROCYTE [DISTWIDTH] IN BLOOD BY AUTOMATED COUNT: 42 FL (ref 35.9–50)
GLOBULIN SER CALC-MCNC: 3.1 G/DL (ref 1.9–3.5)
GLUCOSE SERPL-MCNC: 66 MG/DL (ref 65–99)
GLUCOSE UR STRIP.AUTO-MCNC: NEGATIVE MG/DL
HCT VFR BLD AUTO: 36.3 % (ref 37–47)
HGB BLD-MCNC: 11.8 G/DL (ref 12–16)
IMM GRANULOCYTES # BLD AUTO: 0.04 K/UL (ref 0–0.11)
IMM GRANULOCYTES NFR BLD AUTO: 0.3 % (ref 0–0.9)
KETONES UR STRIP.AUTO-MCNC: NEGATIVE MG/DL
LACTATE BLD-SCNC: 1.4 MMOL/L (ref 0.5–2)
LEUKOCYTE ESTERASE UR QL STRIP.AUTO: NEGATIVE
LYMPHOCYTES # BLD AUTO: 1.54 K/UL (ref 1–4.8)
LYMPHOCYTES NFR BLD: 11.8 % (ref 22–41)
MCH RBC QN AUTO: 29 PG (ref 27–33)
MCHC RBC AUTO-ENTMCNC: 32.5 G/DL (ref 33.6–35)
MCV RBC AUTO: 89.2 FL (ref 81.4–97.8)
MICRO URNS: ABNORMAL
MONOCYTES # BLD AUTO: 1.64 K/UL (ref 0–0.85)
MONOCYTES NFR BLD AUTO: 12.6 % (ref 0–13.4)
NEUTROPHILS # BLD AUTO: 9.62 K/UL (ref 2–7.15)
NEUTROPHILS NFR BLD: 74 % (ref 44–72)
NITRITE UR QL STRIP.AUTO: NEGATIVE
NRBC # BLD AUTO: 0 K/UL
NRBC BLD-RTO: 0 /100 WBC
PH UR STRIP.AUTO: 6.5 [PH] (ref 5–8)
PLATELET # BLD AUTO: 373 K/UL (ref 164–446)
PMV BLD AUTO: 9.8 FL (ref 9–12.9)
POTASSIUM SERPL-SCNC: 4.2 MMOL/L (ref 3.6–5.5)
PROT SERPL-MCNC: 6.9 G/DL (ref 6–8.2)
PROT UR QL STRIP: NEGATIVE MG/DL
RBC # BLD AUTO: 4.07 M/UL (ref 4.2–5.4)
RBC # URNS HPF: NORMAL /HPF
RBC UR QL AUTO: ABNORMAL
SARS-COV-2 RNA RESP QL NAA+PROBE: NOTDETECTED
SODIUM SERPL-SCNC: 140 MMOL/L (ref 135–145)
SP GR UR STRIP.AUTO: <=1.005
SPECIMEN SOURCE: NORMAL
WBC # BLD AUTO: 13 K/UL (ref 4.8–10.8)
WBC #/AREA URNS HPF: NORMAL /HPF

## 2020-10-21 PROCEDURE — 770006 HCHG ROOM/CARE - MED/SURG/GYN SEMI*

## 2020-10-21 PROCEDURE — 87040 BLOOD CULTURE FOR BACTERIA: CPT

## 2020-10-21 PROCEDURE — 87086 URINE CULTURE/COLONY COUNT: CPT

## 2020-10-21 PROCEDURE — 83605 ASSAY OF LACTIC ACID: CPT

## 2020-10-21 PROCEDURE — 99285 EMERGENCY DEPT VISIT HI MDM: CPT

## 2020-10-21 PROCEDURE — 85025 COMPLETE CBC W/AUTO DIFF WBC: CPT

## 2020-10-21 PROCEDURE — 81001 URINALYSIS AUTO W/SCOPE: CPT

## 2020-10-21 PROCEDURE — 71045 X-RAY EXAM CHEST 1 VIEW: CPT

## 2020-10-21 PROCEDURE — C9803 HOPD COVID-19 SPEC COLLECT: HCPCS | Performed by: EMERGENCY MEDICINE

## 2020-10-21 PROCEDURE — 80053 COMPREHEN METABOLIC PANEL: CPT

## 2020-10-21 PROCEDURE — U0003 INFECTIOUS AGENT DETECTION BY NUCLEIC ACID (DNA OR RNA); SEVERE ACUTE RESPIRATORY SYNDROME CORONAVIRUS 2 (SARS-COV-2) (CORONAVIRUS DISEASE [COVID-19]), AMPLIFIED PROBE TECHNIQUE, MAKING USE OF HIGH THROUGHPUT TECHNOLOGIES AS DESCRIBED BY CMS-2020-01-R: HCPCS

## 2020-10-21 RX ORDER — KETOROLAC TROMETHAMINE 30 MG/ML
15 INJECTION, SOLUTION INTRAMUSCULAR; INTRAVENOUS ONCE
Status: DISPENSED | OUTPATIENT
Start: 2020-10-21 | End: 2020-10-22

## 2020-10-21 ASSESSMENT — FIBROSIS 4 INDEX: FIB4 SCORE: 0.73

## 2020-10-22 PROBLEM — A41.9 SEPSIS (HCC): Status: ACTIVE | Noted: 2020-10-22

## 2020-10-22 LAB
GLUCOSE BLD-MCNC: 104 MG/DL (ref 65–99)
GLUCOSE BLD-MCNC: 110 MG/DL (ref 65–99)
GLUCOSE BLD-MCNC: 94 MG/DL (ref 65–99)
INR PPP: 1.14 (ref 0.87–1.13)
LACTATE BLD-SCNC: 0.9 MMOL/L (ref 0.5–2)
LACTATE BLD-SCNC: 1.4 MMOL/L (ref 0.5–2)
PROTHROMBIN TIME: 14.3 SEC (ref 12–14.6)

## 2020-10-22 PROCEDURE — 83605 ASSAY OF LACTIC ACID: CPT

## 2020-10-22 PROCEDURE — 96375 TX/PRO/DX INJ NEW DRUG ADDON: CPT

## 2020-10-22 PROCEDURE — 99223 1ST HOSP IP/OBS HIGH 75: CPT | Performed by: HOSPITALIST

## 2020-10-22 PROCEDURE — 94640 AIRWAY INHALATION TREATMENT: CPT

## 2020-10-22 PROCEDURE — A9270 NON-COVERED ITEM OR SERVICE: HCPCS | Performed by: HOSPITALIST

## 2020-10-22 PROCEDURE — 700105 HCHG RX REV CODE 258: Performed by: HOSPITALIST

## 2020-10-22 PROCEDURE — 700111 HCHG RX REV CODE 636 W/ 250 OVERRIDE (IP): Performed by: HOSPITALIST

## 2020-10-22 PROCEDURE — 700105 HCHG RX REV CODE 258: Performed by: EMERGENCY MEDICINE

## 2020-10-22 PROCEDURE — 85610 PROTHROMBIN TIME: CPT

## 2020-10-22 PROCEDURE — 96365 THER/PROPH/DIAG IV INF INIT: CPT

## 2020-10-22 PROCEDURE — 700111 HCHG RX REV CODE 636 W/ 250 OVERRIDE (IP): Performed by: EMERGENCY MEDICINE

## 2020-10-22 PROCEDURE — 94669 MECHANICAL CHEST WALL OSCILL: CPT

## 2020-10-22 PROCEDURE — 96372 THER/PROPH/DIAG INJ SC/IM: CPT

## 2020-10-22 PROCEDURE — 770021 HCHG ROOM/CARE - ISO PRIVATE

## 2020-10-22 PROCEDURE — 82962 GLUCOSE BLOOD TEST: CPT | Mod: 91

## 2020-10-22 PROCEDURE — 700101 HCHG RX REV CODE 250: Performed by: EMERGENCY MEDICINE

## 2020-10-22 PROCEDURE — 700102 HCHG RX REV CODE 250 W/ 637 OVERRIDE(OP): Performed by: HOSPITALIST

## 2020-10-22 PROCEDURE — 700101 HCHG RX REV CODE 250: Performed by: HOSPITALIST

## 2020-10-22 PROCEDURE — 94760 N-INVAS EAR/PLS OXIMETRY 1: CPT

## 2020-10-22 RX ORDER — SODIUM CHLORIDE FOR INHALATION 7 %
8 VIAL, NEBULIZER (ML) INHALATION
Status: DISCONTINUED | OUTPATIENT
Start: 2020-10-22 | End: 2020-10-26 | Stop reason: HOSPADM

## 2020-10-22 RX ORDER — LACTOBACILLUS RHAMNOSUS GG 10B CELL
1 CAPSULE ORAL
Status: DISCONTINUED | OUTPATIENT
Start: 2020-10-22 | End: 2020-10-26 | Stop reason: HOSPADM

## 2020-10-22 RX ORDER — POLYETHYLENE GLYCOL 3350 17 G/17G
1 POWDER, FOR SOLUTION ORAL
Status: DISCONTINUED | OUTPATIENT
Start: 2020-10-22 | End: 2020-10-26 | Stop reason: HOSPADM

## 2020-10-22 RX ORDER — ACETAMINOPHEN 325 MG/1
650 TABLET ORAL EVERY 6 HOURS PRN
Status: DISCONTINUED | OUTPATIENT
Start: 2020-10-22 | End: 2020-10-26 | Stop reason: HOSPADM

## 2020-10-22 RX ORDER — ASCORBIC ACID 500 MG
1000 TABLET ORAL DAILY
Status: DISCONTINUED | OUTPATIENT
Start: 2020-10-22 | End: 2020-10-26 | Stop reason: HOSPADM

## 2020-10-22 RX ORDER — CIPROFLOXACIN 2 MG/ML
400 INJECTION, SOLUTION INTRAVENOUS EVERY 12 HOURS
Status: DISCONTINUED | OUTPATIENT
Start: 2020-10-22 | End: 2020-10-22

## 2020-10-22 RX ORDER — INSULIN DEGLUDEC INJECTION 100 U/ML
6 INJECTION, SOLUTION SUBCUTANEOUS DAILY
Status: DISCONTINUED | OUTPATIENT
Start: 2020-10-22 | End: 2020-10-22

## 2020-10-22 RX ORDER — SODIUM CHLORIDE, SODIUM LACTATE, POTASSIUM CHLORIDE, AND CALCIUM CHLORIDE .6; .31; .03; .02 G/100ML; G/100ML; G/100ML; G/100ML
500 INJECTION, SOLUTION INTRAVENOUS
Status: DISCONTINUED | OUTPATIENT
Start: 2020-10-22 | End: 2020-10-26 | Stop reason: HOSPADM

## 2020-10-22 RX ORDER — AMOXICILLIN 250 MG
2 CAPSULE ORAL 2 TIMES DAILY
Status: DISCONTINUED | OUTPATIENT
Start: 2020-10-22 | End: 2020-10-26 | Stop reason: HOSPADM

## 2020-10-22 RX ORDER — SODIUM CHLORIDE FOR INHALATION 7 %
8 VIAL, NEBULIZER (ML) INHALATION
Status: DISCONTINUED | OUTPATIENT
Start: 2020-10-22 | End: 2020-10-22

## 2020-10-22 RX ORDER — BISACODYL 10 MG
10 SUPPOSITORY, RECTAL RECTAL
Status: DISCONTINUED | OUTPATIENT
Start: 2020-10-22 | End: 2020-10-26 | Stop reason: HOSPADM

## 2020-10-22 RX ORDER — MORPHINE SULFATE 4 MG/ML
2 INJECTION, SOLUTION INTRAMUSCULAR; INTRAVENOUS
Status: DISCONTINUED | OUTPATIENT
Start: 2020-10-22 | End: 2020-10-26 | Stop reason: HOSPADM

## 2020-10-22 RX ORDER — HYOSCYAMINE SULFATE 0.125 MG
0.12 TABLET,DISINTEGRATING ORAL EVERY 4 HOURS
Status: DISCONTINUED | OUTPATIENT
Start: 2020-10-22 | End: 2020-10-22

## 2020-10-22 RX ORDER — OXYCODONE HYDROCHLORIDE 5 MG/1
5 TABLET ORAL EVERY 4 HOURS PRN
Status: DISCONTINUED | OUTPATIENT
Start: 2020-10-22 | End: 2020-10-26 | Stop reason: HOSPADM

## 2020-10-22 RX ORDER — INSULIN GLARGINE 100 [IU]/ML
6 INJECTION, SOLUTION SUBCUTANEOUS EVERY EVENING
Status: DISCONTINUED | OUTPATIENT
Start: 2020-10-22 | End: 2020-10-26 | Stop reason: HOSPADM

## 2020-10-22 RX ORDER — PHYTONADIONE 5 MG/1
5 TABLET ORAL DAILY
Status: DISCONTINUED | OUTPATIENT
Start: 2020-10-22 | End: 2020-10-26 | Stop reason: HOSPADM

## 2020-10-22 RX ADMIN — PHYTONADIONE 5 MG: 5 TABLET ORAL at 12:32

## 2020-10-22 RX ADMIN — ALBUTEROL SULFATE 2.5 MG: 2.5 SOLUTION RESPIRATORY (INHALATION) at 09:50

## 2020-10-22 RX ADMIN — ALBUTEROL SULFATE 2.5 MG: 2.5 SOLUTION RESPIRATORY (INHALATION) at 20:11

## 2020-10-22 RX ADMIN — CEFTAZIDIME 1 G: 1 INJECTION, POWDER, FOR SOLUTION INTRAMUSCULAR; INTRAVENOUS at 15:19

## 2020-10-22 RX ADMIN — Medication 10000 UNITS: at 08:30

## 2020-10-22 RX ADMIN — INSULIN LISPRO 14 UNITS: 100 INJECTION, SOLUTION INTRAVENOUS; SUBCUTANEOUS at 12:36

## 2020-10-22 RX ADMIN — OXYCODONE HYDROCHLORIDE AND ACETAMINOPHEN 1000 MG: 500 TABLET ORAL at 08:29

## 2020-10-22 RX ADMIN — Medication 8 ML: at 20:11

## 2020-10-22 RX ADMIN — CEFTAZIDIME 1 G: 1 INJECTION, POWDER, FOR SOLUTION INTRAMUSCULAR; INTRAVENOUS at 09:09

## 2020-10-22 RX ADMIN — Medication 8 ML: at 15:12

## 2020-10-22 RX ADMIN — PANCRELIPASE 6000 UNITS: 30000; 6000; 19000 CAPSULE, DELAYED RELEASE PELLETS ORAL at 11:30

## 2020-10-22 RX ADMIN — PANCRELIPASE 6000 UNITS: 30000; 6000; 19000 CAPSULE, DELAYED RELEASE PELLETS ORAL at 08:31

## 2020-10-22 RX ADMIN — INSULIN GLARGINE 6 UNITS: 100 INJECTION, SOLUTION SUBCUTANEOUS at 17:39

## 2020-10-22 RX ADMIN — PANCRELIPASE 6000 UNITS: 30000; 6000; 19000 CAPSULE, DELAYED RELEASE PELLETS ORAL at 17:53

## 2020-10-22 RX ADMIN — CEFTAZIDIME 1 G: 1 INJECTION, POWDER, FOR SOLUTION INTRAMUSCULAR; INTRAVENOUS at 22:57

## 2020-10-22 RX ADMIN — MORPHINE SULFATE 2 MG: 4 INJECTION INTRAVENOUS at 04:11

## 2020-10-22 RX ADMIN — MORPHINE SULFATE 2 MG: 4 INJECTION INTRAVENOUS at 20:55

## 2020-10-22 RX ADMIN — KETAMINE HYDROCHLORIDE 25 MG: 10 INJECTION, SOLUTION INTRAMUSCULAR; INTRAVENOUS at 00:47

## 2020-10-22 RX ADMIN — INSULIN LISPRO 14 UNITS: 100 INJECTION, SOLUTION INTRAVENOUS; SUBCUTANEOUS at 17:40

## 2020-10-22 RX ADMIN — ACETAMINOPHEN 650 MG: 325 TABLET, FILM COATED ORAL at 20:05

## 2020-10-22 RX ADMIN — AZTREONAM 1000 MG: 1 INJECTION, POWDER, LYOPHILIZED, FOR SOLUTION INTRAMUSCULAR; INTRAVENOUS at 17:47

## 2020-10-22 RX ADMIN — Medication 8 ML: at 09:52

## 2020-10-22 RX ADMIN — CEFTAZIDIME 1 G: 1 INJECTION, POWDER, FOR SOLUTION INTRAMUSCULAR; INTRAVENOUS at 00:52

## 2020-10-22 RX ADMIN — ACETAMINOPHEN 650 MG: 325 TABLET, FILM COATED ORAL at 04:10

## 2020-10-22 RX ADMIN — INSULIN LISPRO 14 UNITS: 100 INJECTION, SOLUTION INTRAVENOUS; SUBCUTANEOUS at 08:27

## 2020-10-22 ASSESSMENT — LIFESTYLE VARIABLES
TOTAL SCORE: 0
CONSUMPTION TOTAL: NEGATIVE
HAVE PEOPLE ANNOYED YOU BY CRITICIZING YOUR DRINKING: NO
HOW MANY TIMES IN THE PAST YEAR HAVE YOU HAD 5 OR MORE DRINKS IN A DAY: 0
TOTAL SCORE: 0
ALCOHOL_USE: NO
HAVE YOU EVER FELT YOU SHOULD CUT DOWN ON YOUR DRINKING: NO
TOTAL SCORE: 0
AVERAGE NUMBER OF DAYS PER WEEK YOU HAVE A DRINK CONTAINING ALCOHOL: 0
ON A TYPICAL DAY WHEN YOU DRINK ALCOHOL HOW MANY DRINKS DO YOU HAVE: 1
EVER FELT BAD OR GUILTY ABOUT YOUR DRINKING: NO
EVER HAD A DRINK FIRST THING IN THE MORNING TO STEADY YOUR NERVES TO GET RID OF A HANGOVER: NO

## 2020-10-22 ASSESSMENT — COGNITIVE AND FUNCTIONAL STATUS - GENERAL
SUGGESTED CMS G CODE MODIFIER MOBILITY: CH
SUGGESTED CMS G CODE MODIFIER DAILY ACTIVITY: CH
MOBILITY SCORE: 24
DAILY ACTIVITIY SCORE: 24

## 2020-10-22 ASSESSMENT — ENCOUNTER SYMPTOMS
MYALGIAS: 0
WEAKNESS: 0
DEPRESSION: 0
BLURRED VISION: 0
FEVER: 1
INSOMNIA: 0
VOMITING: 0
SHORTNESS OF BREATH: 1
SORE THROAT: 0
DOUBLE VISION: 0
NAUSEA: 0
HEADACHES: 0
DIZZINESS: 0
BRUISES/BLEEDS EASILY: 0
COUGH: 1
PALPITATIONS: 0
NECK PAIN: 0

## 2020-10-22 ASSESSMENT — PATIENT HEALTH QUESTIONNAIRE - PHQ9
4. FEELING TIRED OR HAVING LITTLE ENERGY: NEARLY EVERY DAY
9. THOUGHTS THAT YOU WOULD BE BETTER OFF DEAD, OR OF HURTING YOURSELF: NOT AT ALL
SUM OF ALL RESPONSES TO PHQ9 QUESTIONS 1 AND 2: 1
6. FEELING BAD ABOUT YOURSELF - OR THAT YOU ARE A FAILURE OR HAVE LET YOURSELF OR YOUR FAMILY DOWN: SEVERAL DAYS
2. FEELING DOWN, DEPRESSED, IRRITABLE, OR HOPELESS: SEVERAL DAYS
3. TROUBLE FALLING OR STAYING ASLEEP OR SLEEPING TOO MUCH: NOT AT ALL
5. POOR APPETITE OR OVEREATING: NOT AT ALL
8. MOVING OR SPEAKING SO SLOWLY THAT OTHER PEOPLE COULD HAVE NOTICED. OR THE OPPOSITE, BEING SO FIGETY OR RESTLESS THAT YOU HAVE BEEN MOVING AROUND A LOT MORE THAN USUAL: NOT AT ALL
7. TROUBLE CONCENTRATING ON THINGS, SUCH AS READING THE NEWSPAPER OR WATCHING TELEVISION: NOT AT ALL
1. LITTLE INTEREST OR PLEASURE IN DOING THINGS: NOT AT ALL
SUM OF ALL RESPONSES TO PHQ QUESTIONS 1-9: 5

## 2020-10-22 ASSESSMENT — FIBROSIS 4 INDEX: FIB4 SCORE: 0.77

## 2020-10-22 ASSESSMENT — PAIN DESCRIPTION - PAIN TYPE
TYPE: ACUTE PAIN
TYPE: ACUTE PAIN
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
## 2020-10-22 NOTE — PROGRESS NOTES
Attending Hospitalist today is Dr. Gusman starting at 0700. Please call this Physician for orders, updates and questions.

## 2020-10-22 NOTE — ASSESSMENT & PLAN NOTE
Patient currently is on Tresiba at home and here patient is on Lantus 6 units subcutaneously nightly and Lispro 5 units TIDAC with sliding scale during the day.  Diabetic diet  A1c is well controlled at 5.6

## 2020-10-22 NOTE — PROGRESS NOTES
The patient at this point has resistant Pseudomonas and she is admitted because of a Pseudomonas pneumonia with cystic fibrosis.  The patient's initial antibiotic selection is Fortaz which is apparently working for her but after talking to her cystic fibrosis specialist she is recommending adding aztreonam as the patient is allergic to tobramycin as well as quinolones.  I have spoken with pharmacy and we are going to initiate the aztreonam as a second agent.

## 2020-10-22 NOTE — ASSESSMENT & PLAN NOTE
Patient is infected with Pseudomonas once again.  Patient is on a combination of Fortaz and aztreonam  Continue also with Kalydeco and phytonadione.  Monitor blood cultures   Pt received PICC line 10/24/20

## 2020-10-22 NOTE — FLOWSHEET NOTE
10/22/20 1516   Vital Signs   Pulse 90   Respiration 16   Pulse Oximetry 100 %   $ Pulse Oximetry (Spot Check) Yes   Respiratory Assessment   Level of Consciousness Alert   Breath Sounds   RUL Breath Sounds Clear   RML Breath Sounds Clear   RLL Breath Sounds Clear;Diminished   SANTOS Breath Sounds Clear   LLL Breath Sounds Clear;Diminished   Secretions   Cough Congested   Oxygen   O2 Delivery Device None - Room Air

## 2020-10-22 NOTE — FLOWSHEET NOTE
10/22/20 1011   Vital Signs   Pulse 90   Respiration 14   Pulse Oximetry 98 %   Respiratory Assessment   Level of Consciousness Alert   Breath Sounds   RUL Breath Sounds Clear   RML Breath Sounds Clear   RLL Breath Sounds Clear;Diminished   SANTOS Breath Sounds Clear   LLL Breath Sounds Clear;Diminished   Secretions   Cough Congested

## 2020-10-22 NOTE — PROGRESS NOTES
Patient concerned about Azactam ordered by MD. Dr. Gusman notified and stated it was a recommendation from Dr. Whaley, patient agreed to take medication. Dr. Gusman updated.

## 2020-10-22 NOTE — PROGRESS NOTES
Patient is admitted at 12:03 AM this morning because of sepsis secondary to pneumonia with cystic fibrosis who has failed outpatient antibiotic management.  Patient is admitted for IV antibiotic management and sepsis management.

## 2020-10-22 NOTE — ED NOTES
Pt started on Incentive Spirometer with return demonstration to 1300 ml. Pt encourage to use twice every ten minutes.

## 2020-10-22 NOTE — PROGRESS NOTES
Pt arrived to unit via gurney. Ambulated from [place] to bed, standby assist. Tele monitor applied, vitals taken. Pt assessed. A&O x 4. Admit profile and med rec complete. Discussed POC with pt, including IV antibiotic therapy, PICC line placement, prn pain medications. Patient states she if currently having 7/10 left rib/lung pain but refuses any medications at this time. Welcome folder provided and discussed. Communication board filled out. Questions and concerns addressed, verbalized understanding. Fall precautions in place. Pt demonstrates ability to use call light appropriately. Pt left in lowest position. Bed locked and low.

## 2020-10-22 NOTE — ED NOTES
Pt did not tolerate ketamine administration. Pt c/o dizziness and nausea. Medication stopped by patient request.

## 2020-10-22 NOTE — ASSESSMENT & PLAN NOTE
Sepsis at this point has resolved for the most part.  Continue to monitoring white blood cell count cultures and continue with antibiotics using a combination of aztreonam and Fortaz.

## 2020-10-23 LAB
ANION GAP SERPL CALC-SCNC: 11 MMOL/L (ref 7–16)
BUN SERPL-MCNC: 11 MG/DL (ref 8–22)
CALCIUM SERPL-MCNC: 9.2 MG/DL (ref 8.4–10.2)
CHLORIDE SERPL-SCNC: 103 MMOL/L (ref 96–112)
CO2 SERPL-SCNC: 26 MMOL/L (ref 20–33)
CREAT SERPL-MCNC: 0.58 MG/DL (ref 0.5–1.4)
ERYTHROCYTE [DISTWIDTH] IN BLOOD BY AUTOMATED COUNT: 44.6 FL (ref 35.9–50)
GLUCOSE BLD-MCNC: 101 MG/DL (ref 65–99)
GLUCOSE BLD-MCNC: 113 MG/DL (ref 65–99)
GLUCOSE BLD-MCNC: 132 MG/DL (ref 65–99)
GLUCOSE BLD-MCNC: 46 MG/DL (ref 65–99)
GLUCOSE BLD-MCNC: 57 MG/DL (ref 65–99)
GLUCOSE BLD-MCNC: 71 MG/DL (ref 65–99)
GLUCOSE BLD-MCNC: 83 MG/DL (ref 65–99)
GLUCOSE BLD-MCNC: 97 MG/DL (ref 65–99)
GLUCOSE SERPL-MCNC: 101 MG/DL (ref 65–99)
HCT VFR BLD AUTO: 37.7 % (ref 37–47)
HGB BLD-MCNC: 11.5 G/DL (ref 12–16)
MCH RBC QN AUTO: 28.1 PG (ref 27–33)
MCHC RBC AUTO-ENTMCNC: 30.5 G/DL (ref 33.6–35)
MCV RBC AUTO: 92.2 FL (ref 81.4–97.8)
PLATELET # BLD AUTO: 376 K/UL (ref 164–446)
PMV BLD AUTO: 10.2 FL (ref 9–12.9)
POTASSIUM SERPL-SCNC: 4.3 MMOL/L (ref 3.6–5.5)
RBC # BLD AUTO: 4.09 M/UL (ref 4.2–5.4)
SODIUM SERPL-SCNC: 140 MMOL/L (ref 135–145)
WBC # BLD AUTO: 8.5 K/UL (ref 4.8–10.8)

## 2020-10-23 PROCEDURE — 700101 HCHG RX REV CODE 250: Performed by: HOSPITALIST

## 2020-10-23 PROCEDURE — 94640 AIRWAY INHALATION TREATMENT: CPT

## 2020-10-23 PROCEDURE — 85027 COMPLETE CBC AUTOMATED: CPT

## 2020-10-23 PROCEDURE — 700105 HCHG RX REV CODE 258: Performed by: HOSPITALIST

## 2020-10-23 PROCEDURE — 94669 MECHANICAL CHEST WALL OSCILL: CPT

## 2020-10-23 PROCEDURE — 700102 HCHG RX REV CODE 250 W/ 637 OVERRIDE(OP): Performed by: HOSPITALIST

## 2020-10-23 PROCEDURE — 700111 HCHG RX REV CODE 636 W/ 250 OVERRIDE (IP): Performed by: HOSPITALIST

## 2020-10-23 PROCEDURE — 80048 BASIC METABOLIC PNL TOTAL CA: CPT

## 2020-10-23 PROCEDURE — 94760 N-INVAS EAR/PLS OXIMETRY 1: CPT

## 2020-10-23 PROCEDURE — A9270 NON-COVERED ITEM OR SERVICE: HCPCS | Performed by: HOSPITALIST

## 2020-10-23 PROCEDURE — 770021 HCHG ROOM/CARE - ISO PRIVATE

## 2020-10-23 PROCEDURE — 99233 SBSQ HOSP IP/OBS HIGH 50: CPT | Performed by: HOSPITALIST

## 2020-10-23 PROCEDURE — 82962 GLUCOSE BLOOD TEST: CPT | Mod: 91

## 2020-10-23 RX ORDER — DEXTROSE MONOHYDRATE 25 G/50ML
50 INJECTION, SOLUTION INTRAVENOUS
Status: DISCONTINUED | OUTPATIENT
Start: 2020-10-23 | End: 2020-10-26 | Stop reason: HOSPADM

## 2020-10-23 RX ADMIN — MAGNESIUM HYDROXIDE 30 ML: 400 SUSPENSION ORAL at 21:45

## 2020-10-23 RX ADMIN — Medication 8 ML: at 13:45

## 2020-10-23 RX ADMIN — ALBUTEROL SULFATE 2.5 MG: 2.5 SOLUTION RESPIRATORY (INHALATION) at 19:53

## 2020-10-23 RX ADMIN — PHYTONADIONE 5 MG: 5 TABLET ORAL at 05:34

## 2020-10-23 RX ADMIN — PANCRELIPASE 6000 UNITS: 30000; 6000; 19000 CAPSULE, DELAYED RELEASE PELLETS ORAL at 16:48

## 2020-10-23 RX ADMIN — SENNOSIDES-DOCUSATE SODIUM TAB 8.6-50 MG 2 TABLET: 8.6-5 TAB at 12:08

## 2020-10-23 RX ADMIN — INSULIN LISPRO 14 UNITS: 100 INJECTION, SOLUTION INTRAVENOUS; SUBCUTANEOUS at 11:52

## 2020-10-23 RX ADMIN — CEFTAZIDIME 1 G: 1 INJECTION, POWDER, FOR SOLUTION INTRAMUSCULAR; INTRAVENOUS at 15:48

## 2020-10-23 RX ADMIN — Medication 16 G: at 20:17

## 2020-10-23 RX ADMIN — PANCRELIPASE 6000 UNITS: 30000; 6000; 19000 CAPSULE, DELAYED RELEASE PELLETS ORAL at 08:06

## 2020-10-23 RX ADMIN — CEFTAZIDIME 1 G: 1 INJECTION, POWDER, FOR SOLUTION INTRAMUSCULAR; INTRAVENOUS at 21:36

## 2020-10-23 RX ADMIN — AZTREONAM 1000 MG: 1 INJECTION, POWDER, LYOPHILIZED, FOR SOLUTION INTRAMUSCULAR; INTRAVENOUS at 11:51

## 2020-10-23 RX ADMIN — Medication 8 ML: at 19:53

## 2020-10-23 RX ADMIN — Medication 8 ML: at 07:16

## 2020-10-23 RX ADMIN — AZTREONAM 1000 MG: 1 INJECTION, POWDER, LYOPHILIZED, FOR SOLUTION INTRAMUSCULAR; INTRAVENOUS at 01:47

## 2020-10-23 RX ADMIN — POLYETHYLENE GLYCOL 3350 1 PACKET: 17 POWDER, FOR SOLUTION ORAL at 18:40

## 2020-10-23 RX ADMIN — OXYCODONE HYDROCHLORIDE AND ACETAMINOPHEN 1000 MG: 500 TABLET ORAL at 05:34

## 2020-10-23 RX ADMIN — ALBUTEROL SULFATE 2.5 MG: 2.5 SOLUTION RESPIRATORY (INHALATION) at 13:43

## 2020-10-23 RX ADMIN — ALBUTEROL SULFATE 2.5 MG: 2.5 SOLUTION RESPIRATORY (INHALATION) at 07:16

## 2020-10-23 RX ADMIN — PANCRELIPASE 6000 UNITS: 30000; 6000; 19000 CAPSULE, DELAYED RELEASE PELLETS ORAL at 11:51

## 2020-10-23 RX ADMIN — Medication 10000 UNITS: at 05:34

## 2020-10-23 RX ADMIN — Medication 1 CAPSULE: at 08:06

## 2020-10-23 RX ADMIN — INSULIN GLARGINE 6 UNITS: 100 INJECTION, SOLUTION SUBCUTANEOUS at 17:02

## 2020-10-23 RX ADMIN — INSULIN LISPRO 14 UNITS: 100 INJECTION, SOLUTION INTRAVENOUS; SUBCUTANEOUS at 08:08

## 2020-10-23 RX ADMIN — AZTREONAM 1000 MG: 1 INJECTION, POWDER, LYOPHILIZED, FOR SOLUTION INTRAMUSCULAR; INTRAVENOUS at 16:47

## 2020-10-23 RX ADMIN — INSULIN LISPRO 14 UNITS: 100 INJECTION, SOLUTION INTRAVENOUS; SUBCUTANEOUS at 17:09

## 2020-10-23 RX ADMIN — CEFTAZIDIME 1 G: 1 INJECTION, POWDER, FOR SOLUTION INTRAMUSCULAR; INTRAVENOUS at 05:34

## 2020-10-23 RX ADMIN — ACETAMINOPHEN 650 MG: 325 TABLET, FILM COATED ORAL at 20:15

## 2020-10-23 ASSESSMENT — ENCOUNTER SYMPTOMS
LOSS OF CONSCIOUSNESS: 0
CONSTITUTIONAL NEGATIVE: 1
CHILLS: 0
SEIZURES: 0
HEMOPTYSIS: 0
FOCAL WEAKNESS: 0
EYES NEGATIVE: 1
PALPITATIONS: 0
DIAPHORESIS: 0
MUSCULOSKELETAL NEGATIVE: 1
NERVOUS/ANXIOUS: 0
DIZZINESS: 0
COUGH: 1
DOUBLE VISION: 0
CONSTIPATION: 0
GASTROINTESTINAL NEGATIVE: 1
NAUSEA: 0
SPUTUM PRODUCTION: 1
NEUROLOGICAL NEGATIVE: 1
BRUISES/BLEEDS EASILY: 0
HEARTBURN: 0
WHEEZING: 0
HEADACHES: 0
SHORTNESS OF BREATH: 1
FEVER: 0
ABDOMINAL PAIN: 0
BLOOD IN STOOL: 0
PSYCHIATRIC NEGATIVE: 1
DIARRHEA: 0
VOMITING: 0

## 2020-10-23 ASSESSMENT — FIBROSIS 4 INDEX: FIB4 SCORE: 0.77

## 2020-10-23 ASSESSMENT — PAIN DESCRIPTION - PAIN TYPE: TYPE: CHRONIC PAIN

## 2020-10-23 NOTE — PROGRESS NOTES
Report given to Cassidy COKER. Plan of care discussed. Patient resting comfortably in bed. Safety precautions in place.

## 2020-10-23 NOTE — PROGRESS NOTES
Report received from SHEELA GOVEA. Patient denies needs at this time. RT at bedside. Safety check completed. Will continue to monitor patient.

## 2020-10-23 NOTE — RESPIRATORY CARE
COPD EDUCATION by COPD CLINICAL EDUCATOR  10/23/2020 at 8:36 AM by Nati Mccollum, RRT     Patient reviewed by COPD education team. Patient has a history of Cystic Fibrosis and is a non-smoker.  Therefore does not qualify for the COPD program. Admit for Pseudomonas Pneumonia, pt has not had a PFT only Spirometry with Pediatric Pulmonary.

## 2020-10-23 NOTE — PROGRESS NOTES
[facility] Medicine Daily Progress Note    Date of Service  10/23/2020    Chief Complaint  60 y.o. female admitted 10/21/2020 with shortness of breath along with chills and fevers.    Hospital Course    Connie is a 61-year-old female who comes in because of above-mentioned symptoms.  The patient has underlying cystic fibrosis for the past 50 years she has been treated.  And she is actually doing very well.  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  Patient was placed on Fortaz after discussing with the cystic fibrosis specialist we have also added aztreonam.  Patient will be monitored on her blood cultures if there was a negative the plan is to get her a PICC line on Sunday and then she should be able to discharge home when antibiotics to be finished off as an outpatient.      Interval Problem Update  Continue with pulmonary toilet  Continue with incentive spirometry  Continue with oxygen support  Continue with antibiotic management  Optimize pain management  Follow cultures      Consultants/Specialty  Cystic fibrosis specialist and pulmonology by phone    Code Status  Full Code    Disposition  Most likely home Sunday after PICC line is placed.    Review of Systems  Review of Systems   Constitutional: Negative.  Negative for chills, diaphoresis and fever.   HENT: Negative.    Eyes: Negative.  Negative for double vision.   Respiratory: Positive for cough, sputum production and shortness of breath. Negative for hemoptysis and wheezing.    Cardiovascular: Positive for chest pain. Negative for palpitations and leg swelling.   Gastrointestinal: Negative.  Negative for abdominal pain, blood in stool, constipation, diarrhea, heartburn, nausea and vomiting.   Genitourinary: Negative.  Negative for frequency, hematuria and urgency.   Musculoskeletal: Negative.  Negative for joint pain.   Skin: Negative.  Negative for itching and rash.   Neurological: Negative.  Negative for  dizziness, focal weakness, seizures, loss of consciousness and headaches.   Endo/Heme/Allergies: Negative.  Does not bruise/bleed easily.   Psychiatric/Behavioral: Negative.  Negative for suicidal ideas. The patient is not nervous/anxious.    All other systems reviewed and are negative.       Physical Exam  Temp:  [36.5 °C (97.7 °F)-37.5 °C (99.5 °F)] 36.5 °C (97.7 °F)  Pulse:  [] 103  Resp:  [14-18] 16  BP: (121-143)/(56-77) 121/56  SpO2:  [91 %-100 %] 91 %    Physical Exam  Vitals signs and nursing note reviewed.   Constitutional:       Appearance: Normal appearance. She is well-developed and normal weight.   HENT:      Head: Normocephalic and atraumatic.      Right Ear: External ear normal.      Left Ear: External ear normal.      Nose: Nose normal.      Mouth/Throat:      Mouth: Mucous membranes are moist.      Pharynx: Oropharynx is clear.   Eyes:      Extraocular Movements: Extraocular movements intact.      Conjunctiva/sclera: Conjunctivae normal.      Pupils: Pupils are equal, round, and reactive to light.   Neck:      Musculoskeletal: Normal range of motion and neck supple.      Thyroid: No thyromegaly.      Vascular: No JVD.   Cardiovascular:      Rate and Rhythm: Normal rate and regular rhythm.      Pulses: Normal pulses.      Heart sounds: Normal heart sounds.   Pulmonary:      Effort: Accessory muscle usage present.      Breath sounds: Decreased air movement present. Examination of the right-lower field reveals rhonchi. Examination of the left-lower field reveals rhonchi. Wheezing, rhonchi and rales present.   Chest:      Chest wall: No tenderness.   Abdominal:      General: Abdomen is flat. Bowel sounds are normal. There is no distension.      Palpations: Abdomen is soft. There is no mass.      Tenderness: There is no abdominal tenderness. There is no guarding or rebound.   Musculoskeletal: Normal range of motion.   Lymphadenopathy:      Cervical: No cervical adenopathy.   Skin:     General: Skin  is warm and dry.      Capillary Refill: Capillary refill takes more than 3 seconds.      Findings: No rash.   Neurological:      General: No focal deficit present.      Mental Status: She is alert and oriented to person, place, and time. Mental status is at baseline.      Cranial Nerves: No cranial nerve deficit.      Deep Tendon Reflexes: Reflexes are normal and symmetric.   Psychiatric:         Mood and Affect: Mood normal.         Behavior: Behavior normal.         Thought Content: Thought content normal.         Judgment: Judgment normal.         Fluids    Intake/Output Summary (Last 24 hours) at 10/23/2020 1009  Last data filed at 10/23/2020 0900  Gross per 24 hour   Intake 320 ml   Output --   Net 320 ml       Laboratory  Recent Labs     10/21/20  2130 10/23/20  0237   WBC 13.0* 8.5   RBC 4.07* 4.09*   HEMOGLOBIN 11.8* 11.5*   HEMATOCRIT 36.3* 37.7   MCV 89.2 92.2   MCH 29.0 28.1   MCHC 32.5* 30.5*   RDW 42.0 44.6   PLATELETCT 373 376   MPV 9.8 10.2     Recent Labs     10/21/20  2130 10/23/20  0237   SODIUM 140 140   POTASSIUM 4.2 4.3   CHLORIDE 102 103   CO2 26 26   GLUCOSE 66 101*   BUN 11 11   CREATININE 0.68 0.58   CALCIUM 9.2 9.2     Recent Labs     10/22/20  0315   INR 1.14*               Imaging  DX-CHEST-PORTABLE (1 VIEW)   Final Result         1.  Patchy left midlung and lower lobe infiltrate.           Assessment/Plan  * Sepsis ([facility])  Assessment & Plan  Sepsis at this point has resolved for the most part.  Continue to monitoring white blood cell count cultures and continue with antibiotics using a combination of aztreonam and Fortaz.          Cystic fibrosis ([facility])- (present on admission)  Assessment & Plan  Patient is infected with Pseudomonas once again.  Patient is on a combination of Fortaz and aztreonam  Continue also with Kalydeco and phytonadione.  Monitor blood cultures if they are negative anticipate placement of PICC line on Sunday    Diabetes mellitus due to cystic fibrosis ([facility])- (present  on admission)  Assessment & Plan  Patient currently is on Tresiba at home and here patient is on Lantus 6 units subcutaneously nightly with sliding scale during the day.  Diabetic diet  A1c is well controlled at 5.6    Exocrine pancreatic insufficiency- (present on admission)  Assessment & Plan  Patient is on Tresiba at home.  Continue with Lantus insulin here.         VTE prophylaxis: SCDs

## 2020-10-23 NOTE — PROGRESS NOTES
· 2 RN skin check complete with Pryia COKER  · Skin assessment: WNL  · Devices in place: PIV, tele monitor  · Skin assessed under devices: intact  · Confirmed pressure ulcers found on: n/a  · New potential pressure ulcers noted on: n/a.   · The following interventions in place: Pt is ambulatory, is able to reposition self, has pillows for support

## 2020-10-23 NOTE — PROGRESS NOTES
Assumed pt care. AOx4. Denies any pain at this time.  Denies any other distress.  Discussed POC.  Pt verbalized understanding.  Hourly rounding in place. Fall precautions in place and call lights w/in reach.  RT at bedside.

## 2020-10-23 NOTE — CARE PLAN
Problem: Knowledge Deficit  Goal: Knowledge of disease process/condition, treatment plan, diagnostic tests, and medications will improve  Outcome: PROGRESSING AS EXPECTED  Discussed plan of care with patient including IV antibiotic therapy, isolation precautions, and safety. Allowed time for questions, patient agreed and verbalized understanding.     Problem: Infection  Goal: Will remain free from infection  Outcome: PROGRESSING AS EXPECTED  Patient is on IV antibiotic therapy for pneumonia

## 2020-10-23 NOTE — PROGRESS NOTES
Received report from Priya COKER. POC discussed. Pt sitting up in bed watching television, denies any needs at this time. Safety precautions in place. Call light and personal belongings within reach.

## 2020-10-23 NOTE — FLOWSHEET NOTE
10/23/20 0700   Vital Signs   Pulse 96   Respiration 16   Pulse Oximetry 94 %   $ Pulse Oximetry (Spot Check) Yes   Respiratory Assessment   Level of Consciousness Alert   Breath Sounds   RUL Breath Sounds Clear   RML Breath Sounds Clear   RLL Breath Sounds Clear;Diminished   SANTOS Breath Sounds Clear   LLL Breath Sounds Clear;Diminished   Secretions   Cough Congested   How Sputum Obtained Spontaneous   Sputum Amount Moderate   Sputum Color Yellow;Green   Sputum Consistency Thick   Oxygen   O2 Delivery Device None - Room Air

## 2020-10-23 NOTE — CARE PLAN
Problem: Safety  Goal: Will remain free from injury  Outcome: PROGRESSING AS EXPECTED  Goal: Will remain free from falls  Outcome: PROGRESSING AS EXPECTED     Problem: Pain Management  Goal: Pain level will decrease to patient's comfort goal  Outcome: PROGRESSING AS EXPECTED  Note: Pt reports decreased pain with medications, repositioning, and heat packs. Pt verbalized understanding of education provided on pain management. Denies any concerns at this time.

## 2020-10-23 NOTE — DISCHARGE PLANNING
Pt previously on service with [facility] for home antibiotics. LSW spoke with Michaela from [facility] who can come teach pt once ready for d/c.

## 2020-10-23 NOTE — CARE PLAN
Problem: Communication  Goal: The ability to communicate needs accurately and effectively will improve  Outcome: PROGRESSING AS EXPECTED     Problem: Safety  Goal: Will remain free from injury  Outcome: PROGRESSING AS EXPECTED  Goal: Will remain free from falls  Outcome: PROGRESSING AS EXPECTED     Problem: Infection  Goal: Will remain free from infection  Outcome: PROGRESSING AS EXPECTED     Problem: Venous Thromboembolism (VTW)/Deep Vein Thrombosis (DVT) Prevention:  Goal: Patient will participate in Venous Thrombosis (VTE)/Deep Vein Thrombosis (DVT)Prevention Measures  Outcome: PROGRESSING AS EXPECTED     Problem: Bowel/Gastric:  Goal: Normal bowel function is maintained or improved  Outcome: PROGRESSING AS EXPECTED  Goal: Will not experience complications related to bowel motility  Outcome: PROGRESSING AS EXPECTED     Problem: Knowledge Deficit  Goal: Knowledge of disease process/condition, treatment plan, diagnostic tests, and medications will improve  Outcome: PROGRESSING AS EXPECTED  Goal: Knowledge of the prescribed therapeutic regimen will improve  Outcome: PROGRESSING AS EXPECTED     Problem: Discharge Barriers/Planning  Goal: Patient's continuum of care needs will be met  Outcome: PROGRESSING AS EXPECTED     Problem: Respiratory:  Goal: Respiratory status will improve  Outcome: PROGRESSING AS EXPECTED     Problem: Fluid Volume:  Goal: Will maintain balanced intake and output  Outcome: PROGRESSING AS EXPECTED     Problem: Pain Management  Goal: Pain level will decrease to patient's comfort goal  Outcome: PROGRESSING AS EXPECTED

## 2020-10-24 ENCOUNTER — APPOINTMENT (OUTPATIENT)
Dept: RADIOLOGY | Facility: MEDICAL CENTER | Age: 60
DRG: 871 | End: 2020-10-24
Attending: HOSPITALIST
Payer: COMMERCIAL

## 2020-10-24 LAB
BACTERIA UR CULT: NORMAL
GLUCOSE BLD-MCNC: 110 MG/DL (ref 65–99)
GLUCOSE BLD-MCNC: 135 MG/DL (ref 65–99)
GLUCOSE BLD-MCNC: 141 MG/DL (ref 65–99)
GLUCOSE BLD-MCNC: 73 MG/DL (ref 65–99)
GLUCOSE BLD-MCNC: 94 MG/DL (ref 65–99)
GLUCOSE BLD-MCNC: 99 MG/DL (ref 65–99)
SIGNIFICANT IND 70042: NORMAL
SITE SITE: NORMAL
SOURCE SOURCE: NORMAL

## 2020-10-24 PROCEDURE — A9270 NON-COVERED ITEM OR SERVICE: HCPCS | Performed by: HOSPITALIST

## 2020-10-24 PROCEDURE — 700102 HCHG RX REV CODE 250 W/ 637 OVERRIDE(OP): Performed by: HOSPITALIST

## 2020-10-24 PROCEDURE — 99233 SBSQ HOSP IP/OBS HIGH 50: CPT | Performed by: STUDENT IN AN ORGANIZED HEALTH CARE EDUCATION/TRAINING PROGRAM

## 2020-10-24 PROCEDURE — 82962 GLUCOSE BLOOD TEST: CPT | Mod: 91

## 2020-10-24 PROCEDURE — 94640 AIRWAY INHALATION TREATMENT: CPT

## 2020-10-24 PROCEDURE — 94669 MECHANICAL CHEST WALL OSCILL: CPT

## 2020-10-24 PROCEDURE — 94760 N-INVAS EAR/PLS OXIMETRY 1: CPT

## 2020-10-24 PROCEDURE — 700105 HCHG RX REV CODE 258: Performed by: HOSPITALIST

## 2020-10-24 PROCEDURE — 05HC33Z INSERTION OF INFUSION DEVICE INTO LEFT BASILIC VEIN, PERCUTANEOUS APPROACH: ICD-10-PCS | Performed by: STUDENT IN AN ORGANIZED HEALTH CARE EDUCATION/TRAINING PROGRAM

## 2020-10-24 PROCEDURE — 770021 HCHG ROOM/CARE - ISO PRIVATE

## 2020-10-24 PROCEDURE — 700101 HCHG RX REV CODE 250: Performed by: HOSPITALIST

## 2020-10-24 PROCEDURE — 36573 INSJ PICC RS&I 5 YR+: CPT

## 2020-10-24 PROCEDURE — A9270 NON-COVERED ITEM OR SERVICE: HCPCS | Performed by: STUDENT IN AN ORGANIZED HEALTH CARE EDUCATION/TRAINING PROGRAM

## 2020-10-24 PROCEDURE — 700111 HCHG RX REV CODE 636 W/ 250 OVERRIDE (IP): Performed by: HOSPITALIST

## 2020-10-24 PROCEDURE — 700102 HCHG RX REV CODE 250 W/ 637 OVERRIDE(OP): Performed by: STUDENT IN AN ORGANIZED HEALTH CARE EDUCATION/TRAINING PROGRAM

## 2020-10-24 RX ORDER — LACTULOSE 20 G/30ML
30 SOLUTION ORAL 2 TIMES DAILY
Status: DISCONTINUED | OUTPATIENT
Start: 2020-10-24 | End: 2020-10-26 | Stop reason: HOSPADM

## 2020-10-24 RX ADMIN — CEFTAZIDIME 1 G: 1 INJECTION, POWDER, FOR SOLUTION INTRAMUSCULAR; INTRAVENOUS at 22:06

## 2020-10-24 RX ADMIN — PANCRELIPASE 6000 UNITS: 30000; 6000; 19000 CAPSULE, DELAYED RELEASE PELLETS ORAL at 17:39

## 2020-10-24 RX ADMIN — CEFTAZIDIME 1 G: 1 INJECTION, POWDER, FOR SOLUTION INTRAMUSCULAR; INTRAVENOUS at 05:54

## 2020-10-24 RX ADMIN — PHYTONADIONE 5 MG: 5 TABLET ORAL at 05:54

## 2020-10-24 RX ADMIN — AZTREONAM 1000 MG: 1 INJECTION, POWDER, LYOPHILIZED, FOR SOLUTION INTRAMUSCULAR; INTRAVENOUS at 01:59

## 2020-10-24 RX ADMIN — Medication 8 ML: at 19:52

## 2020-10-24 RX ADMIN — OXYCODONE HYDROCHLORIDE AND ACETAMINOPHEN 1000 MG: 500 TABLET ORAL at 05:54

## 2020-10-24 RX ADMIN — ALBUTEROL SULFATE 2.5 MG: 2.5 SOLUTION RESPIRATORY (INHALATION) at 19:45

## 2020-10-24 RX ADMIN — CEFTAZIDIME 1 G: 1 INJECTION, POWDER, FOR SOLUTION INTRAMUSCULAR; INTRAVENOUS at 14:25

## 2020-10-24 RX ADMIN — Medication 1 CAPSULE: at 07:48

## 2020-10-24 RX ADMIN — AZTREONAM 1000 MG: 1 INJECTION, POWDER, LYOPHILIZED, FOR SOLUTION INTRAMUSCULAR; INTRAVENOUS at 09:00

## 2020-10-24 RX ADMIN — PANCRELIPASE 6000 UNITS: 30000; 6000; 19000 CAPSULE, DELAYED RELEASE PELLETS ORAL at 11:45

## 2020-10-24 RX ADMIN — Medication 8 ML: at 06:56

## 2020-10-24 RX ADMIN — INSULIN GLARGINE 6 UNITS: 100 INJECTION, SOLUTION SUBCUTANEOUS at 17:40

## 2020-10-24 RX ADMIN — Medication 10000 UNITS: at 05:53

## 2020-10-24 RX ADMIN — ALBUTEROL SULFATE 2.5 MG: 2.5 SOLUTION RESPIRATORY (INHALATION) at 13:56

## 2020-10-24 RX ADMIN — ALBUTEROL SULFATE 2.5 MG: 2.5 SOLUTION RESPIRATORY (INHALATION) at 06:56

## 2020-10-24 RX ADMIN — SENNOSIDES-DOCUSATE SODIUM TAB 8.6-50 MG 2 TABLET: 8.6-5 TAB at 17:38

## 2020-10-24 RX ADMIN — AZTREONAM 1000 MG: 1 INJECTION, POWDER, LYOPHILIZED, FOR SOLUTION INTRAMUSCULAR; INTRAVENOUS at 17:39

## 2020-10-24 RX ADMIN — SENNOSIDES-DOCUSATE SODIUM TAB 8.6-50 MG 2 TABLET: 8.6-5 TAB at 05:53

## 2020-10-24 RX ADMIN — LACTULOSE 30 ML: 20 SOLUTION ORAL at 17:38

## 2020-10-24 RX ADMIN — Medication 8 ML: at 13:57

## 2020-10-24 RX ADMIN — PANCRELIPASE 6000 UNITS: 30000; 6000; 19000 CAPSULE, DELAYED RELEASE PELLETS ORAL at 07:48

## 2020-10-24 ASSESSMENT — ENCOUNTER SYMPTOMS
ABDOMINAL PAIN: 0
PSYCHIATRIC NEGATIVE: 1
DIAPHORESIS: 0
NERVOUS/ANXIOUS: 0
FEVER: 0
EYES NEGATIVE: 1
WHEEZING: 0
NEUROLOGICAL NEGATIVE: 1
SHORTNESS OF BREATH: 1
BRUISES/BLEEDS EASILY: 0
GASTROINTESTINAL NEGATIVE: 1
DIZZINESS: 0
HEMOPTYSIS: 0
MUSCULOSKELETAL NEGATIVE: 1
HEARTBURN: 0
NAUSEA: 0
DOUBLE VISION: 0
CHILLS: 0
HEADACHES: 0
VOMITING: 0
SPUTUM PRODUCTION: 1
BLOOD IN STOOL: 0
CONSTIPATION: 0
SEIZURES: 0
COUGH: 1
PALPITATIONS: 0
LOSS OF CONSCIOUSNESS: 0
DIARRHEA: 0
CONSTITUTIONAL NEGATIVE: 1
FOCAL WEAKNESS: 0

## 2020-10-24 ASSESSMENT — PAIN DESCRIPTION - PAIN TYPE: TYPE: CHRONIC PAIN

## 2020-10-24 NOTE — PROGRESS NOTES
Hospital Medicine Daily Progress Note    Date of Service  10/24/2020    Chief Complaint  60 y.o. female admitted 10/21/2020 with shortness of breath along with chills and fevers.    Hospital Course    Connie is a 61-year-old female who comes in because of above-mentioned symptoms.  The patient has underlying cystic fibrosis for the past 50 years she has been treated.  And she is actually doing very well.  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  Patient was placed on Fortaz after discussing with the cystic fibrosis specialist we have also added aztreonam.  Patient will be monitored on her blood cultures if there was a negative then to get PICC Line which she received on 10/24/20 and then she should be able to discharge home when antibiotics to be finished off as an outpatient.      Interval Problem Update  Continue with pulmonary toilet  Continue with incentive spirometry  Continue with oxygen support  Continue with antibiotic management  Optimize pain management  Follow cultures  Patient symptomatically improved, still making yellow sputum  Spoke Dr. Payne , Cystic fibrosis specialist who will sent in prescription for out patient antibiotics which patient can self administer using home infusion services.    Consultants/Specialty  Cystic fibrosis specialist and pulmonology by phone    Code Status  Full Code    Disposition  Most likely home on Monday once home infusion services set up    Review of Systems  Review of Systems   Constitutional: Negative.  Negative for chills, diaphoresis and fever.   HENT: Negative.    Eyes: Negative.  Negative for double vision.   Respiratory: Positive for cough, sputum production and shortness of breath. Negative for hemoptysis and wheezing.    Cardiovascular: Positive for chest pain. Negative for palpitations and leg swelling.   Gastrointestinal: Negative.  Negative for abdominal pain, blood in stool, constipation, diarrhea, heartburn,  nausea and vomiting.   Genitourinary: Negative.  Negative for frequency, hematuria and urgency.   Musculoskeletal: Negative.  Negative for joint pain.   Skin: Negative.  Negative for itching and rash.   Neurological: Negative.  Negative for dizziness, focal weakness, seizures, loss of consciousness and headaches.   Endo/Heme/Allergies: Negative.  Does not bruise/bleed easily.   Psychiatric/Behavioral: Negative.  Negative for suicidal ideas. The patient is not nervous/anxious.    All other systems reviewed and are negative.       Physical Exam  Temp:  [36.8 °C (98.2 °F)-37.4 °C (99.3 °F)] 37.4 °C (99.3 °F)  Pulse:  [] 98  Resp:  [16-20] 17  BP: (110-133)/(54-64) 133/54  SpO2:  [91 %-100 %] 93 %    Physical Exam  Vitals signs and nursing note reviewed.   Constitutional:       Appearance: Normal appearance. She is well-developed and normal weight.   HENT:      Head: Normocephalic and atraumatic.      Right Ear: External ear normal.      Left Ear: External ear normal.      Nose: Nose normal.      Mouth/Throat:      Mouth: Mucous membranes are moist.      Pharynx: Oropharynx is clear.   Eyes:      Extraocular Movements: Extraocular movements intact.      Conjunctiva/sclera: Conjunctivae normal.      Pupils: Pupils are equal, round, and reactive to light.   Neck:      Musculoskeletal: Normal range of motion and neck supple.      Thyroid: No thyromegaly.      Vascular: No JVD.   Cardiovascular:      Rate and Rhythm: Normal rate and regular rhythm.      Pulses: Normal pulses.      Heart sounds: Normal heart sounds.   Pulmonary:      Breath sounds: Decreased air movement present.   Chest:      Chest wall: No tenderness.   Abdominal:      General: Abdomen is flat. Bowel sounds are normal. There is no distension.      Palpations: Abdomen is soft. There is no mass.      Tenderness: There is no abdominal tenderness. There is no guarding or rebound.   Musculoskeletal: Normal range of motion.   Lymphadenopathy:       Cervical: No cervical adenopathy.   Skin:     General: Skin is warm and dry.      Capillary Refill: Capillary refill takes less than 2 seconds.      Findings: No rash.   Neurological:      General: No focal deficit present.      Mental Status: She is alert and oriented to person, place, and time. Mental status is at baseline.      Cranial Nerves: No cranial nerve deficit.      Deep Tendon Reflexes: Reflexes are normal and symmetric.   Psychiatric:         Mood and Affect: Mood normal.         Behavior: Behavior normal.         Thought Content: Thought content normal.         Judgment: Judgment normal.         Fluids    Intake/Output Summary (Last 24 hours) at 10/24/2020 1353  Last data filed at 10/24/2020 0900  Gross per 24 hour   Intake 7843.33 ml   Output --   Net 7843.33 ml       Laboratory  Recent Labs     10/21/20  2130 10/23/20  0237   WBC 13.0* 8.5   RBC 4.07* 4.09*   HEMOGLOBIN 11.8* 11.5*   HEMATOCRIT 36.3* 37.7   MCV 89.2 92.2   MCH 29.0 28.1   MCHC 32.5* 30.5*   RDW 42.0 44.6   PLATELETCT 373 376   MPV 9.8 10.2     Recent Labs     10/21/20  2130 10/23/20  0237   SODIUM 140 140   POTASSIUM 4.2 4.3   CHLORIDE 102 103   CO2 26 26   GLUCOSE 66 101*   BUN 11 11   CREATININE 0.68 0.58   CALCIUM 9.2 9.2     Recent Labs     10/22/20  0315   INR 1.14*               Imaging  IR-PICC LINE PLACEMENT W/ GUIDANCE > AGE 5   Final Result                  Ultrasound-guided PICC placement performed by qualified nursing staff as    above.          DX-CHEST-PORTABLE (1 VIEW)   Final Result         1.  Patchy left midlung and lower lobe infiltrate.           Assessment/Plan  * Sepsis (HCC)  Assessment & Plan  Sepsis at this point has resolved for the most part.  Continue to monitoring white blood cell count cultures and continue with antibiotics using a combination of aztreonam and Fortaz.          Cystic fibrosis (HCC)- (present on admission)  Assessment & Plan  Patient is infected with Pseudomonas once again.  Patient is on  a combination of Fortaz and aztreonam  Continue also with Kalydeco and phytonadione.  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  Diabetic diet  A1c is well controlled at 5.6    Exocrine pancreatic insufficiency- (present on admission)  Assessment & Plan  Patient is on Tresiba at home.  Continue with Lantus insulin here.       VTE prophylaxis: SCDs

## 2020-10-24 NOTE — PROGRESS NOTES
1956 Pt reports she feels her bs is low. FSBS 46. Pt given milk as she said it raises her BS and keeps it up.     2017 Rechecked, bs up to 57. Given glucose tabs per protocol.     2034 bs 71 given more milk as pt states orange juice is too acidic for her stomach.    2103 bs 83 pt eating grapes    2142 bs 132

## 2020-10-24 NOTE — CARE PLAN
Problem: Metabolic:  Goal: Ability to maintain appropriate glucose levels will improve  Description: Will check BS and maintain appropriate levels  Outcome: PROGRESSING AS EXPECTED

## 2020-10-24 NOTE — CARE PLAN
Problem: Infection  Goal: Will remain free from infection  Description: Patient still getting antibiotics as scheduled  Outcome: PROGRESSING AS EXPECTED     Problem: Discharge Barriers/Planning  Goal: Patient's continuum of care needs will be met  Description: Patient had PICC placed today  Outcome: PROGRESSING AS EXPECTED     Problem: Pain Management  Goal: Pain level will decrease to patient's comfort goal  Description: Patient denies pain at this time  Outcome: PROGRESSING AS EXPECTED

## 2020-10-24 NOTE — FLOWSHEET NOTE
10/24/20 1358   Inhalation Therapy Treatment   $ SVN Performed Yes   Given By: Mouthpiece   Chest Physiotherapy Treatment   $ PEP/CPT Performed PEP / Flutter

## 2020-10-24 NOTE — PROGRESS NOTES
Report received from SHEELA Garcia. Patient denies needs. RT at bedside. Safety check completed. Will continue to monitor patient

## 2020-10-24 NOTE — CARE PLAN
Problem: Communication  Goal: The ability to communicate needs accurately and effectively will improve  Outcome: PROGRESSING AS EXPECTED  Note: Discussed use of pain scale, call light, medications and nonpharmacologic ways to control pain. Whiteboard updated, POC discussed.

## 2020-10-24 NOTE — FLOWSHEET NOTE
10/24/20 0658   Vital Signs   Pulse 80   Respiration 16   Pulse Oximetry 95 %   $ Pulse Oximetry (Spot Check) Yes   Respiratory Assessment   Level of Consciousness Alert   Breath Sounds   RUL Breath Sounds Clear   RML Breath Sounds Clear   RLL Breath Sounds Clear   SANTOS Breath Sounds Clear   LLL Breath Sounds Clear   Oxygen   O2 (LPM) 0   O2 Delivery Device None - Room Air

## 2020-10-24 NOTE — FLOWSHEET NOTE
10/23/20 1954   Inhalation Therapy Treatment   $ SVN Performed Yes   Given By: Mouthpiece   $ Continuous Nebulizer Q 1 Hour Yes   Chest Physiotherapy Treatment   $ PEP/CPT Performed PEP / Flutter

## 2020-10-24 NOTE — PROCEDURES
PICC Insertion Final 3CG    Consents confirmed, vessel patency confirmed with ultrasound. Risks and benefits of procedure explained to patient/family and education regarding central line associated bloodstream infections provided. Questions answered.     PICC placed in LUE per MD order with ultrasound guidance. 4 Fr, single  lumen PICC placed in basilic vein after one attempt(s). 4 cc's of 1% lidocaine injected intradermally, 21 gauge microintroducer needle and modified Seldinger technique used. 44 cm catheter inserted with good blood return. Secured at 1 cm marker. Each lumen flushed without resistance with 10 mL 0.9% normal saline. PICC line secured with Biopatch and Tegaderm.    PICC placement is confirmed by nurse using 3CG technology. PICC line is appropriate for use at this time. 3cg confirmation image did not save to send over to PACS. Pt tolerated procedure well.  Patient condition relayed to unit RN or ordering physician via this post procedure note in the EMR.     [employer] PICC ref #[id number], Lot #[id number]

## 2020-10-24 NOTE — FLOWSHEET NOTE
10/24/20 0655   Inhalation Therapy Treatment   $ SVN Performed Yes   Given By: Mouthpiece   Chest Physiotherapy Treatment   $ PEP/CPT Performed PEP / Flutter

## 2020-10-24 NOTE — FLOWSHEET NOTE
10/24/20 1403   Vital Signs   Pulse 90   Respiration 16   Pulse Oximetry 94 %   $ Pulse Oximetry (Spot Check) Yes   Respiratory Assessment   Level of Consciousness Alert   Secretions   Cough Congested   How Sputum Obtained Spontaneous   Sputum Amount Small   Sputum Color Yellow   Oxygen   O2 (LPM) 0   O2 Delivery Device None - Room Air

## 2020-10-24 NOTE — FLOWSHEET NOTE
10/23/20 1954   Breath Sounds   RUL Breath Sounds Clear   RML Breath Sounds Clear   RLL Breath Sounds Clear   SANTOS Breath Sounds Clear   LLL Breath Sounds Clear

## 2020-10-25 LAB
ANION GAP SERPL CALC-SCNC: 9 MMOL/L (ref 7–16)
BASOPHILS # BLD AUTO: 0.9 % (ref 0–1.8)
BASOPHILS # BLD: 0.07 K/UL (ref 0–0.12)
BUN SERPL-MCNC: 12 MG/DL (ref 8–22)
CALCIUM SERPL-MCNC: 8.9 MG/DL (ref 8.4–10.2)
CHLORIDE SERPL-SCNC: 105 MMOL/L (ref 96–112)
CO2 SERPL-SCNC: 28 MMOL/L (ref 20–33)
CREAT SERPL-MCNC: 0.56 MG/DL (ref 0.5–1.4)
EOSINOPHIL # BLD AUTO: 0.44 K/UL (ref 0–0.51)
EOSINOPHIL NFR BLD: 5.7 % (ref 0–6.9)
ERYTHROCYTE [DISTWIDTH] IN BLOOD BY AUTOMATED COUNT: 42.4 FL (ref 35.9–50)
GLUCOSE BLD-MCNC: 100 MG/DL (ref 65–99)
GLUCOSE BLD-MCNC: 126 MG/DL (ref 65–99)
GLUCOSE BLD-MCNC: 148 MG/DL (ref 65–99)
GLUCOSE BLD-MCNC: 78 MG/DL (ref 65–99)
GLUCOSE BLD-MCNC: 99 MG/DL (ref 65–99)
GLUCOSE SERPL-MCNC: 85 MG/DL (ref 65–99)
HCT VFR BLD AUTO: 36.1 % (ref 37–47)
HGB BLD-MCNC: 11.6 G/DL (ref 12–16)
IMM GRANULOCYTES # BLD AUTO: 0.02 K/UL (ref 0–0.11)
IMM GRANULOCYTES NFR BLD AUTO: 0.3 % (ref 0–0.9)
LYMPHOCYTES # BLD AUTO: 2.07 K/UL (ref 1–4.8)
LYMPHOCYTES NFR BLD: 26.9 % (ref 22–41)
MCH RBC QN AUTO: 28.8 PG (ref 27–33)
MCHC RBC AUTO-ENTMCNC: 32.1 G/DL (ref 33.6–35)
MCV RBC AUTO: 89.6 FL (ref 81.4–97.8)
MONOCYTES # BLD AUTO: 1.07 K/UL (ref 0–0.85)
MONOCYTES NFR BLD AUTO: 13.9 % (ref 0–13.4)
NEUTROPHILS # BLD AUTO: 4.03 K/UL (ref 2–7.15)
NEUTROPHILS NFR BLD: 52.3 % (ref 44–72)
NRBC # BLD AUTO: 0 K/UL
NRBC BLD-RTO: 0 /100 WBC
PLATELET # BLD AUTO: 422 K/UL (ref 164–446)
PMV BLD AUTO: 9.3 FL (ref 9–12.9)
POTASSIUM SERPL-SCNC: 4.5 MMOL/L (ref 3.6–5.5)
RBC # BLD AUTO: 4.03 M/UL (ref 4.2–5.4)
SODIUM SERPL-SCNC: 142 MMOL/L (ref 135–145)
WBC # BLD AUTO: 7.7 K/UL (ref 4.8–10.8)

## 2020-10-25 PROCEDURE — 94669 MECHANICAL CHEST WALL OSCILL: CPT

## 2020-10-25 PROCEDURE — 700105 HCHG RX REV CODE 258: Performed by: HOSPITALIST

## 2020-10-25 PROCEDURE — 94640 AIRWAY INHALATION TREATMENT: CPT

## 2020-10-25 PROCEDURE — 82962 GLUCOSE BLOOD TEST: CPT | Mod: 91

## 2020-10-25 PROCEDURE — 700111 HCHG RX REV CODE 636 W/ 250 OVERRIDE (IP): Performed by: HOSPITALIST

## 2020-10-25 PROCEDURE — 700102 HCHG RX REV CODE 250 W/ 637 OVERRIDE(OP): Performed by: STUDENT IN AN ORGANIZED HEALTH CARE EDUCATION/TRAINING PROGRAM

## 2020-10-25 PROCEDURE — A9270 NON-COVERED ITEM OR SERVICE: HCPCS | Performed by: HOSPITALIST

## 2020-10-25 PROCEDURE — 700102 HCHG RX REV CODE 250 W/ 637 OVERRIDE(OP): Performed by: HOSPITALIST

## 2020-10-25 PROCEDURE — 700101 HCHG RX REV CODE 250: Performed by: HOSPITALIST

## 2020-10-25 PROCEDURE — 80048 BASIC METABOLIC PNL TOTAL CA: CPT

## 2020-10-25 PROCEDURE — 99233 SBSQ HOSP IP/OBS HIGH 50: CPT | Performed by: STUDENT IN AN ORGANIZED HEALTH CARE EDUCATION/TRAINING PROGRAM

## 2020-10-25 PROCEDURE — 770021 HCHG ROOM/CARE - ISO PRIVATE

## 2020-10-25 PROCEDURE — 85025 COMPLETE CBC W/AUTO DIFF WBC: CPT

## 2020-10-25 PROCEDURE — 94760 N-INVAS EAR/PLS OXIMETRY 1: CPT

## 2020-10-25 PROCEDURE — A9270 NON-COVERED ITEM OR SERVICE: HCPCS | Performed by: STUDENT IN AN ORGANIZED HEALTH CARE EDUCATION/TRAINING PROGRAM

## 2020-10-25 RX ADMIN — CEFTAZIDIME 1 G: 1 INJECTION, POWDER, FOR SOLUTION INTRAMUSCULAR; INTRAVENOUS at 06:40

## 2020-10-25 RX ADMIN — CEFTAZIDIME 1 G: 1 INJECTION, POWDER, FOR SOLUTION INTRAMUSCULAR; INTRAVENOUS at 21:16

## 2020-10-25 RX ADMIN — PANCRELIPASE 6000 UNITS: 30000; 6000; 19000 CAPSULE, DELAYED RELEASE PELLETS ORAL at 08:24

## 2020-10-25 RX ADMIN — ALBUTEROL SULFATE 2.5 MG: 2.5 SOLUTION RESPIRATORY (INHALATION) at 19:31

## 2020-10-25 RX ADMIN — INSULIN GLARGINE 6 UNITS: 100 INJECTION, SOLUTION SUBCUTANEOUS at 17:36

## 2020-10-25 RX ADMIN — AZTREONAM 1000 MG: 1 INJECTION, POWDER, LYOPHILIZED, FOR SOLUTION INTRAMUSCULAR; INTRAVENOUS at 09:48

## 2020-10-25 RX ADMIN — PANCRELIPASE 6000 UNITS: 30000; 6000; 19000 CAPSULE, DELAYED RELEASE PELLETS ORAL at 12:07

## 2020-10-25 RX ADMIN — Medication 8 ML: at 06:58

## 2020-10-25 RX ADMIN — ALBUTEROL SULFATE 2.5 MG: 2.5 SOLUTION RESPIRATORY (INHALATION) at 14:40

## 2020-10-25 RX ADMIN — Medication 1 CAPSULE: at 08:34

## 2020-10-25 RX ADMIN — PANCRELIPASE 6000 UNITS: 30000; 6000; 19000 CAPSULE, DELAYED RELEASE PELLETS ORAL at 17:27

## 2020-10-25 RX ADMIN — AZTREONAM 1000 MG: 1 INJECTION, POWDER, LYOPHILIZED, FOR SOLUTION INTRAMUSCULAR; INTRAVENOUS at 01:20

## 2020-10-25 RX ADMIN — Medication 8 ML: at 14:44

## 2020-10-25 RX ADMIN — ALBUTEROL SULFATE 2.5 MG: 2.5 SOLUTION RESPIRATORY (INHALATION) at 06:57

## 2020-10-25 RX ADMIN — Medication 8 ML: at 19:30

## 2020-10-25 RX ADMIN — PHYTONADIONE 5 MG: 5 TABLET ORAL at 08:35

## 2020-10-25 RX ADMIN — CEFTAZIDIME 1 G: 1 INJECTION, POWDER, FOR SOLUTION INTRAMUSCULAR; INTRAVENOUS at 14:13

## 2020-10-25 RX ADMIN — AZTREONAM 1000 MG: 1 INJECTION, POWDER, LYOPHILIZED, FOR SOLUTION INTRAMUSCULAR; INTRAVENOUS at 17:27

## 2020-10-25 RX ADMIN — SENNOSIDES-DOCUSATE SODIUM TAB 8.6-50 MG 2 TABLET: 8.6-5 TAB at 06:39

## 2020-10-25 RX ADMIN — LACTULOSE 30 ML: 20 SOLUTION ORAL at 06:39

## 2020-10-25 RX ADMIN — OXYCODONE HYDROCHLORIDE AND ACETAMINOPHEN 1000 MG: 500 TABLET ORAL at 08:21

## 2020-10-25 RX ADMIN — Medication 10000 UNITS: at 08:21

## 2020-10-25 ASSESSMENT — ENCOUNTER SYMPTOMS
BRUISES/BLEEDS EASILY: 0
HEADACHES: 0
CONSTITUTIONAL NEGATIVE: 1
MUSCULOSKELETAL NEGATIVE: 1
PALPITATIONS: 0
FEVER: 0
DIAPHORESIS: 0
VOMITING: 0
LOSS OF CONSCIOUSNESS: 0
NEUROLOGICAL NEGATIVE: 1
DOUBLE VISION: 0
GASTROINTESTINAL NEGATIVE: 1
BLOOD IN STOOL: 0
ABDOMINAL PAIN: 0
COUGH: 1
SPUTUM PRODUCTION: 1
DIZZINESS: 0
NERVOUS/ANXIOUS: 0
HEMOPTYSIS: 1
NAUSEA: 0
HEARTBURN: 0
WHEEZING: 0
FOCAL WEAKNESS: 0
SHORTNESS OF BREATH: 1
DIARRHEA: 0
SEIZURES: 0
PSYCHIATRIC NEGATIVE: 1
CHILLS: 0
CONSTIPATION: 0
EYES NEGATIVE: 1

## 2020-10-25 NOTE — FLOWSHEET NOTE
10/25/20 0659   Inhalation Therapy Treatment   $ SVN Performed Yes   Given By: Mouthpiece   Chest Physiotherapy Treatment   $ PEP/CPT Performed PEP / Flutter

## 2020-10-25 NOTE — PROGRESS NOTES
Assessment complete. Pt denies pain or discomfort. PIV and PICC flush well. Pt reports only sm pellet sized BM today. FSBG 135. Safety precautions in place. Call light in reach.

## 2020-10-25 NOTE — FLOWSHEET NOTE
10/25/20 0700   Vital Signs   Pulse 88   Respiration 16   Pulse Oximetry 95 %   $ Pulse Oximetry (Spot Check) Yes   Respiratory Assessment   Level of Consciousness Alert   Breath Sounds   RUL Breath Sounds Clear   RML Breath Sounds Clear   RLL Breath Sounds Clear   SANTOS Breath Sounds Clear   LLL Breath Sounds Clear   Secretions   Cough Congested;Moist;Productive   How Sputum Obtained Spontaneous   Sputum Amount Small   Sputum Color Yellow   Sputum Consistency Thick   Oxygen   O2 (LPM) 0   O2 Delivery Device None - Room Air

## 2020-10-25 NOTE — PROGRESS NOTES
Report received from Angelica COKER. POC discussed. Pt resting comfortably in bed. Safety precautions in place. Pt requesting RT treatment before bed. RT notified.

## 2020-10-25 NOTE — PROGRESS NOTES
Pt has blood tinged sputum. She claims that the  used in the room earlier in the day irritated her lungs and caused the bleed. She requests that they not clean using chemicals for the remainder of her stay.

## 2020-10-25 NOTE — CARE PLAN
Problem: Infection  Goal: Will remain free from infection  Description: Patient still getting antibiotics as scheduled  Outcome: PROGRESSING AS EXPECTED  Note: Pt is receiving IV abx, encouraged hand hygiene before meals and after using bathroom.      Problem: Respiratory:  Goal: Respiratory status will improve  Outcome: PROGRESSING AS EXPECTED  Note: Pt is maintaining oxygen saturation above 90% on rooms air. Pt denies SOB. Will continue to monitor.

## 2020-10-25 NOTE — PROGRESS NOTES
Report received from Vida COKER. POC discussed. Pt resting comfortably in bed. Safety precautions in place.

## 2020-10-25 NOTE — CARE PLAN
Problem: Infection  Goal: Will remain free from infection  Description: Patient still getting antibiotics as scheduled  Outcome: PROGRESSING AS EXPECTED  Note: Pt educated on handwashing and hygiene. Standard precautions implemented. Assessed for s/s of infections. VS and labs monitored. Pt verbalized understanding of infection prevention care plan.       Problem: Knowledge Deficit  Goal: Knowledge of disease process/condition, treatment plan, diagnostic tests, and medications will improve  Outcome: PROGRESSING AS EXPECTED  Note: Pt knowledge level assessed. Pt educated on disease process/condition, POC, diagnostic tests, and medications. Pt verbalized understanding of care plan.

## 2020-10-25 NOTE — PROGRESS NOTES
Hospital Medicine Daily Progress Note    Date of Service  10/25/2020    Chief Complaint  60 y.o. female admitted 10/21/2020 with shortness of breath along with chills and fevers.    Hospital Course    Connie is a 61-year-old female who comes in because of above-mentioned symptoms.  The patient has underlying cystic fibrosis for the past 50 years she has been treated.  And she is actually doing very well.  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  Patient was placed on Fortaz after discussing with the cystic fibrosis specialist we have also added aztreonam.  Patient will be monitored on her blood cultures if there was a negative then to get PICC Line which she received on 10/24/20 and then she should be able to discharge home when antibiotics to be finished off as an outpatient.      Interval Problem Update  Continue with pulmonary toilet  Continue with incentive spirometry  Continue with oxygen support  Continue with antibiotic management  Optimize pain management  Follow cultures  Patient symptomatically improved, still making yellow sputum  Spoke Dr. Payne , Cystic fibrosis specialist who will sent in prescription for out patient antibiotics which patient can self administer using home infusion services.    Consultants/Specialty  Cystic fibrosis specialist and pulmonology by phone    Code Status  Full Code    Disposition  Most likely home on 1-2 days once home infusion services set up    Review of Systems  Review of Systems   Constitutional: Negative.  Negative for chills, diaphoresis and fever.   HENT: Negative.    Eyes: Negative.  Negative for double vision.   Respiratory: Positive for cough, hemoptysis, sputum production and shortness of breath. Negative for wheezing.    Cardiovascular: Positive for chest pain. Negative for palpitations and leg swelling.   Gastrointestinal: Negative.  Negative for abdominal pain, blood in stool, constipation, diarrhea, heartburn,  nausea and vomiting.   Genitourinary: Negative.  Negative for frequency, hematuria and urgency.   Musculoskeletal: Negative.  Negative for joint pain.   Skin: Negative.  Negative for itching and rash.   Neurological: Negative.  Negative for dizziness, focal weakness, seizures, loss of consciousness and headaches.   Endo/Heme/Allergies: Negative.  Does not bruise/bleed easily.   Psychiatric/Behavioral: Negative.  Negative for suicidal ideas. The patient is not nervous/anxious.    All other systems reviewed and are negative.       Physical Exam  Temp:  [36.7 °C (98.1 °F)-36.8 °C (98.2 °F)] 36.8 °C (98.2 °F)  Pulse:  [] 96  Resp:  [16-18] 16  BP: (105-122)/(65-82) 105/65  SpO2:  [94 %-99 %] 96 %    Physical Exam  Vitals signs and nursing note reviewed.   Constitutional:       Appearance: Normal appearance. She is well-developed and normal weight.   HENT:      Head: Normocephalic and atraumatic.      Right Ear: External ear normal.      Left Ear: External ear normal.      Nose: Nose normal.      Mouth/Throat:      Mouth: Mucous membranes are moist.      Pharynx: Oropharynx is clear.   Eyes:      Extraocular Movements: Extraocular movements intact.      Conjunctiva/sclera: Conjunctivae normal.      Pupils: Pupils are equal, round, and reactive to light.   Neck:      Musculoskeletal: Normal range of motion and neck supple.      Thyroid: No thyromegaly.      Vascular: No JVD.   Cardiovascular:      Rate and Rhythm: Normal rate and regular rhythm.      Pulses: Normal pulses.      Heart sounds: Normal heart sounds.   Pulmonary:      Breath sounds: Decreased air movement present.   Chest:      Chest wall: No tenderness.   Abdominal:      General: Abdomen is flat. Bowel sounds are normal. There is no distension.      Palpations: Abdomen is soft. There is no mass.      Tenderness: There is no abdominal tenderness. There is no guarding or rebound.   Musculoskeletal: Normal range of motion.   Lymphadenopathy:      Cervical:  No cervical adenopathy.   Skin:     General: Skin is warm and dry.      Capillary Refill: Capillary refill takes less than 2 seconds.      Findings: No rash.   Neurological:      General: No focal deficit present.      Mental Status: She is alert and oriented to person, place, and time. Mental status is at baseline.      Cranial Nerves: No cranial nerve deficit.      Deep Tendon Reflexes: Reflexes are normal and symmetric.   Psychiatric:         Mood and Affect: Mood normal.         Behavior: Behavior normal.         Thought Content: Thought content normal.         Judgment: Judgment normal.         Fluids  No intake or output data in the 24 hours ending 10/25/20 1239    Laboratory  Recent Labs     10/23/20  0237 10/25/20  0130   WBC 8.5 7.7   RBC 4.09* 4.03*   HEMOGLOBIN 11.5* 11.6*   HEMATOCRIT 37.7 36.1*   MCV 92.2 89.6   MCH 28.1 28.8   MCHC 30.5* 32.1*   RDW 44.6 42.4   PLATELETCT 376 422   MPV 10.2 9.3     Recent Labs     10/23/20  0237 10/25/20  0130   SODIUM 140 142   POTASSIUM 4.3 4.5   CHLORIDE 103 105   CO2 26 28   GLUCOSE 101* 85   BUN 11 12   CREATININE 0.58 0.56   CALCIUM 9.2 8.9                   Imaging  IR-PICC LINE PLACEMENT W/ GUIDANCE > AGE 5   Final Result                  Ultrasound-guided PICC placement performed by qualified nursing staff as    above.          DX-CHEST-PORTABLE (1 VIEW)   Final Result         1.  Patchy left midlung and lower lobe infiltrate.           Assessment/Plan  * Sepsis ([facility])  Assessment & Plan  Sepsis at this point has resolved for the most part.  Continue to monitoring white blood cell count cultures and continue with antibiotics using a combination of aztreonam and Fortaz.          Cystic fibrosis (HCC)- (present on admission)  Assessment & Plan  Patient is infected with Pseudomonas once again.  Patient is on a combination of Fortaz and aztreonam  Continue also with Kalydeco and phytonadione.  Monitor blood cultures   Pt received PICC line 10/24/20    Diabetes  mellitus due to cystic fibrosis (HCC)- (present on admission)  Assessment & Plan  Patient currently is on Tresiba at home and here patient is on Lantus 6 units subcutaneously nightly and Lispro 5 units TIDAC with sliding scale during the day.  Diabetic diet  A1c is well controlled at 5.6    Exocrine pancreatic insufficiency- (present on admission)  Assessment & Plan  Patient is on Tresiba at home.  Continue with Lantus insulin here.       VTE prophylaxis: SCDs

## 2020-10-26 VITALS
HEIGHT: 65 IN | TEMPERATURE: 97.7 F | BODY MASS INDEX: 22.7 KG/M2 | RESPIRATION RATE: 18 BRPM | SYSTOLIC BLOOD PRESSURE: 114 MMHG | OXYGEN SATURATION: 95 % | DIASTOLIC BLOOD PRESSURE: 72 MMHG | WEIGHT: 136.24 LBS | HEART RATE: 76 BPM

## 2020-10-26 PROBLEM — A41.9 SEPSIS (HCC): Status: RESOLVED | Noted: 2020-10-22 | Resolved: 2020-10-26

## 2020-10-26 LAB
ANION GAP SERPL CALC-SCNC: 10 MMOL/L (ref 7–16)
BACTERIA BLD CULT: NORMAL
BACTERIA BLD CULT: NORMAL
BASOPHILS # BLD AUTO: 0.9 % (ref 0–1.8)
BASOPHILS # BLD: 0.07 K/UL (ref 0–0.12)
BUN SERPL-MCNC: 12 MG/DL (ref 8–22)
CALCIUM SERPL-MCNC: 8.8 MG/DL (ref 8.4–10.2)
CHLORIDE SERPL-SCNC: 107 MMOL/L (ref 96–112)
CO2 SERPL-SCNC: 26 MMOL/L (ref 20–33)
CREAT SERPL-MCNC: 0.56 MG/DL (ref 0.5–1.4)
EOSINOPHIL # BLD AUTO: 0.47 K/UL (ref 0–0.51)
EOSINOPHIL NFR BLD: 6.3 % (ref 0–6.9)
ERYTHROCYTE [DISTWIDTH] IN BLOOD BY AUTOMATED COUNT: 42.5 FL (ref 35.9–50)
GLUCOSE BLD-MCNC: 109 MG/DL (ref 65–99)
GLUCOSE BLD-MCNC: 85 MG/DL (ref 65–99)
GLUCOSE SERPL-MCNC: 96 MG/DL (ref 65–99)
HCT VFR BLD AUTO: 34.8 % (ref 37–47)
HGB BLD-MCNC: 11.2 G/DL (ref 12–16)
IMM GRANULOCYTES # BLD AUTO: 0.03 K/UL (ref 0–0.11)
IMM GRANULOCYTES NFR BLD AUTO: 0.4 % (ref 0–0.9)
LYMPHOCYTES # BLD AUTO: 1.57 K/UL (ref 1–4.8)
LYMPHOCYTES NFR BLD: 21.1 % (ref 22–41)
MCH RBC QN AUTO: 28.6 PG (ref 27–33)
MCHC RBC AUTO-ENTMCNC: 32.2 G/DL (ref 33.6–35)
MCV RBC AUTO: 89 FL (ref 81.4–97.8)
MONOCYTES # BLD AUTO: 0.87 K/UL (ref 0–0.85)
MONOCYTES NFR BLD AUTO: 11.7 % (ref 0–13.4)
NEUTROPHILS # BLD AUTO: 4.44 K/UL (ref 2–7.15)
NEUTROPHILS NFR BLD: 59.6 % (ref 44–72)
NRBC # BLD AUTO: 0 K/UL
NRBC BLD-RTO: 0 /100 WBC
PLATELET # BLD AUTO: 423 K/UL (ref 164–446)
PMV BLD AUTO: 9.1 FL (ref 9–12.9)
POTASSIUM SERPL-SCNC: 4.3 MMOL/L (ref 3.6–5.5)
RBC # BLD AUTO: 3.91 M/UL (ref 4.2–5.4)
SIGNIFICANT IND 70042: NORMAL
SIGNIFICANT IND 70042: NORMAL
SITE SITE: NORMAL
SITE SITE: NORMAL
SODIUM SERPL-SCNC: 143 MMOL/L (ref 135–145)
SOURCE SOURCE: NORMAL
SOURCE SOURCE: NORMAL
WBC # BLD AUTO: 7.5 K/UL (ref 4.8–10.8)

## 2020-10-26 PROCEDURE — 700102 HCHG RX REV CODE 250 W/ 637 OVERRIDE(OP): Performed by: HOSPITALIST

## 2020-10-26 PROCEDURE — 700105 HCHG RX REV CODE 258: Performed by: HOSPITALIST

## 2020-10-26 PROCEDURE — A9270 NON-COVERED ITEM OR SERVICE: HCPCS | Performed by: HOSPITALIST

## 2020-10-26 PROCEDURE — 80048 BASIC METABOLIC PNL TOTAL CA: CPT

## 2020-10-26 PROCEDURE — 700101 HCHG RX REV CODE 250: Performed by: HOSPITALIST

## 2020-10-26 PROCEDURE — 700111 HCHG RX REV CODE 636 W/ 250 OVERRIDE (IP): Performed by: HOSPITALIST

## 2020-10-26 PROCEDURE — 82962 GLUCOSE BLOOD TEST: CPT | Mod: 91

## 2020-10-26 PROCEDURE — 85025 COMPLETE CBC W/AUTO DIFF WBC: CPT

## 2020-10-26 PROCEDURE — 99239 HOSP IP/OBS DSCHRG MGMT >30: CPT | Performed by: STUDENT IN AN ORGANIZED HEALTH CARE EDUCATION/TRAINING PROGRAM

## 2020-10-26 RX ADMIN — OXYCODONE HYDROCHLORIDE AND ACETAMINOPHEN 1000 MG: 500 TABLET ORAL at 07:55

## 2020-10-26 RX ADMIN — PHYTONADIONE 5 MG: 5 TABLET ORAL at 07:55

## 2020-10-26 RX ADMIN — Medication 1 CAPSULE: at 07:55

## 2020-10-26 RX ADMIN — CEFTAZIDIME 1 G: 1 INJECTION, POWDER, FOR SOLUTION INTRAMUSCULAR; INTRAVENOUS at 14:01

## 2020-10-26 RX ADMIN — PANCRELIPASE 6000 UNITS: 30000; 6000; 19000 CAPSULE, DELAYED RELEASE PELLETS ORAL at 07:54

## 2020-10-26 RX ADMIN — CEFTAZIDIME 1 G: 1 INJECTION, POWDER, FOR SOLUTION INTRAMUSCULAR; INTRAVENOUS at 07:37

## 2020-10-26 RX ADMIN — SENNOSIDES-DOCUSATE SODIUM TAB 8.6-50 MG 2 TABLET: 8.6-5 TAB at 07:55

## 2020-10-26 RX ADMIN — Medication 10000 UNITS: at 07:55

## 2020-10-26 RX ADMIN — PANCRELIPASE 6000 UNITS: 30000; 6000; 19000 CAPSULE, DELAYED RELEASE PELLETS ORAL at 12:05

## 2020-10-26 RX ADMIN — AZTREONAM 1000 MG: 1 INJECTION, POWDER, LYOPHILIZED, FOR SOLUTION INTRAMUSCULAR; INTRAVENOUS at 10:23

## 2020-10-26 RX ADMIN — AZTREONAM 1000 MG: 1 INJECTION, POWDER, LYOPHILIZED, FOR SOLUTION INTRAMUSCULAR; INTRAVENOUS at 00:10

## 2020-10-26 ASSESSMENT — PAIN DESCRIPTION - PAIN TYPE: TYPE: CHRONIC PAIN

## 2020-10-26 NOTE — FLOWSHEET NOTE
10/25/20 1934   Inhalation Therapy Treatment   $ SVN Performed Yes   Given By: Mouthpiece   $ Continuous Nebulizer Q 1 Hour Yes   Chest Physiotherapy Treatment   $ PEP/CPT Performed PEP / Flutter

## 2020-10-26 NOTE — PROGRESS NOTES
Pt sitting in bed completing breathing treatment. Assessment complete, medicated per MAR. Discussed POC and timing of her medications. Pt requesting to have all morning medications with her breakfast.  Allowed time to ask questions. Pt resting in bed, RR even and unlabored, productive cough. Pt educated to call for assistance. Declines needs at this time. Safety precautions in place.

## 2020-10-26 NOTE — DISCHARGE INSTRUCTIONS
Discharge Instructions    Discharged to home by car with relative. Discharged via wheelchair, hospital escort: Yes.  Special equipment needed: Not Applicable    Be sure to schedule a follow-up appointment with your primary care doctor or any specialists as instructed.     Discharge Plan:   Diet Plan: Discussed  Activity Level: Discussed  Confirmed Follow up Appointment: Patient to Call and Schedule Appointment  Confirmed Symptoms Management: Discussed  Medication Reconciliation Updated: Yes  Influenza Vaccine Indication: Indicated: 9 to 64 years of age    I understand that a diet low in cholesterol, fat, and sodium is recommended for good health. Unless I have been given specific instructions below for another diet, I accept this instruction as my diet prescription.   Other diet: regular    Special Instructions: None    · Is patient discharged on Warfarin / Coumadin?   No     Depression / Suicide Risk    As you are discharged from this [facility] facility, it is important to learn how to keep safe from harming yourself.    Recognize the warning signs:  · Abrupt changes in personality, positive or negative- including increase in energy   · Giving away possessions  · Change in eating patterns- significant weight changes-  positive or negative  · Change in sleeping patterns- unable to sleep or sleeping all the time   · Unwillingness or inability to communicate  · Depression  · Unusual sadness, discouragement and loneliness  · Talk of wanting to die  · Neglect of personal appearance   · Rebelliousness- reckless behavior  · Withdrawal from people/activities they love  · Confusion- inability to concentrate     If you or a loved one observes any of these behaviors or has concerns about self-harm, here's what you can do:  · Talk about it- your feelings and reasons for harming yourself  · Remove any means that you might use to hurt yourself (examples: pills, rope, extension cords, firearm)  · Get professional help from  the community (Mental Health, Substance Abuse, psychological counseling)  · Do not be alone:Call your Safe Contact- someone whom you trust who will be there for you.  · Call your local CRISIS HOTLINE 212-1496 or 514-703-8457  · Call your local Children's Mobile Crisis Response Team [place] (058) 871-8695 or www.[employer]  · Call the toll free National Suicide Prevention Hotlines   · National Suicide Prevention Lifeline 728-148-JTLE (8355)  · [employer] [facility] Line Network 800-SUICIDE (009-1471)      Community-Acquired Pneumonia, Adult  Pneumonia is an infection of the lungs. It causes swelling in the airways of the lungs. Mucus and fluid may also build up inside the airways.  One type of pneumonia can happen while a person is in a hospital. A different type can happen when a person is not in a hospital (community-acquired pneumonia).   What are the causes?    This condition is caused by germs (viruses, bacteria, or fungi). Some types of germs can be passed from one person to another. This can happen when you breathe in droplets from the cough or sneeze of an infected person.  What increases the risk?  You are more likely to develop this condition if you:  · Have a long-term (chronic) disease, such as:  ? Chronic obstructive pulmonary disease (COPD).  ? Asthma.  ? Cystic fibrosis.  ? Congestive heart failure.  ? Diabetes.  ? Kidney disease.  · Have HIV.  · Have sickle cell disease.  · Have had your spleen removed.  · Do not take good care of your teeth and mouth (poor dental hygiene).  · Have a medical condition that increases the risk of breathing in droplets from your own mouth and nose.  · Have a weakened body defense system (immune system).  · Are a smoker.  · Travel to areas where the germs that cause this illness are common.  · Are around certain animals or the places they live.  What are the signs or symptoms?  · A dry cough.  · A wet (productive) cough.  · Fever.  · Sweating.  · Chest pain. This  often happens when breathing deeply or coughing.  · Fast breathing or trouble breathing.  · Shortness of breath.  · Shaking chills.  · Feeling tired (fatigue).  · Muscle aches.  How is this treated?  Treatment for this condition depends on many things. Most adults can be treated at home. In some cases, treatment must happen in a hospital. Treatment may include:  · Medicines given by mouth or through an IV tube.  · Being given extra oxygen.  · Respiratory therapy.  In rare cases, treatment for very bad pneumonia may include:  · Using a machine to help you breathe.  · Having a procedure to remove fluid from around your lungs.  Follow these instructions at home:  Medicines  · Take over-the-counter and prescription medicines only as told by your doctor.  ? Only take cough medicine if you are losing sleep.  · If you were prescribed an antibiotic medicine, take it as told by your doctor. Do not stop taking the antibiotic even if you start to feel better.  General instructions    · Sleep with your head and neck raised (elevated). You can do this by sleeping in a recliner or by putting a few pillows under your head.  · Rest as needed. Get at least 8 hours of sleep each night.  · Drink enough water to keep your pee (urine) pale yellow.  · Eat a healthy diet that includes plenty of vegetables, fruits, whole grains, low-fat dairy products, and lean protein.  · Do not use any products that contain nicotine or tobacco. These include cigarettes, e-cigarettes, and chewing tobacco. If you need help quitting, ask your doctor.  · Keep all follow-up visits as told by your doctor. This is important.  How is this prevented?  A shot (vaccine) can help prevent pneumonia. Shots are often suggested for:  · People older than 65 years of age.  · People older than 19 years of age who:  ? Are having cancer treatment.  ? Have long-term (chronic) lung disease.  ? Have problems with their body's defense system.  You may also prevent pneumonia if  you take these actions:  · Get the flu (influenza) shot every year.  · Go to the dentist as often as told.  · Wash your hands often. If you cannot use soap and water, use hand .  Contact a doctor if:  · You have a fever.  · You lose sleep because your cough medicine does not help.  Get help right away if:  · You are short of breath and it gets worse.  · You have more chest pain.  · Your sickness gets worse. This is very serious if:  ? You are an older adult.  ? Your body's defense system is weak.  · You cough up blood.  Summary  · Pneumonia is an infection of the lungs.  · Most adults can be treated at home. Some will need treatment in a hospital.  · Drink enough water to keep your pee pale yellow.  · Get at least 8 hours of sleep each night.  This information is not intended to replace advice given to you by your health care provider. Make sure you discuss any questions you have with your health care provider.  Document Released: 06/05/2009 Document Revised: 04/08/2020 Document Reviewed: 08/15/2019  Elsevier Patient Education © 2020 [employer]

## 2020-10-26 NOTE — FLOWSHEET NOTE
10/25/20 1934   Inhalation Therapy Treatment   $ SVN Performed Yes   Given By: Mouthpiece   $ Continuous Nebulizer Q 1 Hour Yes

## 2020-10-26 NOTE — DISCHARGE PLANNING
LSW spoke with Michaela with [facility]. They have orders needed to continue home infusions and are at [facility] to see pt. Michaela requested PICC report, blood cultures, and last progress note to be faxed to their main office.   LSW faxed clinicals.

## 2020-10-26 NOTE — CARE PLAN
Problem: Knowledge Deficit  Goal: Knowledge of disease process/condition, treatment plan, diagnostic tests, and medications will improve  Outcome: PROGRESSING AS EXPECTED  Note: Updated pt on plan of care, no questions at this time.      Problem: Respiratory:  Goal: Respiratory status will improve  Outcome: PROGRESSING AS EXPECTED  Note: Pt denies SOB. Will continue to monitor.

## 2020-10-26 NOTE — CARE PLAN
Problem: Infection  Goal: Will remain free from infection  Description: Patient still getting antibiotics as scheduled  Outcome: PROGRESSING AS EXPECTED  Note: Pt taught signs and symptoms of infection. Instructed on proper hand washing techniques and oral care. Standard precautions used at every encounter.      Problem: Pain Management  Goal: Pain level will decrease to patient's comfort goal  Description: Patient denies pain at this time  Outcome: PROGRESSING AS EXPECTED  Flowsheets (Taken 10/25/2020 2000)  Pain Rating Scale (NPRS): 0  Note: Medicated for pain as needed, non pharm methods of comfort used and taught. Pt educated on use of pain scale.

## 2020-10-26 NOTE — PROGRESS NOTES
Discharge order written. IV removed, patient tolerated. All personal belongings are in possession. AVS printed, reviewed and copy signed and placed on the chart. Patient has no further questions. Discharged in satisfactory condition.PT left unit with SO via walking. Staff escort  declined.   Home medications returned to pt.

## 2020-10-26 NOTE — FLOWSHEET NOTE
10/25/20 1938   Vital Signs   Pulse 91   Pulse Oximetry 98 %   $ Pulse Oximetry (Spot Check) Yes   Oxygen   O2 Delivery Device None - Room Air

## 2020-10-26 NOTE — DISCHARGE SUMMARY
Discharge Summary    CHIEF COMPLAINT ON ADMISSION  Chief Complaint   Patient presents with   • Cough     Hx CF and battling lung infection x 1 mon   • Shortness of Breath   • Fever     Int x 1 mon Last night  T-max 102.7   • Chills     Shaking chills last night   • Chest Pain     Pleuritic LT axilla pain Onset today       Reason for Admission  fever, chest/flank pain     Admission Date  10/21/2020    CODE STATUS  Full Code    HPI & HOSPITAL COURSE     Connie is a 61-year-old female who comes in because of above-mentioned symptoms.  The patient has underlying cystic fibrosis for the past 50 years she has been treated.  And she is actually doing very well.  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  Patient was placed on Fortaz after discussing with the cystic fibrosis specialist we have also added aztreonam.  Patient will be monitored on her blood cultures if there was a negative then to get PICC Line which she received on 10/24/20 and then she should be able to discharge home when antibiotics to be finished off as an outpatient.  Patient 2 blood cultures and urine cultures negative on 10/21/20. Patient clinically improved, vitals stable, labs stable, pt cleared for DC on IV antibiotics as per Dr. Cuellar, cystic fibrosis specialist recommendations. Explained to the patient to  follow up with PCP in 1 week for labs, physical.     Therefore, she is discharged in good and stable condition to home with close outpatient follow-up.    The patient met 2-midnight criteria for an inpatient stay at the time of discharge.    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  [address] [address]  [phone]    Schedule an appointment as soon as possible for a visit in 1 week        MEDICATIONS ON DISCHARGE     Medication List      CHANGE how you take these medications      Instructions   albuterol 2.5mg/3ml Nebu solution for nebulization  What changed: when to take this  Commonly known as: PROVENTIL   Doctor's comments: Dispense 90 day supply please  3 mL by Nebulization route 2 Times a Day for 364 days.  Dose: 2.5 mg     HumaLOG KwikPen 100 UNIT/ML Sopn injection PEN  What changed: how much to take  Generic drug: insulin lispro   Inject 10 Units as instructed 3 times a day before meals. Needs Humalog cartridges for use in the INPEN  Dose: 10 Units     sodium chloride 7 % Nebu  What changed: See the new instructions.  Commonly known as: HYPER-SAL   USE 4 ML BY NEBULIZATION ROUTE 4 TIMES A DAY (MAX 240ML - 1 BOX PER INS)     Tresiba 100 UNIT/ML Soln  What changed: how much to take   Inject 30 Units as instructed every day.  Dose: 30 Units     vitamin A 01584 UNIT capsule  What changed:   · how much to take  · when to take this  · additional instructions   Take 1 Cap by mouth every day.  Dose: 10,000 Units        CONTINUE taking these medications      Instructions   Cholecalciferol 125 MCG (5000 UT) Tabs   Take 10,000 Units by mouth 2 Times a Day.  Dose: 10,000 Units     ciprofloxacin 750 MG Tabs  Commonly known as: CIPRO   Take 1 Tab by mouth 2 times a day.  Dose: 750 mg     Creon 6000 units Cpep  Generic drug: pancrelipase (Lip-Prot-Amyl)   TAKE 1-2 CAPSULES 4 TIMES A DAY AS NEEDED WITH MEALS OR SNACKS **KEEP IN ORIGINAL BOTTLE**     DEKAS PLUS PO   Take 1 Cap by mouth 2 times a day.  Dose: 1 Cap     Dexcom G6 Sensor Misc   1 Each by Does not apply route every 10 days.  Dose: 1 Each     Dexcom G6 Transmitter Misc   1 Each by Does not  apply route Continuous. Change every 3 months  Dose: 1 Each     fluticasone-salmeterol 250-50 MCG/DOSE Aepb  Commonly known as: Advair Diskus   Inhale 1 Puff by mouth 2 times a day. Rinse mouth after each use.  Dose: 1 Puff     Kalydeco 150 MG Tabs  Generic drug: Ivacaftor   Take 1 Tab by mouth 2 times a day. With fat containing food.  Dose: 1 Tab     magnesium oxide 400 MG Tabs tablet  Commonly known as: MAG-OX   Take 200 mg by mouth 2 times a day.  Dose: 200 mg     phytonadione 5 MG Tabs  Commonly known as: MEPHYTON   Doctor's comments: Dispense 90 day supply please  Take 1 Tab by mouth every day.  Dose: 5 mg     PROBIOTIC-10 PO   Take 1 Tab by mouth every day.  Dose: 1 Tab     Vitamin C 1000 MG Tabs   Take 1,000 mg by mouth 2 Times a Day.  Dose: 1,000 mg            Allergies  Allergies   Allergen Reactions   • Levofloxacin      Unknown reaction  Possibly myalgias, arthralgias, nightmares   • Tobramycin Hives     Hives       DIET  Orders Placed This Encounter   Procedures   • Diet Order Diabetic     Standing Status:   Standing     Number of Occurrences:   1     Order Specific Question:   Diet:     Answer:   Diabetic [3]       ACTIVITY  As tolerated.  Weight bearing as tolerated    CONSULTATIONS  Cystic fibrosis specialist ( Dr Cuellar) by phone    PROCEDURES  PICC Line placement    LABORATORY  Lab Results   Component Value Date    SODIUM 143 10/26/2020    POTASSIUM 4.3 10/26/2020    CHLORIDE 107 10/26/2020    CO2 26 10/26/2020    GLUCOSE 96 10/26/2020    BUN 12 10/26/2020    CREATININE 0.56 10/26/2020        Lab Results   Component Value Date    WBC 7.5 10/26/2020    HEMOGLOBIN 11.2 (L) 10/26/2020    HEMATOCRIT 34.8 (L) 10/26/2020    PLATELETCT 423 10/26/2020        Total time of the discharge process exceeds 34 minutes.

## 2020-10-27 DIAGNOSIS — E84.9 CYSTIC FIBROSIS (HCC): ICD-10-CM

## 2020-10-28 RX ORDER — IVACAFTOR 150 MG/1
TABLET, FILM COATED ORAL
Qty: 56 TAB | Refills: 11 | Status: SHIPPED | OUTPATIENT
Start: 2020-10-28 | End: 2021-09-21

## 2020-11-02 ENCOUNTER — TELEPHONE (OUTPATIENT)
Dept: PEDIATRIC PULMONOLOGY | Facility: MEDICAL CENTER | Age: 60
End: 2020-11-02

## 2020-11-02 ENCOUNTER — OFFICE VISIT (OUTPATIENT)
Dept: MEDICAL GROUP | Facility: LAB | Age: 60
End: 2020-11-02
Payer: COMMERCIAL

## 2020-11-02 VITALS
HEART RATE: 88 BPM | TEMPERATURE: 98.9 F | DIASTOLIC BLOOD PRESSURE: 60 MMHG | BODY MASS INDEX: 21.86 KG/M2 | HEIGHT: 66 IN | OXYGEN SATURATION: 96 % | WEIGHT: 136 LBS | SYSTOLIC BLOOD PRESSURE: 110 MMHG | RESPIRATION RATE: 16 BRPM

## 2020-11-02 DIAGNOSIS — D64.9 ANEMIA, UNSPECIFIED TYPE: ICD-10-CM

## 2020-11-02 DIAGNOSIS — E84.0 CYSTIC FIBROSIS WITH PULMONARY MANIFESTATIONS (HCC): ICD-10-CM

## 2020-11-02 DIAGNOSIS — B96.5 PSEUDOMONAS RESPIRATORY INFECTION: ICD-10-CM

## 2020-11-02 DIAGNOSIS — Z23 NEED FOR VACCINATION: ICD-10-CM

## 2020-11-02 DIAGNOSIS — E84.9 DIABETES MELLITUS DUE TO CYSTIC FIBROSIS (HCC): ICD-10-CM

## 2020-11-02 DIAGNOSIS — J98.8 PSEUDOMONAS RESPIRATORY INFECTION: ICD-10-CM

## 2020-11-02 DIAGNOSIS — E08.9 DIABETES MELLITUS DUE TO CYSTIC FIBROSIS (HCC): ICD-10-CM

## 2020-11-02 PROCEDURE — 90686 IIV4 VACC NO PRSV 0.5 ML IM: CPT | Performed by: FAMILY MEDICINE

## 2020-11-02 PROCEDURE — 90471 IMMUNIZATION ADMIN: CPT | Performed by: FAMILY MEDICINE

## 2020-11-02 PROCEDURE — 99214 OFFICE O/P EST MOD 30 MIN: CPT | Mod: 25 | Performed by: FAMILY MEDICINE

## 2020-11-02 RX ORDER — AZTREONAM 75 MG/ML
KIT INHALATION
COMMUNITY
Start: 2020-10-12 | End: 2021-07-03

## 2020-11-02 ASSESSMENT — FIBROSIS 4 INDEX: FIB4 SCORE: 0.68

## 2020-11-02 ASSESSMENT — PATIENT HEALTH QUESTIONNAIRE - PHQ9
5. POOR APPETITE OR OVEREATING: 0 - NOT AT ALL
CLINICAL INTERPRETATION OF PHQ2 SCORE: 4
SUM OF ALL RESPONSES TO PHQ QUESTIONS 1-9: 7

## 2020-11-02 NOTE — TELEPHONE ENCOUNTER
Josep from [facility] called asking if Dr. Payne plans to stop the patient's IV abx on Nov 6th as planned. I spoke with Dr. Payne and she said she does plan to stop them Nov 6th. I let Josep know.

## 2020-11-02 NOTE — PATIENT INSTRUCTIONS
Anemia    Anemia is a condition in which you do not have enough red blood cells or hemoglobin. Hemoglobin is a substance in red blood cells that carries oxygen. When you do not have enough red blood cells or hemoglobin (are anemic), your body cannot get enough oxygen and your organs may not work properly. As a result, you may feel very tired or have other problems.  What are the causes?  Common causes of anemia include:  · Excessive bleeding. Anemia can be caused by excessive bleeding inside or outside the body, including bleeding from the intestine or from periods in women.  · Poor nutrition.  · Long-lasting (chronic) kidney, thyroid, and liver disease.  · Bone marrow disorders.  · Cancer and treatments for cancer.  · HIV (human immunodeficiency virus) and AIDS (acquired immunodeficiency syndrome).  · Treatments for HIV and AIDS.  · Spleen problems.  · Blood disorders.  · Infections, medicines, and autoimmune disorders that destroy red blood cells.  What are the signs or symptoms?  Symptoms of this condition include:  · Minor weakness.  · Dizziness.  · Headache.  · Feeling heartbeats that are irregular or faster than normal (palpitations).  · Shortness of breath, especially with exercise.  · Paleness.  · Cold sensitivity.  · Indigestion.  · Nausea.  · Difficulty sleeping.  · Difficulty concentrating.  Symptoms may occur suddenly or develop slowly. If your anemia is mild, you may not have symptoms.  How is this diagnosed?  This condition is diagnosed based on:  · Blood tests.  · Your medical history.  · A physical exam.  · Bone marrow biopsy.  Your health care provider may also check your stool (feces) for blood and may do additional testing to look for the cause of your bleeding.  You may also have other tests, including:  · Imaging tests, such as a CT scan or MRI.  · Endoscopy.  · Colonoscopy.  How is this treated?  Treatment for this condition depends on the cause. If you continue to lose a lot of blood, you may  need to be treated at a hospital. Treatment may include:  · Taking supplements of iron, vitamin B12, or folic acid.  · Taking a hormone medicine (erythropoietin) that can help to stimulate red blood cell growth.  · Having a blood transfusion. This may be needed if you lose a lot of blood.  · Making changes to your diet.  · Having surgery to remove your spleen.  Follow these instructions at home:  · Take over-the-counter and prescription medicines only as told by your health care provider.  · Take supplements only as told by your health care provider.  · Follow any diet instructions that you were given.  · Keep all follow-up visits as told by your health care provider. This is important.  Contact a health care provider if:  · You develop new bleeding anywhere in the body.  Get help right away if:  · You are very weak.  · You are short of breath.  · You have pain in your abdomen or chest.  · You are dizzy or feel faint.  · You have trouble concentrating.  · You have bloody or black, tarry stools.  · You vomit repeatedly or you vomit up blood.  Summary  · Anemia is a condition in which you do not have enough red blood cells or enough of a substance in your red blood cells that carries oxygen (hemoglobin).  · Symptoms may occur suddenly or develop slowly.  · If your anemia is mild, you may not have symptoms.  · This condition is diagnosed with blood tests as well as a medical history and physical exam. Other tests may be needed.  · Treatment for this condition depends on the cause of the anemia.  This information is not intended to replace advice given to you by your health care provider. Make sure you discuss any questions you have with your health care provider.  Document Released: 01/25/2006 Document Revised: 11/30/2018 Document Reviewed: 01/19/2018  [employer] Patient Education © 2020 [employer]

## 2020-11-03 ENCOUNTER — HOSPITAL ENCOUNTER (OUTPATIENT)
Facility: MEDICAL CENTER | Age: 60
End: 2020-11-03
Attending: PEDIATRICS
Payer: COMMERCIAL

## 2020-11-03 PROCEDURE — 83550 IRON BINDING TEST: CPT

## 2020-11-03 PROCEDURE — 82728 ASSAY OF FERRITIN: CPT

## 2020-11-03 PROCEDURE — 85025 COMPLETE CBC W/AUTO DIFF WBC: CPT

## 2020-11-03 PROCEDURE — 83540 ASSAY OF IRON: CPT

## 2020-11-03 PROCEDURE — 82607 VITAMIN B-12: CPT

## 2020-11-03 PROCEDURE — 80048 BASIC METABOLIC PNL TOTAL CA: CPT

## 2020-11-03 NOTE — PROGRESS NOTES
Subjective:     Chief Complaint   Patient presents with   • Hospital Follow-up     review labs, fatigue       Kenia Soto is a 60 y.o. female here today for evaluation and management of:    Kenia is a patient of Dr. Siu.    Kenia is here for a hospital follow up.  She was recently hospitalized for a Pseudomonas infection.  She currently has a PICC line and is getting antibiotics daily through next week.  She has cystic fibrosis that has been fairly stable.  She is insulin-dependent diabetic secondary to this.  She was getting Humalog in the hospital and seemed to have better control.  She is currently on Tresiba but sees the endocrinologist.  She denies any current fever or chills.  She is feeling more fatigued.  Her hemoglobin and hematocrit were lower than they normally are.  She has not had any follow-up labs yet.    Allergies   Allergen Reactions   • Levofloxacin      Unknown reaction  Possibly myalgias, arthralgias, nightmares   • Tobramycin Hives     Hives       Current medicines (including changes today)  Current Outpatient Medications   Medication Sig Dispense Refill   • CAYSTON 75 MG Recon Soln      • KALYDECO 150 MG Tab TAKE ONE TABLET TWICE DAILY WITH FAT-CONTAINING FOOD 56 Tab 11   • ciprofloxacin (CIPRO) 750 MG Tab Take 1 Tab by mouth 2 times a day. 14 Tab 0   • Continuous Blood Gluc Sensor (DEXCOM G6 SENSOR) Misc 1 Each by Does not apply route every 10 days. 9 Each 4   • Continuous Blood Gluc Transmit (DEXCOM G6 TRANSMITTER) Misc 1 Each by Does not apply route Continuous. Change every 3 months 1 Each 4   • sodium chloride (HYPER-SAL) 7 % Nebu Soln USE 4 ML BY NEBULIZATION ROUTE 4 TIMES A DAY (MAX 240ML - 1 BOX PER INS) (Patient taking differently: 8 mL by Nebulization route 3 times a day.) 1440 mL 3   • CREON 6000 units Cap DR Particles TAKE 1-2 CAPSULES 4 TIMES A DAY AS NEEDED WITH MEALS OR SNACKS **KEEP IN ORIGINAL BOTTLE** 500 Cap 6   • Insulin Degludec (TRESIBA) 100 UNIT/ML Solution  Inject 30 Units as instructed every day. (Patient taking differently: Inject 6 Units as instructed every day.) 30 mL 3   • vitamin A 03338 UNIT capsule Take 1 Cap by mouth every day. (Patient taking differently: Take 25,000 Units by mouth See Admin Instructions. Mondays, Wednesday, Fridays , AND Saturdays) 30 Cap 3   • HUMALOG KWIKPEN 100 UNIT/ML Solution Pen-injector injection PEN Inject 10 Units as instructed 3 times a day before meals. Needs Humalog cartridges for use in the INPEN (Patient taking differently: Inject 11-14 Units as instructed 3 times a day before meals. Needs Humalog cartridges for use in the INPEN) 30 mL 3   • albuterol (PROVENTIL) 2.5mg/3ml Nebu Soln solution for nebulization 3 mL by Nebulization route 2 Times a Day for 364 days. (Patient taking differently: 2.5 mg by Nebulization route 3 times a day.) 540 mL 3   • phytonadione (MEPHYTON) 5 MG Tab Take 1 Tab by mouth every day. 90 Tab 3   • Multiple Vitamins-Minerals (DEKAS PLUS PO) Take 1 Cap by mouth 2 times a day.     • magnesium oxide (MAG-OX) 400 MG Tab Take 200 mg by mouth 2 times a day.     • Ascorbic Acid (VITAMIN C) 1000 MG Tab Take 1,000 mg by mouth 2 Times a Day.     • Probiotic Product (PROBIOTIC-10 PO) Take 1 Tab by mouth every day.     • Cholecalciferol 5000 units Tab Take 10,000 Units by mouth 2 Times a Day.       No current facility-administered medications for this visit.        She  has a past medical history of Breath shortness, Bronchitis, CF (cystic fibrosis) ([facility]), Coughing blood, Hemorrhagic disorder ([facility]), Herpes simplex, Pneumonia, and Shoulder fracture.    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 ([facility]) 06/16/2020     Priority: Medium   • Exocrine pancreatic insufficiency 06/13/2019     Priority:  "Medium   • Vitamin K deficiency 06/13/2019     Priority: Low   • 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 11/13/2019   • Cystic fibrosis with pulmonary manifestations ([facility]) 11/13/2019   • Herpes simplex 07/16/2019   • Osteopenia of multiple sites 07/16/2019   • CF (3849+10k bC>T, T7172G) ([facility]) 06/13/2019   • Pseudomonas respiratory infection 06/13/2019   • Partial thromboplastin time increased 08/22/2017   • Moderate COPD (chronic obstructive pulmonary disease) ([facility]) 04/07/2015   • 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  No nausea or vomiting.  No chest pain or palpitations.  No cough or SOB.  No pain with urination or hematuria.  No black or bloody stools.       Objective:     /60 (BP Location: Right arm, Patient Position: Sitting, BP Cuff Size: Adult)   Pulse 88   Temp 37.2 °C (98.9 °F) (Temporal)   Resp 16   Ht 1.664 m (5' 5.5\")   Wt 61.7 kg (136 lb)   SpO2 96%  Body mass index is 22.29 kg/m².   Physical Exam:  Well developed, well nourished.  Alert, oriented in no acute distress.  Eye contact is good, speech goal directed, affect calm  Eyes: conjunctiva non-injected, sclera non-icteric.  Neck Supple.  No adenopathy or masses in the neck or supraclavicular regions. No thyromegaly  Lungs: clear to auscultation bilaterally with good excursion. No wheezes or rhonchi  CV: regular rate and rhythm. No murmur        Assessment and Plan:   The following treatment plan was discussed    1. Cystic fibrosis with pulmonary manifestations (HCC)  Fairly stable. Dr Payne following    2. Pseudomonas respiratory infection  Patient is on cipro orally and daily IV antibiotics    3. 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 Anemia, unspecified type  Check CBC, iron binding and TIBC as well as B12.  - VITAMIN B12; Future  - IRON/TOTAL IRON BIND; " Future  - CBC WITH DIFFERENTIAL; Future    5. Need for vaccination    - Influenza Vaccine Quad Injection (PF)    Any change or worsening of signs or symptoms, patient encouraged to follow-up or report to the emergency room for further evaluation. Patient understands and agrees.    Followup: Return if symptoms worsen or fail to improve.

## 2020-11-04 LAB
ANION GAP SERPL CALC-SCNC: 10 MMOL/L (ref 7–16)
BASOPHILS # BLD AUTO: 1 % (ref 0–1.8)
BASOPHILS # BLD: 0.06 K/UL (ref 0–0.12)
BUN SERPL-MCNC: 14 MG/DL (ref 8–22)
CALCIUM SERPL-MCNC: 8.9 MG/DL (ref 8.5–10.5)
CHLORIDE SERPL-SCNC: 101 MMOL/L (ref 96–112)
CO2 SERPL-SCNC: 26 MMOL/L (ref 20–33)
CREAT SERPL-MCNC: 0.61 MG/DL (ref 0.5–1.4)
EOSINOPHIL # BLD AUTO: 0.16 K/UL (ref 0–0.51)
EOSINOPHIL NFR BLD: 2.8 % (ref 0–6.9)
ERYTHROCYTE [DISTWIDTH] IN BLOOD BY AUTOMATED COUNT: 46.4 FL (ref 35.9–50)
FERRITIN SERPL-MCNC: 97.7 NG/ML (ref 10–291)
GLUCOSE SERPL-MCNC: 95 MG/DL (ref 65–99)
HCT VFR BLD AUTO: 40.6 % (ref 37–47)
HGB BLD-MCNC: 12.3 G/DL (ref 12–16)
IMM GRANULOCYTES # BLD AUTO: 0.01 K/UL (ref 0–0.11)
IMM GRANULOCYTES NFR BLD AUTO: 0.2 % (ref 0–0.9)
IRON SATN MFR SERPL: 10 % (ref 15–55)
IRON SERPL-MCNC: 28 UG/DL (ref 40–170)
LYMPHOCYTES # BLD AUTO: 1.58 K/UL (ref 1–4.8)
LYMPHOCYTES NFR BLD: 27.3 % (ref 22–41)
MCH RBC QN AUTO: 28.5 PG (ref 27–33)
MCHC RBC AUTO-ENTMCNC: 30.3 G/DL (ref 33.6–35)
MCV RBC AUTO: 94 FL (ref 81.4–97.8)
MONOCYTES # BLD AUTO: 0.85 K/UL (ref 0–0.85)
MONOCYTES NFR BLD AUTO: 14.7 % (ref 0–13.4)
NEUTROPHILS # BLD AUTO: 3.12 K/UL (ref 2–7.15)
NEUTROPHILS NFR BLD: 54 % (ref 44–72)
NRBC # BLD AUTO: 0 K/UL
NRBC BLD-RTO: 0 /100 WBC
PLATELET # BLD AUTO: 396 K/UL (ref 164–446)
PMV BLD AUTO: 10.7 FL (ref 9–12.9)
POTASSIUM SERPL-SCNC: 4.6 MMOL/L (ref 3.6–5.5)
RBC # BLD AUTO: 4.32 M/UL (ref 4.2–5.4)
SODIUM SERPL-SCNC: 137 MMOL/L (ref 135–145)
TIBC SERPL-MCNC: 283 UG/DL (ref 250–450)
UIBC SERPL-MCNC: 255 UG/DL (ref 110–370)
VIT B12 SERPL-MCNC: 1268 PG/ML (ref 211–911)
WBC # BLD AUTO: 5.8 K/UL (ref 4.8–10.8)

## 2020-11-10 ENCOUNTER — HOSPITAL ENCOUNTER (OUTPATIENT)
Dept: LAB | Facility: MEDICAL CENTER | Age: 60
End: 2020-11-10
Attending: PEDIATRICS
Payer: COMMERCIAL

## 2020-11-10 DIAGNOSIS — E84.0 CYSTIC FIBROSIS WITH PULMONARY EXACERBATION (HCC): ICD-10-CM

## 2020-11-10 DIAGNOSIS — E84.9 CYSTIC FIBROSIS (HCC): ICD-10-CM

## 2020-11-10 LAB
ALBUMIN SERPL BCP-MCNC: 4.4 G/DL (ref 3.2–4.9)
ALBUMIN/GLOB SERPL: 1.7 G/DL
ALP SERPL-CCNC: 112 U/L (ref 30–99)
ALT SERPL-CCNC: 22 U/L (ref 2–50)
ANION GAP SERPL CALC-SCNC: 14 MMOL/L (ref 7–16)
AST SERPL-CCNC: 19 U/L (ref 12–45)
BILIRUB SERPL-MCNC: <0.2 MG/DL (ref 0.1–1.5)
BUN SERPL-MCNC: 15 MG/DL (ref 8–22)
CALCIUM SERPL-MCNC: 9.9 MG/DL (ref 8.5–10.5)
CHLORIDE SERPL-SCNC: 103 MMOL/L (ref 96–112)
CHOLEST SERPL-MCNC: 148 MG/DL (ref 100–199)
CO2 SERPL-SCNC: 27 MMOL/L (ref 20–33)
CREAT SERPL-MCNC: 0.63 MG/DL (ref 0.5–1.4)
GLOBULIN SER CALC-MCNC: 2.6 G/DL (ref 1.9–3.5)
GLUCOSE SERPL-MCNC: 60 MG/DL (ref 65–99)
HDLC SERPL-MCNC: 53 MG/DL
LDLC SERPL CALC-MCNC: 63 MG/DL
POTASSIUM SERPL-SCNC: 4.5 MMOL/L (ref 3.6–5.5)
PROT SERPL-MCNC: 7 G/DL (ref 6–8.2)
SARS-COV-2 AB SERPL QL IA: NORMAL
SODIUM SERPL-SCNC: 144 MMOL/L (ref 135–145)
TRIGL SERPL-MCNC: 159 MG/DL (ref 0–149)

## 2020-11-10 PROCEDURE — 36415 COLL VENOUS BLD VENIPUNCTURE: CPT

## 2020-11-10 PROCEDURE — 86769 SARS-COV-2 COVID-19 ANTIBODY: CPT

## 2020-11-10 PROCEDURE — 80053 COMPREHEN METABOLIC PANEL: CPT

## 2020-11-10 PROCEDURE — 80061 LIPID PANEL: CPT

## 2020-11-16 ENCOUNTER — TELEPHONE (OUTPATIENT)
Dept: PEDIATRIC PULMONOLOGY | Facility: MEDICAL CENTER | Age: 60
End: 2020-11-16

## 2020-11-16 NOTE — TELEPHONE ENCOUNTER
Patient called needing:    -PA ebony Moss needed  -PA for Creon  -How much iron to take? Taking 20 mg BID  -Changing to Dr. De Guzman for Endo  -ENT: wants to go to another office Dr. Oliva

## 2020-11-20 ENCOUNTER — PATIENT MESSAGE (OUTPATIENT)
Dept: PEDIATRIC PULMONOLOGY | Facility: MEDICAL CENTER | Age: 60
End: 2020-11-20

## 2020-11-20 DIAGNOSIS — E84.0 CYSTIC FIBROSIS WITH PULMONARY EXACERBATION (HCC): ICD-10-CM

## 2020-12-04 RX ORDER — CIPROFLOXACIN 750 MG/1
750 TABLET, FILM COATED ORAL 2 TIMES DAILY
Qty: 28 TAB | Refills: 0 | Status: SHIPPED | OUTPATIENT
Start: 2020-12-04 | End: 2020-12-16

## 2020-12-12 ENCOUNTER — HOSPITAL ENCOUNTER (OUTPATIENT)
Dept: RADIOLOGY | Facility: MEDICAL CENTER | Age: 60
End: 2020-12-12
Attending: NURSE PRACTITIONER
Payer: COMMERCIAL

## 2020-12-12 ENCOUNTER — OFFICE VISIT (OUTPATIENT)
Dept: URGENT CARE | Facility: CLINIC | Age: 60
End: 2020-12-12
Payer: COMMERCIAL

## 2020-12-12 ENCOUNTER — APPOINTMENT (OUTPATIENT)
Dept: RADIOLOGY | Facility: IMAGING CENTER | Age: 60
End: 2020-12-12
Attending: NURSE PRACTITIONER
Payer: COMMERCIAL

## 2020-12-12 VITALS
HEART RATE: 103 BPM | DIASTOLIC BLOOD PRESSURE: 60 MMHG | TEMPERATURE: 100.2 F | BODY MASS INDEX: 22.49 KG/M2 | HEIGHT: 65 IN | RESPIRATION RATE: 16 BRPM | OXYGEN SATURATION: 94 % | SYSTOLIC BLOOD PRESSURE: 134 MMHG | WEIGHT: 135 LBS

## 2020-12-12 DIAGNOSIS — M25.562 ACUTE PAIN OF LEFT KNEE: ICD-10-CM

## 2020-12-12 PROCEDURE — 99214 OFFICE O/P EST MOD 30 MIN: CPT | Performed by: NURSE PRACTITIONER

## 2020-12-12 PROCEDURE — 73562 X-RAY EXAM OF KNEE 3: CPT | Mod: TC,FY,LT | Performed by: NURSE PRACTITIONER

## 2020-12-12 PROCEDURE — 93971 EXTREMITY STUDY: CPT | Mod: LT

## 2020-12-12 ASSESSMENT — ENCOUNTER SYMPTOMS
FEVER: 0
VOMITING: 0
HEADACHES: 0
NAUSEA: 0
DIARRHEA: 0
COUGH: 0
DIZZINESS: 0
PALPITATIONS: 0

## 2020-12-12 ASSESSMENT — FIBROSIS 4 INDEX: FIB4 SCORE: 0.61

## 2020-12-12 ASSESSMENT — LIFESTYLE VARIABLES: SUBSTANCE_ABUSE: 0

## 2020-12-12 NOTE — PROGRESS NOTES
Subjective:      Kenia Soto is a 60 y.o. female who presents with Knee Pain (L knee/ thigh)      Reviewed past medical, surgical and family history. Reviewed prescription and OTC medications with patient in electronic health record today  Allergies: Levofloxacin and Tobramycin              HPI this is a new problem.  Connie is a 60-year-old female who presents with left knee pain.  Knee pain started in Oct when she was in the hospital for pneumonia.  She felt pain after having her knee catch for a long period of time.  She thought maybe she had a blood clot at that time but her symptoms slowly improved but never really resolved . Yesterday it was really painful. Yesterday took a walk but it got much worse with pain radiating into her calf and left thigh. No significant pain or swelling. Treatment tried: OTC analgesic, warm / cold compresses, massage. She denies pain or trauma. No other aggravating or alleviating factors.        Review of Systems   Constitutional: Negative for fever and malaise/fatigue.   Respiratory: Negative for cough.    Cardiovascular: Negative for chest pain and palpitations.   Gastrointestinal: Negative for diarrhea, nausea and vomiting.   Musculoskeletal: Positive for joint pain.   Neurological: Negative for dizziness and headaches.   Endo/Heme/Allergies: Negative for environmental allergies.   Psychiatric/Behavioral: Negative for substance abuse.     Allergies   Allergen Reactions   • Ciprofloxacin      Joint swelling   • Levofloxacin      Unknown reaction  Possibly myalgias, arthralgias, nightmares   • Tobramycin Hives     Hives     Past Medical History:   Diagnosis Date   • Breath shortness    • Bronchitis    • CF (cystic fibrosis) ([facility])    • Coughing blood    • Hemorrhagic disorder ([facility])    • Herpes simplex    • Pneumonia    • Shoulder fracture      Past Surgical History:   Procedure Laterality Date   • BRONCHOSCOPY-ENDO  7/22/2019    Procedure: BRONCHOSCOPY - W/BAL;  Surgeon:  Arelis Fleming M.D.;  Location: ENDOSCOPY [facility];  Service: Ent   • DENTAL SURGERY     • SINUS WASHING       Family History   Problem Relation Age of Onset   • Other Daughter         CF carrier   • Alcohol/Drug Father    • Heart Disease Father    • Cystic Fibrosis Sister      Social History     Tobacco Use   • Smoking status: Never Smoker   • Smokeless tobacco: Never Used   Substance Use Topics   • Alcohol use: Yes     Comment: Socially     Patient Active Problem List   Diagnosis   • CF (3849+10k bC>T, L3714R) ([facility])   • Exocrine pancreatic insufficiency   • Hemoptysis   • Vitamin K deficiency   • Pseudomonas respiratory infection   • Herpes simplex   • Osteopenia of multiple sites   • 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   • Cystic fibrosis ([facility])   • Cystic fibrosis with pulmonary manifestations ([facility])   • Irritable bowel syndrome   • Moderate COPD (chronic obstructive pulmonary disease) ([facility])   • Partial thromboplastin time increased   • Oroantral fistula   • Suspected COVID-19 virus infection   • 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 9 Each 4   • Continuous Blood Gluc Transmit (DEXCOM G6 TRANSMITTER) Misc 1 Each by Does not apply route Continuous. Change every 3 months 1 Each 4   • sodium chloride (HYPER-SAL) 7 % Nebu Soln USE 4 ML BY NEBULIZATION ROUTE 4 TIMES A DAY (MAX 240ML - 1 BOX PER INS) (Patient taking differently: 8 mL by Nebulization route 3 times a day.) 1440 mL 3   • CREON 6000 units Cap DR Particles TAKE 1-2 CAPSULES 4 TIMES A DAY AS NEEDED WITH MEALS OR SNACKS **KEEP IN ORIGINAL BOTTLE** 500 Cap 6   • Insulin Degludec (TRESIBA) 100 UNIT/ML Solution Inject 30 Units as  "instructed every day. (Patient taking differently: Inject 6 Units as instructed every day.) 30 mL 3   • vitamin A 24914 UNIT capsule Take 1 Cap by mouth every day. (Patient taking differently: Take 25,000 Units by mouth See Admin Instructions. Mondays, Wednesday, Fridays , AND Saturdays) 30 Cap 3   • HUMALOG KWIKPEN 100 UNIT/ML Solution Pen-injector injection PEN Inject 10 Units as instructed 3 times a day before meals. Needs Humalog cartridges for use in the INPEN (Patient taking differently: Inject 11-14 Units as instructed 3 times a day before meals. Needs Humalog cartridges for use in the INPEN) 30 mL 3   • albuterol (PROVENTIL) 2.5mg/3ml Nebu Soln solution for nebulization 3 mL by Nebulization route 2 Times a Day for 364 days. (Patient taking differently: 2.5 mg by Nebulization route 3 times a day.) 540 mL 3   • phytonadione (MEPHYTON) 5 MG Tab Take 1 Tab by mouth every day. 90 Tab 3   • Multiple Vitamins-Minerals (DEKAS PLUS PO) Take 1 Cap by mouth 2 times a day.     • magnesium oxide (MAG-OX) 400 MG Tab Take 200 mg by mouth 2 times a day.     • Ascorbic Acid (VITAMIN C) 1000 MG Tab Take 1,000 mg by mouth 2 Times a Day.     • Probiotic Product (PROBIOTIC-10 PO) Take 1 Tab by mouth every day.     • Cholecalciferol 5000 units Tab Take 10,000 Units by mouth 2 Times a Day.       No current facility-administered medications on file prior to visit.           Objective:     /60 (BP Location: Left arm, Patient Position: Sitting, BP Cuff Size: Adult)   Pulse (!) 103   Temp 37.9 °C (100.2 °F) (Temporal)   Resp 16   Ht 1.651 m (5' 5\")   Wt 61.2 kg (135 lb)   SpO2 94%   BMI 22.47 kg/m²      Physical Exam  Vitals signs and nursing note reviewed.   Constitutional:       General: She is not in acute distress.     Appearance: She is well-developed. She is not toxic-appearing.   HENT:      Head: Normocephalic.   Neck:      Musculoskeletal: No neck rigidity.   Cardiovascular:      Rate and Rhythm: Normal rate.      " Pulses: Normal pulses.   Pulmonary:      Effort: Pulmonary effort is normal.      Breath sounds: Normal breath sounds.   Musculoskeletal:      Left knee: She exhibits normal range of motion, no swelling, no effusion, no deformity, no erythema, normal alignment, no LCL laxity, no bony tenderness, normal meniscus and no MCL laxity. Tenderness found.        Legs:       Comments: FROM. Neg varus/ valgus.   Gait smooth and steady but mildly antalgic favoring left knee.    Skin:     General: Skin is warm and dry.      Capillary Refill: Capillary refill takes less than 2 seconds.   Neurological:      Mental Status: She is alert and oriented to person, place, and time.   Psychiatric:         Mood and Affect: Mood normal.         Behavior: Behavior normal. Behavior is cooperative.         Thought Content: Thought content normal.             RADIOLOGY RESULTS   Dx-knee 3 Views Left    Result Date: 12/12/2020 12/12/2020 3:16 PM HISTORY/REASON FOR EXAM:  Atraumatic Pain/Swelling/Deformity. Knee pain TECHNIQUE/EXAM DESCRIPTION AND NUMBER OF VIEWS:  3 views of the LEFT knee. COMPARISON: None FINDINGS: There is no fracture or dislocation. The visualized osseous structures are in anatomic alignment. Joint spaces are preserved. Bone mineralization is decreased.. No significant joint effusion.     No significant abnormality.    Us-extremity Venous Lower Unilat Left    Result Date: 12/12/2020 12/12/2020 5:59 PM HISTORY/REASON FOR EXAM:  Pain in Limb with Pressure TECHNIQUE/EXAM DESCRIPTION: Using both color and pulsed-wave Doppler imaging, multiple images were obtained of the left lower extremity from the common femoral vein origin distally through the popliteal trifurcation.  Graded compression was used to demonstrate a patent lumen. COMPARISON:  None. FINDINGS: REAL-TIME GRAY-SCALE IMAGING: Real-time gray-scale imaging reveals no evidence of focal wall thickening. COLOR AND DUPLEX DOPPLER IMAGING: There is no evidence of luminal  thrombus.  There is normal augmentation to flow and respiratory variation.     No evidence of left lower extremity deep venous thrombosis.         Xray: Reviewed and interpreted independently by me. I agree with the radiologist's findings.          Assessment/Plan:        1. Acute pain of left knee  US-EXTREMITY VENOUS LOWER UNILAT LEFT    DX-KNEE 3 VIEWS LEFT         US: pending     Differential Diagnosis includes but is not limited to:  Vascular or MSK knee pain. Will do xray and DVT us .     No massage of leg until after US completed.     OTC  analgesic of choice (acetaminophen or NSAID). Follow manufactures dosing and safety precautions.     Return to urgent care clinic or PCP prn  if current symptoms are not resolving in a satisfactory manner or sooner if new or worsening symptoms occur.     Differential diagnosis, natural history, supportive care, and indications for immediate follow-up. Advised of signs and symptoms which would warrant further evaluation and /or emergent evaluation in ER.      Verbalized agreement with this treatment plan and seemed to understand without barriers. Questions were encouraged and answered to satisfaction.

## 2020-12-16 ENCOUNTER — NON-PROVIDER VISIT (OUTPATIENT)
Dept: ENDOCRINOLOGY | Facility: MEDICAL CENTER | Age: 60
End: 2020-12-16
Attending: INTERNAL MEDICINE
Payer: COMMERCIAL

## 2020-12-16 VITALS — BODY MASS INDEX: 21.69 KG/M2 | HEIGHT: 66 IN | WEIGHT: 135 LBS

## 2020-12-16 DIAGNOSIS — E55.9 VITAMIN D DEFICIENCY: ICD-10-CM

## 2020-12-16 DIAGNOSIS — E84.9 DIABETES MELLITUS DUE TO CYSTIC FIBROSIS (HCC): ICD-10-CM

## 2020-12-16 DIAGNOSIS — K86.81 EXOCRINE PANCREATIC INSUFFICIENCY: ICD-10-CM

## 2020-12-16 DIAGNOSIS — E08.9 DIABETES MELLITUS DUE TO CYSTIC FIBROSIS (HCC): ICD-10-CM

## 2020-12-16 PROCEDURE — 99214 OFFICE O/P EST MOD 30 MIN: CPT | Mod: 95,CR | Performed by: INTERNAL MEDICINE

## 2020-12-16 RX ORDER — INSULIN GLARGINE 100 [IU]/ML
6 INJECTION, SOLUTION SUBCUTANEOUS EVERY EVENING
Qty: 9 ML | Refills: 3 | Status: SHIPPED | OUTPATIENT
Start: 2020-12-16 | End: 2020-12-16 | Stop reason: SDUPTHER

## 2020-12-16 RX ORDER — INSULIN GLARGINE 100 [IU]/ML
6 INJECTION, SOLUTION SUBCUTANEOUS EVERY EVENING
Qty: 9 ML | Refills: 3 | Status: SHIPPED | OUTPATIENT
Start: 2020-12-16 | End: 2021-05-05

## 2020-12-16 ASSESSMENT — FIBROSIS 4 INDEX: FIB4 SCORE: 0.61

## 2020-12-16 NOTE — PROGRESS NOTES
RN-CDE Note    Subjective:   Endocrinology Clinic Progress Note  PCP: Kelin Donohue M.D.    HPI:  Kenia Soto is a 60 y.o. old patient who comes in today for routine follow up of Diabetes due to cystic fibrosis, Exocrine pancreatic insufficiency, and Vitamin D Deficiency.   She was admitted with pneumonia in October and has been treated for respiratory infections several times since then.    Exocrine Pancreatic Insufficiency:  Currently taking Creon 6000 units 1-2 caps before meals.     Vitamin D Deficiency:  Currently taking Vitamin D 10,000 units daily     Ref. Range 9/8/2020 15:15   25-Hydroxy   Vitamin D 25 Latest Ref Range: 30 - 100 ng/mL 64     DM:   Last A1c:   Lab Results   Component Value Date/Time    HBA1C 5.6 09/08/2020 03:15 PM      A1C GOAL: < 7    Diabetes Medications:   Tresiba 6 units per day.  Doesn't feel as if this is working as well as when she was on Lantus in the hospital.  Would like to try.   Humalog 11-14  Taking above medications as prescribed: yes  Taking daily ASA: No    Exercise: no regular exercise, sedentary  Diet: tries eating healthy but admits to eating more sweets during the holidays.   Patient's body mass index is 22.12 kg/m². Exercise and nutrition counseling were performed at this visit.    Glucose monitoring frequency: using Dexcom G6 CGM.     Hypoglycemic episodes: yes - on occasion  Last Retinal Exam: states completed in November.  [facility]  Dr. Jhony An  Daily Foot Exam: Yes   Foot Exam:  Monofilament:  Unable to do due to virtual visit.     No results found for: MICROALBCALC, MALBCRT, MALBEXCR, [id number], MICROALBUR, MICRALB, UMICROALBUM, MICROALBTIM   ACR Albumin/Creatinine Ratio goal <30   Currently Rx ACE/ARB: No   Dyslipidemia:  Lab Results   Component Value Date/Time    CHOLSTRLTOT 148 11/10/2020 02:32 PM    LDL 63 11/10/2020 02:32 PM    HDL 53 11/10/2020 02:32 PM    TRIGLYCERIDE 159 (H) 11/10/2020 02:32 PM       Currently Rx Statin: No     She  reports that  she has never smoked. She has never used smokeless tobacco.      Plan:     Discussed and educated on:   - Discussed diabetic diet, encouraged portion control.   - Discussed importance of testing blood sugars and keeping logs.   - Discussed importance of daily exercise, recommended 30 minutes per day  - Reviewed medications and advised how to take.  - Discussed importance of immunizations and yearly eye exams.   - Advised daily foot  exams. Educated on signs of infection.   - Educated on need to stay well hydrated with water.  - Educated to call with any questions or problems.

## 2020-12-16 NOTE — PROGRESS NOTES
CHIEF COMPLAINT: Patient is here for follow up of Type 2 Diabetes Mellitus    HPI:   Patient was presented for a telehealth consultation via secure and encrypted videoconferencing technology. This encounter was conducted via Zoom . Verbal consent was obtained. Patient's identity was verified.    Kenia Soto is a 60 y.o. 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  Patient states that she is doing well even though she was recently hospitalized in October for pneumonia secondary to cystic fibrosis.  States that her morning blood glucose levels are around 110; notes that when she was in hospital and on Lantus she required last overall insulin and inquired about switching to Lantus from Tresiba.    Diabetes Medications:   Tresiba 6 units per day.   Humalog 11-14  Taking above medications as prescribed: yes  Taking daily ASA: No    Labs from 9/8/2020 HbA1c is 5.6    BG Diary:12/16/20  Morning glucose 110s    Weight has been stable    Diabetes Complications   Retinopathy: No known retinopathy.  Last eye exam: November 2020  Neuropathy: Denies paresthesias or numbness in hands or feet. Denies any foot wounds.  Exercise: Minimal.  Diet: Fair.  Patient's medications, allergies, and social histories were reviewed and updated as appropriate.    ROS:     CONS:     No fever, no chills   EYES:     No diplopia, no blurry vision   CV:           No chest pain, no palpitations   PULM:     No SOB, no cough, no hemoptysis.   GI:            No nausea, no vomiting, no diarrhea, no constipation   ENDO:     No polyuria, no polydipsia, no heat intolerance, no cold intolerance       Past Medical History:  Problem List:  2020-10: Sepsis ([facility])  2020-06: Diabetes mellitus due to cystic fibrosis ([facility])  2020-04: Leukocytosis  2020-04: Suspected COVID-19 virus infection  2019-11: 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  2019-11: Cystic fibrosis ([facility])  2019-11: Cystic fibrosis with pulmonary  "manifestations ([facility])  2019-11: Abnormal glucose tolerance test (GTT)  2019-07: Herpes simplex  2019-07: Osteopenia of multiple sites  2019-06: CF (3849+10k bC>T, O9029J) ([facility])  2019-06: Exocrine pancreatic insufficiency  2019-06: Hemoptysis  2019-06: Vitamin K deficiency  2019-06: Pseudomonas respiratory infection  2017-08: Partial thromboplastin time increased  2015-04: Moderate COPD (chronic obstructive pulmonary disease) ([facility])  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  2006-03: Irritable bowel syndrome      Past Surgical History:  Past Surgical History:   Procedure Laterality Date   • BRONCHOSCOPY-ENDO  7/22/2019    Procedure: BRONCHOSCOPY - W/BAL;  Surgeon: Arelis Fleming M.D.;  Location: [facility];  Service: Ent   • DENTAL SURGERY     • SINUS WASHING          Allergies:  Ciprofloxacin, Levofloxacin, and Tobramycin     Social History:  Social History     Tobacco Use   • Smoking status: Never Smoker   • Smokeless tobacco: Never Used   Substance Use Topics   • Alcohol use: Yes     Comment: Socially   • Drug use: No        Family History:   family history includes Alcohol/Drug in her father; Cystic Fibrosis in her sister; Heart Disease in her father; Other in her daughter.      PHYSICAL EXAM:   OBJECTIVE:  Vital signs: Ht 1.664 m (5' 5.5\")   Wt 61.2 kg (135 lb)   BMI 22.12 kg/m²   GENERAL: Well-developed, well-nourished in no apparent distress.   EYE:  No ocular asymmetry, PERRLA  HENT: Pink, moist mucous membranes.    NECK: No thyromegaly.   EXTREMITIES: No clubbing, cyanosis, or edema.   NEUROLOGICAL: No gross focal motor abnormalities     Labs:  Lab Results   Component Value Date/Time    HBA1C 5.6 09/08/2020 03:15 PM        Lab Results   Component Value Date/Time    WBC 5.8 11/03/2020 03:15 PM    RBC 4.32 11/03/2020 03:15 PM    HEMOGLOBIN 12.3 11/03/2020 03:15 PM    MCV 94.0 11/03/2020 03:15 PM    MCH 28.5 11/03/2020 03:15 PM    MCHC 30.3 (L) 11/03/2020 03:15 PM    RDW 46.4 11/03/2020 03:15 PM    MPV 10.7 " 11/03/2020 03:15 PM       Lab Results   Component Value Date/Time    SODIUM 144 11/10/2020 02:32 PM    POTASSIUM 4.5 11/10/2020 02:32 PM    CHLORIDE 103 11/10/2020 02:32 PM    CO2 27 11/10/2020 02:32 PM    ANION 14.0 11/10/2020 02:32 PM    GLUCOSE 60 (L) 11/10/2020 02:32 PM    BUN 15 11/10/2020 02:32 PM    CREATININE 0.63 11/10/2020 02:32 PM    CALCIUM 9.9 11/10/2020 02:32 PM    ASTSGOT 19 11/10/2020 02:32 PM    ALTSGPT 22 11/10/2020 02:32 PM    TBILIRUBIN <0.2 11/10/2020 02:32 PM    ALBUMIN 4.4 11/10/2020 02:32 PM    TOTPROTEIN 7.0 11/10/2020 02:32 PM    GLOBULIN 2.6 11/10/2020 02:32 PM    AGRATIO 1.7 11/10/2020 02:32 PM       Lab Results   Component Value Date/Time    CHOLSTRLTOT 148 11/10/2020 1432    TRIGLYCERIDE 159 (H) 11/10/2020 1432    HDL 53 11/10/2020 1432    LDL 63 11/10/2020 1432       No results found for: MICROALBCALC, MALBCRT, MALBEXCR, CFOOTE97, MICROALBUR, MICRALB, UMICROALBUM, MICROALBTIM     Lab Results   Component Value Date/Time    TSHULTRASEN 2.300 07/24/2019 0723     No results found for: FREEDIR  No results found for: FREET3  No results found for: THYSTIMIG        ASSESSMENT/PLAN:     1. Diabetes mellitus due to cystic fibrosis (HCC)  Patient previously managed on Tresiba 6 units daily and Humalog 11 to 14 units as needed for Premeal; last hemoglobin A1c 5.6% from 9/8/2020.  Patient inquired about being switched to Lantus from Tresiba because she states that she required less insulin when in the hospital and on Lantus; told patient that we will trial her on Lantus and she may titrate dose as necessary if too much or too little.  Encourage patient to continue doing good work on maintaining hemoglobin A1c below 7%, will have patient return to clinic in 3 months.    -Return to clinic in 3 months  -Discontinue Tresiba, start Lantus at 6 units  -Repeat labs of lipid panel, CBC, CMP, hemoglobin A1c, thyroid panel, and microalbumin    2. Exocrine pancreatic insufficiency  Patient has history of  cystic fibrosis which resulted in pancreatic insufficiency leading to type 2 diabetes.  She is currently being managed on Creon as pancreatic enzyme substitute, Lantus recently switch from Tresiba, and Humalog.    -Continue home Creon  -Continue diabetes regimen as above  -Return to clinic in 3 months    3. Vitamin D deficiency  Patient has history of vitamin D deficiency with last vitamin D level at 64 on 9/8/2020.  Patient currently takes 10,000 units vitamin D twice daily and a multivitamin.  Will recheck vitamin D levels in 3 months, encourage patient to continue on current regimen.      Return in about 3 months (around 3/16/2021) for Type 2 diabetes.      Guy Sawyer MD  PGY1 Internal Medicine  12/16/20      CC:   Kelin Donohue M.D.

## 2020-12-17 ENCOUNTER — TELEPHONE (OUTPATIENT)
Dept: URGENT CARE | Facility: PHYSICIAN GROUP | Age: 60
End: 2020-12-17

## 2021-01-06 ENCOUNTER — PATIENT MESSAGE (OUTPATIENT)
Dept: PEDIATRIC PULMONOLOGY | Facility: MEDICAL CENTER | Age: 61
End: 2021-01-06

## 2021-01-06 DIAGNOSIS — E84.9 CYSTIC FIBROSIS EXACERBATION (HCC): ICD-10-CM

## 2021-01-06 NOTE — TELEPHONE ENCOUNTER
From: Kenia Soto  To: Marjan Payne M.D.  Sent: 1/6/2021 1:55 PM PST  Subject: Non-Urgent Medical Question    Hi Dr. Payne,    Just wanted to let you know I'm running a fever again today. I stopped Cayston on Dec. 28. I still think I had Covid and I'm not getting over the side effects. I found this article that talks about red blood cells. Mine followed along with what the article says.   [email or web address]/health-news/coronavirus/what-the-blood-zhvsd-mo-t-covid-19-patient-can-tell-us/2020/03    Also found this one talking about red blood cells and how long it takes to recover.    https://[email or web address]/amp/s/[email or web address]/news-stories/attack-on-red-blood-cells-a-prime-suspect-in-covids-debilitating-effects%3fhs_amp=true    At this point I don't think Leo is doing much. Not sure what my options are. Inhaled keri??     Marina

## 2021-01-08 ENCOUNTER — HOSPITAL ENCOUNTER (OUTPATIENT)
Dept: RADIOLOGY | Facility: MEDICAL CENTER | Age: 61
End: 2021-01-08
Attending: PEDIATRICS
Payer: COMMERCIAL

## 2021-01-08 ENCOUNTER — HOSPITAL ENCOUNTER (OUTPATIENT)
Facility: MEDICAL CENTER | Age: 61
End: 2021-01-08
Attending: PEDIATRICS
Payer: COMMERCIAL

## 2021-01-08 DIAGNOSIS — E84.9 CYSTIC FIBROSIS EXACERBATION (HCC): ICD-10-CM

## 2021-01-08 PROCEDURE — 87077 CULTURE AEROBIC IDENTIFY: CPT

## 2021-01-08 PROCEDURE — 87070 CULTURE OTHR SPECIMN AEROBIC: CPT

## 2021-01-08 PROCEDURE — 71046 X-RAY EXAM CHEST 2 VIEWS: CPT

## 2021-01-08 PROCEDURE — 87186 SC STD MICRODIL/AGAR DIL: CPT

## 2021-01-08 PROCEDURE — 87181 SC STD AGAR DILUTION PER AGT: CPT

## 2021-01-08 PROCEDURE — 87205 SMEAR GRAM STAIN: CPT

## 2021-01-09 LAB
GRAM STN SPEC: NORMAL
SIGNIFICANT IND 70042: NORMAL
SITE SITE: NORMAL
SOURCE SOURCE: NORMAL

## 2021-01-11 ENCOUNTER — HOSPITAL ENCOUNTER (OUTPATIENT)
Dept: RADIOLOGY | Facility: MEDICAL CENTER | Age: 61
End: 2021-01-11
Attending: PEDIATRICS
Payer: COMMERCIAL

## 2021-01-11 DIAGNOSIS — E84.9 CYSTIC FIBROSIS EXACERBATION (HCC): ICD-10-CM

## 2021-01-11 PROCEDURE — 36573 INSJ PICC RS&I 5 YR+: CPT

## 2021-01-11 NOTE — PROGRESS NOTES
"  PICC Line Instructions    How to Care for your PICC  • Do not take a bath, swim, or use hot tubs when you have a PICC. Cover PICC line with clear plastic wrap and tape to keep it dry while showering  • Check the PICC insertion site daily for leakage, redness, swelling, or pain.  • Flush the PICC as directed by your health care provider. Let your health care provider know right away if the PICC is difficult to flush or does not flush. Do not use force to flush the PICC.  • Do not use a syringe that is less than 10 mL to flush the PICC  • Avoid blood pressure checks on the arm with the PICC.  • Do not take the PICC out yourself. Only a trained clinical professional should remove the PICC  • Make sure you or anyone who access your PICC Line washes their hands before using the line  • Make sure the hub of the line is \"scrubbed\" prior to using the line  Dressing Changes  • Change the PICC dressing as instructed by your health care provider.  • Change your PICC dressing if it becomes loose or wet.  When to seek medical attention  • PICC is accidentally pulled all the way out  • There is any type of drainage, redness, or swelling where the PICC enters the skin.  • You cannot flush the PICC, it is difficult to flush, or the PICC leaks around the insertion site when it is flushed.  • You hear a \"flushing\" sound when the PICC is flushed.  • You notice a hole or tear in the PICC.  • You develop chills or a fever  • Your left arm swells.      "

## 2021-01-11 NOTE — PROGRESS NOTES
I called to get patient's PICC placement scheduled (7300) at [facility]. Tech at  said they can schedule the patient today at 12:30pm.    I called patient to let her know and patient said they will be there at 12:30pm. All info sent to patient via [employer].

## 2021-01-11 NOTE — PROCEDURES
PICC Insertion Final 3CG    Consents confirmed, vessel patency confirmed with ultrasound. Risks and benefits of procedure explained to patient/family and education regarding central line associated bloodstream infections provided. Questions answered.     PICC placed in LUE per MD order with ultrasound guidance. 4 Fr, single lumen PICC placed in basilic vein after one attempt(s). 4 cc's of 1% lidocaine injected intradermally, 21 gauge microintroducer needle and modified Seldinger technique used. 42 cm catheter inserted with good blood return. Secured at 1 cm marker. Each lumen flushed without resistance with 10 mL 0.9% normal saline. PICC line secured with Biopatch and Tegaderm.    PICC placement is confirmed by nurse using 3CG technology. PICC line is appropriate for use at this time. Pt tolerated procedure well.  Patient condition relayed to unit RN or ordering physician via this post procedure note in the EMR.     CredSimple Power PICC ref #[id number], Lot #[id number]

## 2021-01-18 RX ORDER — ACYCLOVIR 400 MG/1
TABLET ORAL
Qty: 90 TAB | Refills: 3 | Status: SHIPPED | OUTPATIENT
Start: 2021-01-18 | End: 2021-09-03 | Stop reason: SDUPTHER

## 2021-01-18 NOTE — TELEPHONE ENCOUNTER
Received request via: Pharmacy    Was the patient seen in the last year in this department? Yes  11/2/20  Does the patient have an active prescription (recently filled or refills available) for medication(s) requested? No

## 2021-01-21 ENCOUNTER — TELEPHONE (OUTPATIENT)
Dept: PEDIATRIC PULMONOLOGY | Facility: MEDICAL CENTER | Age: 61
End: 2021-01-21

## 2021-01-21 NOTE — TELEPHONE ENCOUNTER
I called patient to see how she is doing she said fevers are gone, mucus has decreased, feeling better, having GI problems with the abx but doing ok with extra probiotics. PFT is improving but not to baseline yet.         She is done with IV abx next Tuesday morning (1/26/2021).     Follow up virtual visit scheduled 1/22/2021

## 2021-01-22 ENCOUNTER — TELEMEDICINE (OUTPATIENT)
Dept: PEDIATRIC PULMONOLOGY | Facility: MEDICAL CENTER | Age: 61
End: 2021-01-22
Payer: COMMERCIAL

## 2021-01-22 VITALS — OXYGEN SATURATION: 98 % | WEIGHT: 137 LBS | BODY MASS INDEX: 22.45 KG/M2

## 2021-01-22 DIAGNOSIS — Z22.39 PSEUDOMONAS AERUGINOSA COLONIZATION: ICD-10-CM

## 2021-01-22 DIAGNOSIS — E84.0 CYSTIC FIBROSIS WITH PULMONARY EXACERBATION (HCC): ICD-10-CM

## 2021-01-22 PROCEDURE — 99214 OFFICE O/P EST MOD 30 MIN: CPT | Mod: 95,CR | Performed by: PEDIATRICS

## 2021-01-22 ASSESSMENT — FIBROSIS 4 INDEX: FIB4 SCORE: 0.61

## 2021-01-22 NOTE — PROGRESS NOTES
This evaluation was conducted via Zoom using secure and encrypted videoconferencing technology. The patient was in a private location in the state of [place].    The patient's identity was confirmed and verbal consent was obtained for this virtual visit.    PCP:  Kelin Donohue M.D.   [id number] [id number] / [place] [id number]     SUBJECTIVE:   Kenia Soto is a 60 y.o. female with Cystic Fibrosis,     Patient Active Problem List    Diagnosis Date Noted   • Suspected COVID-19 virus infection 04/19/2020     Priority: High   • Cystic fibrosis ([facility]) 11/13/2019     Priority: High   • Hemoptysis 06/13/2019     Priority: High   • Diabetes mellitus due to cystic fibrosis ([facility]) 06/16/2020     Priority: Medium   • Exocrine pancreatic insufficiency 06/13/2019     Priority: Medium   • Vitamin K deficiency 06/13/2019     Priority: Low   • 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 11/13/2019   • Cystic fibrosis with pulmonary manifestations ([facility]) 11/13/2019   • Herpes simplex 07/16/2019   • Osteopenia of multiple sites 07/16/2019   • CF (3849+10k bC>T, Q1168W) ([facility]) 06/13/2019   • Pseudomonas respiratory infection 06/13/2019   • Partial thromboplastin time increased 08/22/2017   • Moderate COPD (chronic obstructive pulmonary disease) ([facility]) 04/07/2015   • 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   • Irritable bowel syndrome 03/08/2006       Since the last CF clinic visit chief complaint:  Kenia has experienced improvement since starting IV antibiotics   Last hospitalization: [10/21/20]    Respiratory:   Cough frequency: baseline, improved,   Cough character: productive  Sputum quantity: decreased  Sputum color: light yellow  Shortness of breath:better  Had some chest and nose tightness this AM after wearing new slippers (smell noticed)  Chest Pain:gone  Hemoptysis: none   Antibiotics: day 11 IV zosyn and ceftazidime  Pulmonary toilet:   Albuterol: 2-3/day  7% hypertonic saline: 2-3/day  Pulmozyme: 0/day  Chest Physiotherapy: aerobika  2-3  Modulator: kalydeco BID    Compliance: compliant most of the time     Sinus symptoms:  Nasal Congestion: no   Nasal Drainage: no  Headache/sinus pressure: no     Activity / Energy: unable to exercise on treadmill since September due to fever   Emotional assessment: improved       GI: diarrhea and abdominal pain due to abx  Using probiotics   Appetite: improved  Enzymes:  daily      Medications:     Current Outpatient Medications:   •  acyclovir, Take 1 tablet orally 4 times a day for 14 days, then twice daily for 3 months  •  Lantus SoloStar, 6 Units, Subcutaneous, Q EVENING  •  Cayston,   •  Kalydeco, TAKE ONE TABLET TWICE DAILY WITH FAT-CONTAINING FOOD  •  Dexcom G6 Sensor, 1 Each, Does not apply, Q10 DAYS  •  Dexcom G6 Transmitter, 1 Each, Does not apply, Continuous  •  sodium chloride, USE 4 ML BY NEBULIZATION ROUTE 4 TIMES A DAY (MAX 240ML - 1 BOX PER INS)  •  Creon, TAKE 1-2 CAPSULES 4 TIMES A DAY AS NEEDED WITH MEALS OR SNACKS **KEEP IN ORIGINAL BOTTLE**  •  Tresiba, 30 Units, Subcutaneous, DAILY (Patient taking differently: 6 Units, Subcutaneous, DAILY)  •  vitamin A, 10,000 Units, Oral, DAILY (Patient taking differently: 25,000 Units, Oral, SEE ADMIN INSTRUCTIONS, Mondays, Wednesday, Fridays , AND Saturdays)  •  HumaLOG KwikPen, 10 Units, Subcutaneous, TID AC (Patient taking differently: 11-14 Units, Subcutaneous, 3 TIMES DAILY BEFORE MEALS, Needs Humalog cartridges for use in the INPEN)  •  albuterol, 2.5 mg, Nebulization, BID (Patient taking differently: 2.5 mg, Nebulization, 3 TIMES DAILY)  •  phytonadione, 5 mg, Oral, DAILY  •  Multiple Vitamins-Minerals (DEKAS PLUS PO), 1 Cap, Oral, BID  •  magnesium oxide, 200 mg, Oral, BID  •  Vitamin C, 1,000 mg, Oral, BID  •  Probiotic Product (PROBIOTIC-10 PO), 1 Tab, Oral, DAILY  •  Cholecalciferol, 10,000 Units, Oral, BID  Central Line: PICC line    ALLERGIES:  Ciprofloxacin, Levofloxacin, and Tobramycin    Review of System:    Cardiovascular:  Negative  Gastrointestinal: as above  All other systems reviewed and negative or as above      OBJECTIVE:  Physical Exam:  Wt 62.1 kg (137 lb) Comment: home scale  SpO2 98%   BMI 22.45 kg/m²      Vitals obtained by patient:    Physical Exam:  Constitutional: Alert, no distress, well-groomed.  Skin: No rashes in visible areas.  Eye: Round. Conjunctiva clear, lids normal. No icterus.   ENMT: Lips pink without lesions, good dentition, moist mucous membranes. Phonation normal.  Neck: No masses, no thyromegaly. Moves freely without pain.  CV: Pulse as reported by patient  Respiratory: Unlabored respiratory effort, no cough or audible wheeze  Psych: Alert and oriented x3, normal affect and mood.     PFT's:  Home spirometry: FEV1 70%      Labs reviewed:     Last sputum culture date: 1/8/21  Chronic colonization of:  Pseudomonas aeruginosa yes  Respiratory Culture:   Lab Results   Component Value Date/Time    SIGIND POS (POS) 01/08/2021 12:00 PM    SIGIND . 01/08/2021 12:00 PM    SOURCE RESP 01/08/2021 12:00 PM    SOURCE RESP 01/08/2021 12:00 PM    SITE Sputum (coughed) 01/08/2021 12:00 PM    SITE - 01/08/2021 12:00 PM   ]      ASSESSMENT/PLAN:   1. Cystic fibrosis with pulmonary exacerbation (HCC), improving, not completely resolved  Will complete at least 14 days of zosyn and ceftazidime  Will MyChart message us on Monday with clinical info and FEV1  Goal FEV1 is 74% or higher  Continue BID-TID pulmonary toilet  Continue kalydeco  Likely no benefit to switching to symdeko    2. Pseudomonas aeruginosa colonization  Complete IV antibiotic course 14-21 days    Once we stop IV abx, suggest waiting to pull PICC line for about 1 week in case of clinical worsening.      Follow up in 1-3 months    Electronically signed by   Marjan Payne M.D.   Pediatric Pulmonology

## 2021-01-26 ENCOUNTER — TELEPHONE (OUTPATIENT)
Dept: PEDIATRIC PULMONOLOGY | Facility: MEDICAL CENTER | Age: 61
End: 2021-01-26

## 2021-01-26 DIAGNOSIS — E84.0 CYSTIC FIBROSIS WITH PULMONARY EXACERBATION (HCC): ICD-10-CM

## 2021-01-26 DIAGNOSIS — Z22.39 PSEUDOMONAS AERUGINOSA COLONIZATION: ICD-10-CM

## 2021-01-26 RX ORDER — PREDNISONE 20 MG/1
TABLET ORAL
Qty: 18 TAB | Refills: 0 | Status: SHIPPED | OUTPATIENT
Start: 2021-01-26 | End: 2021-04-28

## 2021-01-26 NOTE — TELEPHONE ENCOUNTER
----- Message from Marjan Payne M.D. sent at 1/26/2021  1:55 PM PST -----  Please tell Kenia I sent in a RX for prednisone in case she thinks she needs it, also advair. Start the advair today, start prednisone if still tight over next few days.  Please send [facility] the order to resume IV antibiotics for another 7 days.   Have her follow up next week.

## 2021-01-26 NOTE — TELEPHONE ENCOUNTER
I gave patient all Dr. Payne's advice and patient stated understanding, patient also said that her liver enzymes went up when on long term ABX before so patient is requesting liver enzymes be checked.  CBC, Iron, LFT, aspergillus, and IGE lab orders placed.

## 2021-02-01 ENCOUNTER — TELEMEDICINE (OUTPATIENT)
Dept: PEDIATRIC PULMONOLOGY | Facility: MEDICAL CENTER | Age: 61
End: 2021-02-01
Payer: COMMERCIAL

## 2021-02-01 ENCOUNTER — HOSPITAL ENCOUNTER (OUTPATIENT)
Dept: LAB | Facility: MEDICAL CENTER | Age: 61
End: 2021-02-01
Attending: PEDIATRICS
Payer: COMMERCIAL

## 2021-02-01 VITALS — BODY MASS INDEX: 22.45 KG/M2 | WEIGHT: 137 LBS | OXYGEN SATURATION: 98 %

## 2021-02-01 DIAGNOSIS — J98.8 PSEUDOMONAS RESPIRATORY INFECTION: ICD-10-CM

## 2021-02-01 DIAGNOSIS — B96.5 PSEUDOMONAS RESPIRATORY INFECTION: ICD-10-CM

## 2021-02-01 DIAGNOSIS — E84.0 CYSTIC FIBROSIS WITH PULMONARY EXACERBATION (HCC): ICD-10-CM

## 2021-02-01 DIAGNOSIS — J45.40 MODERATE PERSISTENT ASTHMA WITHOUT COMPLICATION: ICD-10-CM

## 2021-02-01 DIAGNOSIS — E84.0 CYSTIC FIBROSIS WITH PULMONARY MANIFESTATIONS (HCC): ICD-10-CM

## 2021-02-01 DIAGNOSIS — R94.2 ABNORMAL LUNG FUNCTION TEST: ICD-10-CM

## 2021-02-01 LAB
ALBUMIN SERPL BCP-MCNC: 4.6 G/DL (ref 3.2–4.9)
ALP SERPL-CCNC: 93 U/L (ref 30–99)
ALT SERPL-CCNC: 28 U/L (ref 2–50)
AST SERPL-CCNC: 25 U/L (ref 12–45)
BASOPHILS # BLD AUTO: 0.9 % (ref 0–1.8)
BASOPHILS # BLD: 0.06 K/UL (ref 0–0.12)
BILIRUB CONJ SERPL-MCNC: <0.2 MG/DL (ref 0.1–0.5)
BILIRUB INDIRECT SERPL-MCNC: NORMAL MG/DL (ref 0–1)
BILIRUB SERPL-MCNC: 0.2 MG/DL (ref 0.1–1.5)
EOSINOPHIL # BLD AUTO: 0.13 K/UL (ref 0–0.51)
EOSINOPHIL NFR BLD: 1.9 % (ref 0–6.9)
ERYTHROCYTE [DISTWIDTH] IN BLOOD BY AUTOMATED COUNT: 48.9 FL (ref 35.9–50)
HCT VFR BLD AUTO: 43.2 % (ref 37–47)
HGB BLD-MCNC: 13.6 G/DL (ref 12–16)
IMM GRANULOCYTES # BLD AUTO: 0.02 K/UL (ref 0–0.11)
IMM GRANULOCYTES NFR BLD AUTO: 0.3 % (ref 0–0.9)
IRON SATN MFR SERPL: 18 % (ref 15–55)
IRON SERPL-MCNC: 57 UG/DL (ref 40–170)
LYMPHOCYTES # BLD AUTO: 0.82 K/UL (ref 1–4.8)
LYMPHOCYTES NFR BLD: 12.1 % (ref 22–41)
MCH RBC QN AUTO: 28 PG (ref 27–33)
MCHC RBC AUTO-ENTMCNC: 31.5 G/DL (ref 33.6–35)
MCV RBC AUTO: 89.1 FL (ref 81.4–97.8)
MONOCYTES # BLD AUTO: 0.76 K/UL (ref 0–0.85)
MONOCYTES NFR BLD AUTO: 11.2 % (ref 0–13.4)
NEUTROPHILS # BLD AUTO: 4.97 K/UL (ref 2–7.15)
NEUTROPHILS NFR BLD: 73.6 % (ref 44–72)
NRBC # BLD AUTO: 0 K/UL
NRBC BLD-RTO: 0 /100 WBC
PLATELET # BLD AUTO: 327 K/UL (ref 164–446)
PMV BLD AUTO: 10.7 FL (ref 9–12.9)
PROT SERPL-MCNC: 7.1 G/DL (ref 6–8.2)
RBC # BLD AUTO: 4.85 M/UL (ref 4.2–5.4)
TIBC SERPL-MCNC: 315 UG/DL (ref 250–450)
UIBC SERPL-MCNC: 258 UG/DL (ref 110–370)
WBC # BLD AUTO: 6.8 K/UL (ref 4.8–10.8)

## 2021-02-01 PROCEDURE — 85025 COMPLETE CBC W/AUTO DIFF WBC: CPT

## 2021-02-01 PROCEDURE — 83550 IRON BINDING TEST: CPT

## 2021-02-01 PROCEDURE — 36415 COLL VENOUS BLD VENIPUNCTURE: CPT

## 2021-02-01 PROCEDURE — 86606 ASPERGILLUS ANTIBODY: CPT

## 2021-02-01 PROCEDURE — 99214 OFFICE O/P EST MOD 30 MIN: CPT | Mod: 95,CR | Performed by: PEDIATRICS

## 2021-02-01 PROCEDURE — 86003 ALLG SPEC IGE CRUDE XTRC EA: CPT | Mod: 91

## 2021-02-01 PROCEDURE — 83540 ASSAY OF IRON: CPT

## 2021-02-01 PROCEDURE — 82785 ASSAY OF IGE: CPT

## 2021-02-01 PROCEDURE — 80076 HEPATIC FUNCTION PANEL: CPT

## 2021-02-01 ASSESSMENT — FIBROSIS 4 INDEX: FIB4 SCORE: 0.61

## 2021-02-01 NOTE — PROGRESS NOTES
This evaluation was conducted via Zoom using secure and encrypted videoconferencing technology. The patient was in a private location in the state of [place].    The patient's identity was confirmed and verbal consent was obtained for this virtual visit.    PCP:  Pcp Pt States None   No address on file     SUBJECTIVE:   Kenia Soto is a 60 y.o. female with Cystic Fibrosis,     Patient Active Problem List    Diagnosis Date Noted   • Suspected COVID-19 virus infection 04/19/2020     Priority: High   • Cystic fibrosis ([facility]) 11/13/2019     Priority: High   • Hemoptysis 06/13/2019     Priority: High   • Diabetes mellitus due to cystic fibrosis ([facility]) 06/16/2020     Priority: Medium   • Exocrine pancreatic insufficiency 06/13/2019     Priority: Medium   • Vitamin K deficiency 06/13/2019     Priority: Low   • 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 11/13/2019   • Cystic fibrosis with pulmonary manifestations ([facility]) 11/13/2019   • Herpes simplex 07/16/2019   • Osteopenia of multiple sites 07/16/2019   • CF (3849+10k bC>T, [id number]) ([facility]) 06/13/2019   • Pseudomonas respiratory infection 06/13/2019   • Partial thromboplastin time increased 08/22/2017   • Moderate COPD (chronic obstructive pulmonary disease) ([facility]) 04/07/2015   • 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   • Irritable bowel syndrome 03/08/2006       Since the last CF clinic visit chief complaint:  Kenia has experienced feeling better   Last hospitalization: [10/21/20]    Respiratory:   Cough frequency: minimal to none, baseline for the first time since September  Cough character: productive mild  Sputum quantity: decreased  Sputum color: clear  Shortness of breath: much better, able to exercise  Chest Pain:never  Hemoptysis:none this year   Antibiotics:completing 3 weeks of ceftazidime and zosyn  Pulmonary toilet:   Albuterol: 2/day advair 500 BID  7% hypertonic saline: 2/day  Pulmozyme: 0/day  Chest Physiotherapy: 2/day  Oxygen: none  Respiratory assist: none  Modulator:  kalydeco BID    Compliance: compliant most of the time     Sinus symptoms:  Nasal Congestion: no   Nasal Drainage: better  Using nasal saline spray    Activity / Energy: normal for age   Change in activity/energy: increased   School/ Work attendance: not in school, not working   Emotional assessment: positive      GI: no problem   Appetite: normal  Enzymes:  daily      Medications:     Current Outpatient Medications:   •  predniSONE, 1 tablet PO BID x 3 days then 1 tablet PO once daily x 3 days. Repeat course if needed.  •  fluticasone-salmeterol, 1 Puff, Inhalation, BID  •  acyclovir, Take 1 tablet orally 4 times a day for 14 days, then twice daily for 3 months  •  Lantus SoloStar, 6 Units, Subcutaneous, Q EVENING  •  Cayston,   •  Kalydeco, TAKE ONE TABLET TWICE DAILY WITH FAT-CONTAINING FOOD  •  Dexcom G6 Sensor, 1 Each, Does not apply, Q10 DAYS  •  Dexcom G6 Transmitter, 1 Each, Does not apply, Continuous  •  sodium chloride, USE 4 ML BY NEBULIZATION ROUTE 4 TIMES A DAY (MAX 240ML - 1 BOX PER INS)  •  Creon, TAKE 1-2 CAPSULES 4 TIMES A DAY AS NEEDED WITH MEALS OR SNACKS **KEEP IN ORIGINAL BOTTLE**  •  Tresiba, 30 Units, Subcutaneous, DAILY (Patient taking differently: 6 Units, Subcutaneous, DAILY)  •  vitamin A, 10,000 Units, Oral, DAILY (Patient taking differently: 25,000 Units, Oral, SEE ADMIN INSTRUCTIONS, Mondays, Wednesday, Fridays , AND Saturdays)  •  HumaLOG KwikPen, 10 Units, Subcutaneous, TID AC (Patient taking differently: 11-14 Units, Subcutaneous, 3 TIMES DAILY BEFORE MEALS, Needs Humalog cartridges for use in the INPEN)  •  albuterol, 2.5 mg, Nebulization, BID (Patient taking differently: 2.5 mg, Nebulization, 3 TIMES DAILY)  •  phytonadione, 5 mg, Oral, DAILY  •  Multiple Vitamins-Minerals (DEKAS PLUS PO), 1 Cap, Oral, BID  •  magnesium oxide, 200 mg, Oral, BID  •  Vitamin C, 1,000 mg, Oral, BID  •  Probiotic Product (PROBIOTIC-10 PO), 1 Tab, Oral, DAILY  •  Cholecalciferol, 10,000 Units, Oral,  BID  Central Line: PICC line    ALLERGIES:  Ciprofloxacin, Levofloxacin, and Tobramycin    Review of System:    Cardiovascular: resting HR down to 73 today  Gastrointestinal: Negative  Allergic/Immunologic:   Allergies   Allergen Reactions   • Ciprofloxacin      Joint swelling   • Levofloxacin      Unknown reaction  Possibly myalgias, arthralgias, nightmares   • Tobramycin Hives     Hives     Just had blood drawn for ABPA testing today  Per patient blood sugars are better, insulin dose is down  All other systems reviewed and negative or as above      OBJECTIVE:  Physical Exam:  Wt 62.1 kg (137 lb) Comment: per patient  SpO2 98%   BMI 22.45 kg/m²    Vitals obtained by patient:    Physical Exam:  Constitutional: Alert, no distress, well-groomed.  Skin: No rashes in visible areas.  Eye: Round. Conjunctiva clear, lids normal. No icterus.   ENMT: Lips pink without lesions, good dentition, moist mucous membranes. Phonation normal.  Neck: No masses, no thyromegaly. Moves freely without pain.  CV: Pulse as reported by patient  Respiratory: Unlabored respiratory effort, no cough or audible wheeze  Psych: Alert and oriented x3, normal affect and mood.     PFT's:  Single spirometry  Home FEV1: 71%      Labs reviewed:     Last sputum culture date: 1/8/21  Chronic colonization of:  Pseudomonas aeruginosa continuous 2 strains  Other: no  Respiratory Culture:   Lab Results   Component Value Date/Time    SIGIND POS (POS) 01/08/2021 12:00 PM    SIGIND . 01/08/2021 12:00 PM    SOURCE RESP 01/08/2021 12:00 PM    SOURCE RESP 01/08/2021 12:00 PM    SITE Sputum (coughed) 01/08/2021 12:00 PM    SITE - 01/08/2021 12:00 PM   ]    ASSESSMENT/PLAN:   1. Cystic fibrosis with pulmonary manifestations (HCC)  Stable after 3 weeks of IV antibiotics, end of exacerbation    2. Pseudomonas respiratory infection  Chronic infection, now stable  Order will be sent to [facility] to remove PICC line tomorrow    3. Abnormal lung function test  Still  slightly below baseline of 74%, clinically much better    4. Moderate persistent asthma without complication  Has an asthma component.  Clinically responding to advair 500 1 puff BID  ABPA blood testing drawn today, results pending      Pulmonary Exacerbation: absent      Follow up 1 month    Electronically signed by   Marjan Payne M.D.   Pediatric Pulmonology

## 2021-02-03 LAB
A FUMIGATUS IGE QN: <0.1 KU/L
A NIGER IGE QN: <0.1 KU/L
DEPRECATED MISC ALLERGEN IGE RAST QL: NORMAL
IGE SERPL-ACNC: 20 KU/L

## 2021-02-06 LAB
ASPERGILLUS AB SER QL ID: NORMAL
ASPERGILLUS AB TITR SER CF: NORMAL {TITER}

## 2021-02-20 DIAGNOSIS — E84.9 CF (CYSTIC FIBROSIS) (HCC): ICD-10-CM

## 2021-02-22 RX ORDER — ALBUTEROL SULFATE 2.5 MG/3ML
2.5 SOLUTION RESPIRATORY (INHALATION) 3 TIMES DAILY
Qty: 540 ML | Refills: 3 | Status: SHIPPED | OUTPATIENT
Start: 2021-02-22 | End: 2022-03-07

## 2021-03-03 DIAGNOSIS — E84.9 DIABETES MELLITUS DUE TO CYSTIC FIBROSIS (HCC): ICD-10-CM

## 2021-03-03 DIAGNOSIS — E08.9 DIABETES MELLITUS DUE TO CYSTIC FIBROSIS (HCC): ICD-10-CM

## 2021-03-03 RX ORDER — BLOOD SUGAR DIAGNOSTIC
STRIP MISCELLANEOUS
Qty: 600 STRIP | Refills: 3 | Status: SHIPPED | OUTPATIENT
Start: 2021-03-03 | End: 2021-04-30 | Stop reason: SDUPTHER

## 2021-03-08 NOTE — PATIENT INSTRUCTIONS
Kenia Soto  1960     -CF Mutations: 3849+10kbC->T, [id number]     -CFTR modulator: Kalydeco     -Ht:____   Wt:____     Respiratory     -Baseline FEV1: 75%    Today’s FEV1:____     -Airway Clearance Routine:        --Albuterol ( 2 x day)/(3-4x/day when sick)        --Hypertonic Saline ( 2 x day)/(3-4x/day when sick             --Pulmozyme ( 1 x day)/(2x/day when sick)        --ACT Vest and/or Acapella ( 2 x day)/(3-4x/day when sick)        --Inhaled antibiotics: Cayston, START IF CHEST STILL FEELING TIGHT AND IF LUNG FUNCTION STAYS BELOW 70%     -Equipment Check (Annually) Date scheduled:     Nutrition/Gastrointestinal     -BMI: Current ______, Goal______        -Enzymes: Creon 6 (1-2 with meals, 0-1 with snacks) = avg of 6 per day     -Vitamins:     -Exercise:     Social     -Insurance: [employer]     -Mental health screening: done 11/13/2019     Goals     -Annual Labs: done last 9/2020, due again by 9/2021     -LFTs: done last 7/24/2019, due again by 9/2021  -Oral Glucose tolerance test: CFRD     -Sputum cultures: 1____ 2 ___ 3___ 4____ (Dates)     -DEXA scan: done last 7/10/2019, due again by 2021     -Chest X-ray: done last 1/2021, due again by 1/2022     -Eye Exam: last done 9/25/2019, due again by ??? ([facility])

## 2021-03-10 ENCOUNTER — HOSPITAL ENCOUNTER (OUTPATIENT)
Dept: LAB | Facility: MEDICAL CENTER | Age: 61
End: 2021-03-10
Attending: STUDENT IN AN ORGANIZED HEALTH CARE EDUCATION/TRAINING PROGRAM
Payer: COMMERCIAL

## 2021-03-10 ENCOUNTER — TELEMEDICINE (OUTPATIENT)
Dept: PEDIATRIC PULMONOLOGY | Facility: MEDICAL CENTER | Age: 61
End: 2021-03-10
Payer: COMMERCIAL

## 2021-03-10 VITALS — HEIGHT: 65 IN | OXYGEN SATURATION: 99 % | WEIGHT: 134 LBS | BODY MASS INDEX: 22.33 KG/M2

## 2021-03-10 DIAGNOSIS — E84.9 DIABETES MELLITUS DUE TO CYSTIC FIBROSIS (HCC): ICD-10-CM

## 2021-03-10 DIAGNOSIS — J98.8 PSEUDOMONAS RESPIRATORY INFECTION: ICD-10-CM

## 2021-03-10 DIAGNOSIS — E84.0 CYSTIC FIBROSIS WITH PULMONARY MANIFESTATIONS (HCC): ICD-10-CM

## 2021-03-10 DIAGNOSIS — K86.81 EXOCRINE PANCREATIC INSUFFICIENCY: ICD-10-CM

## 2021-03-10 DIAGNOSIS — B96.5 PSEUDOMONAS RESPIRATORY INFECTION: ICD-10-CM

## 2021-03-10 DIAGNOSIS — E08.9 DIABETES MELLITUS DUE TO CYSTIC FIBROSIS (HCC): ICD-10-CM

## 2021-03-10 DIAGNOSIS — E55.9 VITAMIN D DEFICIENCY: ICD-10-CM

## 2021-03-10 LAB
ALBUMIN SERPL BCP-MCNC: 3.9 G/DL (ref 3.2–4.9)
ALBUMIN/GLOB SERPL: 1.4 G/DL
ALP SERPL-CCNC: 132 U/L (ref 30–99)
ALT SERPL-CCNC: 20 U/L (ref 2–50)
ANION GAP SERPL CALC-SCNC: 13 MMOL/L (ref 7–16)
AST SERPL-CCNC: 19 U/L (ref 12–45)
BASOPHILS # BLD AUTO: 0.7 % (ref 0–1.8)
BASOPHILS # BLD: 0.06 K/UL (ref 0–0.12)
BILIRUB SERPL-MCNC: 0.2 MG/DL (ref 0.1–1.5)
BUN SERPL-MCNC: 14 MG/DL (ref 8–22)
CALCIUM SERPL-MCNC: 9.7 MG/DL (ref 8.5–10.5)
CHLORIDE SERPL-SCNC: 100 MMOL/L (ref 96–112)
CHOLEST SERPL-MCNC: 141 MG/DL (ref 100–199)
CO2 SERPL-SCNC: 26 MMOL/L (ref 20–33)
CREAT SERPL-MCNC: 0.66 MG/DL (ref 0.5–1.4)
CREAT UR-MCNC: 37.71 MG/DL
EOSINOPHIL # BLD AUTO: 0.21 K/UL (ref 0–0.51)
EOSINOPHIL NFR BLD: 2.5 % (ref 0–6.9)
ERYTHROCYTE [DISTWIDTH] IN BLOOD BY AUTOMATED COUNT: 49.1 FL (ref 35.9–50)
EST. AVERAGE GLUCOSE BLD GHB EST-MCNC: 120 MG/DL
FASTING STATUS PATIENT QL REPORTED: NORMAL
GLOBULIN SER CALC-MCNC: 2.7 G/DL (ref 1.9–3.5)
GLUCOSE SERPL-MCNC: 85 MG/DL (ref 65–99)
HBA1C MFR BLD: 5.8 % (ref 4–5.6)
HCT VFR BLD AUTO: 44.1 % (ref 37–47)
HDLC SERPL-MCNC: 49 MG/DL
HGB BLD-MCNC: 14 G/DL (ref 12–16)
IMM GRANULOCYTES # BLD AUTO: 0.02 K/UL (ref 0–0.11)
IMM GRANULOCYTES NFR BLD AUTO: 0.2 % (ref 0–0.9)
LDLC SERPL CALC-MCNC: 67 MG/DL
LYMPHOCYTES # BLD AUTO: 1.8 K/UL (ref 1–4.8)
LYMPHOCYTES NFR BLD: 21.4 % (ref 22–41)
MCH RBC QN AUTO: 28.3 PG (ref 27–33)
MCHC RBC AUTO-ENTMCNC: 31.7 G/DL (ref 33.6–35)
MCV RBC AUTO: 89.1 FL (ref 81.4–97.8)
MICROALBUMIN UR-MCNC: <1.2 MG/DL
MICROALBUMIN/CREAT UR: NORMAL MG/G (ref 0–30)
MONOCYTES # BLD AUTO: 0.85 K/UL (ref 0–0.85)
MONOCYTES NFR BLD AUTO: 10.1 % (ref 0–13.4)
NEUTROPHILS # BLD AUTO: 5.47 K/UL (ref 2–7.15)
NEUTROPHILS NFR BLD: 65.1 % (ref 44–72)
NRBC # BLD AUTO: 0 K/UL
NRBC BLD-RTO: 0 /100 WBC
PLATELET # BLD AUTO: 381 K/UL (ref 164–446)
PMV BLD AUTO: 11.1 FL (ref 9–12.9)
POTASSIUM SERPL-SCNC: 4 MMOL/L (ref 3.6–5.5)
PROT SERPL-MCNC: 6.6 G/DL (ref 6–8.2)
RBC # BLD AUTO: 4.95 M/UL (ref 4.2–5.4)
SODIUM SERPL-SCNC: 139 MMOL/L (ref 135–145)
TRIGL SERPL-MCNC: 125 MG/DL (ref 0–149)
WBC # BLD AUTO: 8.4 K/UL (ref 4.8–10.8)

## 2021-03-10 PROCEDURE — 99401 PREV MED CNSL INDIV APPRX 15: CPT | Performed by: PEDIATRICS

## 2021-03-10 PROCEDURE — 84443 ASSAY THYROID STIM HORMONE: CPT

## 2021-03-10 PROCEDURE — 85025 COMPLETE CBC W/AUTO DIFF WBC: CPT

## 2021-03-10 PROCEDURE — 99214 OFFICE O/P EST MOD 30 MIN: CPT | Mod: 95,CR,25 | Performed by: PEDIATRICS

## 2021-03-10 PROCEDURE — 80061 LIPID PANEL: CPT

## 2021-03-10 PROCEDURE — 36415 COLL VENOUS BLD VENIPUNCTURE: CPT

## 2021-03-10 PROCEDURE — 82570 ASSAY OF URINE CREATININE: CPT

## 2021-03-10 PROCEDURE — 83036 HEMOGLOBIN GLYCOSYLATED A1C: CPT

## 2021-03-10 PROCEDURE — 82043 UR ALBUMIN QUANTITATIVE: CPT

## 2021-03-10 PROCEDURE — 84439 ASSAY OF FREE THYROXINE: CPT

## 2021-03-10 PROCEDURE — 80053 COMPREHEN METABOLIC PANEL: CPT

## 2021-03-10 ASSESSMENT — FIBROSIS 4 INDEX: FIB4 SCORE: 0.87

## 2021-03-10 NOTE — PROGRESS NOTES
This evaluation was conducted via Zoom using secure and encrypted videoconferencing technology. The patient was in a private location in the state of [place].    The patient's identity was confirmed and verbal consent was obtained for this virtual visit.  PCP:  Pcp Pt States None   No address on file     SUBJECTIVE:   Kenia Soto is a 60 y.o. 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 ([facility]) 06/16/2020   • Exocrine pancreatic insufficiency 06/13/2019   • Vitamin K deficiency 06/13/2019   • 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 11/13/2019   • Cystic fibrosis with pulmonary manifestations ([facility]) 11/13/2019   • Herpes simplex 07/16/2019   • Osteopenia of multiple sites 07/16/2019   • CF (3849+10k bC>T, [id number]) ([facility]) 06/13/2019   • Pseudomonas respiratory infection 06/13/2019   • Partial thromboplastin time increased 08/22/2017   • Moderate COPD (chronic obstructive pulmonary disease) ([facility]) 04/07/2015   • 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   • Irritable bowel syndrome 03/08/2006       Since the last CF clinic visit chief complaint:  Kenia has experienced no problems   Last hospitalization: [10/21/20]    Respiratory:   Cough frequency: episodic, baseline with treatments and mild throughout the day  Cough character: productive  Sputum quantity: baseline  Sputum color: yellow to light green  Shortness of breath:no, able to exercise  Chest Pain:never  Hemoptysis:none recently   Antibiotics:completed IV antibiotics 5 weeks ago  Pulmonary toilet:   Advair: 2/day  7% hypertonic saline: 2/day  Pulmozyme: 0/day  Chest Physiotherapy: 2  Modulator: kalydeco BID    Compliance: compliant most of the time     Sinus symptoms:  Nasal Congestion: rare   Nasal Drainage: clear nasal discharge  Headache/sinus pressure: none   Treatments: doing  sinus rinses x 2 packets BID    Activity / Energy: normal for age   Change in activity/energy: increased   School/ Work attendance: not in school, not working   Emotional assessment: positive  Still mostly isolating for COVID     GI: no problem   Appetite: normal  Enzymes:  daily  See dietician note for further details    Medications:     Current Outpatient Medications:   •  OneTouch Verio, TEST 6 XD FOR INSULIN ADJUSTMENT AND PRF SYMPTOMS OF HIGH OR LOW BLOOD SUGAR, Taking  •  albuterol, 2.5 mg, Nebulization, TID, Taking  •  fluticasone, 1-2 Spray, Nasal, DAILY, Taking  •  fluticasone-salmeterol, 1 Puff, Inhalation, BID, Taking  •  acyclovir, Take 1 tablet orally 4 times a day for 14 days, then twice daily for 3 months, Taking  •  Lantus SoloStar, 6 Units, Subcutaneous, Q EVENING, Taking  •  Kalydeco, TAKE ONE TABLET TWICE DAILY WITH FAT-CONTAINING FOOD, Taking  •  Dexcom G6 Sensor, 1 Each, Does not apply, Q10 DAYS, Taking  •  Dexcom G6 Transmitter, 1 Each, Does not apply, Continuous, Taking  •  sodium chloride, USE 4 ML BY NEBULIZATION ROUTE 4 TIMES A DAY (MAX 240ML - 1 BOX PER INS), Taking  •  Creon, TAKE 1-2 CAPSULES 4 TIMES A DAY AS NEEDED WITH MEALS OR SNACKS **KEEP IN ORIGINAL BOTTLE**, Taking  •  Tresiba, 30 Units, Subcutaneous, DAILY (Patient taking differently: 6 Units, Subcutaneous, DAILY), Taking  •  vitamin A, 10,000 Units, Oral, DAILY (Patient taking differently: 25,000 Units, Oral, SEE ADMIN INSTRUCTIONS, Mondays, Wednesday, Fridays , AND Saturdays), Taking  •  HumaLOG KwikPen, 10 Units, Subcutaneous, TID AC (Patient taking differently: 11-14 Units, Subcutaneous, 3 TIMES DAILY BEFORE MEALS, Needs Humalog cartridges for use in the INPEN), Taking  •  phytonadione, 5 mg, Oral, DAILY, Taking  •  Multiple Vitamins-Minerals (DEKAS PLUS PO), 1 capsule, Oral, BID, Taking  •  magnesium oxide, 200 mg, Oral, BID, Taking  •  Vitamin C, 1,000 mg, Oral, BID, Taking  •  Probiotic Product (PROBIOTIC-10 PO), 1 tablet,  Oral, DAILY, Taking  •  Cholecalciferol, 10,000 Units, Oral, BID, Taking  •  predniSONE, 1 tablet PO BID x 3 days then 1 tablet PO once daily x 3 days. Repeat course if needed., Not Taking  •  Cayston, , Not Taking  COVID positive test or disease: none  COVID vaccine: none  Patient is fearful of taking mRNA vaccine  May be willing to take Anam and Anam     ALLERGIES:  Ciprofloxacin, Levofloxacin, and Tobramycin    Review of System:  CF related diabetes: in better control, trying to reduce insulin doses, seeing endo      OBJECTIVE:  Physical Exam:  Wt 60.8 kg (134 lb)   SpO2 99%   BMI 21.96 kg/m²    Vitals obtained by patient:    Physical Exam:  Constitutional: Alert, no distress, well-groomed.  Skin: No rashes in visible areas.  Eye: Round. Conjunctiva clear, lids normal. No icterus.   ENMT: Lips pink without lesions, good dentition, moist mucous membranes. Phonation normal.  Neck: No masses, no thyromegaly. Moves freely without pain.  CV: Pulse as reported by patient  Respiratory: Unlabored respiratory effort, no cough or audible wheeze  Psych: Alert and oriented x3, normal affect and mood.     Home spirometry: FEV1 73%    Labs reviewed:     Last sputum culture date: 1/8/21  Chronic colonization of:  Pseudomonas aeruginosa continuous  Respiratory Culture:   Lab Results   Component Value Date/Time    SIGIND POS (POS) 01/08/2021 12:00 PM    SIGIND . 01/08/2021 12:00 PM    SOURCE RESP 01/08/2021 12:00 PM    SOURCE RESP 01/08/2021 12:00 PM    SITE Sputum (coughed) 01/08/2021 12:00 PM    SITE - 01/08/2021 12:00 PM   ]      ASSESSMENT/PLAN:     1. Cystic fibrosis with pulmonary manifestations (HCC)  Continue BID advair, pulmonary toilet  Continue BID kalydeco  Now at baseline respiratory status  Discussed importance/safety of COVID vaccine  Suggest in person visit at least once a year as long as we are able to do home spirometry, at least 3 cultures per year and yearly labs.    2. Pseudomonas respiratory  infection  Will use cayston prn, completed antibiotics 5 weeks ago    3. Exocrine pancreatic insufficiency  Continue current enzyme therapy    4. 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   Pediatric Pulmonology

## 2021-03-10 NOTE — PROGRESS NOTES
"Nutrition Screen & Progress Note for Adult CF Clinic  BMI: 22.3       Points: 0  IBW (kg): 56.8  % IBW: 107       Points: 0  Current weight (kg): 60.8   Weight last clinic visit: 61.1kg on 2/6/20  % weight change: down 0.3 kg    Points: 0*  FEV1 % predicted: 73      Points: 0             Total Points: 0             Nutrition Risk: low    BM: 1-2 per day, soft  PERT: Creon 6 (1-2 with meals, 0-1 with snacks)  Lipase units/kg/meal: 99 - 197  CFTR modulator: Kalydeco  Typical diet: 3 meals and 0-2 snacks  Vitamins: general MVi, magnesium (400 mg), vitamin D (20,000 IU), vitamin K (5 mg), vitamin A (10,000 IU), Nuun electrolytes  Calorie supplements: -  Visit details: Did not give any risk points for wt loss as Kenia has been trying to lose weight and be more fit + BMI adequate and wt loss is <1%.    Kenia was very ill last year, she feels she had COVID-19 even though all her tests were negative.  She did not have any GI issues, it was all pulmonary.  She does not feel like she is back to her baseline yet, but improving.   She has been exercising on her mini trampoline, she does 40 minutes 5 days per week but now feels like she can't get her heart rate up high enough to be getting a workout.  She has not been strength training but used to.    She snacks on almonds.  Her blood sugars were terrible when she was ill.  She was requiring more insulin d/t CFRD. She stopped her long acting insulin temporarily but is now back on it (3 units per night instead of 6).  She was having some \"crashes\" (low blood sugars) but now is not.  She is seeing the endocrinologist next week.    Discussed ways to modify exercise regimen.  This RD is also a  ([facility]).  Will email her some exercises she could add.  Would recommend adding 1-2 days per week of strength training vs increasing cardio.  Can do sprint intervals on the mini trampoline or break up the 40 minute workout by doing other exercises to get her " heart rate up.   Recommendations/Plan: continue with CFRD diet, try to modify exercise regimen. Follow up with endo MD.    Follow-up: 3 months    Time spent: 20 minutes

## 2021-03-11 LAB
T4 FREE SERPL-MCNC: 1.12 NG/DL (ref 0.93–1.7)
TSH SERPL DL<=0.005 MIU/L-ACNC: 1.15 UIU/ML (ref 0.38–5.33)

## 2021-03-15 DIAGNOSIS — Z23 NEED FOR VACCINATION: ICD-10-CM

## 2021-03-16 ENCOUNTER — TELEMEDICINE (OUTPATIENT)
Dept: ENDOCRINOLOGY | Facility: MEDICAL CENTER | Age: 61
End: 2021-03-16
Attending: NURSE PRACTITIONER
Payer: COMMERCIAL

## 2021-03-16 DIAGNOSIS — E08.9 DIABETES MELLITUS DUE TO CYSTIC FIBROSIS (HCC): ICD-10-CM

## 2021-03-16 DIAGNOSIS — K86.81 EXOCRINE PANCREATIC INSUFFICIENCY: ICD-10-CM

## 2021-03-16 DIAGNOSIS — E84.9 DIABETES MELLITUS DUE TO CYSTIC FIBROSIS (HCC): ICD-10-CM

## 2021-03-16 DIAGNOSIS — E55.9 VITAMIN D DEFICIENCY: ICD-10-CM

## 2021-03-16 PROCEDURE — 99214 OFFICE O/P EST MOD 30 MIN: CPT | Mod: 95,CR | Performed by: NURSE PRACTITIONER

## 2021-03-16 NOTE — PROGRESS NOTES
CHIEF COMPLAINT: Patient is here for follow up of Type 2 Diabetes Mellitus,  Exocrine pancreatic insufficiency and Vitamin D Deficiency.  Previously seen by  with last appointment on 2020 with Dr. De Guzman.  Patient was presented for a telehealth consultation via secure and encrypted videoconferencing technology. This encounter was conducted via Zoom . Verbal consent was obtained. Patient's identity was verified.    HPI:     Kenia Soto is a 60 y.o. female with for continued evaluation & treatment of the followin.  Type 2 Diabetes Mellitus   History of cystic fibrosis.  Patient has elevated insulin requirements for meals because of increased work of breathing leading to increased carb intake.    Current diabetes regimen:  Lantus 6 units daily  Humalog-6 to 8 units with meals, with a 1-7 carb ratio.    Labs from 3/16/2021 HbA1c is 5.8    BG Diary:21 patient does home glucose monitoring 2-4 times daily.  Fasting glucose 89, 105, 93.  Patient was using [employer] product from her 's health insurance company.  She noticed some large discrepancies with this glucometer so she has gone back to her Dexcom and One Touch glucometer.    Weight unchanged from prior appointment.    Diabetes Complications   Retinopathy: No known retinopathy.  Last eye exam: 2019 at eye [facility].  Neuropathy: Denies paresthesias or numbness in hands or feet. Denies any foot wounds.  Exercise: Minimal.  Diet: Fair.    2. Exocrine pancreatic insufficiency  Currently taking Creon 1 to 2 capsules 4 times a day as needed with meals or snacks.  Patient states she is tolerating the medication well.  Denies abdominal pain, nausea and/or vomiting.    3.  Vitamin D deficiency  Currently taking vitamin D 10,000 units daily.    Patient's medications, allergies, and social histories were reviewed and updated as appropriate.    ROS:     CONS:     No fever, no chills   EYES:     No diplopia, no blurry vision   CV:            No chest pain, no palpitations   PULM:     No SOB, no cough, no hemoptysis.   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 ([facility])  2020-04: Leukocytosis  2020-04: Suspected COVID-19 virus infection  2019-11: 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  2019-11: Cystic fibrosis ([facility])  2019-11: Cystic fibrosis with pulmonary manifestations ([facility])  2019-11: Abnormal glucose tolerance test (GTT)  2019-07: Herpes simplex  2019-07: Osteopenia of multiple sites  2019-06: CF (3849+10k bC>T, [id number]) ([facility])  2019-06: Exocrine pancreatic insufficiency  2019-06: Hemoptysis  2019-06: Vitamin K deficiency  2019-06: Pseudomonas respiratory infection  2017-08: Partial thromboplastin time increased  2015-04: Moderate COPD (chronic obstructive pulmonary disease) ([facility])  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  2006-03: Irritable bowel syndrome      Past Surgical History:  Past Surgical History:   Procedure Laterality Date   • BRONCHOSCOPY-ENDO  7/22/2019    Procedure: BRONCHOSCOPY - W/BAL;  Surgeon: Arelis Fleming M.D.;  Location: [facility];  Service: Ent   • DENTAL SURGERY     • SINUS WASHING          Allergies:  Ciprofloxacin, Levofloxacin, and Tobramycin     Social History:  Social History     Tobacco Use   • Smoking status: Never Smoker   • Smokeless tobacco: Never Used   Substance Use Topics   • Alcohol use: Yes     Comment: Socially   • Drug use: No        Family History:   family history includes Alcohol/Drug in her father; Cystic Fibrosis in her sister; Heart Disease in her father; Other in her daughter.      PHYSICAL EXAM:   OBJECTIVE:  Vital signs: There were no vitals taken for this visit.  GENERAL: Well-developed, well-nourished in no apparent distress.   EYE:  No ocular asymmetry, PERRLA  HENT: Pink, moist  mucous membranes.    NECK: No thyromegaly.   CARDIOVASCULAR:  No murmurs  LUNGS: Clear breath sounds  ABDOMEN: Soft, nontender   EXTREMITIES: No clubbing, cyanosis, or edema.   NEUROLOGICAL: No gross focal motor abnormalities   LYMPH: No cervical adenopathy seen.   SKIN: No rashes, lesions.     Labs:  Lab Results   Component Value Date/Time    WBC 8.4 03/10/2021 02:14 PM    RBC 4.95 03/10/2021 02:14 PM    HEMOGLOBIN 14.0 03/10/2021 02:14 PM    MCV 89.1 03/10/2021 02:14 PM    MCH 28.3 03/10/2021 02:14 PM    MCHC 31.7 (L) 03/10/2021 02:14 PM    RDW 49.1 03/10/2021 02:14 PM    MPV 11.1 03/10/2021 02:14 PM       Lab Results   Component Value Date/Time    SODIUM 139 03/10/2021 02:14 PM    POTASSIUM 4.0 03/10/2021 02:14 PM    CHLORIDE 100 03/10/2021 02:14 PM    CO2 26 03/10/2021 02:14 PM    ANION 13.0 03/10/2021 02:14 PM    GLUCOSE 85 03/10/2021 02:14 PM    BUN 14 03/10/2021 02:14 PM    CREATININE 0.66 03/10/2021 02:14 PM    CALCIUM 9.7 03/10/2021 02:14 PM    ASTSGOT 19 03/10/2021 02:14 PM    ALTSGPT 20 03/10/2021 02:14 PM    TBILIRUBIN 0.2 03/10/2021 02:14 PM    ALBUMIN 3.9 03/10/2021 02:14 PM    TOTPROTEIN 6.6 03/10/2021 02:14 PM    GLOBULIN 2.7 03/10/2021 02:14 PM    AGRATIO 1.4 03/10/2021 02:14 PM       Lab Results   Component Value Date/Time    CHOLSTRLTOT 141 03/10/2021 1414    TRIGLYCERIDE 125 03/10/2021 1414    HDL 49 03/10/2021 1414    LDL 67 03/10/2021 1414       Lab Results   Component Value Date/Time    MALBCRT see below 03/10/2021 02:13 PM    MICROALBUR <1.2 03/10/2021 02:13 PM        Lab Results   Component Value Date/Time    TSHTIFFANIE 1.150 03/10/2021 1414       ASSESSMENT/PLAN:     1. 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  Continue Lantus 6 units daily.  Continue Humalog 6 to 8 units before each meal, with carb ratio 1:7.    Continue balanced meal planning such as my [email or web address].  Recommend 2 to 3 L of water each day.  Continue 150 minutes of exercise each week.  Recommend daily foot  inspections.  Dilated eye exam is overdue.  Patient will schedule an appointment.    2. Exocrine pancreatic insufficiency  Stable.  Continue daily use of Creon.    3. Vitamin D deficiency  Stable.  Continue vitamin D 10,000 IU daily.    We will do point-of-care A1c at next appointment.  Next appointment in 6 months.    Thank you kindly for allowing me to participate in the diabetes care plan for this patient.    Eryn Moore, APRN  03/16/21    CC:   Pcp Pt States None

## 2021-03-30 ENCOUNTER — PATIENT MESSAGE (OUTPATIENT)
Dept: PEDIATRIC PULMONOLOGY | Facility: MEDICAL CENTER | Age: 61
End: 2021-03-30

## 2021-03-30 DIAGNOSIS — K86.81 EXOCRINE PANCREATIC INSUFFICIENCY: ICD-10-CM

## 2021-03-30 DIAGNOSIS — E56.1 VITAMIN K DEFICIENCY: ICD-10-CM

## 2021-03-30 DIAGNOSIS — E84.9 CYSTIC FIBROSIS (HCC): ICD-10-CM

## 2021-03-30 RX ORDER — PHYTONADIONE 5 MG/1
5 TABLET ORAL DAILY
Qty: 30 TABLET | Refills: 3 | Status: SHIPPED | OUTPATIENT
Start: 2021-03-30 | End: 2021-06-03 | Stop reason: SDUPTHER

## 2021-03-30 RX ORDER — INSULIN LISPRO 100 [IU]/ML
12-15 INJECTION, SOLUTION INTRAVENOUS; SUBCUTANEOUS
Qty: 45 ML | Refills: 3 | Status: SHIPPED | OUTPATIENT
Start: 2021-03-30 | End: 2021-03-30 | Stop reason: SDUPTHER

## 2021-03-30 RX ORDER — INSULIN LISPRO 100 [IU]/ML
12-15 INJECTION, SOLUTION INTRAVENOUS; SUBCUTANEOUS
Qty: 45 ML | Refills: 3 | Status: SHIPPED | OUTPATIENT
Start: 2021-03-30 | End: 2022-05-20

## 2021-03-30 NOTE — TELEPHONE ENCOUNTER
From: Kenia Soto  To: Physician Marjan Payne  Sent: 3/30/2021 8:55 AM PDT  Subject: Prescription Question    Hi Dr. Payne,    I need a new prescription for Phytonadione sent to [facility]. Thanks.

## 2021-04-21 ENCOUNTER — TELEPHONE (OUTPATIENT)
Dept: MEDICAL GROUP | Facility: LAB | Age: 61
End: 2021-04-21

## 2021-04-21 NOTE — TELEPHONE ENCOUNTER
NEW PATIENT VISIT PRE-VISIT PLANNING    1.  Epic[employer] Patient is checked in Patient Demographics?Yes    2.  Immunizations were updated in Epic using Reconcile Outside Information activity? Yes         3.  Is this appointment scheduled as a Hospital Follow-Up? No    4.  Patient is due for the following Health Maintenance Topics:   Health Maintenance Due   Topic Date Due   • DIABETES MONOFILAMENT / LE EXAM  Never done   • COVID-19 Vaccine (1) Never done   • RETINAL SCREENING  Never done   • IMM HEP B VACCINE (1 of 3 - Risk 3-dose series) 10/20/1979   • COLONOSCOPY  Never done   • IMM ZOSTER VACCINES (2 of 3) 06/17/2015   • MAMMOGRAM  07/10/2020   • Annual Pulmonary Function Test / Spirometry  02/06/2021       5.  Reviewed/Updated the following with patient:       •   Preferred Pharmacy? Yes       •   Preferred Lab? Yes       •   Preferred Communication? Yes       •   Allergies? Yes       •   Medications? YES. Was Abstract Encounter opened and chart updated? YES    6.  Updated Care Team?       •   DME Company (gait device, O2, CPAP, etc.) N\A       •   Other Specialists (eye doctor, derm, GYN, cardiology, endo, etc): YES    7.  AHA (Puls8) form printed for Provider? N/A

## 2021-04-23 DIAGNOSIS — E84.0 CYSTIC FIBROSIS WITH PULMONARY EXACERBATION (HCC): ICD-10-CM

## 2021-04-28 ENCOUNTER — OFFICE VISIT (OUTPATIENT)
Dept: MEDICAL GROUP | Facility: LAB | Age: 61
End: 2021-04-28
Payer: COMMERCIAL

## 2021-04-28 VITALS
TEMPERATURE: 99.1 F | SYSTOLIC BLOOD PRESSURE: 110 MMHG | WEIGHT: 136 LBS | BODY MASS INDEX: 21.86 KG/M2 | HEART RATE: 94 BPM | RESPIRATION RATE: 16 BRPM | HEIGHT: 66 IN | DIASTOLIC BLOOD PRESSURE: 60 MMHG | OXYGEN SATURATION: 96 %

## 2021-04-28 DIAGNOSIS — E84.9 CYSTIC FIBROSIS (HCC): ICD-10-CM

## 2021-04-28 DIAGNOSIS — K86.81 EXOCRINE PANCREATIC INSUFFICIENCY: ICD-10-CM

## 2021-04-28 DIAGNOSIS — E84.9 DIABETES MELLITUS DUE TO CYSTIC FIBROSIS (HCC): ICD-10-CM

## 2021-04-28 DIAGNOSIS — Z12.31 ENCOUNTER FOR SCREENING MAMMOGRAM FOR BREAST CANCER: ICD-10-CM

## 2021-04-28 DIAGNOSIS — E08.9 DIABETES MELLITUS DUE TO CYSTIC FIBROSIS (HCC): ICD-10-CM

## 2021-04-28 PROBLEM — R04.2 HEMOPTYSIS: Status: RESOLVED | Noted: 2019-06-13 | Resolved: 2021-04-28

## 2021-04-28 PROBLEM — B96.5 PSEUDOMONAS RESPIRATORY INFECTION: Status: RESOLVED | Noted: 2019-06-13 | Resolved: 2021-04-28

## 2021-04-28 PROBLEM — J98.8 PSEUDOMONAS RESPIRATORY INFECTION: Status: RESOLVED | Noted: 2019-06-13 | Resolved: 2021-04-28

## 2021-04-28 PROCEDURE — 99214 OFFICE O/P EST MOD 30 MIN: CPT | Performed by: FAMILY MEDICINE

## 2021-04-28 RX ORDER — PEN NEEDLE, DIABETIC 32GX 5/32"
NEEDLE, DISPOSABLE MISCELLANEOUS
COMMUNITY
Start: 2021-02-20 | End: 2021-06-02 | Stop reason: SDUPTHER

## 2021-04-28 RX ORDER — IVACAFTOR 150 MG/1
TABLET, FILM COATED ORAL
COMMUNITY
Start: 2021-02-25 | End: 2021-09-17

## 2021-04-28 ASSESSMENT — FIBROSIS 4 INDEX: FIB4 SCORE: 0.67

## 2021-04-28 ASSESSMENT — PATIENT HEALTH QUESTIONNAIRE - PHQ9: CLINICAL INTERPRETATION OF PHQ2 SCORE: 0

## 2021-04-30 DIAGNOSIS — E08.9 DIABETES MELLITUS DUE TO CYSTIC FIBROSIS (HCC): ICD-10-CM

## 2021-04-30 DIAGNOSIS — E84.9 DIABETES MELLITUS DUE TO CYSTIC FIBROSIS (HCC): ICD-10-CM

## 2021-04-30 RX ORDER — BLOOD SUGAR DIAGNOSTIC
STRIP MISCELLANEOUS
Qty: 600 STRIP | Refills: 3 | Status: SHIPPED | OUTPATIENT
Start: 2021-04-30 | End: 2021-06-02 | Stop reason: SDUPTHER

## 2021-05-05 NOTE — ASSESSMENT & PLAN NOTE
Patient has cystic fibrosis that is being managed by Dr. Barlow.  Patient has been very stable.  Patient has not gotten the COVID vaccine and has no plans to get one.

## 2021-05-05 NOTE — ASSESSMENT & PLAN NOTE
Patient is followed by endocrinology.  She is currently on Tresiba at home and here patient is on Lantus 6 units subcutaneously nightly and Lispro 5 units TIDAC with sliding scale during the day.  Diabetic diet  A1c is well controlled at 5.8

## 2021-05-05 NOTE — PROGRESS NOTES
Chief Complaint   Patient presents with   • Establish Care         Kenia Soto is a 60 y.o. female here to establish care and for evaluation and management of:        HPI:    CF (3849+10k bC>T, [id number]) ([facility])  Patient has cystic fibrosis that is being managed by Dr. Barlow.  Patient has been very stable.  Patient has not gotten the COVID vaccine and has no plans to get one.    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  She is currently on Tresiba at home and here patient is on Lantus 6 units subcutaneously nightly and Lispro 5 units TIDAC with sliding scale during the day.  Diabetic diet  A1c is well controlled at 5.8      Allergies   Allergen Reactions   • Ciprofloxacin      Joint swelling   • Levofloxacin      Unknown reaction  Possibly myalgias, arthralgias, nightmares   • Tobramycin Hives     Hives       Current medicines (including changes today)  Current Outpatient Medications   Medication Sig Dispense Refill   • ONETOUCH VERIO strip TEST 6 XD FOR INSULIN ADJUSTMENT AND PRF SYMPTOMS OF HIGH OR LOW BLOOD SUGAR 600 Strip 3   • BD PEN NEEDLE TAMIKO U/F      • KALYDECO 150 MG Tab      • ADVAIR DISKUS 500-50 MCG/DOSE AEROSOL POWDER, BREATH ACTIVATED INHALE 1 PUFF 2 TIMES A DAY. RINSE MOUTH AFTER EACH USE. 60 Each 5   • phytonadione (MEPHYTON) 5 MG Tab Take 1 tablet by mouth every day. 30 tablet 3   • HUMALOG KWIKPEN 100 UNIT/ML Solution Pen-injector injection PEN Inject 12-15 Units under the skin 3 times a day before meals. 45 mL 3   • albuterol (PROVENTIL) 2.5mg/3ml Nebu Soln solution for nebulization Take 3 mL by nebulization 3 times a day. 540 mL 3   • fluticasone (FLONASE) 50 MCG/ACT nasal spray Administer 1-2 Sprays into affected nostril(S) every day. 11.1 mL 5   • acyclovir (ZOVIRAX) 400 MG tablet Take 1 tablet orally 4 times a day for 14 days, then twice daily for 3 months 90 Tab 3   • CAYSTON 75 MG Recon Soln      • KALYDECO 150 MG Tab TAKE ONE TABLET TWICE  DAILY WITH FAT-CONTAINING FOOD 56 Tab 11   • Continuous Blood Gluc Sensor (DEXCOM G6 SENSOR) Misc 1 Each by Does not apply route every 10 days. 9 Each 4   • Continuous Blood Gluc Transmit (DEXCOM G6 TRANSMITTER) Misc 1 Each by Does not apply route Continuous. Change every 3 months 1 Each 4   • CREON 6000 units Cap DR Particles TAKE 1-2 CAPSULES 4 TIMES A DAY AS NEEDED WITH MEALS OR SNACKS **KEEP IN ORIGINAL BOTTLE** 500 Cap 6   • Multiple Vitamins-Minerals (DEKAS PLUS PO) Take 1 Cap by mouth 2 times a day.     • magnesium oxide (MAG-OX) 400 MG Tab Take 200 mg by mouth 2 times a day.     • Ascorbic Acid (VITAMIN C) 1000 MG Tab Take 1,000 mg by mouth 2 Times a Day.     • Probiotic Product (PROBIOTIC-10 PO) Take 1 Tab by mouth every day.     • Cholecalciferol 5000 units Tab Take 10,000 Units by mouth 2 Times a Day.       No current facility-administered medications for this visit.     She  has a past medical history of Allergy, Anemia, Asthma, Breath shortness, Bronchitis, CF (cystic fibrosis) ([facility]), Coughing blood, Diabetes ([facility]), Head ache, Hemorrhagic disorder ([facility]), Herpes simplex, Pneumonia, and Shoulder fracture.  She  has a past surgical history that includes sinus washing; dental surgery; bronchoscopy-endo (7/22/2019); and eye surgery.  Social History     Tobacco Use   • Smoking status: Never Smoker   • Smokeless tobacco: Never Used   Substance Use Topics   • Alcohol use: Yes     Alcohol/week: 1.8 oz     Types: 3 Standard drinks or equivalent per week     Comment: Socially   • Drug use: No     Social History     Social History Narrative    Has 2 daughters who are both CF carriers. Enjoys staying active.     Retired .     Recently moved to [place] from [place]     Family History   Problem Relation Age of Onset   • Other Daughter         CF carrier   • Alcohol/Drug Father    • Heart Disease Father    • Cystic Fibrosis Sister      Family Status   Relation Name Status   • Jakob  (Not Specified)   •  "Mo  Alive   • Fa 45    • Sis 19          ROS  No fever or chills.  No nausea or vomiting.  No chest pain or palpitations.  No cough or SOB.  No pain with urination or hematuria.  No black or bloody stools.  All other systems reviewed and are negative     Objective:     /60 (BP Location: Right arm, Patient Position: Sitting, BP Cuff Size: Adult)   Pulse 94   Temp 37.3 °C (99.1 °F) (Temporal)   Resp 16   Ht 1.664 m (5' 5.5\")   Wt 61.7 kg (136 lb)   SpO2 96%  Body mass index is 22.29 kg/m².  Physical Exam:      Well developed, well nourished.  Alert, oriented in no acute distress.  Psych: Eye contact is good, speech goal directed, affect calm  Eyes: conjunctiva non-injected, sclera non-icteric.  Neck Supple.  No adenopathy or masses in the neck or supraclavicular regions. No thyromegaly  Lungs: Occasional rhonchi throughout with good excursion. No wheezes or rhonchi  CV: regular rate and rhythm. No murmur      Assessment and Plan:   The following treatment plan was discussed    1. CF (3849+10k bC>T, Y0434L) ([facility])  This is a new problem to me.   Strongly encourage patient to get Covid vaccine as she is at highest risk.  Continue follow-up with pulmonology.    2. 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  Follow up with endocrinology as scheduled    3. Exocrine pancreatic insufficiency  This is a new problem to me that is currently stable.  Follow up with endocrinology as scheduled    Patient is due for mammogram.  Records reviewed    Any change or worsening of signs or symptoms, patient encouraged to follow-up or report to the emergency room for further evaluation. Patient understands and agrees.    Followup: Return in about 6 months (around 10/28/2021).         "

## 2021-06-02 ENCOUNTER — PATIENT MESSAGE (OUTPATIENT)
Dept: PEDIATRIC PULMONOLOGY | Facility: MEDICAL CENTER | Age: 61
End: 2021-06-02

## 2021-06-02 DIAGNOSIS — E84.9 DIABETES MELLITUS DUE TO CYSTIC FIBROSIS (HCC): ICD-10-CM

## 2021-06-02 DIAGNOSIS — E56.1 VITAMIN K DEFICIENCY: ICD-10-CM

## 2021-06-02 DIAGNOSIS — E08.9 DIABETES MELLITUS DUE TO CYSTIC FIBROSIS (HCC): ICD-10-CM

## 2021-06-02 DIAGNOSIS — E84.0 CYSTIC FIBROSIS WITH PULMONARY EXACERBATION (HCC): ICD-10-CM

## 2021-06-02 RX ORDER — BLOOD SUGAR DIAGNOSTIC
STRIP MISCELLANEOUS
Qty: 300 STRIP | Refills: 3 | Status: SHIPPED | OUTPATIENT
Start: 2021-06-02 | End: 2022-05-20

## 2021-06-02 RX ORDER — SODIUM CHLORIDE FOR INHALATION 7 %
4 VIAL, NEBULIZER (ML) INHALATION 4 TIMES DAILY
Qty: 480 ML | Refills: 5 | Status: SHIPPED | OUTPATIENT
Start: 2021-06-02 | End: 2021-06-03

## 2021-06-02 RX ORDER — PEN NEEDLE, DIABETIC 32GX 5/32"
1 NEEDLE, DISPOSABLE MISCELLANEOUS
Qty: 400 EACH | Refills: 3 | Status: SHIPPED | OUTPATIENT
Start: 2021-06-02 | End: 2022-05-20

## 2021-06-02 NOTE — TELEPHONE ENCOUNTER
From: Kenia Soto  To: Physician Marjan Payne  Sent: 6/2/2021 9:14 AM PDT  Subject: Prescription Question    Hi Dr. Payne, I need a prescription for sodium chloride 7% solution. Thanks, Marina

## 2021-06-03 RX ORDER — PHYTONADIONE 5 MG/1
5 TABLET ORAL DAILY
Qty: 90 TABLET | Refills: 3 | Status: SHIPPED | OUTPATIENT
Start: 2021-06-03 | End: 2022-05-19

## 2021-06-03 RX ORDER — SODIUM CHLORIDE FOR INHALATION 7 %
4 VIAL, NEBULIZER (ML) INHALATION 4 TIMES DAILY
Qty: 1440 ML | Refills: 3 | Status: SHIPPED | OUTPATIENT
Start: 2021-06-03 | End: 2022-05-17

## 2021-06-16 ENCOUNTER — APPOINTMENT (OUTPATIENT)
Dept: PEDIATRIC PULMONOLOGY | Facility: MEDICAL CENTER | Age: 61
End: 2021-06-16
Payer: COMMERCIAL

## 2021-06-25 ENCOUNTER — TELEPHONE (OUTPATIENT)
Dept: PEDIATRIC PULMONOLOGY | Facility: MEDICAL CENTER | Age: 61
End: 2021-06-25

## 2021-06-25 DIAGNOSIS — R05.9 COUGH: ICD-10-CM

## 2021-06-25 RX ORDER — AZITHROMYCIN 250 MG/1
TABLET, FILM COATED ORAL
Qty: 12 TABLET | Refills: 6 | Status: SHIPPED | OUTPATIENT
Start: 2021-06-25 | End: 2021-07-03

## 2021-06-25 NOTE — TELEPHONE ENCOUNTER
Daughter had a Cold, now Kenia has a cough, fever, lungs congested, and fatigue. She started Cayston, and increased treatments to 3x/day.    I discussed patient with Dr. Payne who sent in Rx for 5 day course of azithromycin. Patient informed via phone. Follow up visit in office scheduled for July 7.

## 2021-07-01 ENCOUNTER — HOSPITAL ENCOUNTER (OUTPATIENT)
Dept: RADIOLOGY | Facility: MEDICAL CENTER | Age: 61
End: 2021-07-01
Attending: FAMILY MEDICINE
Payer: COMMERCIAL

## 2021-07-01 DIAGNOSIS — Z12.31 ENCOUNTER FOR SCREENING MAMMOGRAM FOR BREAST CANCER: ICD-10-CM

## 2021-07-01 PROCEDURE — 77063 BREAST TOMOSYNTHESIS BI: CPT

## 2021-07-03 ENCOUNTER — OFFICE VISIT (OUTPATIENT)
Dept: URGENT CARE | Facility: CLINIC | Age: 61
End: 2021-07-03
Payer: COMMERCIAL

## 2021-07-03 VITALS
DIASTOLIC BLOOD PRESSURE: 68 MMHG | WEIGHT: 130 LBS | TEMPERATURE: 98.2 F | OXYGEN SATURATION: 95 % | BODY MASS INDEX: 21.66 KG/M2 | HEART RATE: 95 BPM | RESPIRATION RATE: 16 BRPM | SYSTOLIC BLOOD PRESSURE: 120 MMHG | HEIGHT: 65 IN

## 2021-07-03 DIAGNOSIS — E84.9 DIABETES MELLITUS DUE TO CYSTIC FIBROSIS (HCC): ICD-10-CM

## 2021-07-03 DIAGNOSIS — L08.9 SKIN INFECTION: ICD-10-CM

## 2021-07-03 DIAGNOSIS — E08.9 DIABETES MELLITUS DUE TO CYSTIC FIBROSIS (HCC): ICD-10-CM

## 2021-07-03 DIAGNOSIS — Z87.2 HISTORY OF CELLULITIS: ICD-10-CM
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
## 2021-07-03 PROCEDURE — 99214 OFFICE O/P EST MOD 30 MIN: CPT | Performed by: NURSE PRACTITIONER

## 2021-07-03 RX ORDER — SULFAMETHOXAZOLE AND TRIMETHOPRIM 800; 160 MG/1; MG/1
1 TABLET ORAL 2 TIMES DAILY
Qty: 10 TABLET | Refills: 0 | Status: SHIPPED | OUTPATIENT
Start: 2021-07-03 | End: 2021-07-05

## 2021-07-03 ASSESSMENT — FIBROSIS 4 INDEX: FIB4 SCORE: 0.67

## 2021-07-05 ENCOUNTER — OFFICE VISIT (OUTPATIENT)
Dept: URGENT CARE | Facility: CLINIC | Age: 61
End: 2021-07-05
Payer: COMMERCIAL

## 2021-07-05 VITALS
OXYGEN SATURATION: 98 % | HEART RATE: 86 BPM | TEMPERATURE: 98.3 F | RESPIRATION RATE: 14 BRPM | DIASTOLIC BLOOD PRESSURE: 62 MMHG | SYSTOLIC BLOOD PRESSURE: 110 MMHG | BODY MASS INDEX: 25.52 KG/M2 | HEIGHT: 60 IN | WEIGHT: 130 LBS

## 2021-07-05 DIAGNOSIS — L08.9 SKIN INFECTION: ICD-10-CM

## 2021-07-05 DIAGNOSIS — T50.905A ADVERSE EFFECT OF DRUG, INITIAL ENCOUNTER: ICD-10-CM
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
## 2021-07-05 PROCEDURE — 99214 OFFICE O/P EST MOD 30 MIN: CPT | Performed by: PHYSICIAN ASSISTANT

## 2021-07-05 RX ORDER — DOXYCYCLINE HYCLATE 100 MG
100 TABLET ORAL 2 TIMES DAILY
Qty: 14 TABLET | Refills: 0 | Status: SHIPPED | OUTPATIENT
Start: 2021-07-05 | End: 2021-07-12

## 2021-07-05 ASSESSMENT — ENCOUNTER SYMPTOMS
FEVER: 0
COUGH: 0
DIARRHEA: 0
SHORTNESS OF BREATH: 0
FATIGUE: 0

## 2021-07-05 ASSESSMENT — FIBROSIS 4 INDEX: FIB4 SCORE: 0.67

## 2021-07-05 NOTE — PROGRESS NOTES
Subjective:   Kenia Soto is a 60 y.o. female who presents for Medication Reaction (antibiotics, hives, shoulder, albow, wrist pain)        Rash  This is a new problem. The current episode started yesterday. The problem has been gradually worsening since onset. The affected locations include the right arm, right upper leg, left upper leg and left arm. The rash is characterized by redness. She was exposed to a new medication (bactrim). Pertinent negatives include no cough, diarrhea, facial edema, fatigue, fever or shortness of breath. Past treatments include antihistamine (allegra and benadryl). The treatment provided mild relief. Her past medical history is significant for allergies.     Review of Systems   Constitutional: Negative for fatigue and fever.   Respiratory: Negative for cough and shortness of breath.    Gastrointestinal: Negative for diarrhea.   Skin: Positive for rash.       PMH:  has a past medical history of Allergy, Anemia, Asthma, Breath shortness, Bronchitis, CF (cystic fibrosis) ([facility]), Coughing blood, Diabetes ([facility]), Head ache, Hemorrhagic disorder ([facility]), Herpes simplex, Pneumonia, and Shoulder fracture.  MEDS:   Current Outpatient Medications:   •  doxycycline (VIBRAMYCIN) 100 MG Tab, Take 1 tablet by mouth 2 times a day for 7 days., Disp: 14 tablet, Rfl: 0  •  mupirocin (BACTROBAN) 2 % Ointment, Apply BID for up to 10 days, Disp: 30 g, Rfl: 0  •  sodium chloride (HYPER-SAL) 7 % Nebu Soln, Take 4 mL by nebulization 4 times a day., Disp: 1440 mL, Rfl: 3  •  phytonadione (MEPHYTON) 5 MG Tab, Take 1 tablet by mouth every day., Disp: 90 tablet, Rfl: 3  •  BD PEN NEEDLE TAMIKO U/F, 1 Each by Other route 4 Times a Day,Before Meals and at Bedtime. For insulin administration., Disp: 400 Each, Rfl: 3  •  ONETOUCH VERIO strip, TEST 3 XD FOR INSULIN ADJUSTMENT AND PRF SYMPTOMS OF HIGH OR LOW BLOOD SUGAR, Disp: 300 Strip, Rfl: 3  •  KALYDECO 150 MG Tab, , Disp: , Rfl:   •  ADVAIR DISKUS 500-50 MCG/DOSE  AEROSOL POWDER, BREATH ACTIVATED, INHALE 1 PUFF 2 TIMES A DAY. RINSE MOUTH AFTER EACH USE., Disp: 60 Each, Rfl: 5  •  HUMALOG KWIKPEN 100 UNIT/ML Solution Pen-injector injection PEN, Inject 12-15 Units under the skin 3 times a day before meals., Disp: 45 mL, Rfl: 3  •  albuterol (PROVENTIL) 2.5mg/3ml Nebu Soln solution for nebulization, Take 3 mL by nebulization 3 times a day., Disp: 540 mL, Rfl: 3  •  fluticasone (FLONASE) 50 MCG/ACT nasal spray, Administer 1-2 Sprays into affected nostril(S) every day., Disp: 11.1 mL, Rfl: 5  •  acyclovir (ZOVIRAX) 400 MG tablet, Take 1 tablet orally 4 times a day for 14 days, then twice daily for 3 months, Disp: 90 Tab, Rfl: 3  •  KALYDECO 150 MG Tab, TAKE ONE TABLET TWICE DAILY WITH FAT-CONTAINING FOOD, Disp: 56 Tab, Rfl: 11  •  Continuous Blood Gluc Sensor (DEXCOM G6 SENSOR) Misc, 1 Each by Does not apply route every 10 days., Disp: 9 Each, Rfl: 4  •  Continuous Blood Gluc Transmit (DEXCOM G6 TRANSMITTER) Misc, 1 Each by Does not apply route Continuous. Change every 3 months, Disp: 1 Each, Rfl: 4  •  CREON 6000 units Cap DR Particles, TAKE 1-2 CAPSULES 4 TIMES A DAY AS NEEDED WITH MEALS OR SNACKS **KEEP IN ORIGINAL BOTTLE**, Disp: 500 Cap, Rfl: 6  •  Multiple Vitamins-Minerals (DEKAS PLUS PO), Take 1 Cap by mouth 2 times a day., Disp: , Rfl:   •  magnesium oxide (MAG-OX) 400 MG Tab, Take 200 mg by mouth 2 times a day., Disp: , Rfl:   •  Ascorbic Acid (VITAMIN C) 1000 MG Tab, Take 1,000 mg by mouth 2 Times a Day., Disp: , Rfl:   •  Probiotic Product (PROBIOTIC-10 PO), Take 1 Tab by mouth every day., Disp: , Rfl:   •  Cholecalciferol 5000 units Tab, Take 10,000 Units by mouth 2 Times a Day., Disp: , Rfl:   ALLERGIES:   Allergies   Allergen Reactions   • Bactrim [Sulfamethoxazole-Trimethoprim]      rash   • Ciprofloxacin      Joint swelling   • Levofloxacin      Unknown reaction  Possibly myalgias, arthralgias, nightmares   • Tobramycin Hives     Hives     SURGHX:   Past Surgical  History:   Procedure Laterality Date   • BRONCHOSCOPY-ENDO  7/22/2019    Procedure: BRONCHOSCOPY - W/BAL;  Surgeon: Arelis Fleming M.D.;  Location[facility];  Service: Ent   • DENTAL SURGERY     • EYE SURGERY     • SINUS WASHING       SOCHX:  reports that she has never smoked. She has never used smokeless tobacco. She reports current alcohol use of about 1.8 oz of alcohol per week. She reports that she does not use drugs.  FH: Family history was reviewed, no pertinent findings to report   Objective:   /62 (BP Location: Left arm, Patient Position: Sitting, BP Cuff Size: Adult)   Pulse 86   Temp 36.8 °C (98.3 °F) (Temporal)   Resp 14   Ht 1.524 m (5')   Wt 59 kg (130 lb)   SpO2 98%   BMI 25.39 kg/m²   Physical Exam  Vitals reviewed.   Constitutional:       General: She is not in acute distress.     Appearance: Normal appearance. She is well-developed. She is not toxic-appearing.   HENT:      Head: Normocephalic and atraumatic.      Right Ear: External ear normal.      Left Ear: External ear normal.      Nose: Nose normal.   Eyes:      General: Gaze aligned appropriately.   Cardiovascular:      Rate and Rhythm: Normal rate and regular rhythm.   Pulmonary:      Effort: Pulmonary effort is normal. No respiratory distress.      Breath sounds: No stridor.   Musculoskeletal:      Cervical back: Neck supple.   Skin:     General: Skin is warm and dry.      Capillary Refill: Capillary refill takes less than 2 seconds.             Comments: Diffuse distribution of erythematous maculopapular rash on arms and legs bilaterally.  Nontender.  No vesicles.  No discharge.       Neurological:      Mental Status: She is alert and oriented to person, place, and time.      Comments: CN2-12 grossly intact   Psychiatric:         Speech: Speech normal.         Behavior: Behavior normal.           Assessment/Plan:   1. Adverse effect of drug, initial encounter    2. Insect bite, unspecified site, initial  encounter  - doxycycline (VIBRAMYCIN) 100 MG Tab; Take 1 tablet by mouth 2 times a day for 7 days.  Dispense: 14 tablet; Refill: 0    3. Skin infection  - doxycycline (VIBRAMYCIN) 100 MG Tab; Take 1 tablet by mouth 2 times a day for 7 days.  Dispense: 14 tablet; Refill: 0    Patient's new lesions consistent with drug reaction. No evidence of anaphylaxis. Stop Bactrim.  This is added to her allergy/intolerance list.  Patient switched to doxycycline.  She may continue using topical mupirocin as this is not causing any issues.  Patient declines topical steroid.  Continue Benadryl and Allegra.  If symptoms worsen, persist, or fail to fully resolve follow-up with PCP or return to clinic for reevaluation.    Differential diagnosis, natural history, supportive care, and indications for immediate follow-up discussed.

## 2021-07-08 ASSESSMENT — ENCOUNTER SYMPTOMS
NAUSEA: 0
FEVER: 0
CHILLS: 0
VOMITING: 0
MYALGIAS: 0
TINGLING: 0

## 2021-07-08 ASSESSMENT — LIFESTYLE VARIABLES: SUBSTANCE_ABUSE: 0

## 2021-07-08 NOTE — PROGRESS NOTES
Kenia Soto is a 60 y.o. female who presents for Spider Bite (rt leg, lt ankle x2 days )      HPI this new problem.  Mio is a 60-year-old female presents with a possible spider bite to her right leg and her left ankle.  Bites been present for 2 days.  There is been increasing redness and swelling around the bites.  Has been mild itching.  She has taken over-the-counter antihistamine with some relief of the itch.  She is concerned about infection.  No other aggravating or alleviating factors.    Review of Systems   Constitutional: Negative for chills and fever.   Gastrointestinal: Negative for nausea and vomiting.   Musculoskeletal: Negative for joint pain and myalgias.   Neurological: Negative for tingling.   Psychiatric/Behavioral: Negative for substance abuse.       Allergies:       Allergies   Allergen Reactions   • Bactrim [Sulfamethoxazole-Trimethoprim]      rash   • Ciprofloxacin      Joint swelling   • Levofloxacin      Unknown reaction  Possibly myalgias, arthralgias, nightmares   • Tobramycin Hives     Hives       PMSFS Hx:  Past Medical History:   Diagnosis Date   • Allergy    • Anemia    • Asthma    • Breath shortness    • Bronchitis    • CF (cystic fibrosis) (HCC)    • Coughing blood    • Diabetes ([facility])    • Head ache    • Hemorrhagic disorder ([facility])    • Herpes simplex    • Pneumonia    • Shoulder fracture      Past Surgical History:   Procedure Laterality Date   • BRONCHOSCOPY-ENDO  7/22/2019    Procedure: BRONCHOSCOPY - W/BAL;  Surgeon: Arelis Fleming M.D.;  Location: [facility];  Service: Ent   • DENTAL SURGERY     • EYE SURGERY     • SINUS WASHING       Family History   Problem Relation Age of Onset   • Other Daughter         CF carrier   • Alcohol/Drug Father    • Heart Disease Father    • Cystic Fibrosis Sister      Social History     Tobacco Use   • Smoking status: Never Smoker   • Smokeless tobacco: Never Used   Substance Use Topics   • Alcohol use: Yes     Alcohol/week:  1.8 oz     Types: 3 Standard drinks or equivalent per week     Comment: Socially       Problems:   Patient Active Problem List   Diagnosis   • CF (3849+10k bC>T, O1296I) ([facility])   • Exocrine pancreatic insufficiency   • Vitamin K deficiency   • Herpes simplex   • Osteopenia of multiple sites   • 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   • Cystic fibrosis ([facility])   • Cystic fibrosis with pulmonary manifestations ([facility])   • Irritable bowel syndrome   • Moderate COPD (chronic obstructive pulmonary disease) ([facility])   • Partial thromboplastin time increased   • Oroantral fistula   • Suspected COVID-19 virus infection   • 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 1440 mL 3   • phytonadione (MEPHYTON) 5 MG Tab Take 1 tablet by mouth every day. 90 tablet 3   • BD PEN NEEDLE TAMIKO U/F 1 Each by Other route 4 Times a Day,Before Meals and at Bedtime. For insulin administration. 400 Each 3   • ONETOUCH VERIO strip TEST 3 XD FOR INSULIN ADJUSTMENT AND PRF SYMPTOMS OF HIGH OR LOW BLOOD SUGAR 300 Strip 3   • KALYDECO 150 MG Tab      • ADVAIR DISKUS 500-50 MCG/DOSE AEROSOL POWDER, BREATH ACTIVATED INHALE 1 PUFF 2 TIMES A DAY. RINSE MOUTH AFTER EACH USE. 60 Each 5   • HUMALOG KWIKPEN 100 UNIT/ML Solution Pen-injector injection PEN Inject 12-15 Units under the skin 3 times a day before meals. 45 mL 3   • albuterol (PROVENTIL) 2.5mg/3ml Nebu Soln solution for nebulization Take 3 mL by nebulization 3 times a day. 540 mL 3   • fluticasone (FLONASE) 50 MCG/ACT nasal spray Administer 1-2 Sprays into affected nostril(S) every day. 11.1 mL 5   • acyclovir (ZOVIRAX) 400 MG tablet Take 1 tablet orally 4 times a day for 14 days, then twice daily for 3 months 90 Tab 3   • KALYDECO 150 MG Tab TAKE ONE TABLET TWICE DAILY WITH FAT-CONTAINING FOOD 56 Tab 11   •  "Continuous Blood Gluc Sensor (DEXCOM G6 SENSOR) Misc 1 Each by Does not apply route every 10 days. 9 Each 4   • Continuous Blood Gluc Transmit (DEXCOM G6 TRANSMITTER) Misc 1 Each by Does not apply route Continuous. Change every 3 months 1 Each 4   • CREON 6000 units Cap DR Particles TAKE 1-2 CAPSULES 4 TIMES A DAY AS NEEDED WITH MEALS OR SNACKS **KEEP IN ORIGINAL BOTTLE** 500 Cap 6   • Multiple Vitamins-Minerals (DEKAS PLUS PO) Take 1 Cap by mouth 2 times a day.     • magnesium oxide (MAG-OX) 400 MG Tab Take 200 mg by mouth 2 times a day.     • Ascorbic Acid (VITAMIN C) 1000 MG Tab Take 1,000 mg by mouth 2 Times a Day.     • Probiotic Product (PROBIOTIC-10 PO) Take 1 Tab by mouth every day.     • Cholecalciferol 5000 units Tab Take 10,000 Units by mouth 2 Times a Day.       No current facility-administered medications on file prior to visit.          Objective:     /68   Pulse 95   Temp 36.8 °C (98.2 °F) (Temporal)   Resp 16   Ht 1.651 m (5' 5\")   Wt 59 kg (130 lb)   SpO2 95%   BMI 21.63 kg/m²     Physical Exam  Vitals and nursing note reviewed.   Constitutional:       Appearance: Normal appearance. She is normal weight.   Cardiovascular:      Rate and Rhythm: Normal rate and regular rhythm.   Musculoskeletal:      Cervical back: Normal range of motion and neck supple.   Skin:     General: Skin is warm and dry.      Capillary Refill: Capillary refill takes less than 2 seconds.      Findings: Erythema, lesion and rash present. Rash is macular, papular and pustular.          Neurological:      Mental Status: She is alert and oriented to person, place, and time.   Psychiatric:         Mood and Affect: Mood normal.         Thought Content: Thought content normal.         Assessment /Associated Orders:      1. Insect bite, unspecified site, initial encounter  mupirocin (BACTROBAN) 2 % Ointment    DISCONTINUED: sulfamethoxazole-trimethoprim (BACTRIM DS) 800-160 MG tablet   2. Skin infection  mupirocin " (BACTROBAN) 2 % Ointment    DISCONTINUED: sulfamethoxazole-trimethoprim (BACTRIM DS) 800-160 MG tablet   3. History of cellulitis  DISCONTINUED: sulfamethoxazole-trimethoprim (BACTRIM DS) 800-160 MG tablet   4. 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  No acute distress noted. Appropriate for outpatient management at this time.   Acute problem today with uncertain prognosis.   Educated in proper administration of medication(s) ordered today including safety, possible SE, risks, benefits, rationale and alternatives to therapy.   Warm compresses BID for 3-4 days then prn     Advised to follow-up with the primary care provider for recheck, reevaluation, and consideration of further management if necessary.   Discussed management options (risks,benefits, and alternatives to treatment). Expressed understanding and the treatment plan was agreed upon. Questions were encouraged and answered       Return to urgent care prn if new or worsening sx or if there is no improvement in condition prn.  Educated in Red flags and indications to immediately call 911 or present to the Emergency Department.     I personally reviewed prior external notes and test results pertinent to today's visit.  I have independently reviewed and interpreted all diagnostics ordered during this urgent care acute visit.   Time spent evaluating this patient was at least 30 minutes and includes preparing for visit, counseling/education, exam and evaluation, obtaining history, independent interpretation, ordering lab/test/procedures,medication management and documentation.

## 2021-07-17 ENCOUNTER — OFFICE VISIT (OUTPATIENT)
Dept: URGENT CARE | Facility: CLINIC | Age: 61
End: 2021-07-17
Payer: COMMERCIAL

## 2021-07-17 VITALS
HEART RATE: 90 BPM | BODY MASS INDEX: 25.52 KG/M2 | OXYGEN SATURATION: 95 % | SYSTOLIC BLOOD PRESSURE: 100 MMHG | WEIGHT: 130 LBS | DIASTOLIC BLOOD PRESSURE: 52 MMHG | RESPIRATION RATE: 16 BRPM | TEMPERATURE: 98.3 F | HEIGHT: 60 IN

## 2021-07-17 DIAGNOSIS — L03.119 CELLULITIS OF ANKLE: ICD-10-CM
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
## 2021-07-17 PROCEDURE — 99214 OFFICE O/P EST MOD 30 MIN: CPT | Performed by: NURSE PRACTITIONER

## 2021-07-17 RX ORDER — TRIAMCINOLONE ACETONIDE 1 MG/G
1 CREAM TOPICAL 2 TIMES DAILY
Qty: 15 G | Refills: 0 | Status: SHIPPED | OUTPATIENT
Start: 2021-07-17 | End: 2021-07-24

## 2021-07-17 RX ORDER — DOXYCYCLINE 100 MG/1
100 CAPSULE ORAL 2 TIMES DAILY
Qty: 14 CAPSULE | Refills: 0 | Status: SHIPPED | OUTPATIENT
Start: 2021-07-17 | End: 2021-07-19 | Stop reason: SDUPTHER

## 2021-07-17 RX ORDER — CEPHALEXIN 500 MG/1
500 CAPSULE ORAL 4 TIMES DAILY
Qty: 20 CAPSULE | Refills: 0 | Status: SHIPPED | OUTPATIENT
Start: 2021-07-17 | End: 2021-07-19

## 2021-07-17 ASSESSMENT — FIBROSIS 4 INDEX: FIB4 SCORE: 0.67

## 2021-07-17 NOTE — NON-PROVIDER
Patient was seen in UC on 7/3 for two spider bites. She was prescribed Bactrim, but had a reaction to it, so she came back to Urgent Care and was prescribed doxycycline. Patient completed that course, and symptoms abated. Patient's rash, bumps, and swelling have developed since completing the doxycycline on 7/12.    Patient has had a similar reaction once in the past, which was attributed to cipro. However, the one constant across all of the reactions was her taking Cayston, and she thinks she may actually be allergic to that instead.

## 2021-07-17 NOTE — PROGRESS NOTES
Subjective:     Kenia Soto is a 60 y.o. female who presents for Rash (both arms, both legs, no itching or pain), Bump (several lumps on both legs, painful), and Ankle Pain (swollen left ankle, redness)      Started 7/3 with a bug bite, then had a adverse reaction to medications. Bites to outer left thigh, and lower left leg are now healing. Returns for new bump to left outer ankle since yesterday, with increased swelling. States it started as a small bug bite like bump. No fever. No itch. Painful. Also has other smaller rash like bumps. Has PCP. Hx of increased reactions to bites.          Rash  This is a recurrent problem. The current episode started yesterday. The problem has been rapidly worsening since onset. The affected locations include the left ankle. She was exposed to an insect bite/sting. Pertinent negatives include no facial edema, fever or shortness of breath.   Ankle Pain         Past Medical History:   Diagnosis Date   • Allergy    • Anemia    • Asthma    • Breath shortness    • Bronchitis    • CF (cystic fibrosis) ([facility])    • Coughing blood    • Diabetes ([facility])    • Head ache    • Hemorrhagic disorder ([facility])    • Herpes simplex    • Pneumonia    • Shoulder fracture        Past Surgical History:   Procedure Laterality Date   • BRONCHOSCOPY-ENDO  7/22/2019    Procedure: BRONCHOSCOPY - W/BAL;  Surgeon: Arelis Fleming M.D.;  Location: [facility];  Service: Ent   • DENTAL SURGERY     • EYE SURGERY     • SINUS WASHING         Social History     Socioeconomic History   • Marital status:      Spouse name: Not on file   • Number of children: Not on file   • Years of education: Not on file   • Highest education level: Not on file   Occupational History   • Not on file   Tobacco Use   • Smoking status: Never Smoker   • Smokeless tobacco: Never Used   Vaping Use   • Vaping Use: Never used   Substance and Sexual Activity   • Alcohol use: Yes     Alcohol/week: 1.8 oz     Types: 3  Standard drinks or equivalent per week     Comment: Socially   • Drug use: No   • Sexual activity: Yes     Partners: Male     Comment: PM    Other Topics Concern   • Not on file   Social History Narrative    Has 2 daughters who are both CF carriers. Enjoys staying active.     Retired .     Recently moved to [place] from [place]     Social Determinants of Health     Financial Resource Strain:    • Difficulty of Paying Living Expenses:    Food Insecurity:    • Worried About Running Out of Food in the Last Year:    • Ran Out of Food in the Last Year:    Transportation Needs:    • Lack of Transportation (Medical):    • Lack of Transportation (Non-Medical):    Physical Activity:    • Days of Exercise per Week:    • Minutes of Exercise per Session:    Stress:    • Feeling of Stress :    Social Connections:    • Frequency of Communication with Friends and Family:    • Frequency of Social Gatherings with Friends and Family:    • Attends [employer] Services:    • Active Member of Clubs or Organizations:    • Attends Club or Organization Meetings:    • Marital Status:    Intimate Partner Violence:    • Fear of Current or Ex-Partner:    • Emotionally Abused:    • Physically Abused:    • Sexually Abused:         Family History   Problem Relation Age of Onset   • Other Daughter         CF carrier   • Alcohol/Drug Father    • Heart Disease Father    • Cystic Fibrosis Sister         Allergies   Allergen Reactions   • Bactrim [Sulfamethoxazole-Trimethoprim]      rash   • Ciprofloxacin      Joint swelling   • Levofloxacin      Unknown reaction  Possibly myalgias, arthralgias, nightmares   • Tobramycin Hives     Hives       Review of Systems   Constitutional: Negative for fever.   Respiratory: Negative for shortness of breath.    Skin: Positive for rash. Negative for itching.   All other systems reviewed and are negative.       Objective:   /52 (BP Location: Left arm, Patient Position: Sitting, BP Cuff Size:  Adult)   Pulse 90   Temp 36.8 °C (98.3 °F) (Temporal)   Resp 16   Ht 1.524 m (5')   Wt 59 kg (130 lb)   SpO2 95%   BMI 25.39 kg/m²     Physical Exam  Vitals reviewed.   Constitutional:       General: She is not in acute distress.     Appearance: She is well-developed.   HENT:      Head: Normocephalic and atraumatic.      Right Ear: External ear normal.      Left Ear: External ear normal.      Nose: Nose normal.      Mouth/Throat:      Lips: Pink.      Mouth: Mucous membranes are moist.   Eyes:      Conjunctiva/sclera: Conjunctivae normal.   Cardiovascular:      Rate and Rhythm: Normal rate.   Pulmonary:      Effort: Pulmonary effort is normal. No respiratory distress.   Musculoskeletal:         General: Swelling and tenderness present. Normal range of motion.      Cervical back: Normal range of motion.   Skin:     General: Skin is warm and dry.      Findings: Erythema and rash present. Rash is papular and vesicular. Rash is not pustular.      Comments: 5x5 cm area of erythema to lateral malleolus. Edema. Warm to palption. Small central vesicular lesions. No drainage. No joint pain to ROM.    Other scattered smaller papular erythemic lesions to legs. No calf pain, redness, or edema   Neurological:      General: No focal deficit present.      Mental Status: She is alert and oriented to person, place, and time.      GCS: GCS eye subscore is 4. GCS verbal subscore is 5. GCS motor subscore is 6.   Psychiatric:         Mood and Affect: Mood normal.         Speech: Speech normal.         Behavior: Behavior normal.         Thought Content: Thought content normal.         Judgment: Judgment normal.         Assessment/Plan:   1. Cellulitis of ankle  - cephALEXin (KEFLEX) 500 MG Cap; Take 1 capsule by mouth 4 times a day for 5 days.  Dispense: 20 capsule; Refill: 0  Contingent- doxycycline (MONODOX) 100 MG capsule; Take 1 capsule by mouth 2 times a day for 7 days.  Dispense: 14 capsule; Refill: 0    2. Bug bite, initial  encounter  - triamcinolone acetonide (KENALOG) 0.1 % Cream; Apply 1 Application topically 2 times a day for 7 days.  Dispense: 15 g; Refill: 0    -Cold compresses.  -Elevate extremity for swelling.   -Over the counter antihistamine for itching, cetirizine (zyrtec).  -Nonsteroidal anti-inflammatory drugs (NSAIDs) for pain, Ibuprofen as directed.  -Watch for any signs of infection (redness, pus, pain, increased swelling or fever).     Follow up for signs of infection, red streaking, shortness of breath or wheezing, oral or facial swelling, rash, joint pain, or any other concerns.    Follow up with PCP as planned. Patient has photo from previous spider bite. Current symptoms are similar. Discussed initiating oral antibiotics for secondary bacterial infection. Contingent doxycycline.    Differential diagnosis, natural history, supportive care, and indications for immediate follow-up discussed.

## 2021-07-17 NOTE — PATIENT INSTRUCTIONS
Cellulitis, Adult    Cellulitis is a skin infection. The infected area is usually warm, red, swollen, and tender. This condition occurs most often in the arms and lower legs. The infection can travel to the muscles, blood, and underlying tissue and become serious. It is very important to get treated for this condition.  What are the causes?  Cellulitis is caused by bacteria. The bacteria enter through a break in the skin, such as a cut, burn, insect bite, open sore, or crack.  What increases the risk?  This condition is more likely to occur in people who:  · Have a weak body defense system (immune system).  · Have open wounds on the skin, such as cuts, burns, bites, and scrapes. Bacteria can enter the body through these open wounds.  · Are older than 60 years of age.  · Have diabetes.  · Have a type of long-lasting (chronic) liver disease (cirrhosis) or kidney disease.  · Are obese.  · Have a skin condition such as:  ? Itchy rash (eczema).  ? Slow movement of blood in the veins (venous stasis).  ? Fluid buildup below the skin (edema).  · Have had radiation therapy.  · Use IV drugs.  What are the signs or symptoms?  Symptoms of this condition include:  · Redness, streaking, or spotting on the skin.  · Swollen area of the skin.  · Tenderness or pain when an area of the skin is touched.  · Warm skin.  · A fever.  · Chills.  · Blisters.  How is this diagnosed?  This condition is diagnosed based on a medical history and physical exam. You may also have tests, including:  · Blood tests.  · Imaging tests.  How is this treated?  Treatment for this condition may include:  · Medicines, such as antibiotic medicines or medicines to treat allergies (antihistamines).  · Supportive care, such as rest and application of cold or warm cloths (compresses) to the skin.  · Hospital care, if the condition is severe.  The infection usually starts to get better within 1-2 days of treatment.  Follow these instructions at  home:    Medicines  · Take over-the-counter and prescription medicines only as told by your health care provider.  · If you were prescribed an antibiotic medicine, take it as told by your health care provider. Do not stop taking the antibiotic even if you start to feel better.  General instructions  · Drink enough fluid to keep your urine pale yellow.  · Do not touch or rub the infected area.  · Raise (elevate) the infected area above the level of your heart while you are sitting or lying down.  · Apply warm or cold compresses to the affected area as told by your health care provider.  · Keep all follow-up visits as told by your health care provider. This is important. These visits let your health care provider make sure a more serious infection is not developing.  Contact a health care provider if:  · You have a fever.  · Your symptoms do not begin to improve within 1-2 days of starting treatment.  · Your bone or joint underneath the infected area becomes painful after the skin has healed.  · Your infection returns in the same area or another area.  · You notice a swollen bump in the infected area.  · You develop new symptoms.  · You have a general ill feeling (malaise) with muscle aches and pains.  Get help right away if:  · Your symptoms get worse.  · You feel very sleepy.  · You develop vomiting or diarrhea that persists.  · You notice red streaks coming from the infected area.  · Your red area gets larger or turns dark in color.  These symptoms may represent a serious problem that is an emergency. Do not wait to see if the symptoms will go away. Get medical help right away. Call your local emergency services (911 in the [place] Do not drive yourself to the hospital.  Summary  · Cellulitis is a skin infection. This condition occurs most often in the arms and lower legs.  · Treatment for this condition may include medicines, such as antibiotic medicines or antihistamines.  · Take over-the-counter and prescription  medicines only as told by your health care provider. If you were prescribed an antibiotic medicine, do not stop taking the antibiotic even if you start to feel better.  · Contact a health care provider if your symptoms do not begin to improve within 1-2 days of starting treatment or your symptoms get worse.  · Keep all follow-up visits as told by your health care provider. This is important. These visits let your health care provider make sure that a more serious infection is not developing.  This information is not intended to replace advice given to you by your health care provider. Make sure you discuss any questions you have with your health care provider.  Document Released: 09/27/2006 Document Revised: 05/09/2019 Document Reviewed: 05/09/2019  [employer] Patient Education © 2020 [employer]      Insect Bite, Adult  An insect bite can make your skin red, itchy, and swollen. Some insects can spread disease to people with a bite. However, most insect bites do not lead to disease, and most are not serious.  What are the causes?  Insects may bite for many reasons, including:  · Hunger.  · To defend themselves.  Insects that bite include:  · Spiders.  · Mosquitoes.  · Ticks.  · Fleas.  · Ants.  · Flies.  · Kissing bugs.  · Chiggers.  What are the signs or symptoms?  Symptoms of this condition include:  · Itching or pain in the bite area.  · Redness and swelling in the bite area.  · An open wound (skin ulcer).  Symptoms often last for 2-4 days.  In rare cases, a person may have a very bad allergic reaction (anaphylactic reaction) to a bite. Symptoms of an anaphylactic reaction may include:  · Feeling warm in the face (flushed). Your face may turn red.  · Itchy, red, swollen areas of skin (hives).  · Swelling of the:  ? Eyes.  ? Lips.  ? Face.  ? Mouth.  ? Tongue.  ? Throat.  · Trouble with any of these:  ? Breathing.  ? Talking.  ? Swallowing.  · Loud breathing (wheezing).  · Feeling dizzy or light-headed.  · Passing  "out (fainting).  · Pain or cramps in your belly.  · Throwing up (vomiting).  · Watery poop (diarrhea).  How is this treated?  Treatment is usually not needed. Symptoms often go away on their own. When treatment is needed, it may involve:  · Putting a cream or lotion on the bite area. This helps with itching.  · Taking an antibiotic medicine. This treatment is needed if the bite area gets infected.  · Getting a tetanus shot, if you are not up to date on this vaccine.  · Putting ice on the affected area.  · Using medicines called antihistamines. This treatment may be needed if you have itching or an allergic reaction to the insect bite.  · Giving yourself a shot of medicine (epinephrine) using an auto-injector \"pen\" if you have an anaphylactic reaction to a bite. Your doctor will teach you how to use this pen.  Follow these instructions at home:  Bite area care    · Do not scratch the bite area.  · Keep the bite area clean and dry.  · Wash the bite area every day with soap and water as told by your doctor.  · Check the bite area every day for signs of infection. Check for:  ? Redness, swelling, or pain.  ? Fluid or blood.  ? Warmth.  ? Pus or a bad smell.  Managing pain, itching, and swelling    · You may put any of these on the bite area as told by your doctor:  ? A paste made of baking soda and water.  ? Cortisone cream.  ? Calamine lotion.  · If told, put ice on the bite area.  ? Put ice in a plastic bag.  ? Place a towel between your skin and the bag.  ? Leave the ice on for 20 minutes, 2-3 times a day.  General instructions  · Apply or take over-the-counter and prescription medicines only as told by your doctor.  · If you were prescribed an antibiotic medicine, take or apply it as told by your doctor. Do not stop using the antibiotic even if your condition improves.  · Keep all follow-up visits as told by your doctor. This is important.  How is this prevented?  To help you have a lower risk of insect " bites:  · When you are outside, wear clothing that covers your arms and legs.  · Use insect repellent. The best insect repellents contain one of these:  ? DEET.  ? Picaridin.  ? Oil of lemon eucalyptus (OLE).  ? [id number].  · Consider spraying your clothing with a pesticide called permethrin. Permethrin helps prevent insect bites. It works for several weeks and for up to 5-6 clothing washes. Do not apply permethrin directly to the skin.  · If your home windows do not have screens, think about putting some in.  · If you will be sleeping in an area where there are mosquitoes, consider covering your sleeping area with a mosquito net.  Contact a doctor if:  · You have redness, swelling, or pain in the bite area.  · You have fluid or blood coming from the bite area.  · The bite area feels warm to the touch.  · You have pus or a bad smell coming from the bite area.  · You have a fever.  Get help right away if:  · You have joint pain.  · You have a rash.  · You feel more tired or sleepy than you normally do.  · You have neck pain.  · You have a headache.  · You feel weaker than you normally do.  · You have signs of an anaphylactic reaction. Signs may include:  ? Feeling warm in the face.  ? Itchy, red, swollen areas of skin.  ? Swelling of your:  § Eyes.  § Lips.  § Face.  § Mouth.  § Tongue.  § Throat.  ? Trouble with any of these:  § Breathing.  § Talking.  § Swallowing.  ? Loud breathing.  ? Feeling dizzy or light-headed.  ? Passing out.  ? Pain or cramps in your belly.  ? Throwing up.  ? Watery poop.  These symptoms may be an emergency. Do not wait to see if the symptoms will go away. Do this right away:  · Use your auto-injector pen as you have been told.  · Get medical help. Call your local emergency services (911 in the [place] Do not drive yourself to the hospital.  Summary  · An insect bite can make your skin red, itchy, and swollen.  · Treatment is usually not needed. Symptoms often go away on their own.  · Do not  scratch the bite area. Keep it clean and dry.  · Ice can help with pain and itching from the bite.  This information is not intended to replace advice given to you by your health care provider. Make sure you discuss any questions you have with your health care provider.  Document Released: 12/15/2001 Document Revised: 06/28/2019 Document Reviewed: 06/28/2019  [employer] Patient Education © 2020 [employer]

## 2021-07-19 ENCOUNTER — OFFICE VISIT (OUTPATIENT)
Dept: MEDICAL GROUP | Facility: LAB | Age: 61
End: 2021-07-19
Payer: COMMERCIAL

## 2021-07-19 VITALS
HEIGHT: 65 IN | HEART RATE: 100 BPM | OXYGEN SATURATION: 96 % | WEIGHT: 133 LBS | BODY MASS INDEX: 22.16 KG/M2 | TEMPERATURE: 99.1 F | RESPIRATION RATE: 16 BRPM | DIASTOLIC BLOOD PRESSURE: 70 MMHG | SYSTOLIC BLOOD PRESSURE: 130 MMHG

## 2021-07-19 DIAGNOSIS — L03.119 CELLULITIS OF ANKLE: ICD-10-CM

## 2021-07-19 PROCEDURE — 99214 OFFICE O/P EST MOD 30 MIN: CPT | Performed by: FAMILY MEDICINE

## 2021-07-19 RX ORDER — DOXYCYCLINE 100 MG/1
100 CAPSULE ORAL 2 TIMES DAILY
Qty: 14 CAPSULE | Refills: 0 | Status: SHIPPED | OUTPATIENT
Start: 2021-07-19 | End: 2021-07-26

## 2021-07-19 ASSESSMENT — FIBROSIS 4 INDEX: FIB4 SCORE: 0.67

## 2021-07-22 NOTE — PROGRESS NOTES
Subjective:     Chief Complaint   Patient presents with   • Rash     insect bites, swelling left ankle       Kenia Soto is a 60 y.o. female here today for evaluation and management of:  Patient experienced some insect bites around July 2.  They were very red and itchy and the one on her left ankle became extremely red and swollen.  She was seen in the urgent care on 7/3 and started on Septra with a presumed cellulitis.  She then developed an allover body rash and was seen again in the urgent care on 7/45.  This was felt to be a drug reaction and was stopped.  She was then started on doxycycline.  She reports that this improved her symptoms and she was doing well for about a week and then on 7/17 she developed redness and swelling and was once again seen in the urgent care where which she was started on Keflex.  She still has some swelling in the left ankle but she reports that it is much better than what it was 2 days ago.  Before she was seen last week she developed another all over body rash but had been off the doxycycline for about 7 days at that point.  She changed all of her bedding and has not had any more bites.    Allergies   Allergen Reactions   • Bactrim [Sulfamethoxazole-Trimethoprim]      rash   • Ciprofloxacin      Joint swelling   • Levofloxacin      Unknown reaction  Possibly myalgias, arthralgias, nightmares   • Tobramycin Hives     Hives       Current medicines (including changes today)  Current Outpatient Medications   Medication Sig Dispense Refill   • doxycycline (MONODOX) 100 MG capsule Take 1 capsule by mouth 2 times a day for 7 days. 14 capsule 0   • triamcinolone acetonide (KENALOG) 0.1 % Cream Apply 1 Application topically 2 times a day for 7 days. 15 g 0   • mupirocin (BACTROBAN) 2 % Ointment Apply BID for up to 10 days 30 g 0   • sodium chloride (HYPER-SAL) 7 % Nebu Soln Take 4 mL by nebulization 4 times a day. 1440 mL 3   • phytonadione (MEPHYTON) 5 MG Tab Take 1 tablet by mouth  every day. 90 tablet 3   • BD PEN NEEDLE TAMIKO U/F 1 Each by Other route 4 Times a Day,Before Meals and at Bedtime. For insulin administration. 400 Each 3   • ONETOUCH VERIO strip TEST 3 XD FOR INSULIN ADJUSTMENT AND PRF SYMPTOMS OF HIGH OR LOW BLOOD SUGAR 300 Strip 3   • KALYDECO 150 MG Tab      • ADVAIR DISKUS 500-50 MCG/DOSE AEROSOL POWDER, BREATH ACTIVATED INHALE 1 PUFF 2 TIMES A DAY. RINSE MOUTH AFTER EACH USE. 60 Each 5   • HUMALOG KWIKPEN 100 UNIT/ML Solution Pen-injector injection PEN Inject 12-15 Units under the skin 3 times a day before meals. 45 mL 3   • albuterol (PROVENTIL) 2.5mg/3ml Nebu Soln solution for nebulization Take 3 mL by nebulization 3 times a day. 540 mL 3   • fluticasone (FLONASE) 50 MCG/ACT nasal spray Administer 1-2 Sprays into affected nostril(S) every day. 11.1 mL 5   • acyclovir (ZOVIRAX) 400 MG tablet Take 1 tablet orally 4 times a day for 14 days, then twice daily for 3 months 90 Tab 3   • KALYDECO 150 MG Tab TAKE ONE TABLET TWICE DAILY WITH FAT-CONTAINING FOOD 56 Tab 11   • Continuous Blood Gluc Sensor (DEXCOM G6 SENSOR) Misc 1 Each by Does not apply route every 10 days. 9 Each 4   • Continuous Blood Gluc Transmit (DEXCOM G6 TRANSMITTER) Misc 1 Each by Does not apply route Continuous. Change every 3 months 1 Each 4   • CREON 6000 units Cap DR Particles TAKE 1-2 CAPSULES 4 TIMES A DAY AS NEEDED WITH MEALS OR SNACKS **KEEP IN ORIGINAL BOTTLE** 500 Cap 6   • Multiple Vitamins-Minerals (DEKAS PLUS PO) Take 1 Cap by mouth 2 times a day.     • magnesium oxide (MAG-OX) 400 MG Tab Take 200 mg by mouth 2 times a day.     • Ascorbic Acid (VITAMIN C) 1000 MG Tab Take 1,000 mg by mouth 2 Times a Day.     • Probiotic Product (PROBIOTIC-10 PO) Take 1 Tab by mouth every day.     • Cholecalciferol 5000 units Tab Take 10,000 Units by mouth 2 Times a Day.       No current facility-administered medications for this visit.       She  has a past medical history of Allergy, Anemia, Asthma, Breath  "shortness, Bronchitis, CF (cystic fibrosis) ([facility]), Coughing blood, Diabetes ([facility]), Head ache, Hemorrhagic disorder ([facility]), Herpes simplex, Pneumonia, and Shoulder fracture.    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   • Suspected COVID-19 virus infection 04/19/2020   • 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 11/13/2019   • Cystic fibrosis ([facility]) 11/13/2019   • Cystic fibrosis with pulmonary manifestations ([facility]) 11/13/2019   • Herpes simplex 07/16/2019   • Osteopenia of multiple sites 07/16/2019   • CF (3849+10k bC>T, H7646C) ([facility]) 06/13/2019   • Exocrine pancreatic insufficiency 06/13/2019   • Vitamin K deficiency 06/13/2019   • Partial thromboplastin time increased 08/22/2017   • Moderate COPD (chronic obstructive pulmonary disease) ([facility]) 04/07/2015   • 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  No nausea or vomiting.  No chest pain or palpitations.  No cough or SOB.  No pain with urination or hematuria.  No black or bloody stools.       Objective:     /70 (BP Location: Right arm, Patient Position: Sitting, BP Cuff Size: Adult)   Pulse 100   Temp 37.3 °C (99.1 °F) (Temporal)   Resp 16   Ht 1.651 m (5' 5\")   Wt 60.3 kg (133 lb)   SpO2 96%  Body mass index is 22.13 kg/m².   Physical Exam:  Well developed, well nourished.  Alert, oriented in no acute distress.  Eye contact is good, speech goal directed, affect calm  Eyes: conjunctiva non-injected, sclera non-icteric.  Ankles: There is lateral swelling that is not fluctuant or warm.  Mild erythema at healed bite site.  No lymphatic streaking.  No calf tenderness.  Tenderness to palpation of this area. ROM intact. Anterior drawer negative. 5/5 strength bilaterally. Sensation intact. 2+ pedal pulses.      Assessment and Plan:   The following treatment plan was discussed    1. Cellulitis of ankle  Patient has had extensive antibiotics " for this.  She feels like she just needs a longer course of antibiotics and is requesting 1 more week of doxycycline.  I have advised her to use Benadryl at night and then Allegra or Claritin during the day.  Patient is to call if symptoms worsen.  Also discussed insect bite prevention.  Emergency precautions given.  - doxycycline (MONODOX) 100 MG capsule; Take 1 capsule by mouth 2 times a day for 7 days.  Dispense: 14 capsule; Refill: 0    Any change or worsening of signs or symptoms, patient encouraged to follow-up or report to the emergency room for further evaluation. Patient understands and agrees.    Followup: Return if symptoms worsen or fail to improve.

## 2021-07-24 ASSESSMENT — ENCOUNTER SYMPTOMS
FEVER: 0
SHORTNESS OF BREATH: 0

## 2021-07-29 ENCOUNTER — TELEPHONE (OUTPATIENT)
Dept: PEDIATRIC PULMONOLOGY | Facility: MEDICAL CENTER | Age: 61
End: 2021-07-29

## 2021-07-29 ENCOUNTER — PATIENT MESSAGE (OUTPATIENT)
Dept: MEDICAL GROUP | Facility: LAB | Age: 61
End: 2021-07-29

## 2021-07-29 DIAGNOSIS — E84.9 CYSTIC FIBROSIS (HCC): Primary | ICD-10-CM

## 2021-07-29 DIAGNOSIS — R21 RASH: ICD-10-CM

## 2021-07-29 DIAGNOSIS — M25.50 ARTHRALGIA, UNSPECIFIED JOINT: ICD-10-CM

## 2021-07-29 DIAGNOSIS — M25.40 SWOLLEN JOINT: ICD-10-CM

## 2021-07-29 NOTE — TELEPHONE ENCOUNTER
Patient is getting labs done for a ongoing rash after a spider bite, she is requesting her annual CF labs be done at this time. Can we please order today, patient has an appointment scheduled with us on 8/4/2021.

## 2021-07-29 NOTE — PROGRESS NOTES
Tl Aquino,  I have ordered all your annual labs for your cystic fibrosis. Have a wonderful day.  -Lalitha COKER

## 2021-07-29 NOTE — PATIENT INSTRUCTIONS
Kenia Soto  1960    ? CF Mutations: 3849+10kbC->T, Q3255H  ? CFTR modulator: Kalydeco  ? Ht:____   Wt:____     Respiratory  ? Baseline FEV1: 71%    Today’s FEV1:__65__  ? Airway Clearance Routine:  ? Albuterol ( 2 x day) / (3-4x/day when sick)  ? Hypertonic Saline ( 2 x day) / (3-4x/day when sick       ? Pulmozyme ( 1 x day) / (2x/day when sick)  ? ACT Vest and/or Acapella ( 2 x day) / (3-4x/day when sick)  ? Inhaled antibiotics: Cayston, START IF CHEST STILL FEELING TIGHT AND IF LUNG FUNCTION STAYS BELOW 70%  ? Equipment Check (Annually) Date scheduled:     Nutrition/Gastrointestinal  ? Weight.:131 pounds  ? BMI: Current: 21.6, Goal: 22 - 23     ? Enzymes: Creon 6 (1-2 with meals, 0-1 with snacks)  ? Vitamins: general MVi, magnesium, D, K, A + Nuun electrolytes  ? Exercise: as able, resume exercise program 3-5 days per week     Social  ? Insurance: [employer]  ? Mental health screening: done    8/4/21    ? Tasks:   o Schedule with a new PCP     Goals  ? Annual Labs: last done 8/2021, due again by 8/2022  ? LFTs: last done 8/2021, due again by 8/2022  ? Oral Glucose Tolerance Test: CFRD  ? Sputum Cultures: 1:  1/8/21    2:  8/4/21    3:      4:            (Dates)  ? DEXA scan: last done 7/2019, due again by 2024  ? Chest X-ray: last done 1/2021, due again by 1/2022   ? Eye Exam: last done 11/2020, due again 11/2021 ([facility] [place])   ? INFECTIOUS DISEASES REFERRAL DONE    Notes  ? Needs another DEXA due to abnormal results  ? Completing annual labs prior to appointment  ? Needs eye exam

## 2021-07-30 ENCOUNTER — HOSPITAL ENCOUNTER (OUTPATIENT)
Dept: LAB | Facility: MEDICAL CENTER | Age: 61
End: 2021-07-30
Attending: FAMILY MEDICINE
Payer: COMMERCIAL

## 2021-07-30 ENCOUNTER — HOSPITAL ENCOUNTER (OUTPATIENT)
Dept: LAB | Facility: MEDICAL CENTER | Age: 61
End: 2021-07-30
Attending: PEDIATRICS
Payer: COMMERCIAL

## 2021-07-30 DIAGNOSIS — E84.9 CYSTIC FIBROSIS (HCC): ICD-10-CM

## 2021-07-30 DIAGNOSIS — M25.40 SWOLLEN JOINT: ICD-10-CM

## 2021-07-30 DIAGNOSIS — R21 RASH: ICD-10-CM

## 2021-07-30 DIAGNOSIS — M25.50 ARTHRALGIA, UNSPECIFIED JOINT: ICD-10-CM

## 2021-07-30 LAB
25(OH)D3 SERPL-MCNC: 66 NG/ML (ref 30–100)
ALBUMIN SERPL BCP-MCNC: 4.2 G/DL (ref 3.2–4.9)
ALBUMIN/GLOB SERPL: 1.8 G/DL
ALP SERPL-CCNC: 105 U/L (ref 30–99)
ALT SERPL-CCNC: 20 U/L (ref 2–50)
ANION GAP SERPL CALC-SCNC: 12 MMOL/L (ref 7–16)
AST SERPL-CCNC: 19 U/L (ref 12–45)
BASOPHILS # BLD AUTO: 0.8 % (ref 0–1.8)
BASOPHILS # BLD: 0.07 K/UL (ref 0–0.12)
BILIRUB SERPL-MCNC: 0.4 MG/DL (ref 0.1–1.5)
BUN SERPL-MCNC: 12 MG/DL (ref 8–22)
CALCIUM SERPL-MCNC: 9.5 MG/DL (ref 8.5–10.5)
CHLORIDE SERPL-SCNC: 105 MMOL/L (ref 96–112)
CHOLEST SERPL-MCNC: 148 MG/DL (ref 100–199)
CO2 SERPL-SCNC: 25 MMOL/L (ref 20–33)
CREAT SERPL-MCNC: 0.58 MG/DL (ref 0.5–1.4)
CRP SERPL HS-MCNC: 1.51 MG/DL (ref 0–0.75)
EOSINOPHIL # BLD AUTO: 0.13 K/UL (ref 0–0.51)
EOSINOPHIL NFR BLD: 1.6 % (ref 0–6.9)
ERYTHROCYTE [DISTWIDTH] IN BLOOD BY AUTOMATED COUNT: 45.1 FL (ref 35.9–50)
ERYTHROCYTE [SEDIMENTATION RATE] IN BLOOD BY WESTERGREN METHOD: 8 MM/HOUR (ref 0–25)
FASTING STATUS PATIENT QL REPORTED: NORMAL
FASTING STATUS PATIENT QL REPORTED: NORMAL
GGT SERPL-CCNC: 11 U/L (ref 7–34)
GLOBULIN SER CALC-MCNC: 2.4 G/DL (ref 1.9–3.5)
GLUCOSE SERPL-MCNC: 109 MG/DL (ref 65–99)
HCT VFR BLD AUTO: 43.6 % (ref 37–47)
HDLC SERPL-MCNC: 52 MG/DL
HGB BLD-MCNC: 13.9 G/DL (ref 12–16)
IMM GRANULOCYTES # BLD AUTO: 0.03 K/UL (ref 0–0.11)
IMM GRANULOCYTES NFR BLD AUTO: 0.4 % (ref 0–0.9)
LDLC SERPL CALC-MCNC: 78 MG/DL
LYMPHOCYTES # BLD AUTO: 1.51 K/UL (ref 1–4.8)
LYMPHOCYTES NFR BLD: 18.2 % (ref 22–41)
MCH RBC QN AUTO: 29.1 PG (ref 27–33)
MCHC RBC AUTO-ENTMCNC: 31.9 G/DL (ref 33.6–35)
MCV RBC AUTO: 91.4 FL (ref 81.4–97.8)
MONOCYTES # BLD AUTO: 1.1 K/UL (ref 0–0.85)
MONOCYTES NFR BLD AUTO: 13.3 % (ref 0–13.4)
NEUTROPHILS # BLD AUTO: 5.44 K/UL (ref 2–7.15)
NEUTROPHILS NFR BLD: 65.7 % (ref 44–72)
NRBC # BLD AUTO: 0 K/UL
NRBC BLD-RTO: 0 /100 WBC
PLATELET # BLD AUTO: 333 K/UL (ref 164–446)
PMV BLD AUTO: 10.2 FL (ref 9–12.9)
POTASSIUM SERPL-SCNC: 4.6 MMOL/L (ref 3.6–5.5)
PROT SERPL-MCNC: 6.6 G/DL (ref 6–8.2)
RBC # BLD AUTO: 4.77 M/UL (ref 4.2–5.4)
RHEUMATOID FACT SER IA-ACNC: <10 IU/ML (ref 0–14)
SODIUM SERPL-SCNC: 142 MMOL/L (ref 135–145)
TRIGL SERPL-MCNC: 89 MG/DL (ref 0–149)
WBC # BLD AUTO: 8.3 K/UL (ref 4.8–10.8)

## 2021-07-30 PROCEDURE — 85025 COMPLETE CBC W/AUTO DIFF WBC: CPT

## 2021-07-30 PROCEDURE — 86060 ANTISTREPTOLYSIN O TITER: CPT

## 2021-07-30 PROCEDURE — 80053 COMPREHEN METABOLIC PANEL: CPT

## 2021-07-30 PROCEDURE — 84446 ASSAY OF VITAMIN E: CPT

## 2021-07-30 PROCEDURE — 86038 ANTINUCLEAR ANTIBODIES: CPT

## 2021-07-30 PROCEDURE — 82306 VITAMIN D 25 HYDROXY: CPT

## 2021-07-30 PROCEDURE — 80061 LIPID PANEL: CPT

## 2021-07-30 PROCEDURE — 36415 COLL VENOUS BLD VENIPUNCTURE: CPT

## 2021-07-30 PROCEDURE — 85652 RBC SED RATE AUTOMATED: CPT

## 2021-07-30 PROCEDURE — 86431 RHEUMATOID FACTOR QUANT: CPT

## 2021-07-30 PROCEDURE — 84590 ASSAY OF VITAMIN A: CPT

## 2021-07-30 PROCEDURE — 82977 ASSAY OF GGT: CPT

## 2021-07-30 PROCEDURE — 86140 C-REACTIVE PROTEIN: CPT

## 2021-08-01 LAB
ASO AB SERPL-ACNC: <55 IU/ML (ref 0–330)
NUCLEAR IGG SER QL IA: NORMAL

## 2021-08-02 LAB
A-TOCOPHEROL VIT E SERPL-MCNC: 13 MG/L (ref 5.5–18)
ANNOTATION COMMENT IMP: NORMAL
BETA+GAMMA TOCOPHEROL SERPL-MCNC: 0.8 MG/L (ref 0–6)
RETINYL PALMITATE SERPL-MCNC: 0.03 MG/L (ref 0–0.1)
VIT A SERPL-MCNC: 0.38 MG/L (ref 0.3–1.2)

## 2021-08-03 ENCOUNTER — PATIENT MESSAGE (OUTPATIENT)
Dept: PEDIATRIC PULMONOLOGY | Facility: MEDICAL CENTER | Age: 61
End: 2021-08-03

## 2021-08-03 NOTE — TELEPHONE ENCOUNTER
From: Kenia Soto  To: Physician Marjan Payne  Sent: 8/3/2021 10:08 AM PDT  Subject: Non-Urgent Medical Question    Hi Dr. Payne,  I was wondering if we can do my appt virtually tomorrow. I've been dealing with a very swollen ankle from an infection from a spider bite and I don't want to walk on it. Or we can reschedule and I can come in!!    Thanks,  Marina

## 2021-08-03 NOTE — TELEPHONE ENCOUNTER
From: Kenia Soto  To: Medical Assistant Irasema ROGERS  Sent: 8/3/2021 11:25 AM PDT  Subject: Non-Urgent Medical Question    Ok, sounds good. Where do I meet them. It's been a long time since I've been there and I always got lost when I did come!!      ----- Message -----   From:Medical Assistant Irasema ROGERS   Sent:8/3/2021 11:23 AM PDT   To:Kenia Soto   Subject:RE: Non-Urgent Medical Question    Good morning,     I'm so sorry ,it must be so painful! Kenia we have not seen you in the office since February 2020 , we really need to see you in person. Someone from our office can meet you downstairs with a wheelchair this way you are not walking on it as much? Let me know if this would work so we are able to arrange for someone to meet you downstairs.     Lianet Suarez M.A.       ----- Message -----    From:Kenia Soto   Sent:8/3/2021 10:08 AM PDT   To:Physician Marjan Payne   Subject:Non-Urgent Medical Question    Hi Dr. Payne,  I was wondering if we can do my appt virtually tomorrow. I've been dealing with a very swollen ankle from an infection from a spider bite and I don't want to walk on it. Or we can reschedule and I can come in!!    Thanks,  Marina

## 2021-08-04 ENCOUNTER — OFFICE VISIT (OUTPATIENT)
Dept: PEDIATRIC PULMONOLOGY | Facility: MEDICAL CENTER | Age: 61
End: 2021-08-04
Payer: COMMERCIAL

## 2021-08-04 ENCOUNTER — HOSPITAL ENCOUNTER (OUTPATIENT)
Facility: MEDICAL CENTER | Age: 61
End: 2021-08-04
Attending: PEDIATRICS
Payer: COMMERCIAL

## 2021-08-04 VITALS
DIASTOLIC BLOOD PRESSURE: 72 MMHG | WEIGHT: 131.61 LBS | BODY MASS INDEX: 21.15 KG/M2 | HEART RATE: 112 BPM | RESPIRATION RATE: 16 BRPM | TEMPERATURE: 98.1 F | OXYGEN SATURATION: 95 % | SYSTOLIC BLOOD PRESSURE: 120 MMHG | HEIGHT: 66 IN

## 2021-08-04 DIAGNOSIS — E08.9 DIABETES MELLITUS RELATED TO CYSTIC FIBROSIS (HCC): ICD-10-CM

## 2021-08-04 DIAGNOSIS — L03.116 CELLULITIS OF LEFT LOWER EXTREMITY: ICD-10-CM

## 2021-08-04 DIAGNOSIS — B96.5 PSEUDOMONAS RESPIRATORY INFECTION: ICD-10-CM

## 2021-08-04 DIAGNOSIS — K86.81 EXOCRINE PANCREATIC INSUFFICIENCY: ICD-10-CM

## 2021-08-04 DIAGNOSIS — E84.0 CYSTIC FIBROSIS WITH PULMONARY MANIFESTATIONS (HCC): ICD-10-CM

## 2021-08-04 DIAGNOSIS — E84.8 DIABETES MELLITUS RELATED TO CYSTIC FIBROSIS (HCC): ICD-10-CM

## 2021-08-04 DIAGNOSIS — E84.0 CYSTIC FIBROSIS WITH PULMONARY EXACERBATION (HCC): ICD-10-CM

## 2021-08-04 DIAGNOSIS — R21 RASH: ICD-10-CM

## 2021-08-04 DIAGNOSIS — J98.8 PSEUDOMONAS RESPIRATORY INFECTION: ICD-10-CM

## 2021-08-04 PROCEDURE — 87116 MYCOBACTERIA CULTURE: CPT

## 2021-08-04 PROCEDURE — 87181 SC STD AGAR DILUTION PER AGT: CPT

## 2021-08-04 PROCEDURE — 99215 OFFICE O/P EST HI 40 MIN: CPT | Mod: 25 | Performed by: PEDIATRICS

## 2021-08-04 PROCEDURE — 87206 SMEAR FLUORESCENT/ACID STAI: CPT

## 2021-08-04 PROCEDURE — 99401 PREV MED CNSL INDIV APPRX 15: CPT | Mod: 25 | Performed by: PEDIATRICS

## 2021-08-04 PROCEDURE — 87205 SMEAR GRAM STAIN: CPT | Mod: 91

## 2021-08-04 PROCEDURE — 94010 BREATHING CAPACITY TEST: CPT | Performed by: PEDIATRICS

## 2021-08-04 PROCEDURE — 87077 CULTURE AEROBIC IDENTIFY: CPT | Mod: 91

## 2021-08-04 PROCEDURE — 87186 SC STD MICRODIL/AGAR DIL: CPT

## 2021-08-04 PROCEDURE — 87070 CULTURE OTHR SPECIMN AEROBIC: CPT

## 2021-08-04 PROCEDURE — 87015 SPECIMEN INFECT AGNT CONCNTJ: CPT

## 2021-08-04 PROCEDURE — 87102 FUNGUS ISOLATION CULTURE: CPT

## 2021-08-04 ASSESSMENT — FIBROSIS 4 INDEX: FIB4 SCORE: 0.77

## 2021-08-04 NOTE — PROGRESS NOTES
Nutrition Screen & Progress Note for Adult CF Clinic  BMI: 21.6       Points: 0-1  IBW (kg): 56.8  % IBW: 105       Points: 0  Current weight (kg): 59.7   Weight last clinic visit: 60.8 kg on 3/10/21  % weight change: down 2%     Points: 0*  FEV1 % predicted: 65 (down from 73)   Points: 1             Total Points: 1             Nutrition Risk: low    BM: 1-2 per day, soft  PERT: Creon 6 (1-2 with meals, 0-1 with snacks)  Lipase units/kg/meal: 101 - 201  CFTR modulator: Kalydeco  Typical diet: 3 meals and 0-2 snacks  Vitamins: general MVi, DEKAs Plus softgel, magnesium (400 mg), vitamin D (unsure), vitamin A (10,000 IU), vitamin K (5 mg), Nuun electrolytes  Calorie supplements: -  Visit details: Kenia is doing well from a lung and nutrition standpoint; however, she has had a really hard time with spider bites and cellulitis.    She got a bad spider bite and she reacts badly to them.  She needed Bactrim, but had a reaction and got a rash.  She was on doxy for 7 days.  Then she got another rash and had more doxy.  She is taking Allegra but feels this issue is not going away. She has not been able to exercise d/t left ankle cellulitis. She spent the whole day yesterday elevating and icing her ankle.  Prior to this she was doing arm exercises with wts on her treadmill for exercise.    She is concerned about her labs, her vitamin A is low normal and she feels this is too low.  She changed her vitamin A supplement, felt that maybe it was contributing to her skin issues.  She has not started it yet.   Her glyco is great at 5.8%. She needs a new endocrinologists, hers left to start their own practice. She checks her blood sugars at home, has a DexCom and they are usually good except lately higher with stress/inflammation.   Does not like her new PCP so she is hoping her previous PCP will come back from maternity leave.  Her CRP was high and the current PCP told her it was d/t her CF; however, feel this is high d/t  inflammation.    She asked about vertical ridges on her nails, if it is a nutritional deficiency.  They also get dry and split.  Do not feel this is a nutrition issue, it could be r/t age and our dry climate?  Horizontal lines on the nails could be an issue.  Could try keratin supplement?  MD not concerned.   Recommendations/Plan: Add back exercise as able.  Find new endocrinologist and make appt to get established.    Follow-up: 3 months    Time spent: 20 minutes

## 2021-08-04 NOTE — PROGRESS NOTES
PCP:  Fabiana Fontenot M.D.   [address] [id number] / [place] [id number]     SUBJECTIVE:   Kenia Soto is a 60 y.o. 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 ([facility]) 06/16/2020   • Suspected COVID-19 virus infection 04/19/2020   • 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 11/13/2019   • Cystic fibrosis ([facility]) 11/13/2019   • Cystic fibrosis with pulmonary manifestations ([facility]) 11/13/2019   • Herpes simplex 07/16/2019   • Osteopenia of multiple sites 07/16/2019   • CF (3849+10k bC>T, Z6205D) ([facility]) 06/13/2019   • Exocrine pancreatic insufficiency 06/13/2019   • Vitamin K deficiency 06/13/2019   • Partial thromboplastin time increased 08/22/2017   • Moderate COPD (chronic obstructive pulmonary disease) ([facility]) 04/07/2015   • 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 Seeing PCP/urgent care. Treated initially with bactrim then doxycycline.  Last hospitalization: [10/21/20]    Respiratory:   Was sick in June with cough, sneezing, headaches.   Was doing more pulm toilet treatments, up to 5 times a day.   Also had a fever. Treated with cayston. Symptoms resolved in 2 weeks.  Cough frequency: episodic, baseline  Cough character: productive  Sputum quantity: baseline/less than usual  Sputum color: yellow  Shortness of breath:rare  Chest Pain:never  Hemoptysis:none   Pulmonary toilet:   Albuterol: 2/day  7% hypertonic saline: 2/day  Pulmozyme: none/day  Chest Physiotherapy: aerobika BID  Oxygen: none  Respiratory assist: none  Modulator: kalydeco BID    Compliance: compliant most of the time     Sinus symptoms: back to normal  Nasal Congestion: intermittent, never   Nasal Drainage: none currently, better  Headache/sinus pressure: 2 months ago, treated with sinus rinses and cayston as  "above.  Now back to normal     Activity / Energy: less   Change in activity/energy: decreased   School/ Work attendance: not in school, not working   Emotional assessment: positive       GI: no problem   Appetite: normal  Enzymes:  daily  Creon 6000 units 1-2 per meal, 0-1 per snack  Stool: 1-2/day, characteristics: soft      Medications:     Current Outpatient Medications:   •  mupirocin, Apply BID for up to 10 days, PRN  •  sodium chloride, 4 mL, Nebulization, 4X/DAY, Taking  •  Advair Diskus, INHALE 1 PUFF 2 TIMES A DAY. RINSE MOUTH AFTER EACH USE., Taking  •  albuterol, 2.5 mg, Nebulization, TID, Taking  •  fluticasone, 1-2 Spray, Nasal, DAILY, Taking  •  acyclovir, Take 1 tablet orally 4 times a day for 14 days, then twice daily for 3 months, PRN  •  Kalydeco, TAKE ONE TABLET TWICE DAILY WITH FAT-CONTAINING FOOD, Taking  •  Creon, TAKE 1-2 CAPSULES 4 TIMES A DAY AS NEEDED WITH MEALS OR SNACKS **KEEP IN ORIGINAL BOTTLE**, Taking  •  Multiple Vitamins-Minerals (DEKAS PLUS PO), 1 capsule, Oral, BID, Taking  •  magnesium oxide, 200 mg, Oral, BID, Taking  •  Vitamin C, 1,000 mg, Oral, BID, Taking  •  Probiotic Product (PROBIOTIC-10 PO), 1 tablet, Oral, DAILY, Taking  •  Cholecalciferol, 10,000 Units, Oral, BID, Taking  •  phytonadione, 5 mg, Oral, DAILY  •  BD Pen Needle Radha U/F, 1 Each, Other, 4X/DAY ACHS  •  OneTouch Verio, TEST 3 XD FOR INSULIN ADJUSTMENT AND PRF SYMPTOMS OF HIGH OR LOW BLOOD SUGAR  •  Kalydeco,   •  HumaLOG KwikPen, 12-15 Units, Subcutaneous, TID AC  •  Dexcom G6 Sensor, 1 Each, Does not apply, Q10 DAYS  •  Dexcom G6 Transmitter, 1 Each, Does not apply, Continuous    ALLERGIES:  Bactrim [sulfamethoxazole-trimethoprim], Ciprofloxacin, Levofloxacin, and Tobramycin    Review of System:  May have had some fever 1 month ago.  Continues to have \"bug bites\", left ankle is still swollen.   Has diabetes, on insulin, under good control per patient.      OBJECTIVE:  Physical Exam:  /72 (BP Location: " "Right arm, Patient Position: Sitting)   Pulse (!) 112   Temp 36.7 °C (98.1 °F) (Temporal)   Resp 16   Ht 1.664 m (5' 5.51\")   Wt 59.7 kg (131 lb 9.8 oz)   SpO2 95%   BMI 21.56 kg/m²      GENERAL: well appearing, well nourished, no respiratory distress and normal affect   EARS: not examined   NOSE: no audible congestion and no discharge   MOUTH/THROAT: normal oropharynx and mucous membranes moist   NECK: normal, supple full range of motion, no thyroid enlargement and no adenopathy or masses   CHEST: no chest wall deformities and normal A-P diameter   LUNGS: rales occasional, coarse R>L posteriorly   HEART: regular rate and rhythm and no murmurs   ABDOMEN: soft, non-tender, non-distended and no hepatosplenomegaly  : not examined  BACK: not examined   SKIN: normal color. Multiple macular,slightly raised lesions/bites on extremities especially left upper arm.  EXTREMITIES: mild clubbing. Left ankle swollen, slightly erythematous, not warm to touch.    NEURO: gross motor exam normal by observation     PFT's:  Pulmonary Function Test Results (PFT)    Spirometry Actual Predicted % Predicted   FVC (L) 3.02 3.47 86   FEV1 ((L) 1.76 2.68 65   FEV1/FVC (%) 58 78 74   FEF 25-75% (L/sec) 0.58 2.40 24     Please see  PFT in \"Media Tab\" of Notes activity  (EMR)    Provider Interpretation moderate obstruction     Respiratory -  Cystic Fibrosis Airway Clearance Therapy (ACT)      Kenia seen today at [facility]. Testing today included sputum sample and PFT.  Current plan for Respiratory medications and ACT is:      AM  Albuterol  Hypertonic Saline    PM  Albuterol  Hypertonic saline  ACT vest    Pt. given copy of PFT results.        Labs reviewed:     Last sputum culture date: 1/8/21  Chronic colonization of:  Pseudomonas aeruginosa yes, 2 strains  Respiratory Culture:   Lab Results   Component Value Date/Time    SIGIND POS (POS) 01/08/2021 12:00 PM    SIGIND . 01/08/2021 12:00 PM    SOURCE RESP 01/08/2021 12:00 PM    " SOURCE RESP 01/08/2021 12:00 PM    SITE Sputum (coughed) 01/08/2021 12:00 PM    SITE - 01/08/2021 12:00 PM   ]        ASSESSMENT/PLAN:   1. Cystic fibrosis with pulmonary manifestations (HCC)  FEV1 down from last year, 71% now down to 65%.  Not showing any other significant symptoms of pulmonary exacerbation, however.  Sputum culture done today  Continue BID kalydeco and BID pulmonary toilet.    - Spirometry; Future  - [facility] - CF Resp Culture w/ Gram Stain; Future  - Fungal Culture; Future  - AFB Culture; Future  - AFB Culture  - Fungal Culture  - [facility] - CF Resp Culture w/ Gram Stain  - Spirometry  - REFERRAL TO INFECTIOUS DISEASE    2. Cellulitis of left lower extremity  Was diagnosed with cellulitis in urgent care on 7/19. Redness/pain improving but still swollen.   Will refer to infectious disease first. If nothing infectious, can consider rheumatology referral  - REFERRAL TO INFECTIOUS DISEASE    3. Rash  As above  - REFERRAL TO INFECTIOUS DISEASE    4. 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 Exocrine pancreatic insufficiency  Continue low dose pancreatic enzymes    6. Pseudomonas respiratory infection  Continue cayston prn.  Can have some aseptic joint issues occasionally related to chronic pseudomonas infection.  Will see what ID says first.      Pulmonary Exacerbation: absent    Seen by Dietician:  Yes  Seen by Respiratory Therapy: Yes  Seen by :  Yes    Total attending time over 24 hours: 50 minutes    Follow up in 2-3 months    Electronically signed by   Marjan Payne M.D.   Pediatric Pulmonology

## 2021-08-04 NOTE — PROGRESS NOTES
Medical Social Work    Referral:  Clinic / Dr. Payne     Intervention: Patient presents to clinic today on her own.  Patient has not come in to clinic in person for some time, last seen in person 11/2019.  Patient last seen virtually in March 2021.  Patient completed PHQ-9 and TAL-7, patient scored minimal for depression.  Patient states mild depression likely due to being inactive for the past couple of weeks due to a spider bite on her left foot.  Patient states prior to spider bite she was very active with her daily tasks and now has to wait for spider bite to completely heal before resuming activities.      Patient states wanting to switch to her previous endocrinologist, but does currently see an adult endocrinologist with [facility].  SW discussed being able to switch at her own discretion, and does not need permission from current provider.  Patient states a desire to find a new PCP, as she feels her current PCP is not meeting her needs.  SW explained how patient can go about making this switch.    Patient reports all her needs are being met at this time and no other concerns need to be addressed.    Plan: SW will continue to follow in clinic.    Time Spent: 17 min.

## 2021-08-04 NOTE — PROCEDURES
"Pulmonary Function Test Results (PFT)    Spirometry Actual Predicted % Predicted   FVC (L) 3.02 3.47 86   FEV1 ((L) 1.76 2.68 65   FEV1/FVC (%) 58 78 74   FEF 25-75% (L/sec) 0.58 2.40 24     Please see  PFT in \"Media Tab\" of Notes activity  (EMR)    Provider Interpretation moderate obstruction     Respiratory -  Cystic Fibrosis Airway Clearance Therapy (ACT)      Kenia seen today at [facility]. Testing today included sputum sample and PFT.  Current plan for Respiratory medications and ACT is:      AM  Albuterol  Hypertonic Saline    PM  Albuterol  Hypertonic saline  ACT vest    Pt. given copy of PFT results.      "

## 2021-08-05 LAB
FUNGUS SPEC FUNGUS STN: NORMAL
GRAM STN SPEC: ABNORMAL
RHODAMINE-AURAMINE STN SPEC: NORMAL
SIGNIFICANT IND 70042: ABNORMAL
SIGNIFICANT IND 70042: NORMAL
SIGNIFICANT IND 70042: NORMAL
SITE SITE: ABNORMAL
SITE SITE: NORMAL
SITE SITE: NORMAL
SOURCE SOURCE: ABNORMAL
SOURCE SOURCE: NORMAL
SOURCE SOURCE: NORMAL

## 2021-09-01 LAB
FUNGUS SPEC CULT: NORMAL
FUNGUS SPEC FUNGUS STN: NORMAL
SIGNIFICANT IND 70042: NORMAL
SITE SITE: NORMAL
SOURCE SOURCE: NORMAL

## 2021-09-03 RX ORDER — ACYCLOVIR 400 MG/1
TABLET ORAL
Qty: 90 TABLET | Refills: 0 | Status: SHIPPED | OUTPATIENT
Start: 2021-09-03 | End: 2021-10-07

## 2021-09-03 NOTE — TELEPHONE ENCOUNTER
Received request via: Patient    Was the patient seen in the last year in this department? Yes  LOV 07/19/2021  Does the patient have an active prescription (recently filled or refills available) for medication(s) requested? No

## 2021-09-07 NOTE — FLOWSHEET NOTE
10/23/20 1345   Vital Signs   Pulse 88   Respiration 20   Pulse Oximetry 94 %   $ Pulse Oximetry (Spot Check) Yes   Respiratory Assessment   Level of Consciousness Alert   Breath Sounds   RUL Breath Sounds Clear   RML Breath Sounds Clear   RLL Breath Sounds Crackles;Diminished   SANTOS Breath Sounds Clear   LLL Breath Sounds Crackles;Diminished   Secretions   Cough Congested   How Sputum Obtained Spontaneous   Sputum Amount Small   Sputum Color Yellow;Green   Sputum Consistency Thick   Oxygen   O2 Delivery Device Room air w/o2 available      ----- Message from Elvia Loera sent at 9/7/2021  3:49 PM EDT -----  Subject: Refill Request    QUESTIONS  Name of Medication? lisinopril (PRINIVIL;ZESTRIL) 40 MG tablet  Patient-reported dosage and instructions? 40 MG tablet - Once daily  How many days do you have left? 0  Preferred Pharmacy? [facility]  Pharmacy phone number (if available)? [phone]  ---------------------------------------------------------------------------  --------------,  Name of Medication? metoprolol succinate (TOPROL XL) 50 MG extended   release tablet  Patient-reported dosage and instructions? 50 MG extended release tablet -   Once per day  How many days do you have left? 0  Preferred Pharmacy? [facility]  Pharmacy phone number (if available)? [phone]  ---------------------------------------------------------------------------  --------------,  Name of Medication? hydroCHLOROthiazide (HYDRODIURIL) 25 MG tablet  Patient-reported dosage and instructions? 25 MG tablet - Once per day  How many days do you have left? 0  [facility]  Pharmacy phone number (if available)? [phone]  Additional Information for Provider? Please call pt when medication has   been called into the pharmacy.  ---------------------------------------------------------------------------  --------------  [address]  What is the best way for the office to contact you? OK to leave message on   voicemail  Preferred Call Back Phone Number?  [phone]

## 2021-09-17 ENCOUNTER — OFFICE VISIT (OUTPATIENT)
Dept: ENDOCRINOLOGY | Facility: MEDICAL CENTER | Age: 61
End: 2021-09-17
Attending: NURSE PRACTITIONER
Payer: COMMERCIAL

## 2021-09-17 VITALS
SYSTOLIC BLOOD PRESSURE: 124 MMHG | HEART RATE: 109 BPM | OXYGEN SATURATION: 95 % | BODY MASS INDEX: 22.49 KG/M2 | HEIGHT: 65 IN | DIASTOLIC BLOOD PRESSURE: 62 MMHG | WEIGHT: 135 LBS

## 2021-09-17 DIAGNOSIS — E08.9 DIABETES MELLITUS DUE TO CYSTIC FIBROSIS (HCC): ICD-10-CM

## 2021-09-17 DIAGNOSIS — E84.9 DIABETES MELLITUS DUE TO CYSTIC FIBROSIS (HCC): ICD-10-CM

## 2021-09-17 DIAGNOSIS — E55.9 VITAMIN D DEFICIENCY: ICD-10-CM

## 2021-09-17 DIAGNOSIS — K86.81 EXOCRINE PANCREATIC INSUFFICIENCY: ICD-10-CM

## 2021-09-17 LAB
HBA1C MFR BLD: 5.2 % (ref 0–5.6)
INT CON NEG: NEGATIVE
INT CON POS: POSITIVE

## 2021-09-17 PROCEDURE — 83036 HEMOGLOBIN GLYCOSYLATED A1C: CPT | Performed by: NURSE PRACTITIONER

## 2021-09-17 PROCEDURE — 99214 OFFICE O/P EST MOD 30 MIN: CPT | Performed by: NURSE PRACTITIONER

## 2021-09-17 PROCEDURE — 99213 OFFICE O/P EST LOW 20 MIN: CPT | Performed by: NURSE PRACTITIONER

## 2021-09-17 ASSESSMENT — FIBROSIS 4 INDEX: FIB4 SCORE: 0.77

## 2021-09-17 NOTE — PROGRESS NOTES
CHIEF COMPLAINT: Patient is here for follow up of Type 2 Diabetes Mellitus,  Exocrine pancreatic insufficiency and Vitamin D Deficiency.      HPI:     Kenia Soto is a 60 y.o. female with for continued evaluation & treatment of the followin.  Type 2 Diabetes Mellitus   Patient reports she's feeling well since her last appointment. Possible COVID Delta variant in 2021. Then patient had 2 spider bites to LE's, required antibiotics. This prompted reaction to Vitamin A supplement.     History of cystic fibrosis.  Patient has elevated insulin requirements for meals because of increased work of breathing leading to increased carb intake.    Current diabetes regimen:  Tresiba 3-4 units daily  Humalog-6 to 8 units with meals, with a 1-7 carb ratio.    POC A1c 2021: 5.2%  Labs from 3/16/2021 HbA1c is 5.8    BG Diary: 21 Freestyle Hmeal 2 CGM device. Patient has been using system for over 4 months.  Average Glucose 111.   In Target Range 93%.  Fasting glucose 89, 105, 93.    Weight unchanged from prior appointjment.    Diabetes Complications   Retinopathy: No known retinopathy.  Last eye exam: 2020[facility].  Neuropathy: Denies paresthesias or numbness in hands or feet. Denies any foot wounds.  Exercise: Minimal.  Diet: Fair.    BP currently 124/62.   No ACE or ARB therapy at this time.     Ref. Range 3/10/2021 14:13   Creatinine, Urine Latest Units: mg/dL 37.71   Microalbumin, Urine Random Latest Units: mg/dL <1.2        2. Exocrine pancreatic insufficiency  Currently taking Creon 1 to 2 capsules 4 times a day as needed with meals or snacks.  Patient states she is tolerating the medication well.  Denies abdominal pain, nausea and/or vomiting.    3.  Vitamin D deficiency  Currently taking vitamin D 10,000 units daily.    Patient's medications, allergies, and social histories were reviewed and updated as appropriate.    ROS:     CONS:     No fever, no chills   EYES:     No diplopia, no blurry  vision   CV:           No chest pain, no palpitations   PULM:     No SOB, no cough, no hemoptysis.   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 ([facility])  2020-04: Leukocytosis  2020-04: Suspected COVID-19 virus infection  2019-11: 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  2019-11: Cystic fibrosis ([facility])  2019-11: Cystic fibrosis with pulmonary manifestations ([facility])  2019-11: Abnormal glucose tolerance test (GTT)  2019-07: Herpes simplex  2019-07: Osteopenia of multiple sites  2019-06: CF (3849+10k bC>T, Q4929G) ([facility])  2019-06: Exocrine pancreatic insufficiency  2019-06: Hemoptysis  2019-06: Vitamin K deficiency  2019-06: Pseudomonas respiratory infection  2017-08: Partial thromboplastin time increased  2015-04: Moderate COPD (chronic obstructive pulmonary disease) ([facility])  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  2006-03: Irritable bowel syndrome      Past Surgical History:  Past Surgical History:   Procedure Laterality Date   • BRONCHOSCOPY-ENDO  7/22/2019    Procedure: BRONCHOSCOPY - W/BAL;  Surgeon: Arelis Fleming M.D.;  Location: [facility];  Service: Ent   • DENTAL SURGERY     • EYE SURGERY     • SINUS WASHING          Allergies:  Ciprofloxacin, Levofloxacin, and Tobramycin     Social History:  Social History     Tobacco Use   • Smoking status: Never Smoker   • Smokeless tobacco: Never Used   Vaping Use   • Vaping Use: Never used   Substance Use Topics   • Alcohol use: Yes     Alcohol/week: 1.8 oz     Types: 3 Standard drinks or equivalent per week     Comment: Socially   • Drug use: No        Family History:   family history includes Alcohol/Drug in her father; Cystic Fibrosis in her sister; Heart Disease in her father; Other in her daughter.      PHYSICAL EXAM:   OBJECTIVE:  Vital signs: /62  "(BP Location: Left arm, Patient Position: Sitting, BP Cuff Size: Adult)   Pulse (!) 109   Ht 1.651 m (5' 5\")   Wt 61.2 kg (135 lb)   SpO2 95%   BMI 22.47 kg/m²   GENERAL: Well-developed, well-nourished in no apparent distress.   EYE:  No ocular asymmetry, PERRLA  HENT: Pink, moist mucous membranes.    NECK: No thyromegaly.   CARDIOVASCULAR:  No murmurs  LUNGS: Clear breath sounds  ABDOMEN: Soft, nontender   EXTREMITIES: No clubbing, cyanosis, or edema.   NEUROLOGICAL: No gross focal motor abnormalities   LYMPH: No cervical adenopathy seen.   SKIN: No rashes, lesions.   Monofilament: BLE full sensation throughout dorsal and ventral surfaces.       ASSESSMENT/PLAN:     1. 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  Continue Tresiba 3-4 units daily.  Continue Humalog 6 to 8 units before each meal, with carb ratio 1:7.    Continue balanced meal planning such as my [email or web address].  Recommend 2 to 3 L of water each day.  Continue 150 minutes of exercise each week.  Recommend daily foot inspections.  Dilated eye exam is overdue.  Patient will schedule an appointment.    2. Exocrine pancreatic insufficiency  Stable.  Continue daily use of Creon.    3. Vitamin D deficiency  Stable.  Continue vitamin D 10,000 IU daily.    We will do point-of-care A1c at next appointment.  Next appointment in 6 months.    Thank you kindly for allowing me to participate in the diabetes care plan for this patient.    Eryn Moore, APRN  09/17/21    CC:   Pcp Pt States None  "

## 2021-09-21 DIAGNOSIS — E84.9 CYSTIC FIBROSIS (HCC): ICD-10-CM

## 2021-09-21 RX ORDER — IVACAFTOR 150 MG/1
TABLET, FILM COATED ORAL
Qty: 56 TABLET | Refills: 11 | Status: SHIPPED | OUTPATIENT
Start: 2021-09-21 | End: 2022-07-26

## 2021-09-30 ENCOUNTER — HOSPITAL ENCOUNTER (OUTPATIENT)
Dept: RADIOLOGY | Facility: MEDICAL CENTER | Age: 61
End: 2021-09-30
Attending: PEDIATRICS
Payer: COMMERCIAL

## 2021-09-30 DIAGNOSIS — E84.9 CYSTIC FIBROSIS (HCC): ICD-10-CM

## 2021-09-30 PROCEDURE — 77080 DXA BONE DENSITY AXIAL: CPT

## 2021-10-07 RX ORDER — ACYCLOVIR 400 MG/1
TABLET ORAL
Qty: 90 TABLET | Refills: 0 | Status: SHIPPED | OUTPATIENT
Start: 2021-10-07 | End: 2021-11-29

## 2021-11-02 ENCOUNTER — OFFICE VISIT (OUTPATIENT)
Dept: MEDICAL GROUP | Facility: LAB | Age: 61
End: 2021-11-02
Payer: COMMERCIAL

## 2021-11-02 VITALS
DIASTOLIC BLOOD PRESSURE: 58 MMHG | HEIGHT: 65 IN | OXYGEN SATURATION: 94 % | WEIGHT: 134 LBS | SYSTOLIC BLOOD PRESSURE: 120 MMHG | BODY MASS INDEX: 22.33 KG/M2 | TEMPERATURE: 98.6 F | HEART RATE: 100 BPM | RESPIRATION RATE: 12 BRPM

## 2021-11-02 DIAGNOSIS — N63.10 BREAST MASS, RIGHT: ICD-10-CM

## 2021-11-02 PROCEDURE — 99213 OFFICE O/P EST LOW 20 MIN: CPT | Performed by: FAMILY MEDICINE

## 2021-11-02 ASSESSMENT — FIBROSIS 4 INDEX: FIB4 SCORE: 0.78

## 2021-11-05 NOTE — PATIENT INSTRUCTIONS
Kenia Soto  1960     · CF Mutations: 3849+10kbC->T, [id number]  · CFTR modulator: Kalydeco     Respiratory  · Baseline FEV1: 71%    Today’s FEV1:_74___  · Airway Clearance Routine:  · Albuterol ( 2 x day) / (3-4x/day when sick)  · Hypertonic Saline ( 2 x day) / (3-4x/day when sick       · Pulmozyme ( 1 x day) / (2x/day when sick)  · ACT Vest and/or Acapella ( 2 x day) / (3-4x/day when sick)  · Inhaled antibiotics: Cayston, START IF CHEST STILL FEELING TIGHT AND IF LUNG FUNCTION STAYS BELOW 70%  · Equipment Check (Annually) Date scheduled:     Nutrition/Gastrointestinal  · Weight.:131 pounds  · BMI: Current: 21.6, Goal: 22 - 23     · Enzymes: Creon 6 (1-2 with meals, 0-1 with snacks)  · Vitamins: general MVi, magnesium, D, K, A + Nuun electrolytes  · Exercise: as able, resume exercise program 3-5 days per week     Social  · Insurance: [employer]  · Mental health screening: done    8/4/21    · Tasks:   ? Schedule with a new PCP     Goals  · Annual Labs: last done 7/2021, due again by 8/2022  · LFTs: last done 7/2021, due again by 7/2022  · Oral Glucose Tolerance Test: CFRD  · Sputum Cultures: 1:  1/8/21    2:  8/4/21    3:      4:            (Dates)  · DEXA scan: last done 9/2021, due again by 2026  • Repeated due to abnormal results 7/2019  • Chest X-ray: last done 1/2021, due again by 1/2022   • Eye Exam: last done 11/2020, due again 11/2021 ([facility])   • INFECTIOUS DISEASES REFERRAL DONE     Notes  • Eye Exam This month  • covid tests:   • Covid vaccine: none

## 2021-11-08 DIAGNOSIS — B96.5 PSEUDOMONAS RESPIRATORY INFECTION: ICD-10-CM

## 2021-11-08 DIAGNOSIS — E84.0 CYSTIC FIBROSIS OF THE LUNG (HCC): ICD-10-CM

## 2021-11-08 DIAGNOSIS — J98.8 PSEUDOMONAS RESPIRATORY INFECTION: ICD-10-CM

## 2021-11-08 RX ORDER — AZTREONAM 75 MG/ML
1 KIT INHALATION 3 TIMES DAILY
Qty: 84 ML | Refills: 3 | Status: SHIPPED | OUTPATIENT
Start: 2021-11-08 | End: 2021-12-06

## 2021-11-10 ENCOUNTER — TELEMEDICINE (OUTPATIENT)
Dept: PEDIATRIC PULMONOLOGY | Facility: MEDICAL CENTER | Age: 61
End: 2021-11-10
Payer: COMMERCIAL

## 2021-11-10 VITALS — WEIGHT: 135 LBS | BODY MASS INDEX: 22.47 KG/M2

## 2021-11-10 DIAGNOSIS — E84.9 DIABETES MELLITUS DUE TO CYSTIC FIBROSIS (HCC): ICD-10-CM

## 2021-11-10 DIAGNOSIS — E08.9 DIABETES MELLITUS DUE TO CYSTIC FIBROSIS (HCC): ICD-10-CM

## 2021-11-10 DIAGNOSIS — Z71.3 NUTRITIONAL COUNSELING: ICD-10-CM

## 2021-11-10 DIAGNOSIS — E84.0 CYSTIC FIBROSIS WITH PULMONARY MANIFESTATIONS (HCC): ICD-10-CM

## 2021-11-10 DIAGNOSIS — K86.81 EXOCRINE PANCREATIC INSUFFICIENCY: ICD-10-CM

## 2021-11-10 PROCEDURE — 99214 OFFICE O/P EST MOD 30 MIN: CPT | Mod: 95 | Performed by: PEDIATRICS

## 2021-11-10 ASSESSMENT — FIBROSIS 4 INDEX: FIB4 SCORE: 0.78

## 2021-11-10 NOTE — PROGRESS NOTES
This evaluation was conducted via Zoom using secure and encrypted videoconferencing technology. The patient was in a private location in the state [facility].    The patient's identity was confirmed and verbal consent was obtained for this virtual visit.  PCP:  Fabiana Fontenot M.D.   [id number] [id number] / [place] [phone]     SUBJECTIVE:   Kenia Soto is a 61 y.o. 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 ([facility]) 06/16/2020   • Suspected COVID-19 virus infection 04/19/2020   • 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 11/13/2019   • Cystic fibrosis ([facility]) 11/13/2019   • Cystic fibrosis with pulmonary manifestations ([facility]) 11/13/2019   • Herpes simplex 07/16/2019   • Osteopenia of multiple sites 07/16/2019   • CF (3849+10k bC>T, [id number]) (Formerly [facility]) 06/13/2019   • Exocrine pancreatic insufficiency 06/13/2019   • Vitamin K deficiency 06/13/2019   • Partial thromboplastin time increased 08/22/2017   • Moderate COPD (chronic obstructive pulmonary disease) (Formerly [facility]) 04/07/2015   • 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  Used albuterol and hypertonic saline up to 5 times a day.  Last hospitalization: [10/21/20]    Respiratory:   Cough frequency: episodic, baseline  Cough character: productive  Sputum quantity: baseline  Sputum color: yellow  Shortness of breath:none  Chest Pain:never  Hemoptysis: mild a few days ago x 1   Antibiotics: cayston last used in June  Pulmonary toilet:   Albuterol: yes 4-5 /week  7% hypertonic saline: 4-5/ week  Pulmozyme: 0/day  Chest Physiotherapy: 4-5 times per week  Oxygen: none  Respiratory assist:  "none  Modulator: kalydeco BID    Compliance: compliant most of the time     Sinus symptoms:  Nasal Congestion: never   Nasal Drainage: none  Headache/sinus pressure: none     Activity / Energy: normal for age   Change in activity/energy: increased, exercising 4-5 per week   School/ Work attendance: not in school, not working   Emotional assessment: positive      GI: no problem   Appetite: good  Enzymes:  daily      Medications:     Current Outpatient Medications:   •  Cayston, 1 Ampule, Inhalation, TID  •  fluticasone-salmeterol, 1 Puff, Inhalation, BID  •  acyclovir, TAKE 1 TABLET ORALLY 4 TIMES A DAY FOR 14 DAYS, THEN TWICE DAILY FOR 3 MONTHS  •  Kalydeco, TAKE 1 TABLET TWICE DAILY WITH FAT-CONTAINING FOOD  •  mupirocin, Apply BID for up to 10 days  •  sodium chloride, 4 mL, Nebulization, 4X/DAY  •  phytonadione, 5 mg, Oral, DAILY  •  BD Pen Needle Radha U/F, 1 Each, Other, 4X/DAY ACHS  •  OneTouch Verio, TEST 3 XD FOR INSULIN ADJUSTMENT AND PRF SYMPTOMS OF HIGH OR LOW BLOOD SUGAR  •  HumaLOG KwikPen, 12-15 Units, Subcutaneous, TID AC  •  albuterol, 2.5 mg, Nebulization, TID  •  fluticasone, 1-2 Spray, Nasal, DAILY  •  Dexcom G6 Sensor, 1 Each, Does not apply, Q10 DAYS  •  Dexcom G6 Transmitter, 1 Each, Does not apply, Continuous  •  Creon, TAKE 1-2 CAPSULES 4 TIMES A DAY AS NEEDED WITH MEALS OR SNACKS **KEEP IN ORIGINAL BOTTLE**  •  Multiple Vitamins-Minerals (DEKAS PLUS PO), 1 Capsule, Oral, BID  •  magnesium oxide, 200 mg, Oral, BID  •  Vitamin C, 1,000 mg, Oral, BID  •  Probiotic Product (PROBIOTIC-10 PO), 1 Tablet, Oral, DAILY  •  vitamin D3, 10,000 Units, Oral, BID  COVID positive test or disease: none  COVID vaccine: none       ALLERGIES:  Bactrim [sulfamethoxazole-trimethoprim], Ciprofloxacin, Levofloxacin, and Tobramycin    Review of System:  Has right breast mass, seeing PCP who \"isn't very worried\" per patient. Not painful. Ultrasound and mammogram scheduled for Friday.  Weight stable at 135 #  A1C: " 5.2      OBJECTIVE:  Physical Exam:  There were no vitals taken for this visit.     GENERAL: well appearing, well nourished, no respiratory distress and normal affect   EARS: not examined   NOSE: no audible congestion   MOUTH/THROAT: mucous membranes moist   NECK: normal and no thyroid enlargement   CHEST: no chest wall deformities   SKIN: normal color       PFT's:  Single spirometry  FEV1: 74%      Labs reviewed:     Last sputum culture date: 8/4/2, light growth pseudomonas, cayston sensitive, 2 different strains    Chronic colonization of:  Pseudomonas aeruginosa continuous  Respiratory Culture:   Lab Results   Component Value Date/Time    SIGIND POS (POS) 08/04/2021 11:28 AM    SIGIND NEG 08/04/2021 11:28 AM    SIGIND NEG 08/04/2021 11:28 AM    SIGIND NEG 08/04/2021 11:28 AM    SIGIND . (POS) 08/04/2021 11:28 AM    SIGIND NEG 08/04/2021 11:28 AM    SOURCE RESP 08/04/2021 11:28 AM    SOURCE RESP 08/04/2021 11:28 AM    SOURCE RESP 08/04/2021 11:28 AM    SOURCE RESP 08/04/2021 11:28 AM    SOURCE RESP 08/04/2021 11:28 AM    SOURCE RESP 08/04/2021 11:28 AM    SITE Sputum 08/04/2021 11:28 AM    SITE Sputum 08/04/2021 11:28 AM    SITE Sputum 08/04/2021 11:28 AM    SITE Sputum 08/04/2021 11:28 AM    SITE Sputum 08/04/2021 11:28 AM    SITE Sputum 08/04/2021 11:28 AM   ]  Lab Results     Annual labs done date: 7/2021      ASSESSMENT/PLAN:     1. Cystic fibrosis with pulmonary manifestations (HCC)  Very stable, doing well  Continue pulmonary toilet daily/prn, continue kalydeco BID  Has history of chronic pseudomonas colonization, uses cayston prn, refill just completed    2. 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 Exocrine pancreatic insufficiency  Continue pancreatic enzymes    4. Nutritional counseling  Diet, exercise and weight discussed    Discussed breast mass, has imaging scheduled this week    Pulmonary Exacerbation: absent      Total attending time over 24 hours: 32  minutes    Follow up in 3 months    Electronically signed by   Marjan Payne M.D.   Pediatric Pulmonology

## 2021-11-12 ENCOUNTER — HOSPITAL ENCOUNTER (OUTPATIENT)
Dept: RADIOLOGY | Facility: MEDICAL CENTER | Age: 61
End: 2021-11-12
Attending: FAMILY MEDICINE
Payer: COMMERCIAL

## 2021-11-12 DIAGNOSIS — N63.10 LARGE MASS OF RIGHT BREAST: ICD-10-CM

## 2021-11-12 PROCEDURE — 76642 ULTRASOUND BREAST LIMITED: CPT | Mod: RT

## 2021-11-12 PROCEDURE — G0279 TOMOSYNTHESIS, MAMMO: HCPCS | Mod: RT

## 2021-11-15 NOTE — PROGRESS NOTES
Subjective:     Chief Complaint   Patient presents with   • Breast Mass     right side x 1 week        Kenia Soto is a 61 y.o. female here today for evaluation and management of:    Patient first noticed a mass on her right breast about a week ago.  It is slightly tender to palpation.  No redness or drainage.  No trauma to the area.  No discharge from the nipple.    Allergies   Allergen Reactions   • Bactrim [Sulfamethoxazole-Trimethoprim]      rash   • Ciprofloxacin      Joint swelling   • Levofloxacin      Unknown reaction  Possibly myalgias, arthralgias, nightmares   • Tobramycin Hives     Hives       Current medicines (including changes today)  Current Outpatient Medications   Medication Sig Dispense Refill   • fluticasone-salmeterol (ADVAIR) 250-50 MCG/DOSE AEROSOL POWDER, BREATH ACTIVATED Inhale 1 Puff 2 times a day. 1 Each 5   • acyclovir (ZOVIRAX) 400 MG tablet TAKE 1 TABLET ORALLY 4 TIMES A DAY FOR 14 DAYS, THEN TWICE DAILY FOR 3 MONTHS 90 Tablet 0   • KALYDECO 150 MG Tab TAKE 1 TABLET TWICE DAILY WITH FAT-CONTAINING FOOD 56 Tablet 11   • mupirocin (BACTROBAN) 2 % Ointment Apply BID for up to 10 days 30 g 0   • sodium chloride (HYPER-SAL) 7 % Nebu Soln Take 4 mL by nebulization 4 times a day. 1440 mL 3   • phytonadione (MEPHYTON) 5 MG Tab Take 1 tablet by mouth every day. 90 tablet 3   • BD PEN NEEDLE TAMIKO U/F 1 Each by Other route 4 Times a Day,Before Meals and at Bedtime. For insulin administration. 400 Each 3   • ONETOUCH VERIO strip TEST 3 XD FOR INSULIN ADJUSTMENT AND PRF SYMPTOMS OF HIGH OR LOW BLOOD SUGAR 300 Strip 3   • HUMALOG KWIKPEN 100 UNIT/ML Solution Pen-injector injection PEN Inject 12-15 Units under the skin 3 times a day before meals. 45 mL 3   • albuterol (PROVENTIL) 2.5mg/3ml Nebu Soln solution for nebulization Take 3 mL by nebulization 3 times a day. 540 mL 3   • fluticasone (FLONASE) 50 MCG/ACT nasal spray Administer 1-2 Sprays into affected nostril(S) every day. 11.1 mL 5   •  Continuous Blood Gluc Sensor (DEXCOM G6 SENSOR) Misc 1 Each by Does not apply route every 10 days. 9 Each 4   • Continuous Blood Gluc Transmit (DEXCOM G6 TRANSMITTER) Misc 1 Each by Does not apply route Continuous. Change every 3 months 1 Each 4   • CREON 6000 units Cap DR Particles TAKE 1-2 CAPSULES 4 TIMES A DAY AS NEEDED WITH MEALS OR SNACKS **KEEP IN ORIGINAL BOTTLE** 500 Cap 6   • Multiple Vitamins-Minerals (DEKAS PLUS PO) Take 1 Cap by mouth 2 times a day.     • magnesium oxide (MAG-OX) 400 MG Tab Take 200 mg by mouth 2 times a day.     • Ascorbic Acid (VITAMIN C) 1000 MG Tab Take 1,000 mg by mouth 2 Times a Day.     • Probiotic Product (PROBIOTIC-10 PO) Take 1 Tab by mouth every day.     • Cholecalciferol 5000 units Tab Take 10,000 Units by mouth 2 Times a Day.     • aztreonam lysine (CAYSTON) 75 MG Recon Soln Inhale 1 Ampule 3 times a day for 28 days. 84 mL 3     No current facility-administered medications for this visit.       She  has a past medical history of Allergy, Anemia, Asthma, Breath shortness, Bronchitis, CF (cystic fibrosis) (Former[facility]), Coughing blood, Diabetes ([facility]), Head ache, Hemorrhagic disorder ([facility]), Herpes simplex, Pneumonia, and Shoulder fracture.    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 ([facility]) 06/16/2020   • Suspected COVID-19 virus infection 04/19/2020   • 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 11/13/2019   • Cystic fibrosis ([facility]) 11/13/2019   • Cystic fibrosis with pulmonary manifestations ([facility]) 11/13/2019   • Herpes simplex 07/16/2019   • Osteopenia of multiple sites 07/16/2019   • CF (3849+10k bC>T, [id number]) ([facility]) 06/13/2019   • Exocrine pancreatic insufficiency 06/13/2019   • Vitamin K deficiency 06/13/2019   • Partial thromboplastin time increased 08/22/2017   • Moderate COPD (chronic obstructive pulmonary disease) ([facility]) 04/07/2015   • 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  No nausea or vomiting.  No chest pain or palpitations.  No cough or SOB.  No pain with urination or hematuria.  No black or bloody stools.       Objective:     /58 (BP Location: Right arm, Patient Position: Sitting, BP Cuff Size: Adult)   Pulse 100   Temp 37 °C (98.6 °F)   Resp 12   Ht 1.651 m (5' 5\")   Wt 60.8 kg (134 lb)   SpO2 94%  Body mass index is 22.3 kg/m².   Physical Exam:  Well developed, well nourished.  Alert, oriented in no acute distress.  Eye contact is good, speech goal directed, affect calm  Eyes: conjunctiva non-injected, sclera non-icteric.  Neck Supple.  No adenopathy or masses in the neck or supraclavicular regions. No thyromegaly  Lungs: clear to auscultation bilaterally with good excursion. No wheezes or rhonchi  CV: regular rate and rhythm. No murmur  BREAST EXAM: No skin changes, peau d'orange or nipple retraction. No discharge. No axillary or supraclavicular adenopathy.  Tender mass of the right breast with smooth borders palpated        Assessment and Plan:   The following treatment plan was discussed    1. Large mass of right breast  Diagnostic mammogram and ultrasound to assess.  Await results.  - MA-DIAGNOSTIC MAMMO RIGHT W/TOMOSYNTHESIS W/CAD; Future  - US-BREAST LIMITED-RIGHT; Future    Any change or worsening of signs or symptoms, patient encouraged to follow-up or report to the emergency room for further evaluation. Patient understands and agrees.    Followup: No follow-ups on file.           "

## 2021-11-16 ENCOUNTER — PATIENT MESSAGE (OUTPATIENT)
Dept: PEDIATRIC PULMONOLOGY | Facility: MEDICAL CENTER | Age: 61
End: 2021-11-16

## 2021-11-16 DIAGNOSIS — E84.0 CYSTIC FIBROSIS WITH PULMONARY MANIFESTATIONS (HCC): ICD-10-CM

## 2021-11-16 DIAGNOSIS — K86.81 EXOCRINE PANCREATIC INSUFFICIENCY: ICD-10-CM

## 2021-11-16 RX ORDER — PANCRELIPASE 30000; 6000; 19000 [USP'U]/1; [USP'U]/1; [USP'U]/1
CAPSULE, DELAYED RELEASE PELLETS ORAL
Qty: 500 CAPSULE | Refills: 6 | Status: SHIPPED | OUTPATIENT
Start: 2021-11-16 | End: 2022-12-09

## 2021-11-16 RX ORDER — FLUTICASONE PROPIONATE 50 MCG
1-2 SPRAY, SUSPENSION (ML) NASAL DAILY
Qty: 11.1 ML | Refills: 5 | Status: SHIPPED | OUTPATIENT
Start: 2021-11-16 | End: 2022-06-07

## 2021-11-16 NOTE — TELEPHONE ENCOUNTER
From: Kenia Soto  To: Physician Marjan Payne  Sent: 11/16/2021 1:01 PM PST  Subject: Meds    Hi Dr. Payne,    I was wondering if you got a message from [facility] that I need a prior auth on Cayston.     Also I need a refill on FLUTICASONE and Creon. It says I have refills but it also says Needs Prescriber Renewal. Don't know the difference!    ThanksMarina

## 2021-12-06 DIAGNOSIS — E84.9 DIABETES MELLITUS DUE TO CYSTIC FIBROSIS (HCC): ICD-10-CM

## 2021-12-06 DIAGNOSIS — E08.9 DIABETES MELLITUS DUE TO CYSTIC FIBROSIS (HCC): ICD-10-CM

## 2021-12-06 RX ORDER — PROCHLORPERAZINE 25 MG/1
1 SUPPOSITORY RECTAL
Qty: 9 EACH | Refills: 4 | Status: SHIPPED | OUTPATIENT
Start: 2021-12-06 | End: 2022-05-20 | Stop reason: SDUPTHER

## 2021-12-06 RX ORDER — PROCHLORPERAZINE 25 MG/1
1 SUPPOSITORY RECTAL CONTINUOUS
Qty: 1 EACH | Refills: 4 | Status: SHIPPED | OUTPATIENT
Start: 2021-12-06 | End: 2022-05-20 | Stop reason: SDUPTHER

## 2022-02-02 ENCOUNTER — TELEPHONE (OUTPATIENT)
Dept: PEDIATRIC PULMONOLOGY | Facility: MEDICAL CENTER | Age: 62
End: 2022-02-02

## 2022-02-02 NOTE — TELEPHONE ENCOUNTER
Spoke with Marina and notified her that [employer] won't cover Creon until we show that she has tried Pancreze and not had adequate efficacy to control symptoms.     Put note on visit for 2/16 to ensure we include her history of intolerances and side effects experienced.

## 2022-02-15 ENCOUNTER — OFFICE VISIT (OUTPATIENT)
Dept: MEDICAL GROUP | Facility: LAB | Age: 62
End: 2022-02-15
Payer: COMMERCIAL

## 2022-02-15 VITALS
TEMPERATURE: 98.8 F | HEART RATE: 100 BPM | WEIGHT: 135 LBS | HEIGHT: 65 IN | RESPIRATION RATE: 16 BRPM | OXYGEN SATURATION: 95 % | SYSTOLIC BLOOD PRESSURE: 108 MMHG | BODY MASS INDEX: 22.49 KG/M2 | DIASTOLIC BLOOD PRESSURE: 64 MMHG

## 2022-02-15 DIAGNOSIS — N63.0 BREAST MASS: ICD-10-CM

## 2022-02-15 PROCEDURE — 99213 OFFICE O/P EST LOW 20 MIN: CPT | Performed by: PHYSICIAN ASSISTANT

## 2022-02-15 ASSESSMENT — FIBROSIS 4 INDEX: FIB4 SCORE: 0.78

## 2022-02-15 NOTE — PROGRESS NOTES
"Subjective:     CC: Breast lump    HPI:   Kenia here today with     Right breast mass first noticed approximately the first week of November 2021.  At that time was slightly tender to palpation with no surrounding erythema or induration.  No trauma to the affected area.  No nipple discharge.  Patient underwent diagnostic mammogram and diagnostic breast ultrasound with no focal findings.    Pt reports some underlying discomfort in the same area, it seems to have grown larger/more complex in size. Pt describes the area as \"like a big gland\" in texture.    no color changes, no skin changes, no nipple changes, no nipple discharge. Pain extending into the axillary region.     Denies weight changes, night sweats. Denies hx of abnormal lymph nodes.     ROS:  ROS negative except as indicated above    Current Outpatient Medications Ordered in Epic   Medication Sig Dispense Refill   • Continuous Blood Gluc Sensor (DEXCOM G6 SENSOR) Misc 1 Each every 10 days. 9 Each 4   • Continuous Blood Gluc Transmit (DEXCOM G6 TRANSMITTER) Misc 1 Each continuous. Change every 3 months 1 Each 4   • acyclovir (ZOVIRAX) 400 MG tablet TAKE 1 TABLET ORALLY 4 TIMES A DAY FOR 14 DAYS, THEN TWICE DAILY FOR 3 MONTHS 90 Tablet 2   • CREON 6000-36609 units Cap DR Particles TAKE 1-2 CAPSULES 4 TIMES A DAY AS NEEDED WITH MEALS OR SNACKS **KEEP IN ORIGINAL BOTTLE** 500 Capsule 6   • fluticasone (FLONASE) 50 MCG/ACT nasal spray Administer 1-2 Sprays into affected nostril(S) every day. 11.1 mL 5   • fluticasone-salmeterol (ADVAIR) 250-50 MCG/DOSE AEROSOL POWDER, BREATH ACTIVATED Inhale 1 Puff 2 times a day. 1 Each 5   • KALYDECO 150 MG Tab TAKE 1 TABLET TWICE DAILY WITH FAT-CONTAINING FOOD 56 Tablet 11   • mupirocin (BACTROBAN) 2 % Ointment Apply BID for up to 10 days 30 g 0   • sodium chloride (HYPER-SAL) 7 % Nebu Soln Take 4 mL by nebulization 4 times a day. 1440 mL 3   • phytonadione (MEPHYTON) 5 MG Tab Take 1 tablet by mouth every day. 90 tablet 3   • " "BD PEN NEEDLE TAMIKO U/F 1 Each by Other route 4 Times a Day,Before Meals and at Bedtime. For insulin administration. 400 Each 3   • ONETOUCH VERIO strip TEST 3 XD FOR INSULIN ADJUSTMENT AND PRF SYMPTOMS OF HIGH OR LOW BLOOD SUGAR 300 Strip 3   • HUMALOG KWIKPEN 100 UNIT/ML Solution Pen-injector injection PEN Inject 12-15 Units under the skin 3 times a day before meals. 45 mL 3   • albuterol (PROVENTIL) 2.5mg/3ml Nebu Soln solution for nebulization Take 3 mL by nebulization 3 times a day. 540 mL 3   • Multiple Vitamins-Minerals (DEKAS PLUS PO) Take 1 Cap by mouth 2 times a day.     • magnesium oxide (MAG-OX) 400 MG Tab Take 200 mg by mouth 2 times a day.     • Ascorbic Acid (VITAMIN C) 1000 MG Tab Take 1,000 mg by mouth 2 Times a Day.     • Probiotic Product (PROBIOTIC-10 PO) Take 1 Tab by mouth every day.     • Cholecalciferol 5000 units Tab Take 10,000 Units by mouth 2 Times a Day.       No current [facility]-ordered facility-administered medications on file.         Objective:     Exam:  /64   Pulse 100   Temp 37.1 °C (98.8 °F)   Resp 16   Ht 1.651 m (5' 5\")   Wt 61.2 kg (135 lb)   SpO2 95%   BMI 22.47 kg/m²  Body mass index is 22.47 kg/m².    Gen: Alert and oriented, No apparent distress.  Neck: Neck is supple without lymphadenopathy.  Lungs: Normal effort, CTA bilaterally, no wheezes, rhonchi, or rales  CV: Regular rate and rhythm. No murmurs, rubs, or gallops.  Ext: No clubbing, cyanosis, edema.  Breast:  Bilaterally symmetrical, no nipple discharge, no skin changes or dimpling are  noted.  L breast free of palpable pathology and the L axillae are free of lymphadenopathy.  Palpation of right breast shows soft/rubbery, oval, well-circumscribed, mobile approximately 2 cm mass in the right upper quadrant of the breast near the axillary line.  A small approximately 0.5 cm mass noted just superior/slightly anterior to this      Assessment & Plan:     61 y.o. female with the following    1. Breast mass  Chronic " issue  Per patient, breast lumps/masses have grown larger/more complex over the past 6 months previous diagnostic mammogram and breast ultrasound showed no focal findings  Breast MRI ordered  Referral to breast surgery specialist for further eval and possible surgical excision ordered      I spent a total of 14 minutes with record review, exam, communication with the patient, communication with other providers, and documentation of this encounter.      No follow-ups on file.    Please note that this dictation was created using voice recognition software. I have made every reasonable attempt to correct obvious errors, but there may be errors of grammar and possibly content that I did not discover before finalizing the note.

## 2022-02-16 ENCOUNTER — TELEMEDICINE (OUTPATIENT)
Dept: PEDIATRIC PULMONOLOGY | Facility: MEDICAL CENTER | Age: 62
End: 2022-02-16
Payer: COMMERCIAL

## 2022-02-16 VITALS — HEIGHT: 65 IN | WEIGHT: 134.92 LBS | BODY MASS INDEX: 22.48 KG/M2

## 2022-02-16 DIAGNOSIS — Z22.39 PSEUDOMONAS AERUGINOSA COLONIZATION: ICD-10-CM

## 2022-02-16 DIAGNOSIS — J44.9 MODERATE COPD (CHRONIC OBSTRUCTIVE PULMONARY DISEASE) (HCC): ICD-10-CM

## 2022-02-16 DIAGNOSIS — E84.9 DIABETES MELLITUS DUE TO CYSTIC FIBROSIS (HCC): ICD-10-CM

## 2022-02-16 DIAGNOSIS — E84.0 CYSTIC FIBROSIS WITH PULMONARY MANIFESTATIONS (HCC): ICD-10-CM

## 2022-02-16 DIAGNOSIS — E08.9 DIABETES MELLITUS DUE TO CYSTIC FIBROSIS (HCC): ICD-10-CM

## 2022-02-16 DIAGNOSIS — K86.81 EXOCRINE PANCREATIC INSUFFICIENCY: ICD-10-CM

## 2022-02-16 PROCEDURE — 99214 OFFICE O/P EST MOD 30 MIN: CPT | Mod: 95 | Performed by: PEDIATRICS

## 2022-02-16 ASSESSMENT — FIBROSIS 4 INDEX: FIB4 SCORE: 0.78

## 2022-02-16 NOTE — PROGRESS NOTES
Nutrition Screen & Progress Note for Adult CF Clinic  BMI: 22.5       Points: 0  IBW (kg): 56.8  % IBW: 108       Points: 0  Current weight (kg): 61.2   Weight last clinic visit: 59.7 kg on 8/4/21  % weight change: up 3%     Points: 0  FEV1 % predicted: virtual visit today (last PFT 65)  Points: 1         Total Points: 1         Nutrition Risk: low    BM: daily, soft  PERT: Creon 6 (1-2 with meals, 0-1 with snacks) = avg of 5-6 per day  Lipase units/kg/meal: 196  CFTR modulator: Kalydeco  Other GI meds: no  Typical diet: 3 meals and 0-2 snacks  Vitamins: general MVi, DEKAs Plus softgel, magnesium (400 mg), vitamin D (unsure of amount), vitamin A (10,000 IU), vitamin K (5 mg)  Other supplements: Nuun electrolytes    Visit details: Kenia seen via virtual video today, she looks great.  She is back to exercising but she had Covid again and it set her back for a little while.  Last week she was able to exercise hard again.    Still has Dr Fontenot as her PCP, but she is not available for months so she may still look for another PCP.    Needs a follow up with endocrinologist, but she uses Dexcom and monitors sugars at home.  They are usually good except with illness/stress.    She has a breast lump that she is following up on, probably just fibroid tissue as she had a mammo and u/s in November that was ok.   She is currently on Cayston.  Feeling well.    Kenia does not have any nutrition questions or concerns today.    Recommendations/Plan: continue with CFRD diet and exercise program. Follow up with pelon KLEIN.    Follow-up: 3 months    Time spent: 17 minutes with MONCHO

## 2022-02-16 NOTE — PROGRESS NOTES
This evaluation was conducted via Zoom using secure and encrypted videoconferencing technology. The patient was in their home in the [facility].    The patient's identity was confirmed and verbal consent was obtained for this virtual visit.  PCP:  Fabiana Fontenot M.D.   [id number] [id number] / [place] [phone]     SUBJECTIVE:   Kenia Soto is a 61 y.o. female with Cystic Fibrosis.    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 ([facility]) 06/16/2020   • Suspected COVID-19 virus infection 04/19/2020   • 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 11/13/2019   • Cystic fibrosis ([facility]) 11/13/2019   • Cystic fibrosis with pulmonary manifestations ([facility]) 11/13/2019   • Herpes simplex 07/16/2019   • Osteopenia of multiple sites 07/16/2019   • CF (3849+10k bC>T, R9523J) ([facility]) 06/13/2019   • Exocrine pancreatic insufficiency 06/13/2019   • Vitamin K deficiency 06/13/2019   • Partial thromboplastin time increased 08/22/2017   • Moderate COPD (chronic obstructive pulmonary disease) ([facility]) 04/07/2015   • 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  Positive test 16 days ago, symptoms in sinuses and ears that progressed into cough and slight difficulty breathing.  Now back to normal and exercising again.  Last hospitalization: [10/21/20]    Respiratory:   Cough frequency: treatments only and episodic, baseline and none  Cough character: productive  Sputum quantity: baseline  Sputum color: clear and yellow  Shortness of breath:never  Chest Pain:never  Hemoptysis:never   Doctor visits: none   Antibiotics:Cayston currently  Pulmonary toilet:   Albuterol: 2/day.  Increased to tid while sick, now back down.  7% hypertonic saline: 2/day with inhaled glutathione  Pulmozyme: 0/day  Chest  Physiotherapy: aerobica, 40 minutes of cardio 5days per week  Oxygen: none  Respiratory assist: none  Modulator: kalydeco    Compliance: compliant all of the time     Sinus symptoms:  Nasal Congestion: never   Nasal Drainage: All cleared now  Headache/sinus pressure: never   Treatments: Sinus rinse 2x/day    Activity / Energy: normal for age   Change in activity/energy: none   School/ Work attendance: Not working   School/ Work performance: N/A  Emotional assessment: negative       GI: Issues with digestion around Bandy. Was using Creon close to expiration date. Received new prescription and have been working much better since then.   Appetite: normal  Enzymes:  daily  6000 units 1 per meal, 1 per snack - Insurance concerns, needs to specify she has to have creon, others don't work.  Has tried Pancreaze in the past: lead to diarrhea, bloating and weight loss  Stool: 1-2/day, characteristics: formed  G-Tube: No      Medications:     Current Outpatient Medications:   •  acyclovir, TAKE 1 TABLET ORALLY 4 TIMES A DAY FOR 14 DAYS, THEN TWICE DAILY FOR 3 MONTHS, PRN  •  Creon, TAKE 1-2 CAPSULES 4 TIMES A DAY AS NEEDED WITH MEALS OR SNACKS **KEEP IN ORIGINAL BOTTLE**, Taking  •  Kalydeco, TAKE 1 TABLET TWICE DAILY WITH FAT-CONTAINING FOOD, Taking  •  sodium chloride, 4 mL, Nebulization, 4X/DAY, Taking  •  albuterol, 2.5 mg, Nebulization, TID, Taking  •  Multiple Vitamins-Minerals (DEKAS PLUS PO), 1 Capsule, Oral, BID, Taking  •  magnesium oxide, 200 mg, Oral, BID, Taking  •  Vitamin C, 1,000 mg, Oral, BID, Taking  •  Probiotic Product (PROBIOTIC-10 PO), 1 Tablet, Oral, DAILY, Taking  •  vitamin D3, 10,000 Units, Oral, BID, Taking  •  Dexcom G6 Sensor, 1 Each, Does not apply, Q10 DAYS  •  Dexcom G6 Transmitter, 1 Each, Does not apply, Continuous  •  fluticasone, 1-2 Spray, Nasal, DAILY  •  fluticasone-salmeterol, 1 Puff, Inhalation, BID  •  mupirocin, Apply BID for up to 10 days  •  phytonadione, 5 mg, Oral, DAILY  •   BD Pen Needle Radha U/F, 1 Each, Other, 4X/DAY ACHS  •  OneTouch Verio, TEST 3 XD FOR INSULIN ADJUSTMENT AND PRF SYMPTOMS OF HIGH OR LOW BLOOD SUGAR  •  HumaLOG KwikPen, 12-15 Units, Subcutaneous, TID AC    ALLERGIES:  Bactrim [sulfamethoxazole-trimethoprim], Ciprofloxacin, Levofloxacin, and Tobramycin    Review of System:  10 system review of systems negative other than outlined above.      OBJECTIVE:  Physical Exam:  Wt 61.2 kg (134 lb 14.7 oz) Comment: per patient  BMI 22.45 kg/m²      GENERAL: well appearing, well nourished, no respiratory distress and normal affect    NOSE: no audible congestion and no discharge   NECK: normal and supple full range of motion   CHEST: no chest wall deformities   LUNGS: normal air exchange and respiratory effort normal with no retractions   SKIN: normal color   EXTREMITIES: no clubbing, cyanosis, or inflammation   NEURO: gross motor exam normal by observation     PFT's:  Home spirometer.  Has not tested since prior to Covid.  Prior to Covid, numbers were above baseline (BL 74%, was blowing 76-78%)      Labs reviewed:     Last sputum culture date: 8/4/21  Chronic colonization of:  Pseudomonas aeruginosa yes  Respiratory Culture:   Lab Results   Component Value Date/Time    SIGIND POS (POS) 08/04/2021 11:28 AM    SIGIND NEG 08/04/2021 11:28 AM    SIGIND NEG 08/04/2021 11:28 AM    SIGIND NEG 08/04/2021 11:28 AM    SIGIND . (POS) 08/04/2021 11:28 AM    SIGIND NEG 08/04/2021 11:28 AM    SOURCE RESP 08/04/2021 11:28 AM    SOURCE RESP 08/04/2021 11:28 AM    SOURCE RESP 08/04/2021 11:28 AM    SOURCE RESP 08/04/2021 11:28 AM    SOURCE RESP 08/04/2021 11:28 AM    SOURCE RESP 08/04/2021 11:28 AM    SITE Sputum 08/04/2021 11:28 AM    SITE Sputum 08/04/2021 11:28 AM    SITE Sputum 08/04/2021 11:28 AM    SITE Sputum 08/04/2021 11:28 AM    SITE Sputum 08/04/2021 11:28 AM    SITE Sputum 08/04/2021 11:28 AM   ]    Annual labs done date: 7/2021  Last CXR 1/8/21    ASSESSMENT/PLAN:     1. Cystic  fibrosis with pulmonary manifestations (HCC)  Recently positive for COVID, appears to be clinically resolved  Continue BID 7% hypertonic saline and aerobika  Continue BID kalydeco  Check home spirometry in the next 2 weeks and send us a Helpstream message with the updated numbers.  Needs CXR ASAP as last one was more than 1 year ago.    2. Moderate COPD (chronic obstructive pulmonary disease) (HCC)  Continue advair BID    3. 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 Pseudomonas aeruginosa colonization  Continue cayston prn    5. Exocrine pancreatic insufficiency  Continue pancreatic enzymes.  Has tried pancreaze in the past with poor results, will prepare letter for PA for creon.    Pulmonary Exacerbation: absent    Seen by Dietician:  Yes  Seen by Respiratory Therapy: No  Seen by :  Yes    Total attending time over 24 hours: 40 minutes    Follow up in person in 3 months    Electronically signed by   Marjan Payne M.D.   Pediatric Pulmonology

## 2022-02-16 NOTE — PROGRESS NOTES
Medical Social Work    Referral: CF Clinic / Dr. Payne    Intervention: Patient presents to clinic via telehelth appointment. Patient appears to be doing well, and states she is doing much better since having COVID. Patient states symptoms were mild in comparison to when she had it the first time at the start of the pandemic. Patient currently taking Cayston to help with symptoms and states this has helped tremendously.     Patient is back to working out, and her ankle is fully recovered from the spider bite she had.     Patient has not changed endocrinology providers or found a new PCP. Again encouraged patient to make a switch if she is truly not satisfied with the providers she is currently seeing.     Patient did have an appointment with APRN at same office as PCP to check breast lumps she has been experiencing. Patient reports a CT scan had already been completed, but they continue to persist. MRI was ordered for patient to make sure lumps are not cancerous.     Patient reports no other CF concerns at this time.    Plan: SW will continue to follow in clinic.    Time Spent: 15 min.

## 2022-02-16 NOTE — LETTER
February 16, 2022         Patient: Kenia Soto   YOB: 1960   Date of Visit: 2/16/2022           To Whom it May Concern:    Kenia Soto was seen in my clinic on 2/16/2022. She is being treated for cystic fibrosis and associated exocrine pancreatic insufficiency. She has had excellent health while being treated with creon. She has tried other pancreatic enzymes including Pancreaze in the past. On this, she developed diarrhea, bloating and weight loss.  We are asking for a prior authorization for the use of Creon for this patient.    If you have any questions or concerns, please don't hesitate to call.    Sincerely,     Marjan Payne M.D.  Electronically Signed

## 2022-03-05 DIAGNOSIS — E84.9 CF (CYSTIC FIBROSIS) (HCC): ICD-10-CM

## 2022-03-07 ENCOUNTER — PATIENT OUTREACH (OUTPATIENT)
Dept: PEDIATRIC PULMONOLOGY | Facility: MEDICAL CENTER | Age: 62
End: 2022-03-07

## 2022-03-07 RX ORDER — ALBUTEROL SULFATE 2.5 MG/3ML
2.5 SOLUTION RESPIRATORY (INHALATION) 3 TIMES DAILY
Qty: 525 ML | Refills: 4 | Status: SHIPPED | OUTPATIENT
Start: 2022-03-07 | End: 2023-03-08

## 2022-03-10 ENCOUNTER — TELEPHONE (OUTPATIENT)
Dept: MEDICAL GROUP | Facility: LAB | Age: 62
End: 2022-03-10

## 2022-03-10 NOTE — TELEPHONE ENCOUNTER
MRI breast denied.  For peer to peer review please call Agata at [phone] using reference # [id number].  Please call Juan at [phone] if you decide to do peer to peer review.

## 2022-03-15 ENCOUNTER — OFFICE VISIT (OUTPATIENT)
Dept: ENDOCRINOLOGY | Facility: MEDICAL CENTER | Age: 62
End: 2022-03-15
Attending: NURSE PRACTITIONER
Payer: COMMERCIAL

## 2022-03-15 VITALS
OXYGEN SATURATION: 95 % | HEART RATE: 102 BPM | WEIGHT: 139 LBS | HEIGHT: 66 IN | SYSTOLIC BLOOD PRESSURE: 110 MMHG | DIASTOLIC BLOOD PRESSURE: 72 MMHG | BODY MASS INDEX: 22.34 KG/M2

## 2022-03-15 DIAGNOSIS — K86.81 EXOCRINE PANCREATIC INSUFFICIENCY: ICD-10-CM

## 2022-03-15 DIAGNOSIS — E08.9 DIABETES MELLITUS DUE TO CYSTIC FIBROSIS (HCC): ICD-10-CM

## 2022-03-15 DIAGNOSIS — E55.9 VITAMIN D DEFICIENCY: ICD-10-CM

## 2022-03-15 DIAGNOSIS — E84.9 DIABETES MELLITUS DUE TO CYSTIC FIBROSIS (HCC): ICD-10-CM

## 2022-03-15 LAB
HBA1C MFR BLD: 5.4 % (ref 0–5.6)
INT CON NEG: NEGATIVE
INT CON POS: POSITIVE

## 2022-03-15 PROCEDURE — 83036 HEMOGLOBIN GLYCOSYLATED A1C: CPT | Performed by: NURSE PRACTITIONER

## 2022-03-15 PROCEDURE — 99213 OFFICE O/P EST LOW 20 MIN: CPT | Performed by: NURSE PRACTITIONER

## 2022-03-15 PROCEDURE — 95251 CONT GLUC MNTR ANALYSIS I&R: CPT | Performed by: NURSE PRACTITIONER

## 2022-03-15 PROCEDURE — 99214 OFFICE O/P EST MOD 30 MIN: CPT | Performed by: NURSE PRACTITIONER

## 2022-03-15 ASSESSMENT — FIBROSIS 4 INDEX: FIB4 SCORE: 0.78

## 2022-03-15 NOTE — PROGRESS NOTES
CHIEF COMPLAINT: Patient is here for follow up of Type 2 Diabetes Mellitus,  Exocrine pancreatic insufficiency and Vitamin D Deficiency.      HPI:     Kenia Soto is a 61 y.o. female with for continued evaluation & treatment of the followin.  Type 2 Diabetes Mellitus   Patient reports she's feeling well since her last appointment.  No new concerns or issues at this time.  Longstanding history of cystic fibrosis with COPD.  Patient does well on room air and has increased activity most days of the week.    History of cystic fibrosis.  Patient has elevated insulin requirements for meals because of increased work of breathing leading to increased carb intake.    Current diabetes regimen:  Tresiba 3-4 units daily  Humalog-6 to 8 units with meals, with a 1-7 carb ratio.    Point-of-care A1c 03/15/2022: 5.4%  POC A1c 2021: 5.2%    BG Diary: 03/15/2022 [employer] Hemal 2 CGM device. Patient has been using system for over 12 months.  Device downloaded and scanned under media tab for review.  Average Glucose 114.   In Target Range 96%.     Weight unchanged from prior appointjment.    Diabetes Complications   Retinopathy: No known retinopathy.  Last eye exam: 2020[facility].  Neuropathy: Denies paresthesias or numbness in hands or feet. Denies any foot wounds.  Exercise: Minimal.  Diet: Fair.    BP currently 110/72.  No ACE or ARB therapy at this time.     Ref. Range 3/10/2021 14:13   Creatinine, Urine Latest Units: mg/dL 37.71   Microalbumin, Urine Random Latest Units: mg/dL <1.2        Exocrine pancreatic insufficiency  Currently taking Creon 1 to 2 capsules 4 times a day as needed with meals or snacks.  Patient states she is tolerating the medication well.  Denies abdominal pain, nausea and/or vomiting.  Patient has maintained good control and has minimized her symptoms for several years.  Takes her Creon regimen consistently every day.    Vitamin D deficiency  Currently taking vitamin D 10,000 units  daily.    Patient's medications, allergies, and social histories were reviewed and updated as appropriate.    ROS:     CONS:     No fever, no chills   EYES:     No diplopia, no blurry vision   CV:           No chest pain, no palpitations   PULM:     No SOB, no cough, no hemoptysis.   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 ([facility])  2020-04: Leukocytosis  2020-04: Suspected COVID-19 virus infection  2019-11: 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  2019-11: Cystic fibrosis ([facility])  2019-11: Cystic fibrosis with pulmonary manifestations ([facility])  2019-11: Abnormal glucose tolerance test (GTT)  2019-07: Herpes simplex  2019-07: Osteopenia of multiple sites  2019-06: CF (3849+10k bC>T, A8478C) ([facility])  2019-06: Exocrine pancreatic insufficiency  2019-06: Hemoptysis  2019-06: Vitamin K deficiency  2019-06: Pseudomonas respiratory infection  2017-08: Partial thromboplastin time increased  2015-04: Moderate COPD (chronic obstructive pulmonary disease) ([facility])  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  2006-03: Irritable bowel syndrome      Past Surgical History:  Past Surgical History:   Procedure Laterality Date   • BRONCHOSCOPY-ENDO  7/22/2019    Procedure: BRONCHOSCOPY - W/BAL;  Surgeon: Arelis Fleming M.D.;  Location: [facility];  Service: Ent   • DENTAL SURGERY     • EYE SURGERY     • SINUS WASHING          Allergies:  Ciprofloxacin, Levofloxacin, and Tobramycin     Social History:  Social History     Tobacco Use   • Smoking status: Never Smoker   • Smokeless tobacco: Never Used   Vaping Use   • Vaping Use: Never used   Substance Use Topics   • Alcohol use: Yes     Alcohol/week: 1.8 oz     Types: 3 Standard drinks or equivalent per week     Comment: Socially   • Drug use: No        Family History:   family  "history includes Alcohol/Drug in her father; Cystic Fibrosis in her sister; Heart Disease in her father; Other in her daughter.      PHYSICAL EXAM:   OBJECTIVE:  Vital signs: /72 (BP Location: Left arm, Patient Position: Sitting, BP Cuff Size: Adult)   Pulse (!) 102   Ht 1.676 m (5' 6\")   Wt 63 kg (139 lb)   SpO2 95%   BMI 22.44 kg/m²   GENERAL: Well-developed, well-nourished in no apparent distress.   EYE:  No ocular asymmetry, PERRLA  HENT: Pink, moist mucous membranes.    NECK: No thyromegaly.   CARDIOVASCULAR:  No murmurs  LUNGS: Clear breath sounds  ABDOMEN: Soft, nontender   EXTREMITIES: No clubbing, cyanosis, or edema.   NEUROLOGICAL: No gross focal motor abnormalities   LYMPH: No cervical adenopathy seen.   SKIN: No rashes, lesions.   Monofilament: BLE full sensation throughout dorsal and ventral surfaces.       ASSESSMENT/PLAN:     1. 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  Continue Tresiba 4 units daily.  Continue Humalog 6 units before each meal, with carb ratio 1:7.    Continue balanced meal planning such as [email or web address].  Recommend 2 to 3 L of water each day.  Continue 150 minutes of exercise each week.  Recommend daily foot inspections.  Dilated eye exam is overdue.  Patient will schedule an appointment.    2. Exocrine pancreatic insufficiency  Stable.  Continue daily use of Creon.    3. Vitamin D deficiency  Stable.  Continue vitamin D 10,000 IU daily.    Complete labs 1 to 2 weeks prior to next appointment.  Complete point-of-care A1c at next appointment.  Next appointment in 6 months.    Thank you kindly for allowing me to participate in the diabetes care plan for this patient.    Eryn Moore, APRN  03/15/2022.    CC:   Pcp Pt States None  "

## 2022-03-16 ENCOUNTER — APPOINTMENT (OUTPATIENT)
Dept: RADIOLOGY | Facility: MEDICAL CENTER | Age: 62
End: 2022-03-16
Attending: PHYSICIAN ASSISTANT
Payer: COMMERCIAL

## 2022-03-16 RX ORDER — AZTREONAM 75 MG/ML
KIT INHALATION
COMMUNITY
Start: 2022-03-09 | End: 2022-10-18 | Stop reason: SDUPTHER

## 2022-04-20 ENCOUNTER — HOSPITAL ENCOUNTER (OUTPATIENT)
Dept: RADIOLOGY | Facility: MEDICAL CENTER | Age: 62
End: 2022-04-20
Attending: PHYSICIAN ASSISTANT
Payer: COMMERCIAL

## 2022-04-20 DIAGNOSIS — N63.0 BREAST MASS: ICD-10-CM

## 2022-04-20 PROCEDURE — C8908 MRI W/O FOL W/CONT, BREAST,: HCPCS

## 2022-04-20 PROCEDURE — 700117 HCHG RX CONTRAST REV CODE 255: Performed by: PHYSICIAN ASSISTANT

## 2022-04-20 PROCEDURE — A9576 INJ PROHANCE MULTIPACK: HCPCS | Performed by: PHYSICIAN ASSISTANT

## 2022-04-20 RX ADMIN — GADOTERIDOL 10 ML: 279.3 INJECTION, SOLUTION INTRAVENOUS at 13:17

## 2022-04-22 ENCOUNTER — TELEPHONE (OUTPATIENT)
Dept: MEDICAL GROUP | Facility: LAB | Age: 62
End: 2022-04-22

## 2022-04-22 NOTE — TELEPHONE ENCOUNTER
1. Caller Name: Brenda from DR. Amezquita's office                       Call Back Number: [phone]      How would the patient prefer to be contacted with a response:     Calling to inform you pt has cancelled her appt with them stating she does not have cancer and doesn't need to be seen.

## 2022-05-12 ENCOUNTER — PATIENT OUTREACH (OUTPATIENT)
Dept: PEDIATRIC PULMONOLOGY | Facility: MEDICAL CENTER | Age: 62
End: 2022-05-12

## 2022-05-16 DIAGNOSIS — E84.0 CYSTIC FIBROSIS WITH PULMONARY EXACERBATION (HCC): ICD-10-CM

## 2022-05-17 RX ORDER — SODIUM CHLORIDE FOR INHALATION 7 %
4 VIAL, NEBULIZER (ML) INHALATION 4 TIMES DAILY
Qty: 1440 ML | Refills: 1 | Status: SHIPPED | OUTPATIENT
Start: 2022-05-17 | End: 2022-10-13

## 2022-05-19 DIAGNOSIS — E84.9 CYSTIC FIBROSIS (HCC): ICD-10-CM

## 2022-05-19 DIAGNOSIS — E84.9 DIABETES MELLITUS DUE TO CYSTIC FIBROSIS (HCC): ICD-10-CM

## 2022-05-19 DIAGNOSIS — E08.9 DIABETES MELLITUS DUE TO CYSTIC FIBROSIS (HCC): ICD-10-CM

## 2022-05-19 DIAGNOSIS — E56.1 VITAMIN K DEFICIENCY: ICD-10-CM

## 2022-05-19 DIAGNOSIS — K86.81 EXOCRINE PANCREATIC INSUFFICIENCY: ICD-10-CM

## 2022-05-19 RX ORDER — PHYTONADIONE 5 MG/1
5 TABLET ORAL DAILY
Qty: 90 TABLET | Refills: 3 | Status: SHIPPED | OUTPATIENT
Start: 2022-05-19 | End: 2023-05-31

## 2022-05-20 DIAGNOSIS — E08.9 DIABETES MELLITUS DUE TO CYSTIC FIBROSIS (HCC): ICD-10-CM

## 2022-05-20 DIAGNOSIS — E84.9 DIABETES MELLITUS DUE TO CYSTIC FIBROSIS (HCC): ICD-10-CM

## 2022-05-20 RX ORDER — INSULIN LISPRO 100 [IU]/ML
12-15 INJECTION, SOLUTION INTRAVENOUS; SUBCUTANEOUS
Qty: 45 ML | Refills: 3 | Status: SHIPPED | OUTPATIENT
Start: 2022-05-20

## 2022-05-20 RX ORDER — BLOOD SUGAR DIAGNOSTIC
STRIP MISCELLANEOUS
Qty: 300 STRIP | Refills: 3 | Status: SHIPPED | OUTPATIENT
Start: 2022-05-20

## 2022-05-20 RX ORDER — PEN NEEDLE, DIABETIC 32GX 5/32"
NEEDLE, DISPOSABLE MISCELLANEOUS
Qty: 400 EACH | Refills: 3 | Status: SHIPPED | OUTPATIENT
Start: 2022-05-20

## 2022-05-20 RX ORDER — PROCHLORPERAZINE 25 MG/1
1 SUPPOSITORY RECTAL
Qty: 9 EACH | Refills: 4 | Status: SHIPPED | OUTPATIENT
Start: 2022-05-20

## 2022-05-20 RX ORDER — PROCHLORPERAZINE 25 MG/1
1 SUPPOSITORY RECTAL CONTINUOUS
Qty: 1 EACH | Refills: 4 | Status: SHIPPED | OUTPATIENT
Start: 2022-05-20

## 2022-06-03 ENCOUNTER — TELEPHONE (OUTPATIENT)
Dept: ENDOCRINOLOGY | Facility: MEDICAL CENTER | Age: 62
End: 2022-06-03

## 2022-06-03 DIAGNOSIS — E84.0 CYSTIC FIBROSIS WITH PULMONARY MANIFESTATIONS (HCC): ICD-10-CM

## 2022-06-03 NOTE — TELEPHONE ENCOUNTER
Patient is calling to follow up on Dexcom G6 Sensor PA. She states [facility] has been trying to contact us to have the PA completed.She would like prescription sent to Dexcom G6 Sensor sent to [facility] in [facility].     I spoke with Virginia from [facility]. They have an incorrect fax number for our clinic. She will be sending a PA form to complete shortly.

## 2022-06-07 RX ORDER — FLUTICASONE PROPIONATE 50 MCG
1-2 SPRAY, SUSPENSION (ML) NASAL DAILY
Qty: 48 ML | Refills: 1 | Status: SHIPPED | OUTPATIENT
Start: 2022-06-07 | End: 2023-05-31

## 2022-06-09 DIAGNOSIS — E08.9 DIABETES MELLITUS RELATED TO CYSTIC FIBROSIS (HCC): ICD-10-CM

## 2022-06-09 DIAGNOSIS — E84.8 DIABETES MELLITUS RELATED TO CYSTIC FIBROSIS (HCC): ICD-10-CM

## 2022-06-10 ENCOUNTER — TELEPHONE (OUTPATIENT)
Dept: ENDOCRINOLOGY | Facility: MEDICAL CENTER | Age: 62
End: 2022-06-10

## 2022-06-10 NOTE — TELEPHONE ENCOUNTER
DOCUMENTATION OF PAR STATUS:    1. Name of Medication & Dose:  Dexcom G6 Sensor    2. Name of Prescription Coverage Company & phone #: [employer] Electronic PA Form (2017 NCPDP)    3. Date Prior Auth Submitted: 6/10/2022    4. What information was given to obtain insurance decision? In Chart    5. Prior Auth Status? Pending    6. Patient Notified: no    Key: [id number]

## 2022-06-28 ENCOUNTER — HOSPITAL ENCOUNTER (OUTPATIENT)
Dept: LAB | Facility: MEDICAL CENTER | Age: 62
End: 2022-06-28
Attending: PHYSICIAN ASSISTANT
Payer: COMMERCIAL

## 2022-06-28 LAB
ALBUMIN SERPL BCP-MCNC: 4.1 G/DL (ref 3.2–4.9)
ALBUMIN/GLOB SERPL: 1.7 G/DL
ALP SERPL-CCNC: 117 U/L (ref 30–99)
ALT SERPL-CCNC: 23 U/L (ref 2–50)
ANION GAP SERPL CALC-SCNC: 11 MMOL/L (ref 7–16)
AST SERPL-CCNC: 24 U/L (ref 12–45)
BILIRUB SERPL-MCNC: 0.4 MG/DL (ref 0.1–1.5)
BUN SERPL-MCNC: 16 MG/DL (ref 8–22)
CALCIUM SERPL-MCNC: 9.3 MG/DL (ref 8.5–10.5)
CHLORIDE SERPL-SCNC: 102 MMOL/L (ref 96–112)
CO2 SERPL-SCNC: 25 MMOL/L (ref 20–33)
CREAT SERPL-MCNC: 0.58 MG/DL (ref 0.5–1.4)
GFR SERPLBLD CREATININE-BSD FMLA CKD-EPI: 102 ML/MIN/1.73 M 2
GLOBULIN SER CALC-MCNC: 2.4 G/DL (ref 1.9–3.5)
GLUCOSE SERPL-MCNC: 99 MG/DL (ref 65–99)
POTASSIUM SERPL-SCNC: 4.5 MMOL/L (ref 3.6–5.5)
PROT SERPL-MCNC: 6.5 G/DL (ref 6–8.2)
SODIUM SERPL-SCNC: 138 MMOL/L (ref 135–145)
T4 FREE SERPL-MCNC: 0.99 NG/DL (ref 0.93–1.7)
TSH SERPL DL<=0.005 MIU/L-ACNC: 1.26 UIU/ML (ref 0.38–5.33)

## 2022-06-28 PROCEDURE — 36415 COLL VENOUS BLD VENIPUNCTURE: CPT

## 2022-06-28 PROCEDURE — 84439 ASSAY OF FREE THYROXINE: CPT

## 2022-06-28 PROCEDURE — 84443 ASSAY THYROID STIM HORMONE: CPT

## 2022-06-28 PROCEDURE — 86337 INSULIN ANTIBODIES: CPT

## 2022-06-28 PROCEDURE — 80053 COMPREHEN METABOLIC PANEL: CPT

## 2022-06-28 PROCEDURE — 84681 ASSAY OF C-PEPTIDE: CPT

## 2022-06-28 PROCEDURE — 86341 ISLET CELL ANTIBODY: CPT

## 2022-06-28 PROCEDURE — 84206 ASSAY OF PROINSULIN: CPT

## 2022-06-28 PROCEDURE — 82043 UR ALBUMIN QUANTITATIVE: CPT

## 2022-06-28 PROCEDURE — 83525 ASSAY OF INSULIN: CPT

## 2022-06-28 PROCEDURE — 82570 ASSAY OF URINE CREATININE: CPT

## 2022-06-29 LAB
CREAT UR-MCNC: 10.1 MG/DL
MICROALBUMIN UR-MCNC: <1.2 MG/DL
MICROALBUMIN/CREAT UR: NORMAL MG/G (ref 0–30)

## 2022-06-30 LAB — C PEPTIDE SERPL-MCNC: 0.7 NG/ML (ref 0.5–3.3)

## 2022-07-01 LAB — INSULIN HUMAN AB SER-ACNC: <0.4 U/ML (ref 0–0.4)

## 2022-07-02 LAB — GAD65 AB SER IA-ACNC: <5 IU/ML (ref 0–5)

## 2022-07-11 ENCOUNTER — TELEPHONE (OUTPATIENT)
Dept: PEDIATRIC PULMONOLOGY | Facility: MEDICAL CENTER | Age: 62
End: 2022-07-11

## 2022-07-11 DIAGNOSIS — E84.9 CYSTIC FIBROSIS (HCC): Primary | ICD-10-CM

## 2022-07-11 NOTE — TELEPHONE ENCOUNTER
Outgoing call to schedule quarterly CF Clinic Visit, scheduled for 8/17/22.    Annual labs and chest xray will be due in August, orders placed today.     Please call for any additional questions or concerns!    SHEELA Gong Care Coordinator  Pediatric Pulmonary [phone]

## 2022-07-16 LAB — MISCELLANEOUS LAB RESULT MISCLAB: NORMAL

## 2022-08-10 ENCOUNTER — OFFICE VISIT (OUTPATIENT)
Dept: MEDICAL GROUP | Facility: LAB | Age: 62
End: 2022-08-10
Payer: COMMERCIAL

## 2022-08-10 ENCOUNTER — PATIENT MESSAGE (OUTPATIENT)
Dept: MEDICAL GROUP | Facility: LAB | Age: 62
End: 2022-08-10

## 2022-08-10 VITALS
RESPIRATION RATE: 16 BRPM | OXYGEN SATURATION: 95 % | HEIGHT: 66 IN | BODY MASS INDEX: 21.53 KG/M2 | HEART RATE: 90 BPM | SYSTOLIC BLOOD PRESSURE: 104 MMHG | TEMPERATURE: 98.1 F | WEIGHT: 134 LBS | DIASTOLIC BLOOD PRESSURE: 58 MMHG

## 2022-08-10 DIAGNOSIS — B37.0 ORAL PHARYNGEAL CANDIDIASIS: ICD-10-CM

## 2022-08-10 PROCEDURE — 99213 OFFICE O/P EST LOW 20 MIN: CPT | Performed by: PHYSICIAN ASSISTANT

## 2022-08-10 RX ORDER — FLUCONAZOLE 150 MG/1
TABLET ORAL
Qty: 2 TABLET | Refills: 0 | Status: SHIPPED | OUTPATIENT
Start: 2022-08-10 | End: 2022-08-23 | Stop reason: SDUPTHER

## 2022-08-10 ASSESSMENT — FIBROSIS 4 INDEX: FIB4 SCORE: 0.92

## 2022-08-10 NOTE — PROGRESS NOTES
Chief Complaint   Patient presents with    Pharyngitis        HPI: Patient is a 61 y.o. female complaining of 14 days of illness including: sore throat.   Mucus is: thin.  Similarly ill exposures: no.  Treatments tried: inhaled abx, prednisone  She  reports that she has never smoked. She has never used smokeless tobacco..    Hx of CF, states that symptoms started after starting ozempic and she developed significant heartburn with this medication. She has since discontinued this medication     ROS:  No fever, cough, nausea, changes in bowel movements or skin rash.      I reviewed the patient's medications, allergies and medical history:  Current Outpatient Medications   Medication Sig Dispense Refill    nystatin (MYCOSTATIN) 869096 UNIT/ML Suspension Take 5 mL by mouth 4 times a day. 60 mL 0    maalox plus-benadryl-visc lidocaine (MAGIC MOUTHWASH) Take 5 mL by mouth every 6 hours as needed (for sore throat). 30 mL 0    albuterol (PROVENTIL) 2.5mg/3ml Nebu Soln solution for nebulization TAKE 3 ML BY NEBULIZATION 3 TIMES A DAY. 525 mL 4    KALYDECO 150 MG Tab TAKE 1 TABLET TWICE DAILY WITH FAT-CONTAINING FOOD 56 Tablet 11    fluticasone (FLONASE) 50 MCG/ACT nasal spray ADMINISTER 1-2 SPRAYS INTO AFFECTED NOSTRIL(S) EVERY DAY. 48 mL 1    HUMALOG KWIKPEN 100 UNIT/ML Solution Pen-injector injection PEN INJECT 12-15 UNITS UNDER THE SKIN 3 TIMES A DAY BEFORE MEALS. 45 mL 3    BD PEN NEEDLE TAMIKO 2ND GEN 1 EACH BY OTHER ROUTE 4 TIMES A DAY,BEFORE MEALS AND AT BEDTIME. FOR INSULIN ADMINISTRATION. 400 Each 3    glucose blood (ONETOUCH VERIO) strip TEST 3 TIMES DAILY FOR INSULIN ADJUSTMENT AND AS NEEDED SYMPTOMS OF HIGH OR LOW BLOOD SUGAR 300 Strip 3    Continuous Blood Gluc Sensor (DEXCOM G6 SENSOR) Misc 1 Each every 10 days. 9 Each 4    Continuous Blood Gluc Transmit (DEXCOM G6 TRANSMITTER) Misc 1 Each continuous. Change every 3 months 1 Each 4    phytonadione (MEPHYTON) 5 MG Tab TAKE 1 TABLET BY MOUTH EVERY DAY 90 Tablet 3     "sodium chloride (HYPER-SAL) 7 % Nebu Soln TAKE 4 ML BY NEBULIZATION 4 TIMES A DAY. 1440 mL 1    CAYSTON 75 MG Recon Soln       acyclovir (ZOVIRAX) 400 MG tablet TAKE 1 TABLET ORALLY 4 TIMES A DAY FOR 14 DAYS, THEN TWICE DAILY FOR 3 MONTHS 90 Tablet 2    CREON 6000-37331 units Cap DR Particles TAKE 1-2 CAPSULES 4 TIMES A DAY AS NEEDED WITH MEALS OR SNACKS **KEEP IN ORIGINAL BOTTLE** 500 Capsule 6    fluticasone-salmeterol (ADVAIR) 250-50 MCG/DOSE AEROSOL POWDER, BREATH ACTIVATED Inhale 1 Puff 2 times a day. 1 Each 5    mupirocin (BACTROBAN) 2 % Ointment Apply BID for up to 10 days 30 g 0    Multiple Vitamins-Minerals (DEKAS PLUS PO) Take 1 Cap by mouth 2 times a day.      magnesium oxide (MAG-OX) 400 MG Tab Take 200 mg by mouth 2 times a day.      Ascorbic Acid (VITAMIN C) 1000 MG Tab Take 1,000 mg by mouth 2 Times a Day.      Probiotic Product (PROBIOTIC-10 PO) Take 1 Tab by mouth every day.      Cholecalciferol 5000 units Tab Take 10,000 Units by mouth 2 Times a Day.       No current facility-administered medications for this visit.     Bactrim [sulfamethoxazole-trimethoprim], Ciprofloxacin, Levofloxacin, and Tobramycin  Past Medical History:   Diagnosis Date    Allergy     Anemia     Asthma     Breath shortness     Bronchitis     CF (cystic fibrosis) ([facility])     Coughing blood     Diabetes ([facility])     Head ache     Hemorrhagic disorder ([facility])     Herpes simplex     Pneumonia     Shoulder fracture         EXAM:  /58   Pulse 90   Temp 36.7 °C (98.1 °F) (Temporal)   Resp 16   Ht 1.676 m (5' 6\")   Wt 60.8 kg (134 lb)   SpO2 95%   General: Alert, no conversational dyspnea or audible wheeze, non-toxic appearance.  Eyes: PERRL, conjunctiva slightly injected, no eye discharge.  Ears: Normal pinnae,TM's normal bilaterally.  Nares: Patent with thin mucus.  Sinuses: non tender over maxillary / frontal sinuses.  Throat: Erythematous injection without exudate. With some white plaques  Neck: Supple, with no " adenopathy.  Lungs: Clear to auscultation bilaterally, no wheeze, crackles or rhonchi.   Heart: Regular rate without murmur.  Skin: Warm and dry without rash.     ASSESSMENT:   1. Oral pharyngeal candidiasis          PLAN:  1. Trial of OTC omeprazole, magic mouth wash and nystatin swish and swallow  2. Twice daily use of nasal saline rinse or Neti-Pot.  3. OTC anti-pyretics and decongestants as needed.  4. Follow-up in office or urgent care for worsening symptoms, difficulty breathing, lack of expected recovery, or should new symptoms or problems arise.

## 2022-08-12 DIAGNOSIS — B37.0 ORAL PHARYNGEAL CANDIDIASIS: ICD-10-CM

## 2022-08-16 NOTE — PATIENT INSTRUCTIONS
Kenia Soto  1960    CF Mutations: 3849+10kbC->T, [id number]  CFTR Modulator: Kalydeco    Respiratory  Baseline FEV1: 71% Today's FEV1: __65__  Airway Clearance Routine:  Albuterol (2 x day) / (3-4x/day when sick)  Hypertonic Saline (2 x day) / (3-4x/day when sick)  Pulmozyme (1 x day) / (2x/day when sick)  ACT Vest and/or Acapella (2 x day) / (3-4 x/day when sick)  Inhaled antibiotics: Cayston, COMPLETE 2 MORE WEEKS  ADD BUDESONIDE 2 ML TO SINUS RINSES 2 TIMES PER DAY  Equipment Check (Annually) Date scheduled:  Nutrition/Gastrointestinal  Weight: 134 pounds  BMI: Current: 22.1, Goal: 22 - 23     Enzymes: Creon 6 (1 with dinner only)  Vitamins: general MVi, DEKAs Plus softgel, magnesium, D, K, A + Nuun electrolytes  Exercise: 3-5 days per week (great job!)  Social  Insurance: [employer]  Transportation: yes  Has access to food resources: yes  Financial Resources: none  School / Work Needs: none  Mental health screenin2022     Tasks:  Establish with a new [phone]    Goals  Annual Labs: last done 2021, (CMP 22) due again by 2022   LFTs: last done 2021, due again by 2022  Oral Glucose Tolerance Test: CFRD  Sputum Cultures: 1:  21    2:  22 with fungal, AFB    3:      4:           DEXA scan: last done 2019, due again by   Chest X-ray: last done 2021, due again now   Eye Exam: last done 2021, recommended again 2022 ([facility])   Notes  Recent oral thrush and vaginal yeast infection. Nystatin, diflucan

## 2022-08-17 ENCOUNTER — HOSPITAL ENCOUNTER (OUTPATIENT)
Facility: MEDICAL CENTER | Age: 62
End: 2022-08-17
Attending: PEDIATRICS
Payer: COMMERCIAL

## 2022-08-17 ENCOUNTER — OFFICE VISIT (OUTPATIENT)
Dept: PEDIATRIC PULMONOLOGY | Facility: MEDICAL CENTER | Age: 62
End: 2022-08-17
Payer: COMMERCIAL

## 2022-08-17 ENCOUNTER — PATIENT MESSAGE (OUTPATIENT)
Dept: PEDIATRIC PULMONOLOGY | Facility: MEDICAL CENTER | Age: 62
End: 2022-08-17

## 2022-08-17 ENCOUNTER — PATIENT OUTREACH (OUTPATIENT)
Dept: PEDIATRIC PULMONOLOGY | Facility: MEDICAL CENTER | Age: 62
End: 2022-08-17

## 2022-08-17 VITALS
BODY MASS INDEX: 22.33 KG/M2 | RESPIRATION RATE: 16 BRPM | WEIGHT: 134.04 LBS | HEART RATE: 85 BPM | HEIGHT: 65 IN | OXYGEN SATURATION: 96 %

## 2022-08-17 DIAGNOSIS — E84.9 CYSTIC FIBROSIS (HCC): ICD-10-CM

## 2022-08-17 DIAGNOSIS — B96.5 PSEUDOMONAS RESPIRATORY INFECTION: ICD-10-CM

## 2022-08-17 DIAGNOSIS — E08.9 DIABETES MELLITUS DUE TO CYSTIC FIBROSIS (HCC): ICD-10-CM

## 2022-08-17 DIAGNOSIS — E84.9 DIABETES MELLITUS DUE TO CYSTIC FIBROSIS (HCC): ICD-10-CM

## 2022-08-17 DIAGNOSIS — K86.81 EXOCRINE PANCREATIC INSUFFICIENCY: ICD-10-CM

## 2022-08-17 DIAGNOSIS — J32.4 CHRONIC PANSINUSITIS: ICD-10-CM

## 2022-08-17 DIAGNOSIS — J98.8 PSEUDOMONAS RESPIRATORY INFECTION: ICD-10-CM

## 2022-08-17 DIAGNOSIS — E84.0 CYSTIC FIBROSIS WITH PULMONARY EXACERBATION (HCC): ICD-10-CM

## 2022-08-17 LAB
FUNGUS SPEC FUNGUS STN: NORMAL
GRAM STN SPEC: NORMAL
SIGNIFICANT IND 70042: NORMAL
SIGNIFICANT IND 70042: NORMAL
SITE SITE: NORMAL
SITE SITE: NORMAL
SOURCE SOURCE: NORMAL
SOURCE SOURCE: NORMAL

## 2022-08-17 PROCEDURE — 87077 CULTURE AEROBIC IDENTIFY: CPT

## 2022-08-17 PROCEDURE — 94010 BREATHING CAPACITY TEST: CPT | Performed by: PEDIATRICS

## 2022-08-17 PROCEDURE — 87015 SPECIMEN INFECT AGNT CONCNTJ: CPT

## 2022-08-17 PROCEDURE — 87181 SC STD AGAR DILUTION PER AGT: CPT

## 2022-08-17 PROCEDURE — 87556 M.TUBERCULO DNA AMP PROBE: CPT

## 2022-08-17 PROCEDURE — 87070 CULTURE OTHR SPECIMN AEROBIC: CPT

## 2022-08-17 PROCEDURE — 99401 PREV MED CNSL INDIV APPRX 15: CPT | Mod: 25 | Performed by: PEDIATRICS

## 2022-08-17 PROCEDURE — 87102 FUNGUS ISOLATION CULTURE: CPT

## 2022-08-17 PROCEDURE — 87206 SMEAR FLUORESCENT/ACID STAI: CPT

## 2022-08-17 PROCEDURE — 99215 OFFICE O/P EST HI 40 MIN: CPT | Mod: 25 | Performed by: PEDIATRICS

## 2022-08-17 PROCEDURE — 87205 SMEAR GRAM STAIN: CPT

## 2022-08-17 PROCEDURE — 87116 MYCOBACTERIA CULTURE: CPT

## 2022-08-17 RX ORDER — BUDESONIDE 0.5 MG/2ML
500 INHALANT ORAL 2 TIMES DAILY
Qty: 120 ML | Refills: 3 | Status: SHIPPED | OUTPATIENT
Start: 2022-08-17 | End: 2023-03-08

## 2022-08-17 RX ORDER — PREDNISONE 20 MG/1
20 TABLET ORAL DAILY
Qty: 20 TABLET | Refills: 0 | Status: SHIPPED | OUTPATIENT
Start: 2022-08-17 | End: 2022-08-27

## 2022-08-17 ASSESSMENT — PATIENT HEALTH QUESTIONNAIRE - PHQ9
3. TROUBLE FALLING OR STAYING ASLEEP OR SLEEPING TOO MUCH: 0
SUM OF ALL RESPONSES TO PHQ QUESTIONS 1-9: 3
5. POOR APPETITE OR OVEREATING: 0
4. FEELING TIRED OR HAVING LITTLE ENERGY: 3
7. TROUBLE CONCENTRATING ON THINGS, SUCH AS READING THE NEWSPAPER OR WATCHING TELEVISION: 0
SUM OF ALL RESPONSES TO PHQ9 QUESTIONS 1 AND 2: 0
CLINICAL INTERPRETATION OF PHQ2 SCORE: 0
9. THOUGHTS THAT YOU WOULD BE BETTER OFF DEAD, OR OF HURTING YOURSELF: 0
6. FEELING BAD ABOUT YOURSELF - OR THAT YOU ARE A FAILURE OR HAVE LET YOURSELF OR YOUR FAMILY DOWN: 0
1. LITTLE INTEREST OR PLEASURE IN DOING THINGS: 0
8. MOVING OR SPEAKING SO SLOWLY THAT OTHER PEOPLE COULD HAVE NOTICED. OR THE OPPOSITE, BEING SO FIGETY OR RESTLESS THAT YOU HAVE BEEN MOVING AROUND A LOT MORE THAN USUAL: 0
2. FEELING DOWN, DEPRESSED, IRRITABLE, OR HOPELESS: 0

## 2022-08-17 ASSESSMENT — ANXIETY QUESTIONNAIRES
7. FEELING AFRAID AS IF SOMETHING AWFUL MIGHT HAPPEN: NOT AT ALL
2. NOT BEING ABLE TO STOP OR CONTROL WORRYING: NOT AT ALL
4. TROUBLE RELAXING: NOT AT ALL
6. BECOMING EASILY ANNOYED OR IRRITABLE: NOT AT ALL
GAD7 TOTAL SCORE: 0
1. FEELING NERVOUS, ANXIOUS, OR ON EDGE: NOT AT ALL
3. WORRYING TOO MUCH ABOUT DIFFERENT THINGS: NOT AT ALL
5. BEING SO RESTLESS THAT IT IS HARD TO SIT STILL: NOT AT ALL

## 2022-08-17 ASSESSMENT — FIBROSIS 4 INDEX: FIB4 SCORE: 0.92

## 2022-08-17 NOTE — PROGRESS NOTES
"Nutrition Screen & Progress Note for Adult CF Clinic  BMI: 22.1       Points: 0  IBW (kg): 56.8  % IBW: 107       Points: 0  Current weight (kg): 60.8   Weight last clinic visit: 61.2 kg on 2/16/22  % weight change: down 0.4 kg     Points: 0  FEV1 % predicted: 65      Points: 1            Total Points: 1          Nutrition Risk: low    BM: daily, soft (better since decreasing PERT)  PERT: Creon 6 (one with dinner only) = one per day  Lipase units/kg/meal: 100  CFTR modulator: Kalydeco  Other GI meds: no  Typical diet: 3 meals and 0-1 snacks  Vitamins: general MVi, DEKAs Plus softgel, magnesium (400 mg), vitamin D (unsure of amount), vitamin A (10,000 IU), vitamin K (5 mg)  Other supplements: Nuun electrolytes    Visit details: Marina is doing ok, she has had a rough time the past 2 weeks. She was having terrible sinus issues and throat issues, actually lost her voice last week.  She thought it may be Covid but it was not. She was cleaning her nebs with the new soap she bought and she realized it was the new soap she had been using that was bothering her.  She has stopped using that new soap and she is recovering. She is very sensitive to \"everything\".  She started Cayston recently and got thrush.   She tried the DM med Ozempic but had a bad reaction.  She was constipated and had terrible heartburn and \"my lungs got dehydrated\".  She found out that many people on this medication have side effects.  She follows up with her endo MD, her last glyco was excellent at 5.7.    She only takes PERT with dinner now and her BM's are much better, easier to pass.  She does not seem to need PERT with almonds but definitely with animal protein like steak.    She has been exercising, her goal is 4-5 times per week.  She jumps on her trampoline or runs on it while holding weights.  Some days she can only do 20 minutes but other days she does 40.    She gets her PERT and vitamins from the Cre[facility] program, also gets the Zone bars.   Marina " looks great today, BMI good.    Recommendations/Plan: continue with CFRD diet and exercise program.  Follow-up: 3 months    Time spent: 20 minutes

## 2022-08-17 NOTE — PROGRESS NOTES
"    PCP:  Fabiana Fontenot M.D.   [address] [id number] / [place] [id number]     SUBJECTIVE:   Kenia Soto is a 61 y.o. 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[facility]) 06/16/2020    Suspected COVID-19 virus infection 04/19/2020    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 11/13/2019    Cystic fibrosis ([facility]) 11/13/2019    Cystic fibrosis with pulmonary manifestations ([facility]) 11/13/2019    Herpes simplex 07/16/2019    Osteopenia of multiple sites 07/16/2019    CF (3849+10k bC>T, [id number]) ([facility]) 06/13/2019    Exocrine pancreatic insufficiency 06/13/2019    Vitamin K deficiency 06/13/2019    Partial thromboplastin time increased 08/22/2017    Moderate COPD (chronic obstructive pulmonary disease) ([facility]) 04/07/2015    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  Also started on ozempic for DM 6 weeks ago which caused nausea and heartburn  Lungs felt more \"full of junk\"  Changed to a new dish soap and symptoms seemed to improve  Also started cayston 2 weeks ago. Then got thrush, increased throat pain. Took one dose of diflucan and used nystatin.  Cough character: productive but improving, not coughing all night anymore.  Sputum quantity: increased but improving  Sputum color: white to yellow now but did cough up 2 large dark gray mucus plugs last week.  Shortness of breath:mild  Used prednisone 20 mg x 2 days this week and last week x 4 days.   Chest Pain:had some left rib/side pain a few weeks ago, now better.  Hemoptysis:none   Antibiotics:inhaled cayston x 2 weeks  Pulmonary toilet:   Had stopped advair due to thrush, now taking BID " again  Albuterol: 3/day  7% hypertonic saline: 3/day  Pulmozyme: no/day  Chest Physiotherapy: aerobika TID  Oxygen: none  Respiratory assist: none  Modulator: kalydeco BID, skipped to night doses after diflucan    Compliance: compliant most of the time     Sinus symptoms:  Nasal Congestion: yes, for past several weeks   Nasal Drainage: none  Headache/sinus pressure: fullness but no pain   Treatments: nasal rinse BID, afrin prn    Activity / Energy: improving, able to jog a little and bounce on trampoline this week   Change in activity/energy: decreased but improving  School/ Work attendance: not in school, not working     GI: no problem   Appetite: normal  Enzymes:  daily but only using once a day with dinner  6000 units  Stool: 1/day, characteristics: formed/soft  G-Tube: No      Medications:     Current Outpatient Medications:     nystatin, 500,000 Units, Oral, 4XDAY, PRN    maalox plus-benadryl-visc lidocaine, 5 mL, Oral, Q6HRS PRN, PRN    fluconazole, 1 tablet, ok to repeat dose in 7 days if symptoms persist, Taking    Kalydeco, TAKE 1 TABLET TWICE DAILY WITH FAT-CONTAINING FOOD, Taking    fluticasone, 1-2 Spray, Nasal, DAILY, Taking    HumaLOG KwikPen, 12-15 Units, Subcutaneous, TID AC, Taking    BD Pen Needle Radha 2nd Gen, 1 EACH BY OTHER ROUTE 4 TIMES A DAY,BEFORE MEALS AND AT BEDTIME. FOR INSULIN ADMINISTRATION., Taking    OneTouch Verio, TEST 3 TIMES DAILY FOR INSULIN ADJUSTMENT AND AS NEEDED SYMPTOMS OF HIGH OR LOW BLOOD SUGAR, Taking    Dexcom G6 Sensor, 1 Each, Does not apply, Q10 DAYS, Taking    Dexcom G6 Transmitter, 1 Each, Does not apply, Continuous, Taking    phytonadione, 5 mg, Oral, DAILY, Taking    sodium chloride, 4 mL, Nebulization, 4X/DAY, Taking    Cayston, , Taking    albuterol, 2.5 mg, Nebulization, TID, Taking    acyclovir, TAKE 1 TABLET ORALLY 4 TIMES A DAY FOR 14 DAYS, THEN TWICE DAILY FOR 3 MONTHS, PRN    Creon, TAKE 1-2 CAPSULES 4 TIMES A DAY AS NEEDED WITH MEALS OR SNACKS **KEEP IN  "ORIGINAL BOTTLE**, PRN    fluticasone-salmeterol, 1 Puff, Inhalation, BID, Taking    mupirocin, Apply BID for up to 10 days, PRN    Multiple Vitamins-Minerals (DEKAS PLUS PO), 1 Capsule, Oral, BID, Taking    magnesium oxide, 200 mg, Oral, BID, Taking    Vitamin C, 1,000 mg, Oral, BID, Taking    Probiotic Product (PROBIOTIC-10 PO), 1 Tablet, Oral, DAILY, Taking    vitamin D3, 10,000 Units, Oral, BID, Taking  COVID positive test or disease: was COVID positive in February, resolved  COVID vaccine: none     ALLERGIES:  Bactrim [sulfamethoxazole-trimethoprim], Ciprofloxacin, Levofloxacin, and Tobramycin    Review of System:  Up to 230 with steroids recently but took a little bit more insulin. Generally below 180, A1C 5.7 6 weeks ago.    Social/Environmental:    Tobacco use: never  Pets: dogs    OBJECTIVE:  Physical Exam:  Pulse 85   Resp 16   Ht 1.66 m (5' 5.35\")   Wt 60.8 kg (134 lb 0.6 oz)   SpO2 96%   BMI 22.06 kg/m²      GENERAL: well appearing, well nourished, no respiratory distress, and normal affect   EARS: bilateral TM's and external ear canals normal   NOSE: mild congestion, erythema, hoarse voice   MOUTH/THROAT: mild erythema, no exudate   NECK: normal and no adenopathy or masses   CHEST: no chest wall deformities and normal A-P diameter   LUNGS: clear to auscultation and normal air exchange, minimal occasional coarseness in right base  HEART: regular rate and rhythm and no murmurs   ABDOMEN: soft, non-tender, non-distended, and no hepatosplenomegaly  : not examined  BACK: not examined   SKIN: normal color   EXTREMITIES: mild clubbing   NEURO: gross motor exam normal by observation     PFT's:  Pulmonary Function Test Results (PFT)    Spirometry Actual Predicted % Predicted   FVC (L) 2.80 3.29 85   FEV1 ((L) 1.69 2.58 65   FEV1/FVC (%) 60.49 79.05 76   FEF 25-75% (L/sec) 0.66 2.30 28     Please see  PFT in \"Media Tab\" of Notes activity  (EMR)    Provider Interpretation: mild to moderate obstruction, " slightly decreased from baseline         Labs reviewed:     Last sputum culture date: 8/4/21  Chronic colonization of:  Pseudomonas aeruginosa daily, 2 strains, sensitive to aztreonam  Respiratory Culture:   Lab Results   Component Value Date/Time    SIGIND POS (POS) 08/04/2021 11:28 AM    SIGIND NEG 08/04/2021 11:28 AM    SIGIND NEG 08/04/2021 11:28 AM    SIGIND NEG 08/04/2021 11:28 AM    SIGIND . (POS) 08/04/2021 11:28 AM    SIGIND NEG 08/04/2021 11:28 AM    SOURCE RESP 08/04/2021 11:28 AM    SOURCE RESP 08/04/2021 11:28 AM    SOURCE RESP 08/04/2021 11:28 AM    SOURCE RESP 08/04/2021 11:28 AM    SOURCE RESP 08/04/2021 11:28 AM    SOURCE RESP 08/04/2021 11:28 AM    SITE Sputum 08/04/2021 11:28 AM    SITE Sputum 08/04/2021 11:28 AM    SITE Sputum 08/04/2021 11:28 AM    SITE Sputum 08/04/2021 11:28 AM    SITE Sputum 08/04/2021 11:28 AM    SITE Sputum 08/04/2021 11:28 AM   ]  Annual labs done date: chem panel, CBC normal 6/28/22. Last vitamin levels 7/30/21      ASSESSMENT/PLAN:   1. Cystic fibrosis (HCC)  Will get routine sputum cultures today  Due for annual labs  Will continue BID pulm toilet and BID kalydeco when back to baseline symptoms    - Spirometry; Future  - [facility] - CF Resp Culture w/ Gram Stain; Future  - [facility] - Fungal Culture; Future  - [facility] - AFB Culture; Future  - Spirometry    2. Chronic pansinusitis  Will try adding budesonide to sinus rinses.  If this does not help, will need ENT referral and sinus CT scan.  Patient would like to see a different ENT if needed.    - budesonide (PULMICORT) 0.5 MG/2ML Suspension; Take 2 mL by nebulization 2 times a day. Inhale the contents of 1 vial via nebulizer twice daily with sinus rinses. Rinse mouth after use.  Dispense: 120 mL; Refill: 3    3. Cystic fibrosis with pulmonary exacerbation (HCC)  Has had recent exacerbation, appears to be resolving but FEV1 is still 5% below baseline.  Will continue [facility] for 2 more weeks  Encouraged to check  home spirometry in 2 weeks and send us a message with the result.  I do not recommend more prednisone at this time as this will increase risk of thrush and hyperglycemia.  Will have prednisone on hand however to use if symptoms increase.  Call us if that happens    - predniSONE (DELTASONE) 20 MG Tab; Take 1 Tablet by mouth every day for 10 days. Up to 10 days if needed for shortness of breath  Dispense: 20 Tablet; Refill: 0    4. Exocrine pancreatic insufficiency  Continue low dose pancreatic enzymes as per patient preference    5. Pseudomonas respiratory infection  Continue cayston for 2 more weeks and then either prn or every other month depending on clinical status    6. 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   Pediatric Pulmonology

## 2022-08-17 NOTE — PROCEDURES
"Pulmonary Function Test Results (PFT)    Spirometry Actual Predicted % Predicted   FVC (L) 2.80 3.29 85   FEV1 ((L) 1.69 2.58 65   FEV1/FVC (%) 60.49 79.05 76   FEF 25-75% (L/sec) 0.66 2.30 28     Please see  PFT in \"Media Tab\" of Notes activity  (EMR)    Provider Interpretation: mild to moderate obstruction, slightly decreased from baseline    " rt arm picc in place

## 2022-08-17 NOTE — PROGRESS NOTES
Medical Social Work    Referral: CF Clinic / Dr. Payne    Intervention: Patient seen in CF Clinic today. For additional documentation of today's visit, please see Pediatric Pulmonary visit with Dr. Payne.    Plan: SW will continue to follow in clinic.

## 2022-08-17 NOTE — PROGRESS NOTES
Medical Social Work    Referral: CF Clinic / Dr. Payne    Intervention: Patient presents to CF clinic today for routine CF quarterly care. Patient last seen via telemedicine appointment on 2/16/22. Patient appears in good spirits, but states this was not the case a week prior. Patient had an allergic reaction to her dish soap, which caused fatigue, congestion, and her to lose her voice. Patient does sound hoarse during visit, but states she has had a lot of improvement since Monday. Patient has been seen by a provider in the same office as her PCP. Patient being prescribed steroid inhalents to assist with lung function.    Patient states she has had a good summer overall. Patient states she and her  celebrated their 30 year anniversary by going to [place]. Patient reports she and her  are going to [place] next week with their daughters and their families. Patient states she is very much looking forward to it, as it has been some time since they have visited [place].    SW screened for transportation, food resources, school, and mental health needs. Patient denies any concerns in these areas.     Plan: Patient should return in 3 months for quarterly follow-up. SW will continue to follow in clinic.    Time Spent: 15 minutes

## 2022-08-18 LAB
M TB CMPLX DNA ISLT/SPM QL: NOT DETECTED
RHODAMINE-AURAMINE STN SPEC: NORMAL
SIGNIFICANT IND 70042: NORMAL
SITE SITE: NORMAL
SOURCE SOURCE: NORMAL

## 2022-08-22 ENCOUNTER — PATIENT OUTREACH (OUTPATIENT)
Dept: PEDIATRIC PULMONOLOGY | Facility: MEDICAL CENTER | Age: 62
End: 2022-08-22

## 2022-08-23 ENCOUNTER — OFFICE VISIT (OUTPATIENT)
Dept: MEDICAL GROUP | Facility: LAB | Age: 62
End: 2022-08-23
Payer: COMMERCIAL

## 2022-08-23 ENCOUNTER — HOSPITAL ENCOUNTER (OUTPATIENT)
Dept: LAB | Facility: MEDICAL CENTER | Age: 62
End: 2022-08-23
Attending: PEDIATRICS
Payer: COMMERCIAL

## 2022-08-23 VITALS
RESPIRATION RATE: 16 BRPM | DIASTOLIC BLOOD PRESSURE: 62 MMHG | TEMPERATURE: 98.3 F | WEIGHT: 134 LBS | SYSTOLIC BLOOD PRESSURE: 118 MMHG | HEIGHT: 65 IN | HEART RATE: 90 BPM | OXYGEN SATURATION: 98 % | BODY MASS INDEX: 22.33 KG/M2

## 2022-08-23 DIAGNOSIS — E84.9 CYSTIC FIBROSIS (HCC): ICD-10-CM

## 2022-08-23 DIAGNOSIS — B37.0 ORAL PHARYNGEAL CANDIDIASIS: ICD-10-CM

## 2022-08-23 LAB
25(OH)D3 SERPL-MCNC: 69 NG/ML (ref 30–100)
BASOPHILS # BLD AUTO: 1.1 % (ref 0–1.8)
BASOPHILS # BLD: 0.07 K/UL (ref 0–0.12)
EOSINOPHIL # BLD AUTO: 0.2 K/UL (ref 0–0.51)
EOSINOPHIL NFR BLD: 3.2 % (ref 0–6.9)
ERYTHROCYTE [DISTWIDTH] IN BLOOD BY AUTOMATED COUNT: 42.7 FL (ref 35.9–50)
GGT SERPL-CCNC: 9 U/L (ref 7–34)
HCT VFR BLD AUTO: 45.6 % (ref 37–47)
HGB BLD-MCNC: 15 G/DL (ref 12–16)
IMM GRANULOCYTES # BLD AUTO: 0.01 K/UL (ref 0–0.11)
IMM GRANULOCYTES NFR BLD AUTO: 0.2 % (ref 0–0.9)
INR PPP: 0.99 (ref 0.87–1.13)
LYMPHOCYTES # BLD AUTO: 1.75 K/UL (ref 1–4.8)
LYMPHOCYTES NFR BLD: 27.8 % (ref 22–41)
MCH RBC QN AUTO: 29.6 PG (ref 27–33)
MCHC RBC AUTO-ENTMCNC: 32.9 G/DL (ref 33.6–35)
MCV RBC AUTO: 90.1 FL (ref 81.4–97.8)
MONOCYTES # BLD AUTO: 0.91 K/UL (ref 0–0.85)
MONOCYTES NFR BLD AUTO: 14.4 % (ref 0–13.4)
NEUTROPHILS # BLD AUTO: 3.36 K/UL (ref 2–7.15)
NEUTROPHILS NFR BLD: 53.3 % (ref 44–72)
NRBC # BLD AUTO: 0 K/UL
NRBC BLD-RTO: 0 /100 WBC
PLATELET # BLD AUTO: 371 K/UL (ref 164–446)
PMV BLD AUTO: 10 FL (ref 9–12.9)
PROTHROMBIN TIME: 13 SEC (ref 12–14.6)
RBC # BLD AUTO: 5.06 M/UL (ref 4.2–5.4)
WBC # BLD AUTO: 6.3 K/UL (ref 4.8–10.8)

## 2022-08-23 PROCEDURE — 82977 ASSAY OF GGT: CPT

## 2022-08-23 PROCEDURE — 84590 ASSAY OF VITAMIN A: CPT

## 2022-08-23 PROCEDURE — 85610 PROTHROMBIN TIME: CPT

## 2022-08-23 PROCEDURE — 36415 COLL VENOUS BLD VENIPUNCTURE: CPT

## 2022-08-23 PROCEDURE — 85025 COMPLETE CBC W/AUTO DIFF WBC: CPT

## 2022-08-23 PROCEDURE — 82306 VITAMIN D 25 HYDROXY: CPT

## 2022-08-23 PROCEDURE — 99213 OFFICE O/P EST LOW 20 MIN: CPT | Performed by: PHYSICIAN ASSISTANT

## 2022-08-23 PROCEDURE — 84446 ASSAY OF VITAMIN E: CPT

## 2022-08-23 RX ORDER — FLUCONAZOLE 150 MG/1
TABLET ORAL
Qty: 2 TABLET | Refills: 1 | Status: SHIPPED | OUTPATIENT
Start: 2022-08-23 | End: 2023-03-08

## 2022-08-23 ASSESSMENT — FIBROSIS 4 INDEX: FIB4 SCORE: 0.92

## 2022-08-23 NOTE — PROGRESS NOTES
"Subjective:     CC: f/u sore throat    HPI:   Kenia here today with     Took initial dose of diflucan as prescribed. Some improvement in symptoms. Started getting some stuffiness which pt attributes to some tomato plants pt and her  bought recently    Went to CF appt last week, rx'd budesonide for nasal rinse for the stuffiness but developed some burning in sinuses in throat. Took second dose of diflucan after this which did help symptoms.     Stopped abx, \"mucus is back to baseline\"    Requesting refill on diflucan today due to lingering sore throat and white patches on tongue.     ROS:  ROS neg except as indicated above    Current Outpatient Medications Ordered in Epic   Medication Sig Dispense Refill    budesonide (PULMICORT) 0.5 MG/2ML Suspension Take 2 mL by nebulization 2 times a day. Inhale the contents of 1 vial via nebulizer twice daily with sinus rinses. Rinse mouth after use. 120 mL 3    predniSONE (DELTASONE) 20 MG Tab Take 1 Tablet by mouth every day for 10 days. Up to 10 days if needed for shortness of breath 20 Tablet 0    nystatin (MYCOSTATIN) 984931 UNIT/ML Suspension Take 5 mL by mouth 4 times a day. 473 mL 0    maalox plus-benadryl-visc lidocaine (MAGIC MOUTHWASH) Take 5 mL by mouth every 6 hours as needed (for sore throat). 30 mL 0    fluconazole (DIFLUCAN) 150 MG tablet 1 tablet, ok to repeat dose in 7 days if symptoms persist 2 Tablet 0    KALYDECO 150 MG Tab TAKE 1 TABLET TWICE DAILY WITH FAT-CONTAINING FOOD 56 Tablet 11    fluticasone (FLONASE) 50 MCG/ACT nasal spray ADMINISTER 1-2 SPRAYS INTO AFFECTED NOSTRIL(S) EVERY DAY. 48 mL 1    HUMALOG KWIKPEN 100 UNIT/ML Solution Pen-injector injection PEN INJECT 12-15 UNITS UNDER THE SKIN 3 TIMES A DAY BEFORE MEALS. 45 mL 3    BD PEN NEEDLE TAMIKO 2ND GEN 1 EACH BY OTHER ROUTE 4 TIMES A DAY,BEFORE MEALS AND AT BEDTIME. FOR INSULIN ADMINISTRATION. 400 Each 3    glucose blood (ONETOUCH VERIO) strip TEST 3 TIMES DAILY FOR INSULIN ADJUSTMENT AND AS " "NEEDED SYMPTOMS OF HIGH OR LOW BLOOD SUGAR 300 Strip 3    Continuous Blood Gluc Sensor (DEXCOM G6 SENSOR) Misc 1 Each every 10 days. 9 Each 4    Continuous Blood Gluc Transmit (DEXCOM G6 TRANSMITTER) Misc 1 Each continuous. Change every 3 months 1 Each 4    phytonadione (MEPHYTON) 5 MG Tab TAKE 1 TABLET BY MOUTH EVERY DAY 90 Tablet 3    sodium chloride (HYPER-SAL) 7 % Nebu Soln TAKE 4 ML BY NEBULIZATION 4 TIMES A DAY. 1440 mL 1    CAYSTON 75 MG Recon Soln       albuterol (PROVENTIL) 2.5mg/3ml Nebu Soln solution for nebulization TAKE 3 ML BY NEBULIZATION 3 TIMES A DAY. 525 mL 4    acyclovir (ZOVIRAX) 400 MG tablet TAKE 1 TABLET ORALLY 4 TIMES A DAY FOR 14 DAYS, THEN TWICE DAILY FOR 3 MONTHS 90 Tablet 2    CREON 6000-87860 units Cap DR Particles TAKE 1-2 CAPSULES 4 TIMES A DAY AS NEEDED WITH MEALS OR SNACKS **KEEP IN ORIGINAL BOTTLE** 500 Capsule 6    fluticasone-salmeterol (ADVAIR) 250-50 MCG/DOSE AEROSOL POWDER, BREATH ACTIVATED Inhale 1 Puff 2 times a day. 1 Each 5    mupirocin (BACTROBAN) 2 % Ointment Apply BID for up to 10 days 30 g 0    Multiple Vitamins-Minerals (DEKAS PLUS PO) Take 1 Cap by mouth 2 times a day.      magnesium oxide (MAG-OX) 400 MG Tab Take 200 mg by mouth 2 times a day.      Ascorbic Acid (VITAMIN C) 1000 MG Tab Take 1,000 mg by mouth 2 Times a Day.      Probiotic Product (PROBIOTIC-10 PO) Take 1 Tab by mouth every day.      Cholecalciferol 5000 units Tab Take 10,000 Units by mouth 2 Times a Day.       No current [facility]-ordered facility-administered medications on file.       Objective:     Exam:  /62   Pulse 90   Temp 36.8 °C (98.3 °F)   Resp 16   Ht 1.66 m (5' 5.35\")   Wt 60.8 kg (134 lb)   SpO2 98%   BMI 22.06 kg/m²  Body mass index is 22.06 kg/m².    Gen: Alert and oriented, No apparent distress.  Oropharynx: slightly red, no adherent lesions, otherwise clear  Neck: Neck is supple without lymphadenopathy.  Lungs: Normal effort, CTA bilaterally, no wheezes, rhonchi, or " rales  CV: Regular rate and rhythm. No murmurs, rubs, or gallops.  Ext: No clubbing, cyanosis, edema.        Assessment & Plan:     61 y.o. female with the following -     1. Oral pharyngeal candidiasis  Acute condition, resolving  Repeat course of diflucan, as pt titrates back down from steroids and abx advise pt to monitor symptoms.  - fluconazole (DIFLUCAN) 150 MG tablet; 1 tablet, ok to repeat dose in 7 days if symptoms persist  Dispense: 2 Tablet; Refill: 1  - nystatin (MYCOSTATIN) 072391 UNIT/ML Suspension; Take 5 mL by mouth 4 times a day.  Dispense: 473 mL; Refill: 0        I spent a total of 15 minutes with record review, exam, communication with the patient, communication with other providers, and documentation of this encounter.      No follow-ups on file.    Please note that this dictation was created using voice recognition software. I have made every reasonable attempt to correct obvious errors, but there may be errors of grammar and possibly content that I did not discover before finalizing the note.

## 2022-08-26 LAB
ANNOTATION COMMENT IMP: NORMAL
RETINYL PALMITATE SERPL-MCNC: 0.08 MG/L (ref 0–0.1)
VIT A SERPL-MCNC: 0.41 MG/L (ref 0.3–1.2)

## 2022-08-27 LAB
A-TOCOPHEROL VIT E SERPL-MCNC: 15.2 MG/L (ref 5.5–18)
BETA+GAMMA TOCOPHEROL SERPL-MCNC: 0.7 MG/L (ref 0–6)

## 2022-08-31 ENCOUNTER — PATIENT MESSAGE (OUTPATIENT)
Dept: PEDIATRIC PULMONOLOGY | Facility: MEDICAL CENTER | Age: 62
End: 2022-08-31

## 2022-09-08 DIAGNOSIS — E84.9 CYSTIC FIBROSIS (HCC): ICD-10-CM

## 2022-09-08 RX ORDER — FLUTICASONE PROPIONATE AND SALMETEROL 250; 50 UG/1; UG/1
1 POWDER RESPIRATORY (INHALATION) 2 TIMES DAILY
Qty: 1 EACH | Refills: 5 | Status: SHIPPED | OUTPATIENT
Start: 2022-09-08 | End: 2023-08-14 | Stop reason: SDUPTHER

## 2022-09-26 RX ORDER — ACYCLOVIR 400 MG/1
TABLET ORAL
Qty: 90 TABLET | Refills: 2 | Status: SHIPPED | OUTPATIENT
Start: 2022-09-26 | End: 2023-03-07

## 2022-09-29 DIAGNOSIS — E84.9 CYSTIC FIBROSIS (HCC): ICD-10-CM

## 2022-09-29 DIAGNOSIS — J32.4 CHRONIC PANSINUSITIS: ICD-10-CM

## 2022-09-30 DIAGNOSIS — E84.0 CYSTIC FIBROSIS WITH PULMONARY EXACERBATION (HCC): ICD-10-CM

## 2022-09-30 RX ORDER — SODIUM CHLORIDE FOR INHALATION 3 %
3 VIAL, NEBULIZER (ML) INHALATION 3 TIMES DAILY
Qty: 270 ML | Refills: 1 | Status: SHIPPED | OUTPATIENT
Start: 2022-09-30 | End: 2022-10-06 | Stop reason: SDUPTHER

## 2022-10-06 DIAGNOSIS — E84.0 CYSTIC FIBROSIS WITH PULMONARY EXACERBATION (HCC): ICD-10-CM

## 2022-10-06 DIAGNOSIS — R04.2 HEMOPTYSIS: ICD-10-CM

## 2022-10-06 RX ORDER — SODIUM CHLORIDE FOR INHALATION 3 %
4 VIAL, NEBULIZER (ML) INHALATION 4 TIMES DAILY PRN
Qty: 480 ML | Refills: 3 | Status: SHIPPED | OUTPATIENT
Start: 2022-10-06 | End: 2022-10-11

## 2022-10-06 RX ORDER — TRANEXAMIC ACID 650 MG/1
TABLET ORAL
Qty: 30 TABLET | Refills: 1 | Status: SHIPPED | OUTPATIENT
Start: 2022-10-06 | End: 2023-03-08

## 2022-10-10 DIAGNOSIS — E84.0 CYSTIC FIBROSIS WITH PULMONARY EXACERBATION (HCC): ICD-10-CM

## 2022-10-11 ENCOUNTER — PATIENT MESSAGE (OUTPATIENT)
Dept: PEDIATRIC PULMONOLOGY | Facility: MEDICAL CENTER | Age: 62
End: 2022-10-11

## 2022-10-11 DIAGNOSIS — E84.0 CYSTIC FIBROSIS WITH PULMONARY EXACERBATION (HCC): ICD-10-CM

## 2022-10-11 RX ORDER — SODIUM CHLORIDE FOR INHALATION 3 %
4 VIAL, NEBULIZER (ML) INHALATION 4 TIMES DAILY PRN
Qty: 480 ML | Refills: 3 | Status: SHIPPED | OUTPATIENT
Start: 2022-10-11

## 2022-10-11 NOTE — TELEPHONE ENCOUNTER
Message received from [facility] that NaCl 3% 4 mL vials on backorder. TC to [facility], they can only order 16 mL vials at this time. Message sent to patient for preference, will resend order to [facility] at [address] [facility] [place] [address]

## 2022-10-13 RX ORDER — SODIUM CHLORIDE FOR INHALATION 7 %
4 VIAL, NEBULIZER (ML) INHALATION 4 TIMES DAILY
Qty: 1200 ML | Refills: 2 | Status: SHIPPED | OUTPATIENT
Start: 2022-10-13 | End: 2022-10-14 | Stop reason: DRUGHIGH

## 2022-10-14 DIAGNOSIS — E84.0 CYSTIC FIBROSIS WITH PULMONARY EXACERBATION (HCC): ICD-10-CM

## 2022-10-14 RX ORDER — SODIUM CHLORIDE FOR INHALATION 7 %
4 VIAL, NEBULIZER (ML) INHALATION 4 TIMES DAILY
Qty: 1440 ML | Refills: 3 | Status: SHIPPED | OUTPATIENT
Start: 2022-10-14 | End: 2023-11-21

## 2022-10-14 NOTE — TELEPHONE ENCOUNTER
Received request via: Patient    Was the patient seen in the last year in this department? Yes    Does the patient have an active prescription (recently filled or refills available) for medication(s) requested?  Requested change to 90 day supply

## 2022-10-18 DIAGNOSIS — E84.9 CYSTIC FIBROSIS (HCC): Primary | ICD-10-CM

## 2022-10-18 DIAGNOSIS — B96.5 PSEUDOMONAS RESPIRATORY INFECTION: ICD-10-CM

## 2022-10-18 DIAGNOSIS — J98.8 PSEUDOMONAS RESPIRATORY INFECTION: ICD-10-CM

## 2022-10-18 RX ORDER — AZTREONAM 75 MG/ML
1 KIT INHALATION 3 TIMES DAILY
Qty: 84 ML | Refills: 3 | Status: SHIPPED | OUTPATIENT
Start: 2022-10-18 | End: 2023-01-03 | Stop reason: SDUPTHER

## 2022-11-15 SDOH — ECONOMIC STABILITY: HOUSING INSECURITY
IN THE LAST 12 MONTHS, WAS THERE A TIME WHEN YOU DID NOT HAVE A STEADY PLACE TO SLEEP OR SLEPT IN A SHELTER (INCLUDING NOW)?: NO

## 2022-11-15 SDOH — ECONOMIC STABILITY: HOUSING INSECURITY: IN THE LAST 12 MONTHS, HOW MANY PLACES HAVE YOU LIVED?: 1

## 2022-11-15 SDOH — ECONOMIC STABILITY: FOOD INSECURITY: WITHIN THE PAST 12 MONTHS, THE FOOD YOU BOUGHT JUST DIDN'T LAST AND YOU DIDN'T HAVE MONEY TO GET MORE.: NEVER TRUE

## 2022-11-15 SDOH — ECONOMIC STABILITY: TRANSPORTATION INSECURITY
IN THE PAST 12 MONTHS, HAS LACK OF RELIABLE TRANSPORTATION KEPT YOU FROM MEDICAL APPOINTMENTS, MEETINGS, WORK OR FROM GETTING THINGS NEEDED FOR DAILY LIVING?: NO

## 2022-11-15 SDOH — HEALTH STABILITY: PHYSICAL HEALTH: ON AVERAGE, HOW MANY MINUTES DO YOU ENGAGE IN EXERCISE AT THIS LEVEL?: 40 MIN

## 2022-11-15 SDOH — ECONOMIC STABILITY: INCOME INSECURITY: IN THE LAST 12 MONTHS, WAS THERE A TIME WHEN YOU WERE NOT ABLE TO PAY THE MORTGAGE OR RENT ON TIME?: NO

## 2022-11-15 SDOH — ECONOMIC STABILITY: TRANSPORTATION INSECURITY
IN THE PAST 12 MONTHS, HAS LACK OF TRANSPORTATION KEPT YOU FROM MEETINGS, WORK, OR FROM GETTING THINGS NEEDED FOR DAILY LIVING?: NO

## 2022-11-15 SDOH — HEALTH STABILITY: PHYSICAL HEALTH: ON AVERAGE, HOW MANY DAYS PER WEEK DO YOU ENGAGE IN MODERATE TO STRENUOUS EXERCISE (LIKE A BRISK WALK)?: 4 DAYS

## 2022-11-15 SDOH — ECONOMIC STABILITY: FOOD INSECURITY: WITHIN THE PAST 12 MONTHS, YOU WORRIED THAT YOUR FOOD WOULD RUN OUT BEFORE YOU GOT MONEY TO BUY MORE.: NEVER TRUE

## 2022-11-15 SDOH — ECONOMIC STABILITY: TRANSPORTATION INSECURITY
IN THE PAST 12 MONTHS, HAS THE LACK OF TRANSPORTATION KEPT YOU FROM MEDICAL APPOINTMENTS OR FROM GETTING MEDICATIONS?: NO

## 2022-11-15 SDOH — HEALTH STABILITY: MENTAL HEALTH
STRESS IS WHEN SOMEONE FEELS TENSE, NERVOUS, ANXIOUS, OR CAN'T SLEEP AT NIGHT BECAUSE THEIR MIND IS TROUBLED. HOW STRESSED ARE YOU?: NOT AT ALL

## 2022-11-15 SDOH — ECONOMIC STABILITY: INCOME INSECURITY: HOW HARD IS IT FOR YOU TO PAY FOR THE VERY BASICS LIKE FOOD, HOUSING, MEDICAL CARE, AND HEATING?: NOT HARD AT ALL

## 2022-11-15 ASSESSMENT — SOCIAL DETERMINANTS OF HEALTH (SDOH)
HOW MANY DRINKS CONTAINING ALCOHOL DO YOU HAVE ON A TYPICAL DAY WHEN YOU ARE DRINKING: 1 OR 2
HOW OFTEN DO YOU ATTENT MEETINGS OF THE CLUB OR ORGANIZATION YOU BELONG TO?: MORE THAN 4 TIMES PER YEAR
HOW OFTEN DO YOU ATTEND CHURCH OR RELIGIOUS SERVICES?: MORE THAN 4 TIMES PER YEAR
HOW OFTEN DO YOU HAVE SIX OR MORE DRINKS ON ONE OCCASION: NEVER
IN A TYPICAL WEEK, HOW MANY TIMES DO YOU TALK ON THE PHONE WITH FAMILY, FRIENDS, OR NEIGHBORS?: MORE THAN THREE TIMES A WEEK
HOW OFTEN DO YOU GET TOGETHER WITH FRIENDS OR RELATIVES?: TWICE A WEEK
HOW OFTEN DO YOU GET TOGETHER WITH FRIENDS OR RELATIVES?: TWICE A WEEK
DO YOU BELONG TO ANY CLUBS OR ORGANIZATIONS SUCH AS CHURCH GROUPS UNIONS, FRATERNAL OR ATHLETIC GROUPS, OR SCHOOL GROUPS?: YES
HOW OFTEN DO YOU ATTEND CHURCH OR RELIGIOUS SERVICES?: MORE THAN 4 TIMES PER YEAR
IN A TYPICAL WEEK, HOW MANY TIMES DO YOU TALK ON THE PHONE WITH FAMILY, FRIENDS, OR NEIGHBORS?: MORE THAN THREE TIMES A WEEK
DO YOU BELONG TO ANY CLUBS OR ORGANIZATIONS SUCH AS CHURCH GROUPS UNIONS, FRATERNAL OR ATHLETIC GROUPS, OR SCHOOL GROUPS?: YES
HOW OFTEN DO YOU ATTENT MEETINGS OF THE CLUB OR ORGANIZATION YOU BELONG TO?: MORE THAN 4 TIMES PER YEAR
WITHIN THE PAST 12 MONTHS, YOU WORRIED THAT YOUR FOOD WOULD RUN OUT BEFORE YOU GOT THE MONEY TO BUY MORE: NEVER TRUE
HOW HARD IS IT FOR YOU TO PAY FOR THE VERY BASICS LIKE FOOD, HOUSING, MEDICAL CARE, AND HEATING?: NOT HARD AT ALL
HOW OFTEN DO YOU HAVE A DRINK CONTAINING ALCOHOL: 2-3 TIMES A WEEK

## 2022-11-15 ASSESSMENT — LIFESTYLE VARIABLES
HOW OFTEN DO YOU HAVE SIX OR MORE DRINKS ON ONE OCCASION: NEVER
AUDIT-C TOTAL SCORE: 3
SKIP TO QUESTIONS 9-10: 1
HOW OFTEN DO YOU HAVE A DRINK CONTAINING ALCOHOL: 2-3 TIMES A WEEK
HOW MANY STANDARD DRINKS CONTAINING ALCOHOL DO YOU HAVE ON A TYPICAL DAY: 1 OR 2

## 2022-11-17 ENCOUNTER — PATIENT MESSAGE (OUTPATIENT)
Dept: PEDIATRIC PULMONOLOGY | Facility: MEDICAL CENTER | Age: 62
End: 2022-11-17

## 2022-11-22 ENCOUNTER — HOSPITAL ENCOUNTER (OUTPATIENT)
Facility: MEDICAL CENTER | Age: 62
End: 2022-11-22
Attending: FAMILY MEDICINE
Payer: COMMERCIAL

## 2022-11-22 ENCOUNTER — OFFICE VISIT (OUTPATIENT)
Dept: MEDICAL GROUP | Facility: LAB | Age: 62
End: 2022-11-22
Payer: COMMERCIAL

## 2022-11-22 VITALS
HEIGHT: 66 IN | BODY MASS INDEX: 21.53 KG/M2 | SYSTOLIC BLOOD PRESSURE: 126 MMHG | HEART RATE: 77 BPM | DIASTOLIC BLOOD PRESSURE: 54 MMHG | WEIGHT: 134 LBS | OXYGEN SATURATION: 97 % | TEMPERATURE: 97 F | RESPIRATION RATE: 12 BRPM

## 2022-11-22 DIAGNOSIS — Z12.11 COLON CANCER SCREENING: ICD-10-CM

## 2022-11-22 DIAGNOSIS — Z01.419 WELL WOMAN EXAM WITH ROUTINE GYNECOLOGICAL EXAM: ICD-10-CM

## 2022-11-22 DIAGNOSIS — Z12.4 SCREENING FOR CERVICAL CANCER: ICD-10-CM

## 2022-11-22 DIAGNOSIS — Z13.6 ENCOUNTER FOR SCREENING FOR CARDIOVASCULAR DISORDERS: ICD-10-CM

## 2022-11-22 PROBLEM — Z20.822 SUSPECTED COVID-19 VIRUS INFECTION: Status: RESOLVED | Noted: 2020-04-19 | Resolved: 2022-11-22

## 2022-11-22 PROCEDURE — 87624 HPV HI-RISK TYP POOLED RSLT: CPT

## 2022-11-22 PROCEDURE — 88175 CYTOPATH C/V AUTO FLUID REDO: CPT

## 2022-11-22 PROCEDURE — 99396 PREV VISIT EST AGE 40-64: CPT | Performed by: FAMILY MEDICINE

## 2022-11-22 RX ORDER — INSULIN DEGLUDEC 100 U/ML
INJECTION, SOLUTION SUBCUTANEOUS
COMMUNITY
Start: 2022-09-07

## 2022-11-22 ASSESSMENT — FIBROSIS 4 INDEX: FIB4 SCORE: 0.84

## 2022-11-22 NOTE — PROGRESS NOTES
Subjective:     Chief Complaint   Patient presents with    Gynecologic Exam         HPI:   Kenia presents today for annual exam.     Diet: Pretty good.   Does see endocrine and pulm. Tries to avoid rich foods, butter.   Does use Creon.   Exercise: jogging on trampoline with weights. 4-5 days per week. Doesn't hurt hips.   Sleep: sometimes.  Wakes and cant get back to sleep.   Some naps now and then. No snoring concerns.     LMP: Postmenopausal. No bleeding. Some hot flashes and night sweats initially, this is gone now.     Pap: 10/2019, normal cotesting. Would like redo today. Abnormal in the 80's. Had cryo, normal since then.   Mammo: 11/21, scheduled. No call backs. But some increased lumpy tissue on right axillary area.   Colon CA screening: Cologehsan 11/2019, due.     Sees dental.   Eyes: had PRK in 1997. Some close vision change, otherwise good. Regular appts still.       Current Outpatient Medications Ordered in Epic   Medication Sig Dispense Refill    TRESIBA FLEXTOUCH 100 UNIT/ML Solution Pen-injector INJECT 4 UNITS SUBCUTANEOUSLY DAILY (375 DAYS)      CAYSTON 75 MG Recon Soln Inhale 1 mL 3 times a day. Inhale 1 mL by mouth 3 times a day for 28 days. Repeat every other month as needed. 84 mL 3    sodium chloride (HYPER-SAL) 7 % Nebu Soln Take 4 mL by nebulization 4 times a day. 1440 mL 3    sodium chloride 3% 3 % nebulizer solution Take 4 mL by nebulization 4 times a day as needed (Cough). TAKE 4 ML BY NEBULIZATION 4 TIMES A DAY AS NEEDED (COUGH). 480 mL 3    Tranexamic Acid 650 MG Tab 1-2 tablets PO q 8 hours prn hemoptysis 30 Tablet 1    acyclovir (ZOVIRAX) 400 MG tablet TAKE 1 TABLET BY MOUTH 4 TIMES A DAY FOR 14 DAYS, THEN TWICE DAILY FOR 3 MONTHS 90 Tablet 2    fluticasone-salmeterol (ADVAIR DISKUS) 250-50 MCG/ACT AEROSOL POWDER, BREATH ACTIVATED Inhale 1 Puff 2 times a day. Rinse mouth after each use. 1 Each 5    fluconazole (DIFLUCAN) 150 MG tablet 1 tablet, ok to repeat dose in 7 days if symptoms  persist 2 Tablet 1    nystatin (MYCOSTATIN) 830710 UNIT/ML Suspension Take 5 mL by mouth 4 times a day. 473 mL 0    budesonide (PULMICORT) 0.5 MG/2ML Suspension Take 2 mL by nebulization 2 times a day. Inhale the contents of 1 vial via nebulizer twice daily with sinus rinses. Rinse mouth after use. 120 mL 3    KALYDECO 150 MG Tab TAKE 1 TABLET TWICE DAILY WITH FAT-CONTAINING FOOD 56 Tablet 11    fluticasone (FLONASE) 50 MCG/ACT nasal spray ADMINISTER 1-2 SPRAYS INTO AFFECTED NOSTRIL(S) EVERY DAY. 48 mL 1    HUMALOG KWIKPEN 100 UNIT/ML Solution Pen-injector injection PEN INJECT 12-15 UNITS UNDER THE SKIN 3 TIMES A DAY BEFORE MEALS. 45 mL 3    BD PEN NEEDLE TAMIKO 2ND GEN 1 EACH BY OTHER ROUTE 4 TIMES A DAY,BEFORE MEALS AND AT BEDTIME. FOR INSULIN ADMINISTRATION. 400 Each 3    glucose blood (ONETOUCH VERIO) strip TEST 3 TIMES DAILY FOR INSULIN ADJUSTMENT AND AS NEEDED SYMPTOMS OF HIGH OR LOW BLOOD SUGAR 300 Strip 3    Continuous Blood Gluc Sensor (DEXCOM G6 SENSOR) Misc 1 Each every 10 days. 9 Each 4    Continuous Blood Gluc Transmit (DEXCOM G6 TRANSMITTER) Misc 1 Each continuous. Change every 3 months 1 Each 4    phytonadione (MEPHYTON) 5 MG Tab TAKE 1 TABLET BY MOUTH EVERY DAY 90 Tablet 3    albuterol (PROVENTIL) 2.5mg/3ml Nebu Soln solution for nebulization TAKE 3 ML BY NEBULIZATION 3 TIMES A DAY. 525 mL 4    CREON 6000-34021 units Cap DR Particles TAKE 1-2 CAPSULES 4 TIMES A DAY AS NEEDED WITH MEALS OR SNACKS **KEEP IN ORIGINAL BOTTLE** 500 Capsule 6    mupirocin (BACTROBAN) 2 % Ointment Apply BID for up to 10 days 30 g 0    Multiple Vitamins-Minerals (DEKAS PLUS PO) Take 1 Cap by mouth 2 times a day.      magnesium oxide (MAG-OX) 400 MG Tab Take 200 mg by mouth 2 times a day.      Ascorbic Acid (VITAMIN C) 1000 MG Tab Take 1,000 mg by mouth 2 Times a Day.      Probiotic Product (PROBIOTIC-10 PO) Take 1 Tab by mouth every day.      Cholecalciferol 5000 units Tab Take 10,000 Units by mouth 2 Times a Day.       No  "current [facility]-ordered facility-administered medications on file.         ROS:  Gen: no fevers/chills, no changes in weight  Eyes: no changes in vision  ENT: no sore throat, no hearing loss, no bloody nose  Pulm: no sob, no cough  CV: no chest pain, no palpitations  GI: no nausea/vomiting, no diarrhea  : no dysuria  MSk: no myalgias  Skin: no rash  Neuro: no headaches, no numbness/tingling  Heme/Lymph: no easy bruising      Objective:     Exam:  /54 (BP Location: Left arm, Patient Position: Sitting, BP Cuff Size: Adult)   Pulse 77   Temp 36.1 °C (97 °F)   Resp 12   Ht 1.676 m (5' 6\")   Wt 60.8 kg (134 lb)   SpO2 97%   BMI 21.63 kg/m²  Body mass index is 21.63 kg/m².    Gen: Alert and oriented, No apparent distress.  Neck: Neck is supple without lymphadenopathy.  Lungs: Normal effort, CTA bilaterally, no wheezes, rhonchi, or rales  CV: Regular rate and rhythm. No murmurs, rubs, or gallops.  Ext: No clubbing, cyanosis, edema.      Assessment & Plan:     62 y.o. female with the following -     1. Well woman exam with routine gynecological exam  Discussed routine diet and exercise recommendations.  Discussed age-related anticipatory guidance.  No refills are needed today.  Continue with endocrinology as well as pulmonology for ongoing chronic health conditions.    2. Colon cancer screening  We did discuss colonoscopy screening.  She has had Cologuard in the past but with her conditions of CF and some diarrhea now and then she would like to go and proceed with a colonoscopy for better screening.  - Referral to Gastroenterology    3. Encounter for screening for cardiovascular disorders  She does have some lab orders ordered by her previous consultants and so I will add a lipid panel to this.  - Lipid Profile; Future    4. Screening for cervical cancer  Discussed Pap schedule.  This is negative and normal this can be her last one if she wishes.  - THINPREP PAP WITH HPV; Future            No follow-ups on " file.    Please note that this dictation was created using voice recognition software. I have made every reasonable attempt to correct obvious errors, but I expect that there are errors of grammar and possibly content that I did not discover before finalizing the note.

## 2022-11-23 DIAGNOSIS — Z12.4 SCREENING FOR CERVICAL CANCER: ICD-10-CM

## 2022-11-30 ENCOUNTER — HOSPITAL ENCOUNTER (OUTPATIENT)
Dept: RADIOLOGY | Facility: MEDICAL CENTER | Age: 62
End: 2022-11-30
Attending: FAMILY MEDICINE
Payer: COMMERCIAL

## 2022-11-30 DIAGNOSIS — Z12.31 VISIT FOR SCREENING MAMMOGRAM: ICD-10-CM

## 2022-11-30 PROCEDURE — 77063 BREAST TOMOSYNTHESIS BI: CPT

## 2022-12-07 DIAGNOSIS — K86.81 EXOCRINE PANCREATIC INSUFFICIENCY: ICD-10-CM

## 2022-12-08 NOTE — TELEPHONE ENCOUNTER
Last Visit: 8/17/22  Next Visit: 3/1/23    Received request via: Pharmacy    Was the patient seen in the last year in this department? Yes    Does the patient have an active prescription (recently filled or refills available) for medication(s) requested? No

## 2022-12-09 DIAGNOSIS — K86.81 EXOCRINE PANCREATIC INSUFFICIENCY: ICD-10-CM

## 2022-12-09 RX ORDER — PANCRELIPASE 30000; 6000; 19000 [USP'U]/1; [USP'U]/1; [USP'U]/1
CAPSULE, DELAYED RELEASE PELLETS ORAL
Qty: 500 CAPSULE | Refills: 6 | Status: SHIPPED | OUTPATIENT
Start: 2022-12-09 | End: 2024-02-23

## 2022-12-12 RX ORDER — PANCRELIPASE 30000; 6000; 19000 [USP'U]/1; [USP'U]/1; [USP'U]/1
CAPSULE, DELAYED RELEASE PELLETS ORAL
Qty: 500 CAPSULE | Refills: 6 | Status: CANCELLED | OUTPATIENT
Start: 2022-12-12

## 2022-12-21 ENCOUNTER — APPOINTMENT (RX ONLY)
Dept: URBAN - METROPOLITAN AREA CLINIC 4 | Facility: CLINIC | Age: 62
Setting detail: DERMATOLOGY
End: 2022-12-21

## 2022-12-21 ENCOUNTER — HOSPITAL ENCOUNTER (OUTPATIENT)
Dept: LAB | Facility: MEDICAL CENTER | Age: 62
End: 2022-12-21
Attending: FAMILY MEDICINE
Payer: COMMERCIAL

## 2022-12-21 DIAGNOSIS — L82.1 OTHER SEBORRHEIC KERATOSIS: ICD-10-CM

## 2022-12-21 DIAGNOSIS — D18.0 HEMANGIOMA: ICD-10-CM

## 2022-12-21 DIAGNOSIS — Z71.89 OTHER SPECIFIED COUNSELING: ICD-10-CM

## 2022-12-21 DIAGNOSIS — L81.4 OTHER MELANIN HYPERPIGMENTATION: ICD-10-CM

## 2022-12-21 DIAGNOSIS — D22 MELANOCYTIC NEVI: ICD-10-CM

## 2022-12-21 DIAGNOSIS — Z13.6 ENCOUNTER FOR SCREENING FOR CARDIOVASCULAR DISORDERS: ICD-10-CM

## 2022-12-21 DIAGNOSIS — L30.9 DERMATITIS, UNSPECIFIED: ICD-10-CM

## 2022-12-21 PROBLEM — D22.5 MELANOCYTIC NEVI OF TRUNK: Status: ACTIVE | Noted: 2022-12-21

## 2022-12-21 PROBLEM — D18.01 HEMANGIOMA OF SKIN AND SUBCUTANEOUS TISSUE: Status: ACTIVE | Noted: 2022-12-21

## 2022-12-21 LAB
CHOLEST SERPL-MCNC: 163 MG/DL (ref 100–199)
FASTING STATUS PATIENT QL REPORTED: NORMAL
HDLC SERPL-MCNC: 34 MG/DL
LDLC SERPL CALC-MCNC: 99 MG/DL
TRIGL SERPL-MCNC: 149 MG/DL (ref 0–149)

## 2022-12-21 PROCEDURE — ? PRESCRIPTION

## 2022-12-21 PROCEDURE — ? COUNSELING

## 2022-12-21 PROCEDURE — 36415 COLL VENOUS BLD VENIPUNCTURE: CPT

## 2022-12-21 PROCEDURE — ? ADDITIONAL NOTES

## 2022-12-21 PROCEDURE — 99203 OFFICE O/P NEW LOW 30 MIN: CPT

## 2022-12-21 PROCEDURE — 80061 LIPID PANEL: CPT

## 2022-12-21 RX ORDER — HYDROCORTISONE 25 MG/G
1 CREAM TOPICAL
Qty: 30 | Refills: 0 | Status: ERX | COMMUNITY
Start: 2022-12-21

## 2022-12-21 RX ADMIN — HYDROCORTISONE 1: 25 CREAM TOPICAL at 00:00

## 2022-12-21 ASSESSMENT — LOCATION DETAILED DESCRIPTION DERM
LOCATION DETAILED: LEFT MENTAL CREASE
LOCATION DETAILED: LEFT MEDIAL SUPERIOR CHEST
LOCATION DETAILED: SUPERIOR MID FOREHEAD
LOCATION DETAILED: RIGHT SUPERIOR MEDIAL UPPER BACK
LOCATION DETAILED: EPIGASTRIC SKIN
LOCATION DETAILED: RIGHT SUPERIOR LATERAL LOWER BACK
LOCATION DETAILED: RIGHT BUTTOCK

## 2022-12-21 ASSESSMENT — LOCATION ZONE DERM
LOCATION ZONE: FACE
LOCATION ZONE: TRUNK

## 2022-12-21 ASSESSMENT — LOCATION SIMPLE DESCRIPTION DERM
LOCATION SIMPLE: RIGHT UPPER BACK
LOCATION SIMPLE: RIGHT LOWER BACK
LOCATION SIMPLE: CHEST
LOCATION SIMPLE: ABDOMEN
LOCATION SIMPLE: RIGHT BUTTOCK
LOCATION SIMPLE: CHIN
LOCATION SIMPLE: SUPERIOR FOREHEAD

## 2022-12-21 NOTE — PROCEDURE: COUNSELING
Detail Level: Detailed
Cleanser Recommendations: Gentle cleansers
Moisturizer Recommendations: Cerave cream

## 2023-01-02 ENCOUNTER — PATIENT MESSAGE (OUTPATIENT)
Dept: PEDIATRIC PULMONOLOGY | Facility: MEDICAL CENTER | Age: 63
End: 2023-01-02
Payer: COMMERCIAL

## 2023-01-02 DIAGNOSIS — J98.8 PSEUDOMONAS RESPIRATORY INFECTION: ICD-10-CM

## 2023-01-02 DIAGNOSIS — B96.5 PSEUDOMONAS RESPIRATORY INFECTION: ICD-10-CM

## 2023-01-02 DIAGNOSIS — E84.9 CYSTIC FIBROSIS (HCC): ICD-10-CM

## 2023-01-06 ENCOUNTER — TELEMEDICINE (OUTPATIENT)
Dept: PEDIATRIC PULMONOLOGY | Facility: MEDICAL CENTER | Age: 63
End: 2023-01-06
Payer: COMMERCIAL

## 2023-01-06 ENCOUNTER — TELEPHONE (OUTPATIENT)
Dept: PEDIATRIC PULMONOLOGY | Facility: MEDICAL CENTER | Age: 63
End: 2023-01-06
Payer: COMMERCIAL

## 2023-01-06 VITALS — BODY MASS INDEX: 20.98 KG/M2 | WEIGHT: 130 LBS

## 2023-01-06 DIAGNOSIS — J98.8 PSEUDOMONAS RESPIRATORY INFECTION: ICD-10-CM

## 2023-01-06 DIAGNOSIS — E84.9 CYSTIC FIBROSIS (HCC): ICD-10-CM

## 2023-01-06 DIAGNOSIS — B96.5 PSEUDOMONAS RESPIRATORY INFECTION: ICD-10-CM

## 2023-01-06 PROCEDURE — 99999 PR NO CHARGE: CPT | Performed by: STUDENT IN AN ORGANIZED HEALTH CARE EDUCATION/TRAINING PROGRAM

## 2023-01-06 RX ORDER — DOXYCYCLINE HYCLATE 100 MG/1
100 CAPSULE ORAL 2 TIMES DAILY
Qty: 14 CAPSULE | Refills: 1 | Status: SHIPPED | OUTPATIENT
Start: 2023-01-06 | End: 2023-12-27 | Stop reason: SDUPTHER

## 2023-01-06 RX ORDER — AZTREONAM 75 MG/ML
1 KIT INHALATION 3 TIMES DAILY
Qty: 84 ML | Refills: 3 | Status: CANCELLED | OUTPATIENT
Start: 2023-01-06

## 2023-01-06 RX ORDER — IVACAFTOR 150 MG/1
TABLET, FILM COATED ORAL
Qty: 56 TABLET | Refills: 11 | Status: CANCELLED | OUTPATIENT
Start: 2023-01-06

## 2023-01-06 ASSESSMENT — FIBROSIS 4 INDEX: FIB4 SCORE: 0.84

## 2023-01-06 NOTE — TELEPHONE ENCOUNTER
"Called patient Kenia to inform her provider will not be able to refill medication due to  wanting her to be seen either in clinic or virtually. Patient was very upset on why we aren't able to \"just refill her prescription\" she informed me she will just call the pharmacy to send over her prescription somewhere else. She will have a Virtual Visit today with  @2:20pm today.    "

## 2023-01-09 ENCOUNTER — PATIENT MESSAGE (OUTPATIENT)
Dept: PEDIATRIC PULMONOLOGY | Facility: MEDICAL CENTER | Age: 63
End: 2023-01-09
Payer: COMMERCIAL

## 2023-01-09 NOTE — PROGRESS NOTES
"  Pediatric Pulmonology  Telehealth visit    PCP:  Fabiana Fontenot M.D.   [id number] [id number] / [place] [phone]     SUBJECTIVE:   Kenia Soto is a 61 y.o. 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[facility]) 06/16/2020    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 11/13/2019    Cystic fibrosis ([facility]) 11/13/2019    Cystic fibrosis with pulmonary manifestations ([facility]) 11/13/2019    Herpes simplex 07/16/2019    Osteopenia of multiple sites 07/16/2019    CF (3849+10k bC>T, R5732K) ([facility]) 06/13/2019    Exocrine pancreatic insufficiency 06/13/2019    Vitamin K deficiency 06/13/2019    Partial thromboplastin time increased 08/22/2017    Moderate COPD (chronic obstructive pulmonary disease) ([facility]) 04/07/2015    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 Last seen in CF clinic 8/22  Has had headache and sinus pressure for several days and is getting worse. Has had infections like this in the past, responds well to doxycycline.  No cough or SOB.    Last hospitalization: [10/21/20]    Respiratory:   Cough frequency: yes, increased, increased especially at night, dry.  Also started on ozempic for DM 6 weeks ago which caused nausea and heartburn  Lungs felt more \"full of junk\"  Changed to a new dish soap and symptoms seemed to improve  Also started cayston 2 weeks ago. Then got thrush, increased throat pain. Took one dose of diflucan and used nystatin.  Cough character: productive but improving, not coughing all night anymore.  Sputum quantity: increased but improving  Sputum color: white to yellow now but did cough up 2 large dark gray mucus plugs last week.  Shortness of breath:mild  Used prednisone 20 mg x 2 days this week and last week x 4 days.   Chest Pain:had some left rib/side pain a few weeks ago, now " better.  Hemoptysis:none   Antibiotics:inhaled cayston x 2 weeks  Pulmonary toilet:   Had stopped advair due to thrush, now taking BID again  Albuterol: 3/day  7% hypertonic saline: 3/day  Pulmozyme: no/day  Chest Physiotherapy: aerobika TID  Oxygen: none  Respiratory assist: none  Modulator: kalydeco BID, skipped to night doses after diflucan    Compliance: compliant most of the time     Sinus symptoms:  Nasal Congestion: yes  Nasal Drainage: none  Headache/sinus pressure: fullness   Treatments: nasal rinse BID, afrin prn    Activity / Energy: improving, able to jog a little and bounce on trampoline this week   Change in activity/energy: decreased but improving  School/ Work attendance: not in school, not working     GI: no problem   Appetite: normal  Enzymes:  daily but only using once a day with dinner  6000 units  Stool: 1/day, characteristics: formed/soft  G-Tube: No      Medications:     Current Outpatient Medications:     doxycycline, 100 mg, Oral, BID    Cayston, 1 mL, Inhalation, TID, Taking    Kalydeco, TAKE 1 TABLET TWICE DAILY WITH FAT-CONTAINING FOOD Strength: 150 mg, Taking    Creon, TAKE 1-2 CAPSULES 4 TIMES A DAY AS NEEDED WITH MEALS OR SNACKS **KEEP IN ORIGINAL BOTTLE**, Taking    Tresiba FlexTouch, INJECT 4 UNITS SUBCUTANEOUSLY DAILY (375 DAYS), Taking    sodium chloride, 4 mL, Nebulization, 4X/DAY, Taking    sodium chloride 3%, 4 mL, Nebulization, 4X/DAY PRN, Taking    Tranexamic Acid, 1-2 tablets PO q 8 hours prn hemoptysis, Taking    acyclovir, TAKE 1 TABLET BY MOUTH 4 TIMES A DAY FOR 14 DAYS, THEN TWICE DAILY FOR 3 MONTHS, Taking    fluticasone-salmeterol, 1 Puff, Inhalation, BID, Taking    fluconazole, 1 tablet, ok to repeat dose in 7 days if symptoms persist, Taking    nystatin, 500,000 Units, Oral, 4XDAY, Taking    budesonide, 500 mcg, Nebulization, BID, Taking    fluticasone, 1-2 Spray, Nasal, DAILY, Taking    HumaLOG KwikPen, 12-15 Units, Subcutaneous, TID AC, Taking    BD Pen Needle Radha  2nd Gen, 1 EACH BY OTHER ROUTE 4 TIMES A DAY,BEFORE MEALS AND AT BEDTIME. FOR INSULIN ADMINISTRATION., Taking    OneTouch Verio, TEST 3 TIMES DAILY FOR INSULIN ADJUSTMENT AND AS NEEDED SYMPTOMS OF HIGH OR LOW BLOOD SUGAR, Taking    Dexcom G6 Sensor, 1 Each, Does not apply, Q10 DAYS, Taking    Dexcom G6 Transmitter, 1 Each, Does not apply, Continuous, Taking    phytonadione, 5 mg, Oral, DAILY, Taking    albuterol, 2.5 mg, Nebulization, TID, PRN    mupirocin, Apply BID for up to 10 days, Taking    Multiple Vitamins-Minerals (DEKAS PLUS PO), 1 Capsule, Oral, BID, Taking    magnesium oxide, 200 mg, Oral, BID, Taking    Vitamin C, 1,000 mg, Oral, BID, Taking    Probiotic Product (PROBIOTIC-10 PO), 1 Tablet, Oral, DAILY, Taking    vitamin D3, 10,000 Units, Oral, BID, Taking  COVID positive test or disease: was COVID positive in February, resolved  COVID vaccine: none     ALLERGIES:  Bactrim [sulfamethoxazole-trimethoprim], Ciprofloxacin, Levofloxacin, and Tobramycin    Review of System:  Up to 230 with steroids recently but took a little bit more insulin. Generally below 180, A1C 5.7 6 weeks ago.    Social/Environmental:    Tobacco use: never  Pets: dogs    OBJECTIVE:  Physical Exam:  Wt 59 kg (130 lb) Comment: per patient  BMI 20.98 kg/m²      Video not available    PFT's:          Labs reviewed:     Last sputum culture date: 8/2022  Chronic colonization of:  Pseudomonas aeruginosa daily, 2 strains, sensitive to aztreonam  Respiratory Culture:   Lab Results   Component Value Date/Time    SIGIND POS (POS) 08/17/2022 10:45 AM    SIGIND NEG 08/17/2022 10:45 AM    SIGIND NEG 08/17/2022 10:45 AM    SIGIND NEG 08/17/2022 10:45 AM    SIGIND . 08/17/2022 10:45 AM    SIGIND NEG 08/17/2022 10:45 AM    SOURCE Cervical 11/22/2022 09:53 AM    SOURCE RESP 08/17/2022 10:45 AM    SOURCE RESP 08/17/2022 10:45 AM    SOURCE RESP 08/17/2022 10:45 AM    SOURCE RESP 08/17/2022 10:45 AM    SOURCE RESP 08/17/2022 10:45 AM    SOURCE RESP  08/17/2022 10:45 AM    SITE Sputum 08/17/2022 10:45 AM    SITE Sputum 08/17/2022 10:45 AM    SITE Sputum 08/17/2022 10:45 AM    SITE Sputum 08/17/2022 10:45 AM    SITE Sputum 08/17/2022 10:45 AM    SITE Sputum 08/17/2022 10:45 AM   ]  Annual labs done date: chem panel, CBC normal 6/28/22. Last vitamin levels 8/2022      ASSESSMENT/PLAN:   Acute sinusitis  Doxycycline 100mg PO BID x 7 days      Pulmonary Exacerbation:  present, worsening        Follow up already scheduled 3/1/23    Reina Valdez DO  Pediatric Pulmonology

## 2023-02-06 ENCOUNTER — PATIENT MESSAGE (OUTPATIENT)
Dept: PEDIATRIC PULMONOLOGY | Facility: MEDICAL CENTER | Age: 63
End: 2023-02-06
Payer: COMMERCIAL

## 2023-02-07 ENCOUNTER — OFFICE VISIT (OUTPATIENT)
Dept: PEDIATRIC PULMONOLOGY | Facility: MEDICAL CENTER | Age: 63
End: 2023-02-07
Payer: COMMERCIAL

## 2023-02-07 ENCOUNTER — HOSPITAL ENCOUNTER (OUTPATIENT)
Dept: RADIOLOGY | Facility: MEDICAL CENTER | Age: 63
End: 2023-02-07
Attending: PEDIATRICS
Payer: COMMERCIAL

## 2023-02-07 ENCOUNTER — HOSPITAL ENCOUNTER (OUTPATIENT)
Facility: MEDICAL CENTER | Age: 63
End: 2023-02-07
Attending: PEDIATRICS
Payer: COMMERCIAL

## 2023-02-07 VITALS
HEART RATE: 121 BPM | BODY MASS INDEX: 22.37 KG/M2 | WEIGHT: 134.26 LBS | RESPIRATION RATE: 18 BRPM | HEIGHT: 65 IN | OXYGEN SATURATION: 93 %

## 2023-02-07 DIAGNOSIS — E84.9 CYSTIC FIBROSIS (HCC): ICD-10-CM

## 2023-02-07 DIAGNOSIS — Z22.8 CARRIER OF OTHER INFECTIOUS DISEASES: ICD-10-CM

## 2023-02-07 DIAGNOSIS — K86.81 EXOCRINE PANCREATIC INSUFFICIENCY: ICD-10-CM

## 2023-02-07 DIAGNOSIS — E84.0 CYSTIC FIBROSIS WITH PULMONARY EXACERBATION (HCC): ICD-10-CM

## 2023-02-07 PROCEDURE — 87186 SC STD MICRODIL/AGAR DIL: CPT

## 2023-02-07 PROCEDURE — 87077 CULTURE AEROBIC IDENTIFY: CPT

## 2023-02-07 PROCEDURE — 71046 X-RAY EXAM CHEST 2 VIEWS: CPT

## 2023-02-07 PROCEDURE — 87102 FUNGUS ISOLATION CULTURE: CPT

## 2023-02-07 PROCEDURE — 99215 OFFICE O/P EST HI 40 MIN: CPT | Performed by: PEDIATRICS

## 2023-02-07 PROCEDURE — 87205 SMEAR GRAM STAIN: CPT

## 2023-02-07 PROCEDURE — 87015 SPECIMEN INFECT AGNT CONCNTJ: CPT

## 2023-02-07 PROCEDURE — 87181 SC STD AGAR DILUTION PER AGT: CPT

## 2023-02-07 PROCEDURE — 87116 MYCOBACTERIA CULTURE: CPT

## 2023-02-07 PROCEDURE — 87070 CULTURE OTHR SPECIMN AEROBIC: CPT

## 2023-02-07 PROCEDURE — 87206 SMEAR FLUORESCENT/ACID STAI: CPT

## 2023-02-07 RX ORDER — CEFTAZIDIME 2 G/1
2 INJECTION, POWDER, FOR SOLUTION INTRAVENOUS EVERY 8 HOURS
Qty: 60 G | Refills: 0 | Status: SHIPPED | OUTPATIENT
Start: 2023-02-07 | End: 2023-02-17

## 2023-02-07 RX ORDER — TAZOBACTAM SODIUM AND PIPERACILLIN SODIUM 500; 4 MG/100ML; G/100ML
4.5 INJECTION, SOLUTION INTRAVENOUS EVERY 6 HOURS
Qty: 4000 ML | Refills: 0 | Status: SHIPPED | OUTPATIENT
Start: 2023-02-07 | End: 2023-02-17

## 2023-02-07 ASSESSMENT — FIBROSIS 4 INDEX: FIB4 SCORE: 0.84

## 2023-02-07 NOTE — PROGRESS NOTES
CC: follow up cystic fibrosis    PCP:  Loni Cadet M.D.   [address] [id number] / [place] [phone]     SUBJECTIVE:   Kenia Soto is a 62 y.o. female with Cystic Fibrosis, accompanied by her .     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 ([facility]) 06/16/2020    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 11/13/2019    Cystic fibrosis ([facility]) 11/13/2019    Cystic fibrosis with pulmonary manifestations ([facility]) 11/13/2019    Herpes simplex 07/16/2019    Osteopenia of multiple sites 07/16/2019    CF (3849+10k bC>T, E5209P) ([facility]) 06/13/2019    Exocrine pancreatic insufficiency 06/13/2019    Vitamin K deficiency 06/13/2019    Partial thromboplastin time increased 08/22/2017    Moderate COPD (chronic obstructive pulmonary disease) ([facility]) 04/07/2015    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 Also with coughing with yellow mucus since then and c/o chest tightness especially on right side. Called the office and CXR was ordered. She has RUL consolidation on her CXR  Last hospitalization: [10/21/20]    Respiratory:   Cough frequency: frequent, increased  Cough character: productive  Sputum quantity: increased  Sputum color: green, and yellow  Shortness of breath:never  Chest Pain:intermittently dull pain on right side, more chest tightness than chest pain  Hemoptysis:no  Doctor visits: none   Antibiotics:cayston inhaled   Pulmonary toilet:   Albuterol: 3/day  7% hypertonic saline: 3/day  Pulmozyme: 0/day  Chest Physiotherapy:  Aerobika tid  Oxygen: none  Respiratory assist: none  On Advair bid  Kalydeco bid    Compliance: compliant most of the time     Sinus symptoms:  Nasal Congestion: Yes  Nasal Drainage: clear nasal discharge  Headache/sinus pressure: no  Treatments: nasal rinse bid    Activity /  Energy: normal for age   Change in activity/energy: none   School/ Work attendance: no absences   School/ Work performance: no problem  Emotional assessment: positive       GI: no problem   Appetite: normal  Enzymes:  daily  Creon 6 units once with dinner  Stool: 1-2/day, characteristics: formed      Medications:     Current Outpatient Medications:     Zosyn, 4.5 g, Intravenous, Q6HRS    cefTAZidime, 2 g, Intravenous, Q8HRS    Cayston, 1 mL, Inhalation, TID, Taking    Kalydeco, TAKE 1 TABLET TWICE DAILY WITH FAT-CONTAINING FOOD Strength: 150 mg, Taking    Creon, TAKE 1-2 CAPSULES 4 TIMES A DAY AS NEEDED WITH MEALS OR SNACKS **KEEP IN ORIGINAL BOTTLE**, Taking    acyclovir, TAKE 1 TABLET BY MOUTH 4 TIMES A DAY FOR 14 DAYS, THEN TWICE DAILY FOR 3 MONTHS, PRN    fluticasone-salmeterol, 1 Puff, Inhalation, BID, Taking    fluconazole, 1 tablet, ok to repeat dose in 7 days if symptoms persist, Taking    budesonide, 500 mcg, Nebulization, BID, Taking    fluticasone, 1-2 Spray, Nasal, DAILY, Taking    HumaLOG KwikPen, 12-15 Units, Subcutaneous, TID AC, Taking    BD Pen Needle Radha 2nd Gen, 1 EACH BY OTHER ROUTE 4 TIMES A DAY,BEFORE MEALS AND AT BEDTIME. FOR INSULIN ADMINISTRATION., Taking    OneTouch Verio, TEST 3 TIMES DAILY FOR INSULIN ADJUSTMENT AND AS NEEDED SYMPTOMS OF HIGH OR LOW BLOOD SUGAR, Taking    Dexcom G6 Sensor, 1 Each, Does not apply, Q10 DAYS, Taking    Dexcom G6 Transmitter, 1 Each, Does not apply, Continuous, Taking    albuterol, 2.5 mg, Nebulization, TID, Taking    mupirocin, Apply BID for up to 10 days, Taking    Multiple Vitamins-Minerals (DEKAS PLUS PO), 1 Capsule, Oral, BID, Taking    magnesium oxide, 200 mg, Oral, BID, Taking    Vitamin C, 1,000 mg, Oral, BID, Taking    vitamin D3, 10,000 Units, Oral, BID, Taking    Tresiba FlexTouch, INJECT 4 UNITS SUBCUTANEOUSLY DAILY (375 DAYS)    sodium chloride, 4 mL, Nebulization, 4X/DAY    sodium chloride 3%, 4 mL, Nebulization, 4X/DAY PRN    Tranexamic Acid,  "1-2 tablets PO q 8 hours prn hemoptysis    nystatin, 500,000 Units, Oral, 4XDAY    phytonadione, 5 mg, Oral, DAILY    Probiotic Product (PROBIOTIC-10 PO), 1 Tablet, Oral, DAILY  Other Medications: none  Central Line: none    ALLERGIES:  Bactrim [sulfamethoxazole-trimethoprim], Ciprofloxacin, Levofloxacin, and Tobramycin    Review of System:  All other systems reviewed and negative    Ears, nose, mouth, throat, and face: negative  Cardiovascular: Negative  Gastrointestinal: Negative  Allergic/Immunologic: negative        Social/Environmental:  Social History     Tobacco Use    Smoking status: Never    Smokeless tobacco: Never   Vaping Use    Vaping Use: Never used   Substance Use Topics    Alcohol use: Yes     Alcohol/week: 1.8 oz     Types: 3 Standard drinks or equivalent per week     Comment: Socially    Drug use: No       Home Environment       Pet Exposures    Dogs Yes     Reptiles Yes        Tobacco use: never  Pets: none    Past Medical History:  Past Medical History:   Diagnosis Date    Allergy     Anemia     Asthma     Breath shortness     Bronchitis     CF (cystic fibrosis) (HCC)     Coughing blood     Diabetes (HCC)     Head ache     Hemorrhagic disorder (HCC)     Herpes simplex     Pneumonia     Shoulder fracture      Respiratory hospitalizations: [10/21/20]      Past surgical History:  Past Surgical History:   Procedure Laterality Date    BRONCHOSCOPY-ENDO  7/22/2019    Procedure: BRONCHOSCOPY - W/BAL;  Surgeon: Arelis Fleming M.D.;  Location: ENDOSCOPY [facility];  Service: Ent    DENTAL SURGERY      EYE SURGERY      SINUS WASHING           Family History:   Family History   Problem Relation Age of Onset    Other Daughter         CF carrier    Alcohol/Drug Father     Heart Disease Father     Cystic Fibrosis Sister        OBJECTIVE:  Physical Exam:  Pulse (!) 121   Resp 18   Ht 1.66 m (5' 5.35\")   Wt 60.9 kg (134 lb 4.2 oz)   SpO2 93% Comment: RA  BMI 22.10 kg/m²      GENERAL: well appearing, " "well nourished, no respiratory distress, and normal affect   EYES: PERRL, EOMI, normal conjunctiva  EARS: bilateral TM's and external ear canals normal   NOSE: no audible congestion and no discharge   MOUTH/THROAT: normal oropharynx   NECK: normal   CHEST: no chest wall deformities and normal A-P diameter   LUNGS: normal air exchange and decreased air entry on right upper lobe    HEART: regular rate and rhythm and no murmurs   ABDOMEN: soft, non-tender, non-distended, and no hepatosplenomegaly  : not examined  BACK: not examined   SKIN: normal color   EXTREMITIES: no clubbing, cyanosis, or inflammation   NEURO: gross motor exam normal by observation     PFT's:  Pulmonary Function Test Results (PFT)    Spirometry Actual Predicted % Predicted   FVC (L) 2.56 3.26 78   FEV1 ((L) 1.66 2.55 64   FEV1/FVC (%) 64.73 78.93 82   FEF 25-75% (L/sec) 0.77 2.25 33     Please see  PFT in \"Media Tab\" of Notes activity  (EMR)    Provider Interpretation: Moderate obstruction         Imaging:   CXR on 2/7/23: I personally reviewed the image and per my personal interpretation: RUL consolidation, chronic interstitial changes bilaterally with bronchiectasis    Labs reviewed:     Lab Results   Component Value Date/Time    SIGIND NEG 02/07/2023 02:28 PM    SIGIND NEG 02/07/2023 02:28 PM    SIGIND NEG 02/07/2023 02:28 PM    SIGIND NEG 02/07/2023 02:28 PM    SIGIND . 02/07/2023 02:28 PM    SOURCE RESP 02/07/2023 02:28 PM    SOURCE RESP 02/07/2023 02:28 PM    SOURCE RESP 02/07/2023 02:28 PM    SOURCE RESP 02/07/2023 02:28 PM    SOURCE RESP 02/07/2023 02:28 PM    SITE Sputum (coughed) 02/07/2023 02:28 PM    SITE Sputum (coughed) 02/07/2023 02:28 PM    SITE Sputum (coughed) 02/07/2023 02:28 PM    SITE Sputum (coughed) 02/07/2023 02:28 PM    SITE Sputum (coughed) 02/07/2023 02:28 PM    CULTRSULT - 02/07/2023 02:28 PM    CULTRSULT - 02/07/2023 02:28 PM    CULTRSULT - 02/07/2023 02:28 PM         ASSESSMENT/PLAN:   1. Cystic fibrosis with " pulmonary exacerbation (HCC)  RUL pneumonia, discussed hospital admission.   She adamantly refused hospital admission. She would like to try IV antibiotics at home like she did in 2021.   Discussed about single therapy for pseudomonas coverage based on her previous cultures, however she again wants to try dual coverage based on her previous experiences.   Orders placed for PICC line placement. Called IR to discuss and they wont be able to do it until tomorrow.   She is willing to wait until tomorrow . Doesn't want to start cipro due to severe tendinitis from previous experiences. Discussed the risks with CF and pneumonia and not getting admitted to the hospital. Also discussed the risks for delay in receiving antibiotics from home IV company, [facility].   Continue current airway clearance.   ER precautions discussed. If worsening of chest pain, chest tightness or shortness of breath, worsening of her coughing noted, then to go to ER     - IR-PICC LINE PLACEMENT W/ GUIDANCE > AGE 5; Future  - piperacillin-tazobactam (ZOSYN) 4-0.5 GM/100ML Solution; Infuse 100 mL into a venous catheter every 6 hours for 10 days.  Dispense: 4000 mL; Refill: 0  - cefTAZidime (FORTAZ) 2 GM Recon Soln; Infuse 2 g into a venous catheter every 8 hours for 10 days.  Dispense: 60 g; Refill: 0    2. Exocrine pancreatic insufficiency  Continue enzymes  - Spirometry; Future  - Spirometry    3. 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  Based on previous cultures, she has grown pseudomonas.   Discussed her antibiotics options, single therapy preferred.   She wants to ONLY do dual coverage with zosyn and ceftazidime. Which has worked for her in the past.   Ordered antibiotics through [facility]. Cautioned that there maybe delay in receiving antibiotics.   PICC line placement confirmed with IR for tomorrow at [facility].       Pulmonary Exacerbation: present moderate          Follow Up:  Return in about 1 month (around 3/7/2023), or as  previous scheduled.for annual visit    Electronically signed by   Arelis Fleming M.D.   Pediatric Pulmonology

## 2023-02-07 NOTE — PROCEDURES
"Pulmonary Function Test Results (PFT)    Spirometry Actual Predicted % Predicted   FVC (L) 2.56 3.26 78   FEV1 ((L) 1.66 2.55 64   FEV1/FVC (%) 64.73 78.93 82   FEF 25-75% (L/sec) 0.77 2.25 33     Please see  PFT in \"Media Tab\" of Notes activity  (EMR)    Provider Interpretation: Moderate obstruction     "

## 2023-02-08 ENCOUNTER — HOSPITAL ENCOUNTER (OUTPATIENT)
Dept: RADIOLOGY | Facility: MEDICAL CENTER | Age: 63
End: 2023-02-08
Attending: PEDIATRICS
Payer: COMMERCIAL

## 2023-02-08 DIAGNOSIS — E84.0 CYSTIC FIBROSIS WITH PULMONARY EXACERBATION (HCC): ICD-10-CM

## 2023-02-08 PROCEDURE — 36573 INSJ PICC RS&I 5 YR+: CPT

## 2023-02-08 NOTE — PROCEDURES
Vascular Access Team     Date of Insertion: 2/8/23  Arm Circumference: 27  Internal length: 41  External Length: 1  Vein Occupancy %: 22   Reason for PICC: antibiotics  Labs: WBC N/A, PLT N/A, BUN N/A, Cr N/A, GFR N/A, INR N/A     Consents confirmed, vessel patency confirmed with ultrasound. Risks and benefits of procedure explained to patient and education regarding central line associated bloodstream infections provided. Questions answered.      PICC placed in LUE per licensed provider order with ultrasound guidance.  4 Fr, single lumen PICC placed in basilic vein after 1 attempt(s). 2 mL of 1% lidocaine injected intradermally at the insertion site. A 21 gauge microintroducer needle was visualized entering the vein and modified Seldinger technique was used to obtain access to the vein. 41 cm catheter inserted and brisk blood return was observed from each lumen upon aspiration. Line secured at the 1 cm marker. TCS stylet removed and observed to be fully intact. Each lumen flushed using pulsatile method without resistance with 10 mL 0.9% normal saline. PICC line secured with Biopatch and Tegaderm.     PICC tip placement location is confirmed by nurse to be in the Superior Vena Cava (SVC) utilizing 3CG technology. PICC line is appropriate for use at this time. Patient tolerated procedure well, without complications.  Patient condition relayed to primary RN or ordering physician via this post procedure note in the EMR.      Ultrasound images uploaded to PACS and viewable in the EMR - yes  Ultrasound imaged printed and placed in paper chart - no     [employer] PICC ref # [id number], Lot # [id number], Expiration Date 11/30/23

## 2023-02-14 ENCOUNTER — PATIENT MESSAGE (OUTPATIENT)
Dept: PEDIATRIC PULMONOLOGY | Facility: MEDICAL CENTER | Age: 63
End: 2023-02-14
Payer: COMMERCIAL

## 2023-03-07 DIAGNOSIS — R04.2 HEMOPTYSIS: ICD-10-CM

## 2023-03-07 DIAGNOSIS — E84.9 CF (CYSTIC FIBROSIS) (HCC): ICD-10-CM

## 2023-03-07 DIAGNOSIS — E84.0 CYSTIC FIBROSIS WITH PULMONARY EXACERBATION (HCC): ICD-10-CM

## 2023-03-07 RX ORDER — ACYCLOVIR 400 MG/1
TABLET ORAL
Qty: 180 TABLET | Refills: 1 | Status: SHIPPED | OUTPATIENT
Start: 2023-03-07 | End: 2023-08-23

## 2023-03-07 NOTE — PATIENT INSTRUCTIONS
Kenia Soto  1960    CF Mutations: 3849+10kbC->T, [id number]  CFTR Modulator: Kalydeco    Respiratory  Baseline FEV1: 71% Today's FEV1: 69%  Airway Clearance Routine:  Albuterol (2 x day) / (3-4x/day when sick)  Hypertonic Saline (2 x day) / (3-4x/day when sick)  Pulmozyme (1 x day) / (2x/day when sick)  ACT Vest and/or Acapella (2 x day) / (3-4 x/day when sick)  Inhaled antibiotics: Cayston, COMPLETE 2 MORE WEEKS  ADD BUDESONIDE 2 ML TO SINUS RINSES 2 TIMES PER DAY  Equipment Check (Annually) Date scheduled:  Nutrition/Gastrointestinal  Weight: 129.6 pounds  BMI: Current: 21.3, Goal: 22 - 23     Enzymes: Creon 6 (1-2 with dinner only)  Vitamins: general MVi, DEKAs Plus softgel, magnesium, D, K, A , probiotics, Nuun electrolytes  Exercise: goal of 3-5 days per week to get muscles back  Social  Insurance: [employer]  Transportation: yes  Has access to food resources: yes  Financial Resources: none  School / Work Needs: none  Mental health screenin2022     Tasks:  Establish with a new [phone]  Goals  Annual Labs: last done 2022, Next Due 2023   LFTs: last done 2022, due again by 2023  Oral Glucose Tolerance Test: CFRD  If CFRD, how is it managed: Dietary Change, Oral hypoglycemic agents, intermittent insulin (with illness, steroids, etc.), chronic insulin, other diabetes drugs  Sputum Cultures: #1 ( 23 with AFB and Fungal Cx  )  , #2 ( 3/8/23 ), #3  ( ),  #4 (  ) (Dates)  DEXA scan: last done 2019, due again by   Chest X-ray: last done 23, next due 2024   Eye Exam: last done 2021, recommended again 2022 ([facility])   Notes  IV Antibiotics 23

## 2023-03-08 ENCOUNTER — HOSPITAL ENCOUNTER (OUTPATIENT)
Facility: MEDICAL CENTER | Age: 63
End: 2023-03-08
Attending: PEDIATRICS
Payer: COMMERCIAL

## 2023-03-08 ENCOUNTER — OFFICE VISIT (OUTPATIENT)
Dept: PEDIATRIC PULMONOLOGY | Facility: MEDICAL CENTER | Age: 63
End: 2023-03-08
Attending: PEDIATRICS
Payer: COMMERCIAL

## 2023-03-08 VITALS
HEIGHT: 66 IN | WEIGHT: 129.85 LBS | BODY MASS INDEX: 20.87 KG/M2 | OXYGEN SATURATION: 95 % | RESPIRATION RATE: 18 BRPM | HEART RATE: 93 BPM

## 2023-03-08 DIAGNOSIS — K86.81 EXOCRINE PANCREATIC INSUFFICIENCY: ICD-10-CM

## 2023-03-08 DIAGNOSIS — E08.9 DIABETES MELLITUS DUE TO CYSTIC FIBROSIS (HCC): ICD-10-CM

## 2023-03-08 DIAGNOSIS — Z22.8 CARRIER OF OTHER INFECTIOUS DISEASES: ICD-10-CM

## 2023-03-08 DIAGNOSIS — E84.0 CYSTIC FIBROSIS WITH PULMONARY MANIFESTATIONS (HCC): ICD-10-CM

## 2023-03-08 DIAGNOSIS — E84.9 DIABETES MELLITUS DUE TO CYSTIC FIBROSIS (HCC): ICD-10-CM

## 2023-03-08 PROCEDURE — 87181 SC STD AGAR DILUTION PER AGT: CPT

## 2023-03-08 PROCEDURE — 94010 BREATHING CAPACITY TEST: CPT | Performed by: PEDIATRICS

## 2023-03-08 PROCEDURE — 99215 OFFICE O/P EST HI 40 MIN: CPT | Performed by: PEDIATRICS

## 2023-03-08 PROCEDURE — 87070 CULTURE OTHR SPECIMN AEROBIC: CPT

## 2023-03-08 PROCEDURE — 87077 CULTURE AEROBIC IDENTIFY: CPT

## 2023-03-08 PROCEDURE — 99214 OFFICE O/P EST MOD 30 MIN: CPT | Mod: 25 | Performed by: PEDIATRICS

## 2023-03-08 PROCEDURE — 87186 SC STD MICRODIL/AGAR DIL: CPT

## 2023-03-08 PROCEDURE — 99214 OFFICE O/P EST MOD 30 MIN: CPT | Performed by: PEDIATRICS

## 2023-03-08 PROCEDURE — 87205 SMEAR GRAM STAIN: CPT

## 2023-03-08 RX ORDER — ALBUTEROL SULFATE 2.5 MG/3ML
2.5 SOLUTION RESPIRATORY (INHALATION) 3 TIMES DAILY
Qty: 525 ML | Refills: 4 | Status: SHIPPED | OUTPATIENT
Start: 2023-03-08 | End: 2024-02-23

## 2023-03-08 RX ORDER — TRANEXAMIC ACID 650 MG/1
TABLET ORAL
Qty: 30 TABLET | Refills: 1 | Status: SHIPPED | OUTPATIENT
Start: 2023-03-08 | End: 2023-03-10 | Stop reason: SDUPTHER

## 2023-03-08 ASSESSMENT — ANXIETY QUESTIONNAIRES
5. BEING SO RESTLESS THAT IT IS HARD TO SIT STILL: NOT AT ALL
1. FEELING NERVOUS, ANXIOUS, OR ON EDGE: NOT AT ALL
3. WORRYING TOO MUCH ABOUT DIFFERENT THINGS: NOT AT ALL
4. TROUBLE RELAXING: NOT AT ALL
IF YOU CHECKED OFF ANY PROBLEMS ON THIS QUESTIONNAIRE, HOW DIFFICULT HAVE THESE PROBLEMS MADE IT FOR YOU TO DO YOUR WORK, TAKE CARE OF THINGS AT HOME, OR GET ALONG WITH OTHER PEOPLE: NOT DIFFICULT AT ALL
GAD7 TOTAL SCORE: 0
7. FEELING AFRAID AS IF SOMETHING AWFUL MIGHT HAPPEN: NOT AT ALL
6. BECOMING EASILY ANNOYED OR IRRITABLE: NOT AT ALL
2. NOT BEING ABLE TO STOP OR CONTROL WORRYING: NOT AT ALL

## 2023-03-08 ASSESSMENT — FIBROSIS 4 INDEX: FIB4 SCORE: 0.84

## 2023-03-08 NOTE — TELEPHONE ENCOUNTER
Last Visit: 3/8/23  Next Visit:     Received request via: Pharmacy    Was the patient seen in the last year in this department? Yes    Does the patient have an active prescription (recently filled or refills available) for medication(s) requested? No

## 2023-03-08 NOTE — PROGRESS NOTES
Medical Social Work    Referral: CF Clinic / Dr. Fleming    Intervention: Patient presents to CF clinic today for ongoing cystic fibrosis care. Patient appears to be doing well and is oriented. Patient is recovering from being sick and completed a round of antibiotics. Patient reports now that she is feeling better she has been able to resume working out.     Patient reports one of her daughters has recently become engaged and she is assisting with planning the wedding. Patient is excited and looking forward to this event.     SW screened for transportation, food resources, school/work, financial and mental health needs. Patient denies having concerns in these areas.     Plan: SW will continue to follow in clinic.    Time Spent: 15 minutes

## 2023-03-08 NOTE — PROGRESS NOTES
CC: follow up cystic fibrosis    PCP:  Loni Cadet M.D.   [address] [address] / [place] [id number]     SUBJECTIVE:   Kenia Soto is a 62 y.o. female with Cystic Fibrosis, accompanied by her mother.    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 ([facility]) 06/16/2020    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 11/13/2019    Cystic fibrosis ([facility]) 11/13/2019    Cystic fibrosis with pulmonary manifestations ([facility]) 11/13/2019    Herpes simplex 07/16/2019    Osteopenia of multiple sites 07/16/2019    CF (3849+10k bC>T, [id number]) ([facility]) 06/13/2019    Exocrine pancreatic insufficiency 06/13/2019    Vitamin K deficiency 06/13/2019    Partial thromboplastin time increased 08/22/2017    Moderate COPD (chronic obstructive pulmonary disease) ([facility]) 04/07/2015    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    Irritable bowel syndrome 03/08/2006       Since the last CF clinic visit chief complaint:  Kenia has experienced no problems   Last hospitalization: [10/21/20]    Respiratory:   Cough frequency: episodic, baseline  Cough character: productive  Sputum quantity: baseline  Sputum color: clear  Shortness of breath:never  Chest Pain:never  Hemoptysis:never   Doctor visits: none   Antibiotics:none  Pulmonary toilet:   Albuterol: 2/day  7% hypertonic saline: 2/day  Pulmozyme: 0/day  Chest Physiotherapy: Aerobika bid  Oxygen: none  Respiratory assist: none  Advair 1-2 times/day  Kalydeco bid    Compliance: compliant most of the time     Sinus symptoms:  Nasal Congestion: never   Nasal Drainage: clear nasal discharge  Headache/sinus pressure: never   Treatments: sinus rinses bid, budesonide nasal treatments gave her hemoptysis    Activity / Energy: normal for age   Change in activity/energy: none   School/ Work attendance: no absences   School/ Work performance: no problem  Emotional assessment: positive    CFRD under control currently       GI: no problem   Appetite:  normal  Enzymes:  daily  Creon 6K units 1 with dinner  Stool: 1-2/day, characteristics: formed        Medications:     Current Outpatient Medications:     acyclovir, TAKE 1 TABLET BY MOUTH 4 TIMES A DAY FOR 14 DAYS, THEN TWICE DAILY FOR 3 MONTHS, PRN    Cayston, 1 mL, Inhalation, TID, PRN    Kalydeco, TAKE 1 TABLET TWICE DAILY WITH FAT-CONTAINING FOOD Strength: 150 mg, Taking    Creon, TAKE 1-2 CAPSULES 4 TIMES A DAY AS NEEDED WITH MEALS OR SNACKS **KEEP IN ORIGINAL BOTTLE**, Taking    Tresiba FlexTouch, INJECT 4 UNITS SUBCUTANEOUSLY DAILY (375 DAYS), Taking    sodium chloride, 4 mL, Nebulization, 4X/DAY, Taking    sodium chloride 3%, 4 mL, Nebulization, 4X/DAY PRN, PRN    fluticasone-salmeterol, 1 Puff, Inhalation, BID, Taking    nystatin, 500,000 Units, Oral, 4XDAY, PRN    fluticasone, 1-2 Spray, Nasal, DAILY, Taking    HumaLOG KwikPen, 12-15 Units, Subcutaneous, TID AC, Taking    BD Pen Needle Radha 2nd Gen, 1 EACH BY OTHER ROUTE 4 TIMES A DAY,BEFORE MEALS AND AT BEDTIME. FOR INSULIN ADMINISTRATION., Taking    OneTouch Verio, TEST 3 TIMES DAILY FOR INSULIN ADJUSTMENT AND AS NEEDED SYMPTOMS OF HIGH OR LOW BLOOD SUGAR, Taking    Dexcom G6 Sensor, 1 Each, Does not apply, Q10 DAYS, Taking    Dexcom G6 Transmitter, 1 Each, Does not apply, Continuous, Taking    phytonadione, 5 mg, Oral, DAILY, Taking    mupirocin, Apply BID for up to 10 days, PRN    Multiple Vitamins-Minerals (DEKAS PLUS PO), 1 Capsule, Oral, BID, Taking    magnesium oxide, 200 mg, Oral, BID, Taking    Vitamin C, 1,000 mg, Oral, BID, Taking    Probiotic Product (PROBIOTIC-10 PO), 1 Tablet, Oral, DAILY, Taking    vitamin D3, 10,000 Units, Oral, BID, Taking    albuterol, 2.5 mg, Nebulization, TID    Tranexamic Acid, TAKE 1-2 TABLETS BY MOUTH EVERY 8 HOURS AS NEEDED FOR HEMOPTYSIS  Other Medications: none  Central Line: none    ALLERGIES:  Bactrim [sulfamethoxazole-trimethoprim], Ciprofloxacin, Levofloxacin, and Tobramycin    Review of System:  All other  "systems reviewed and negative    Ears, nose, mouth, throat, and face: negative  Cardiovascular: Negative  Gastrointestinal: Negative  Allergic/Immunologic: negative        Social/Environmental:  Social History     Tobacco Use    Smoking status: Never    Smokeless tobacco: Never   Vaping Use    Vaping Use: Never used   Substance Use Topics    Alcohol use: Yes     Alcohol/week: 1.8 oz     Types: 3 Standard drinks or equivalent per week     Comment: Socially    Drug use: No       Home Environment       Pet Exposures    Dogs Yes     Reptiles Yes        Tobacco use: never  Pets: none    Past Medical History:  Past Medical History:   Diagnosis Date    Allergy     Anemia     Asthma     Breath shortness     Bronchitis     CF (cystic fibrosis) (HCC)     Coughing blood     Diabetes (HCC)     Head ache     Hemorrhagic disorder (HCC)     Herpes simplex     Pneumonia     Shoulder fracture      Respiratory hospitalizations: [10/21/20]      Past surgical History:  Past Surgical History:   Procedure Laterality Date    BRONCHOSCOPY-ENDO  7/22/2019    Procedure: BRONCHOSCOPY - W/BAL;  Surgeon: Arelis Fleming M.D.;  Location: ENDOSCOPY [facility];  Service: Ent    DENTAL SURGERY      EYE SURGERY      SINUS WASHING           Family History:   Family History   Problem Relation Age of Onset    Other Daughter         CF carrier    Alcohol/Drug Father     Heart Disease Father     Cystic Fibrosis Sister        OBJECTIVE:  Physical Exam:  Pulse 93   Resp 18   Ht 1.665 m (5' 5.55\")   Wt 58.9 kg (129 lb 13.6 oz) Comment: stated  SpO2 95%   BMI 21.25 kg/m²      GENERAL: well appearing, well nourished, no respiratory distress, and normal affect   EYES: PERRL, EOMI, normal conjunctiva  EARS: bilateral TM's and external ear canals normal   NOSE: no audible congestion and no discharge   MOUTH/THROAT: normal oropharynx   NECK: normal   CHEST: no chest wall deformities and normal A-P diameter   LUNGS: clear to auscultation and normal air " "exchange   HEART: regular rate and rhythm and no murmurs   ABDOMEN: soft, non-tender, non-distended, and no hepatosplenomegaly  : not examined  BACK: not examined   SKIN: normal color   EXTREMITIES: no clubbing, cyanosis, or inflammation   NEURO: gross motor exam normal by observation     PFT's:  Pulmonary Function Test Results (PFT)    Spirometry Actual Predicted % Predicted   FVC (L) 2.83 3.28 86   FEV1 ((L) 1.78 2.57 69   FEV1/FVC (%) 62.97 78.90 79   FEF 25-75% (L/sec) 0.84 2.26 36     Please see  PFT in \"Media Tab\" of Notes activity  (EMR)    Provider Interpretation Moderate obstruction, FEV1 improving        Labs reviewed:     Lab Results   Component Value Date/Time    SIGIND POS (POS) 02/07/2023 02:28 PM    SIGIND NEG 02/07/2023 02:28 PM    SIGIND NEG 02/07/2023 02:28 PM    SIGIND NEG 02/07/2023 02:28 PM    SIGIND . 02/07/2023 02:28 PM    SIGIND NEG 02/07/2023 02:28 PM    SOURCE RESP 02/07/2023 02:28 PM    SOURCE RESP 02/07/2023 02:28 PM    SOURCE RESP 02/07/2023 02:28 PM    SOURCE RESP 02/07/2023 02:28 PM    SOURCE RESP 02/07/2023 02:28 PM    SOURCE RESP 02/07/2023 02:28 PM    SITE Sputum (coughed) 02/07/2023 02:28 PM    SITE Sputum (coughed) 02/07/2023 02:28 PM    SITE Sputum (coughed) 02/07/2023 02:28 PM    SITE Sputum (coughed) 02/07/2023 02:28 PM    SITE Sputum (coughed) 02/07/2023 02:28 PM    SITE Sputum (coughed) 02/07/2023 02:28 PM    CULTRSULT (A) 02/07/2023 02:28 PM     Respiratory cultures from cystic fibrosis patients are  routinely screened for Staphylococcus aureus, Pseudomonas  aeruginosa, Burkholderia cepacia, Haemophilus influenzae,  Stenotrophomonas maltophilia, Achromobacter sp., and  Streptococcus pneumonia.  Light growth usual upper respiratory demetrius      CULTRSULT Pseudomonas aeruginosa  Light growth   (A) 02/07/2023 02:28 PM    CULTRSULT Culture in progress. 02/07/2023 02:28 PM    CULTRSULT No fungal growth. 02/07/2023 02:28 PM       ASSESSMENT/PLAN:   1. Cystic fibrosis with " pulmonary manifestations (HCC)  Continue current airway clearance  Patient wanting to see if she should retry vest but since she is doing good with aerobika, we dont need to retry airway clearance modalities. Will continue with aerobika.     2. Exocrine pancreatic insufficiency  Continue enzymes before meals  - Spirometry; Future  - [facility] - CF Resp Culture w/ Gram Stain; Future  - Spirometry    3. 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  OP cutlure obtained,   Patient interested in trying inhaled meropenem since one her friends is trying it and has great results. I have emailed Dr Barrie Aparicio and will await for his reply before making any further recommendations on this.     4.  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     Pulmonary Exacerbation: absent    Seen by Dietician:  Yes  Seen by Respiratory Therapy: Yes  Seen by :  Yes        Follow Up:  Return in about 3 months (around 6/8/2023).    Electronically signed by   Arelis Fleming M.D.   Pediatric Pulmonology

## 2023-03-08 NOTE — PROGRESS NOTES
Respiratory -  Cystic Fibrosis Airway Clearance Therapy (ACT)      Kenia seen today at [facility]. Testing today included sputum sample and PFT.  Current plan for Respiratory medications and ACT is:        AM  Albuterol  Hypertonic Saline  Vest     PM  Albuterol  Hypertonic saline  ACT vest    Leo PRN when sick     Pt. given copy of PFT results.

## 2023-03-08 NOTE — PROGRESS NOTES
Nutrition Screen & Progress Note for Adult CF Clinic  BMI: 21.3       Points: 1  IBW (kg): 58  % IBW: 102       Points: 0  Current weight (kg): 58.9   Weight last clinic visit: 60.8 kg on 8/17/22  % weight change: down 3%     Points: 1  FEV1 % predicted: 69      Points: 1            Total Points: 3          Nutrition Risk: moderate    BM: daily (had diarrhea in the mornings when on 2 antibiotics)  PERT: Creon 6 (1-2 with dinner only) = avg of one per day  Lipase units/kg/meal: 102  CFTR modulator: Kalydeco  Other GI meds: no  Typical diet: 3 meals and 0-1 snacks  Vitamins: general MVi, DEKAs Plus softgel, magnesium (400 mg), vitamin D, vitamin A (10,000 IU), vitamin K (5 mg)  Other supplements: Nuun electrolytes, 3 Lac probiotic    Visit details: Marina is here for CF visit, she looks good. She has lost wt, she feels she has lost a lot of muscle (especially in her arms) d/t not exercising d/t illness. She is now off the 2 IV antibiotics.  She had stopped doing her pushups d/t wrist pain so then she also stopped doing her squats. Then she got ill so stopped doing the trampoline as she just didn't feel good. She is now back to exercising and is feeling better so she hopes to get her muscles back (feels she is too thin now, back in her size 4 jeans).    She added Culturelle probiotic to her regular probiotic when she had loose stools on antibiotics. Her BMs are normal now.    She only takes one Creon 6 with dinner but last night they went out to eat so she takes 2 and is fine.  She has a good appetite, always makes sure she eats a good portion of protein at dinner nightly.    Marina has no nutrition questions or concerns today, glad she is back to her exercise program.   Last time she saw pelon WALKER in December he told her that he didn't need to see her for a year.    Recommendations/Plan: continue with CFRD diet and exercise program.   Follow-up: 3 months    Time spent: 18 minutes

## 2023-03-08 NOTE — PROCEDURES
"Pulmonary Function Test Results (PFT)    Spirometry Actual Predicted % Predicted   FVC (L) 2.83 3.28 86   FEV1 ((L) 1.78 2.57 69   FEV1/FVC (%) 62.97 78.90 79   FEF 25-75% (L/sec) 0.84 2.26 36     Please see  PFT in \"Media Tab\" of Notes activity  (EMR)    Provider Interpretation Moderate obstruction, FEV1 improving    "

## 2023-03-09 LAB
GRAM STN SPEC: NORMAL
SIGNIFICANT IND 70042: NORMAL
SITE SITE: NORMAL
SOURCE SOURCE: NORMAL

## 2023-03-09 ASSESSMENT — PATIENT HEALTH QUESTIONNAIRE - PHQ9: CLINICAL INTERPRETATION OF PHQ2 SCORE: 0

## 2023-03-10 DIAGNOSIS — R04.2 HEMOPTYSIS: ICD-10-CM

## 2023-03-10 DIAGNOSIS — E84.0 CYSTIC FIBROSIS WITH PULMONARY EXACERBATION (HCC): ICD-10-CM

## 2023-03-10 RX ORDER — TRANEXAMIC ACID 650 MG/1
TABLET ORAL
Qty: 30 TABLET | Refills: 1 | Status: SHIPPED | OUTPATIENT
Start: 2023-03-10 | End: 2023-09-20

## 2023-03-10 NOTE — TELEPHONE ENCOUNTER
Last Visit: 3/8/23  Next Visit:    Received request via: Patient Refill sent 3/8/23, confirmed with pharmacy they did not receive refill request-resending order today 3/10/23    Was the patient seen in the last year in this department? Yes    Does the patient have an active prescription (recently filled or refills available) for medication(s) requested? No

## 2023-03-14 LAB — ETEST SENSITIVITY ETEST: NORMAL

## 2023-03-15 ENCOUNTER — TELEPHONE (OUTPATIENT)
Dept: PEDIATRIC PULMONOLOGY | Facility: MEDICAL CENTER | Age: 63
End: 2023-03-15

## 2023-03-15 ENCOUNTER — DOCUMENTATION (OUTPATIENT)
Dept: PHARMACY | Facility: MEDICAL CENTER | Age: 63
End: 2023-03-15
Payer: COMMERCIAL

## 2023-03-15 NOTE — TELEPHONE ENCOUNTER
Contacted patient at [phone] to discuss [facility] and services/benefits offered. No answer, left voicemail.    Nenita Batres  Rx Coordinator   [phone]

## 2023-03-16 NOTE — PROGRESS NOTES
Patient interested in trying inhaled meropenem and reachout to adult CF pulmonologist in [facility]: Below is the response      Tl Infante!     Nice to hear from you.      Yes, we use inhaled meropenem quite a bit (and inhaled ceftaz).     The hardest part is getting the medication. Similar to colistin it has to be reconstituted.  If she has medicare they may not cover it! So it can be expensive.  Just as an FYI. If she has gotten cayston/liseth/colistin probably not going to be an issue for you.  IF your hospital has a pharmacy that does home deliver/specialty meds, etc they may be able to help.     Nebulized meropenem.     We use 250mg inhaled twice daily.  The meropenem is diluted in 4cc of sterile water ( or 0.9NS.. with the [place] situation we have had trouble getting sterile water).  Use any anastasia neb. Can use through e-flow or other similar nebulizer as well without issue if the patient has it.    Frequently use it every other month.     If the patient has done IV meropenem without issues before then I just have them do it at home.  If any concerns can do a test dose in the clinic or have them do it while admitted.     I tell the patient to make sure they use inhaled albuterol at some point prior to the antibiotic (we recommend albuterol for all our patients at beginning of their Airway clearance, but if patient has asthma or ends up having a reactive airways reaction to the antibiotic, I will have them repeat the albuterol just prior to the antibiotic) ;     If patient gets other medications from [facility] in [facility] (Specialty Pharmacy in [place] who can provide it if your local pharmacy cannot)  they can provide it and go over how to use it.  If not, local pharmacies can get, but then you have to also give a script for syringes, needles, and the sterile water or saline.      Let me know if you need anything else!       Joaquim

## 2023-04-05 ENCOUNTER — PATIENT MESSAGE (OUTPATIENT)
Dept: INFUSION CENTER | Facility: MEDICAL CENTER | Age: 63
End: 2023-04-05
Payer: COMMERCIAL

## 2023-04-07 ENCOUNTER — TELEPHONE (OUTPATIENT)
Dept: PEDIATRIC PULMONOLOGY | Facility: MEDICAL CENTER | Age: 63
End: 2023-04-07

## 2023-04-07 NOTE — TELEPHONE ENCOUNTER
New PA request     Kalydeca 150mg tablets #56 for 28  days,     Ran Test Claim-PA Required    Submitted PA request via CMM,   Key #[id number]  attached chart notes,     awaiting plan response

## 2023-04-10 ENCOUNTER — DOCUMENTATION (OUTPATIENT)
Dept: PHARMACY | Facility: MEDICAL CENTER | Age: 63
End: 2023-04-10
Payer: COMMERCIAL

## 2023-04-10 ENCOUNTER — TELEPHONE (OUTPATIENT)
Dept: PEDIATRIC PULMONOLOGY | Facility: MEDICAL CENTER | Age: 63
End: 2023-04-10
Payer: COMMERCIAL

## 2023-04-10 NOTE — PROGRESS NOTES
New   Requesting prior authorization for Kalydeco (ivacaftor) 150 mg tablets  PA Outcome approved    Quantity of 56 for a day supply of 28  Insurance [employer] [place] Commercial  Reference #? [id number]  Dates in effect, from 4/7/23 through 4/6/25  Pharmacy:[facility]  Copay 0.00    Will call the pharmacy to inform of the approval

## 2023-04-10 NOTE — TELEPHONE ENCOUNTER
New   Requesting prior authorization for Kalydeco (ivacaftor) 150 mg tablets  PA Outcome approved    Quantity of 56 for a day supply of 28  Insurance [employer] [place] Commercial  Reference #? [id number]  Dates in effect, from 4/7/23 through 4/6/25  Pharmacy:[facility]  Copay 0.00  Fa not needed

## 2023-04-17 ENCOUNTER — PATIENT MESSAGE (OUTPATIENT)
Dept: INFUSION CENTER | Facility: MEDICAL CENTER | Age: 63
End: 2023-04-17
Payer: COMMERCIAL

## 2023-04-20 DIAGNOSIS — J01.01 ACUTE RECURRENT MAXILLARY SINUSITIS: ICD-10-CM

## 2023-04-20 RX ORDER — DOXYCYCLINE HYCLATE 100 MG/1
100 CAPSULE ORAL 2 TIMES DAILY
Qty: 20 CAPSULE | Refills: 0 | Status: SHIPPED | OUTPATIENT
Start: 2023-04-20 | End: 2023-04-30

## 2023-05-30 DIAGNOSIS — E84.0 CYSTIC FIBROSIS WITH PULMONARY MANIFESTATIONS (HCC): ICD-10-CM

## 2023-05-30 DIAGNOSIS — E56.1 VITAMIN K DEFICIENCY: ICD-10-CM

## 2023-05-31 RX ORDER — FLUTICASONE PROPIONATE 50 MCG
1-2 SPRAY, SUSPENSION (ML) NASAL DAILY
Qty: 48 ML | Refills: 2 | Status: SHIPPED | OUTPATIENT
Start: 2023-05-31

## 2023-05-31 RX ORDER — PHYTONADIONE 5 MG/1
5 TABLET ORAL DAILY
Qty: 90 TABLET | Refills: 3 | Status: SHIPPED | OUTPATIENT
Start: 2023-05-31

## 2023-05-31 NOTE — TELEPHONE ENCOUNTER
Last Visit: 3/8/23  Next Visit: 7/19/23    Received request via: Pharmacy    Was the patient seen in the last year in this department? Yes    Does the patient have an active prescription (recently filled or refills available) for medication(s) requested? No

## 2023-07-18 DIAGNOSIS — E84.9 CYSTIC FIBROSIS (HCC): ICD-10-CM

## 2023-07-18 NOTE — PATIENT INSTRUCTIONS
Kenia Soto  1960    CF Mutations: 3849+10kbC->T, [id number]  CFTR Modulator: Kalydeco    Respiratory  Baseline FEV1: 71% Today's FEV1: _____%  Airway Clearance Routine:  Albuterol (2 x day) / (3-4x/day when sick)  Hypertonic Saline (2 x day) / (3-4x/day when sick)  Pulmozyme (1 x day) / (2x/day when sick)  ACT Vest and/or Acapella (2 x day) / (3-4 x/day when sick)  Inhaled antibiotics: Cayston, COMPLETE 2 MORE WEEKS  ADD BUDESONIDE 2 ML TO SINUS RINSES 2 TIMES PER DAY  Equipment Check (Annually) Date scheduled:  Nutrition/Gastrointestinal  Weight: 132 pounds  BMI: Current: 21.8, Goal: 22 - 23     Enzymes: Creon 6 (0-3 depending with dinner only)  Vitamins: general MVi, DEKAs Plus softgel, magnesium, D, K, A , probiotics, Nuun electrolytes  Exercise: goal of 3 days per week = good job!  Social  Insurance: [employer]  Transportation: yes  Has access to food resources: yes  Financial Resources: none  School / Work Needs: none  Mental health screenin2022     Tasks:  Establish with a new [phone]  Goals  Annual Labs: last done 2022, Next Due 2023   LFTs: last done 2022, due again by 2023  Oral Glucose Tolerance Test: CFRD  If CFRD, how is it managed: Dietary Change, Oral hypoglycemic agents, intermittent insulin (with illness, steroids, etc.), chronic insulin, other diabetes drugs  Sputum Cultures: #1 ( 23 with AFB and Fungal Cx  )  , #2 ( 3/8/23 ), #3  ( ),  #4 (  ) (Dates)  DEXA scan: last done 2019, due again by   Chest X-ray: last done 23, next due 2024   Eye Exam: last done 2021, recommended again 2022 ([facility])   Notes  Has outstanding bill with [facility] that she doesn't owe, having problems resolving this issue

## 2023-07-19 ENCOUNTER — OFFICE VISIT (OUTPATIENT)
Dept: PEDIATRIC PULMONOLOGY | Facility: MEDICAL CENTER | Age: 63
End: 2023-07-19
Attending: PEDIATRICS
Payer: COMMERCIAL

## 2023-07-19 ENCOUNTER — HOSPITAL ENCOUNTER (OUTPATIENT)
Facility: MEDICAL CENTER | Age: 63
End: 2023-07-19
Attending: PEDIATRICS
Payer: COMMERCIAL

## 2023-07-19 VITALS
RESPIRATION RATE: 18 BRPM | HEART RATE: 71 BPM | WEIGHT: 132.28 LBS | OXYGEN SATURATION: 99 % | BODY MASS INDEX: 22.04 KG/M2 | HEIGHT: 65 IN

## 2023-07-19 DIAGNOSIS — K86.81 EXOCRINE PANCREATIC INSUFFICIENCY: ICD-10-CM

## 2023-07-19 DIAGNOSIS — Z22.8 CARRIER OF OTHER INFECTIOUS DISEASES: ICD-10-CM

## 2023-07-19 DIAGNOSIS — E08.9 DIABETES MELLITUS RELATED TO CF (CYSTIC FIBROSIS) (HCC): ICD-10-CM

## 2023-07-19 DIAGNOSIS — E84.0 CYSTIC FIBROSIS WITH PULMONARY MANIFESTATIONS (HCC): ICD-10-CM

## 2023-07-19 DIAGNOSIS — E84.8 DIABETES MELLITUS RELATED TO CF (CYSTIC FIBROSIS) (HCC): ICD-10-CM

## 2023-07-19 PROCEDURE — 87077 CULTURE AEROBIC IDENTIFY: CPT

## 2023-07-19 PROCEDURE — 99214 OFFICE O/P EST MOD 30 MIN: CPT | Performed by: PEDIATRICS

## 2023-07-19 PROCEDURE — 94010 BREATHING CAPACITY TEST: CPT | Performed by: PEDIATRICS

## 2023-07-19 PROCEDURE — 87186 SC STD MICRODIL/AGAR DIL: CPT

## 2023-07-19 PROCEDURE — 87181 SC STD AGAR DILUTION PER AGT: CPT

## 2023-07-19 PROCEDURE — 94010 BREATHING CAPACITY TEST: CPT | Mod: 26 | Performed by: PEDIATRICS

## 2023-07-19 PROCEDURE — 87205 SMEAR GRAM STAIN: CPT

## 2023-07-19 PROCEDURE — 87070 CULTURE OTHR SPECIMN AEROBIC: CPT

## 2023-07-19 RX ORDER — AMOXICILLIN 500 MG/1
500 CAPSULE ORAL DAILY
COMMUNITY
Start: 2023-07-17 | End: 2023-10-25

## 2023-07-19 ASSESSMENT — FIBROSIS 4 INDEX: FIB4 SCORE: 0.84

## 2023-07-19 NOTE — PROGRESS NOTES
CC: follow up cystic fibrosis    PCP:  Marleen Hargrove P.A.-C.   [id number] / [place] [id number]     SUBJECTIVE:   Kenia Soto is a 62 y.o. 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[facility]) 06/16/2020    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 11/13/2019    Cystic fibrosis ([facility]) 11/13/2019    Cystic fibrosis with pulmonary manifestations ([facility]) 11/13/2019    Herpes simplex 07/16/2019    Osteopenia of multiple sites 07/16/2019    CF (3849+10k bC>T, G6780M) ([facility]) 06/13/2019    Exocrine pancreatic insufficiency 06/13/2019    Vitamin K deficiency 06/13/2019    Partial thromboplastin time increased 08/22/2017    Moderate COPD (chronic obstructive pulmonary disease) ([facility]) 04/07/2015    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 While at the office, she had small hemoptysis , she took TXA bid for 4 days and hemoptysis stopped.    Last hospitalization: [10/21/20]    Respiratory:   Cough frequency: episodic, baseline  Cough character: productive  Sputum quantity: baseline  Sputum color: clear  Shortness of breath:never  Chest Pain:never  Hemoptysis:never   Doctor visits: none   Antibiotics:Curently on amoxicillin for root canal . Pulmonary toilet:   Albuterol: 2/day  7% hypertonic saline: 2/day 8ml mixed with glutathione  Pulmozyme: 0/day  Chest Physiotherapy: aerobika bid  Oxygen: none  Respiratory assist: none  Kalydeco bid  Advair 1-2 times/day    Compliance: compliant most of the time     Sinus symptoms:  Nasal Congestion: no  Nasal Drainage: no  Headache/sinus pressure: never       Activity / Energy: normal for age   Change in activity/energy: none   School/ Work attendance: no absences   School/ Work performance: no problem  Emotional assessment: positive       GI: no  problem   Appetite: normal  Enzymes:  daily  Creon 6K units 1 with dinner  Stool: 1-2/day, characteristics: formed        Medications:     Current Outpatient Medications:     amoxicillin, 500 mg, Oral, DAILY, Taking    fluticasone, 1-2 Spray, Nasal, DAILY, PRN    phytonadione, 5 mg, Oral, DAILY, Taking    Tranexamic Acid, TAKE 1-2 TABLETS BY MOUTH EVERY 8 HOURS AS NEEDED FOR HEMOPTYSIS, PRN    albuterol, 2.5 mg, Nebulization, TID, Taking    acyclovir, TAKE 1 TABLET BY MOUTH 4 TIMES A DAY FOR 14 DAYS, THEN TWICE DAILY FOR 3 MONTHS, PRN    C[place]ston, 1 mL, Inhalation, TID, PRN    Kalydeco, TAKE 1 TABLET TWICE DAILY WITH FAT-CONTAINING FOOD Strength: 150 mg, Taking    Creon, TAKE 1-2 CAPSULES 4 TIMES A DAY AS NEEDED WITH MEALS OR SNACKS **KEEP IN ORIGINAL BOTTLE**, Taking    Tresiba FlexTouch, INJECT 4 UNITS SUBCUTANEOUSLY DAILY (375 DAYS), Taking    sodium chloride, 4 mL, Nebulization, 4X/DAY, Taking    sodium chloride 3%, 4 mL, Nebulization, 4X/DAY PRN, PRN    fluticasone-salmeterol, 1 Puff, Inhalation, BID, Taking    nystatin, 500,000 Units, Oral, 4XDAY, PRN    HumaLOG KwikPen, 12-15 Units, Subcutaneous, TID AC, Taking    BD Pen Needle Radha 2nd Gen, 1 EACH BY OTHER ROUTE 4 TIMES A DAY,BEFORE MEALS AND AT BEDTIME. FOR INSULIN ADMINISTRATION., Taking    OneTouch Verio, TEST 3 TIMES DAILY FOR INSULIN ADJUSTMENT AND AS NEEDED SYMPTOMS OF HIGH OR LOW BLOOD SUGAR, Taking    Dexcom G6 Sensor, 1 Each, Does not apply, Q10 DAYS, Taking    Dexcom G6 Transmitter, 1 Each, Does not apply, Continuous, Taking    mupirocin, Apply BID for up to 10 days, PRN    Multiple Vitamins-Minerals (DEKAS PLUS PO), 1 Capsule, Oral, BID, Taking    magnesium oxide, 200 mg, Oral, BID, Taking    Vitamin C, 1,000 mg, Oral, BID, Taking    Probiotic Product (PROBIOTIC-10 PO), 1 Tablet, Oral, DAILY, Taking    vitamin D3, 10,000 Units, Oral, BID, Taking  Other Medications: none  Central Line: none    ALLERGIES:  Bactrim [sulfamethoxazole-trimethoprim],  "Ciprofloxacin, Levofloxacin, and Tobramycin    Review of System:  All other systems reviewed and negative    Ears, nose, mouth, throat, and face: negative  Cardiovascular: Negative  Gastrointestinal: Negative  Allergic/Immunologic: negative        Social/Environmental:  Social History     Tobacco Use    Smoking status: Never    Smokeless tobacco: Never   Vaping Use    Vaping Use: Never used   Substance Use Topics    Alcohol use: Yes     Alcohol/week: 1.8 oz     Types: 3 Standard drinks or equivalent per week     Comment: Socially    Drug use: No       Home Environment       Pet Exposures    Dogs Yes     Reptiles Yes        Tobacco use: never  Pets: none    Past Medical History:  Past Medical History:   Diagnosis Date    Allergy     Anemia     Asthma     Breath shortness     Bronchitis     CF (cystic fibrosis) (HCC)     Coughing blood     Diabetes (HCC)     Head ache     Hemorrhagic disorder (HCC)     Herpes simplex     Pneumonia     Shoulder fracture      Respiratory hospitalizations: [10/21/20]      Past surgical History:  Past Surgical History:   Procedure Laterality Date    BRONCHOSCOPY-ENDO  7/22/2019    Procedure: BRONCHOSCOPY - W/BAL;  Surgeon: Arelis Fleming M.D.;  Location: [facility];  Service: Ent    DENTAL SURGERY      EYE SURGERY      SINUS WASHING           Family History:   Family History   Problem Relation Age of Onset    Other Daughter         CF carrier    Alcohol/Drug Father     Heart Disease Father     Cystic Fibrosis Sister        OBJECTIVE:  Physical Exam:  Pulse 71   Resp 18   Ht 1.66 m (5' 5.35\")   Wt 60 kg (132 lb 4.4 oz)   SpO2 99%   BMI 21.77 kg/m²      GENERAL: well appearing, well nourished, no respiratory distress, and normal affect   EYES: PERRL, EOMI, normal conjunctiva  EARS: bilateral TM's and external ear canals normal   NOSE: no audible congestion and no discharge   MOUTH/THROAT: normal oropharynx   NECK: normal   CHEST: no chest wall deformities and normal A-P " "diameter   LUNGS: clear to auscultation and normal air exchange   HEART: regular rate and rhythm and no murmurs   ABDOMEN: soft, non-tender, non-distended, and no hepatosplenomegaly  : not examined  BACK: not examined   SKIN: normal color   EXTREMITIES: no clubbing, cyanosis, or inflammation   NEURO: gross motor exam normal by observation     PFT's:  Pulmonary Function Test Results (PFT)    Spirometry Actual Predicted % Predicted   FVC (L) 2.93 3.28 89   FEV1 ((L) 1.90 2.57 73   FEV1/FVC (%) 64.62 78.90 81   FEF 25-75% (L/sec) 0.94 2.26 41       Please see  PFT in \"Media Tab\" of Notes activity  (EMR)    Provider Interpretation: Mild obstruction, FEV1 at baseline      Labs reviewed:     Lab Results   Component Value Date/Time    SIGIND POS (POS) 03/08/2023 11:28 AM    SIGIND . 03/08/2023 11:28 AM    SOURCE RESP 03/08/2023 11:28 AM    SOURCE RESP 03/08/2023 11:28 AM    SITE Sputum 03/08/2023 11:28 AM    SITE Sputum 03/08/2023 11:28 AM    CULTRSULT (A) 03/08/2023 11:28 AM     Respiratory cultures from cystic fibrosis patients are  routinely screened for Staphylococcus aureus, Pseudomonas  aeruginosa, Burkholderia cepacia, Haemophilus influenzae,  Stenotrophomonas maltophilia, Achromobacter sp., and  Streptococcus pneumonia.  Heavy growth usual upper respiratory demetrius      CULTRSULT (A) 03/08/2023 11:28 AM     Pseudomonas aeruginosa  Light growth  Non mucoid phenotype      CULTRSULT (A) 03/08/2023 11:28 AM     Pseudomonas aeruginosa  Moderate growth  Mucoid phenotype  This organism is carbapenem resistant and requires  isolation for active infection.         ASSESSMENT/PLAN:   1. Cystic fibrosis with pulmonary manifestations (HCC)  Continue current airway clearance  Has h/o recurrent hemoptysis: keeps TXA on hand if needed  - Spirometry; Future  - [facility] - CF Resp Culture w/ Gram Stain; Future  - Spirometry    2. Diabetes mellitus related to CF (cystic fibrosis) (HCC)  New referral for endo needed, done  - " Referral to Endocrinology    3. Exocrine pancreatic insufficiency  Stable  Continue enzymes before meals    4. 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  Sputum cx obtained, will f/u results      Pulmonary Exacerbation: absent    Seen by Dietician:  Yes  Seen by Respiratory Therapy: Yes  Seen by :  Yes        Follow Up:  Return in about 3 months (around 10/19/2023).    Electronically signed by   Arelis Fleming M.D.   Pediatric Pulmonology

## 2023-07-19 NOTE — PROCEDURES
"Pulmonary Function Test Results (PFT)    Spirometry Actual Predicted % Predicted   FVC (L) 2.93 3.28 89   FEV1 ((L) 1.90 2.57 73   FEV1/FVC (%) 64.62 78.90 81   FEF 25-75% (L/sec) 0.94 2.26 41       Please see  PFT in \"Media Tab\" of Notes activity  (EMR)    Provider Interpretation: Mild obstruction, FEV1 at baseline    "

## 2023-07-19 NOTE — PROGRESS NOTES
Nutrition Screen & Progress Note for Adult CF Clinic  BMI: 21.8       Points: 0  IBW (kg): 58  % IBW: 103       Points: 0  Current weight (kg): 60   Weight last clinic visit: 58.9 kg on 3/8/23  % weight change: up 1.1 kg     Points: 0  FEV1 % predicted: 73      Points: 1            Total Points: 1          Nutrition Risk: low      BM: daily  PERT: Creon 6 (0-3 with meals, usually only with dinner) = avg of 2 per day  Lipase units/kg/meal: 200  CFTR modulator: Kalydeco  Other GI meds: no  Typical diet: 3 meals and 0-1 snacks  Vitamins: general MVi, DEKAs Plus softgel, magnesuim (400 mg), vitamin D (5000 IU), vitamin A (10,000 IU), vitamin K (5 mg)  Other supplements: Nuun electrolytes, probiotic    Visit details: Marina is here for CF visit, she is doing well. She feels healthy again. She is exercising 3 days per week, she jumps on the trampoline with weights for 45 minutes.    She is currently on Amoxicillin as she needs a root canal.  She still doesn't understand why she got so sick and needed IV antibiotics; she wonders if it was d/t hyperglycemia. She had reduced her insulin dose but realized she was having issues with her Dexcom.  So now her insulin dose increased again and she is doing better. However, she is unhappy with the care she is receiving from current endo MD so she would like a referral for [facility].    She feels her PERT is dialed in, she adjusts her dose based on what she is eating.  Takes 3 if eating erickson with breakfast but usually only needs PERT with dinner. Her BMs are back on track after two rounds of IV antibiotics.    Marina is having issues with her bill from [employer] for the IV antibx so she does not want to use this company in the future; let SW know about her outstanding bill with them that she really doesn't owe in case we can help her with this.    Recommendations/Plan: continue with CFRD diet, get established with new pelon [facility].  Continue with exercise program, goal is stable wt.     Follow-up: 3 months    Time spent: 20 minutes

## 2023-07-19 NOTE — PROGRESS NOTES
Respiratory -  Cystic Fibrosis Airway Clearance Therapy (ACT)      Kenia seen today at [facility]. Testing today included sputum sample and PFT.  Current plan for Respiratory medications and ACT is:        AM  Albuterol  Hypertonic Saline  Vest     PM  Albuterol  Hypertonic saline  ACT vest     Leo PRN when sick     Pt. given copy of PFT results.

## 2023-07-19 NOTE — PROGRESS NOTES
Medical Social Work    Referral: CF Clinic / Dr. Fleming    Intervention: Patient presents to CF clinic for routine cystic fibrosis follow-up. Patient is well groomed and oriented. Patient appears in good spirits, and states summer is going well, but is ready for fall weather already.       SW screened for transportation, food resources, school/work, financial, medication, and mental health needs. Patient confirms having concerns in one or more of these areas. Patient reports she is having difficulty with [employer]. Patient states she is owed money for overpayment of service when she was on IV antibiotics. Patient was told she needed to pay upfront for medications in the amount of $991.00. Patient states [employer] has been paid double from [facility] and herself. Patient states she has met her deductible with filling Kalydeco, so no payment should actually have been submitted. Patient states she has been in contact with [facility] and has been told they will contact [employer]. Patient states she still has not updates. Patient has filed a fraud claim with [employer].      Plan: SW will contact [facility] for tips. SW will continue to follow in clinic.    Time Spent: 15 minutes

## 2023-07-20 LAB
GRAM STN SPEC: NORMAL
SIGNIFICANT IND 70042: NORMAL
SITE SITE: NORMAL
SOURCE SOURCE: NORMAL

## 2023-07-25 ENCOUNTER — HOSPITAL ENCOUNTER (OUTPATIENT)
Dept: LAB | Facility: MEDICAL CENTER | Age: 63
End: 2023-07-25
Attending: PEDIATRICS
Payer: COMMERCIAL

## 2023-07-25 DIAGNOSIS — E84.9 CYSTIC FIBROSIS (HCC): ICD-10-CM

## 2023-07-25 LAB
25(OH)D3 SERPL-MCNC: 74 NG/ML (ref 30–100)
ALBUMIN SERPL BCP-MCNC: 4.3 G/DL (ref 3.2–4.9)
ALBUMIN/GLOB SERPL: 1.7 G/DL
ALP SERPL-CCNC: 139 U/L (ref 30–99)
ALT SERPL-CCNC: 27 U/L (ref 2–50)
ANION GAP SERPL CALC-SCNC: 8 MMOL/L (ref 7–16)
AST SERPL-CCNC: 26 U/L (ref 12–45)
BASOPHILS # BLD AUTO: 1.6 % (ref 0–1.8)
BASOPHILS # BLD: 0.09 K/UL (ref 0–0.12)
BILIRUB SERPL-MCNC: 0.3 MG/DL (ref 0.1–1.5)
BUN SERPL-MCNC: 14 MG/DL (ref 8–22)
CALCIUM ALBUM COR SERPL-MCNC: 9.5 MG/DL (ref 8.5–10.5)
CALCIUM SERPL-MCNC: 9.7 MG/DL (ref 8.5–10.5)
CHLORIDE SERPL-SCNC: 105 MMOL/L (ref 96–112)
CHOLEST SERPL-MCNC: 159 MG/DL (ref 100–199)
CO2 SERPL-SCNC: 28 MMOL/L (ref 20–33)
CREAT SERPL-MCNC: 0.71 MG/DL (ref 0.5–1.4)
EOSINOPHIL # BLD AUTO: 0.22 K/UL (ref 0–0.51)
EOSINOPHIL NFR BLD: 3.9 % (ref 0–6.9)
ERYTHROCYTE [DISTWIDTH] IN BLOOD BY AUTOMATED COUNT: 47.7 FL (ref 35.9–50)
EST. AVERAGE GLUCOSE BLD GHB EST-MCNC: 120 MG/DL
GFR SERPLBLD CREATININE-BSD FMLA CKD-EPI: 96 ML/MIN/1.73 M 2
GGT SERPL-CCNC: 12 U/L (ref 7–34)
GLOBULIN SER CALC-MCNC: 2.6 G/DL (ref 1.9–3.5)
GLUCOSE SERPL-MCNC: 99 MG/DL (ref 65–99)
HBA1C MFR BLD: 5.8 % (ref 4–5.6)
HCT VFR BLD AUTO: 47.4 % (ref 37–47)
HDLC SERPL-MCNC: 49 MG/DL
HGB BLD-MCNC: 14.6 G/DL (ref 12–16)
IMM GRANULOCYTES # BLD AUTO: 0.01 K/UL (ref 0–0.11)
IMM GRANULOCYTES NFR BLD AUTO: 0.2 % (ref 0–0.9)
INR PPP: 1.06 (ref 0.87–1.13)
LDLC SERPL CALC-MCNC: 89 MG/DL
LYMPHOCYTES # BLD AUTO: 1.65 K/UL (ref 1–4.8)
LYMPHOCYTES NFR BLD: 29.5 % (ref 22–41)
MCH RBC QN AUTO: 29.3 PG (ref 27–33)
MCHC RBC AUTO-ENTMCNC: 30.8 G/DL (ref 32.2–35.5)
MCV RBC AUTO: 95 FL (ref 81.4–97.8)
MONOCYTES # BLD AUTO: 0.64 K/UL (ref 0–0.85)
MONOCYTES NFR BLD AUTO: 11.4 % (ref 0–13.4)
NEUTROPHILS # BLD AUTO: 2.98 K/UL (ref 1.82–7.42)
NEUTROPHILS NFR BLD: 53.4 % (ref 44–72)
NRBC # BLD AUTO: 0 K/UL
NRBC BLD-RTO: 0 /100 WBC (ref 0–0.2)
PLATELET # BLD AUTO: 384 K/UL (ref 164–446)
PMV BLD AUTO: 10.2 FL (ref 9–12.9)
POTASSIUM SERPL-SCNC: 4.7 MMOL/L (ref 3.6–5.5)
PROT SERPL-MCNC: 6.9 G/DL (ref 6–8.2)
PROTHROMBIN TIME: 13.7 SEC (ref 12–14.6)
RBC # BLD AUTO: 4.99 M/UL (ref 4.2–5.4)
SODIUM SERPL-SCNC: 141 MMOL/L (ref 135–145)
TRIGL SERPL-MCNC: 105 MG/DL (ref 0–149)
WBC # BLD AUTO: 5.6 K/UL (ref 4.8–10.8)

## 2023-07-25 PROCEDURE — 80053 COMPREHEN METABOLIC PANEL: CPT

## 2023-07-25 PROCEDURE — 80061 LIPID PANEL: CPT

## 2023-07-25 PROCEDURE — 85025 COMPLETE CBC W/AUTO DIFF WBC: CPT

## 2023-07-25 PROCEDURE — 85610 PROTHROMBIN TIME: CPT

## 2023-07-25 PROCEDURE — 84590 ASSAY OF VITAMIN A: CPT

## 2023-07-25 PROCEDURE — 83036 HEMOGLOBIN GLYCOSYLATED A1C: CPT

## 2023-07-25 PROCEDURE — 82306 VITAMIN D 25 HYDROXY: CPT

## 2023-07-25 PROCEDURE — 82977 ASSAY OF GGT: CPT

## 2023-07-25 PROCEDURE — 36415 COLL VENOUS BLD VENIPUNCTURE: CPT

## 2023-07-25 PROCEDURE — 84446 ASSAY OF VITAMIN E: CPT

## 2023-07-27 LAB
A-TOCOPHEROL VIT E SERPL-MCNC: 11.2 MG/L (ref 5.5–18)
BETA+GAMMA TOCOPHEROL SERPL-MCNC: 0.5 MG/L (ref 0–6)

## 2023-07-28 LAB
ANNOTATION COMMENT IMP: NORMAL
RETINYL PALMITATE SERPL-MCNC: 0.06 MG/L (ref 0–0.1)
VIT A SERPL-MCNC: 0.35 MG/L (ref 0.3–1.2)

## 2023-08-12 ENCOUNTER — PATIENT MESSAGE (OUTPATIENT)
Dept: PEDIATRIC PULMONOLOGY | Facility: MEDICAL CENTER | Age: 63
End: 2023-08-12
Payer: COMMERCIAL

## 2023-08-14 DIAGNOSIS — E84.9 CYSTIC FIBROSIS (HCC): ICD-10-CM

## 2023-08-14 RX ORDER — FLUTICASONE PROPIONATE AND SALMETEROL 250; 50 UG/1; UG/1
1 POWDER RESPIRATORY (INHALATION) 2 TIMES DAILY
Qty: 1 EACH | Refills: 5 | Status: SHIPPED | OUTPATIENT
Start: 2023-08-14 | End: 2024-02-29

## 2023-08-14 NOTE — TELEPHONE ENCOUNTER
Pharmacy called to inform us that Mairna has requested a refill on her Advair inhaler but the refill prescription ran out in September of last year.

## 2023-08-15 ENCOUNTER — PATIENT MESSAGE (OUTPATIENT)
Dept: PEDIATRIC PULMONOLOGY | Facility: MEDICAL CENTER | Age: 63
End: 2023-08-15
Payer: COMMERCIAL

## 2023-08-23 RX ORDER — ACYCLOVIR 400 MG/1
TABLET ORAL
Qty: 180 TABLET | Refills: 1 | Status: SHIPPED | OUTPATIENT
Start: 2023-08-23

## 2023-08-23 NOTE — TELEPHONE ENCOUNTER
Received request via: Pharmacy    Was the patient seen in the last year in this department? Yes  LOV: 11/22/2022  Does the patient have an active prescription (recently filled or refills available) for medication(s) requested? No    Does the patient have retirement Plus and need 100 day supply (blood pressure, diabetes and cholesterol meds only)? Patient does not have SCP

## 2023-09-11 ENCOUNTER — TELEPHONE (OUTPATIENT)
Dept: PHARMACY | Facility: MEDICAL CENTER | Age: 63
End: 2023-09-11
Payer: COMMERCIAL

## 2023-09-11 NOTE — TELEPHONE ENCOUNTER
Received Renewal request via [facility]  for Ivacaftor (KALYDECO) 150 MG Tab   (Quantity:56, Day Supply:28)     Insurance: [facility]  Member ID:  [id number]  BIN: [id number]  PCN: [id number]  Group: [id number]     Ran Test claim via [place] & medication Pays for a $0 copay    Will outreach patient to offer services with [facility] Espinosa [facility]   Pharmacy Liaison  [phone]  9/11/2023 7:39 AM

## 2023-09-20 ENCOUNTER — TELEPHONE (OUTPATIENT)
Dept: PHARMACY | Facility: MEDICAL CENTER | Age: 63
End: 2023-09-20
Payer: COMMERCIAL

## 2023-09-20 DIAGNOSIS — R04.2 HEMOPTYSIS: ICD-10-CM

## 2023-09-20 DIAGNOSIS — E84.0 CYSTIC FIBROSIS WITH PULMONARY EXACERBATION (HCC): ICD-10-CM

## 2023-09-20 RX ORDER — TRANEXAMIC ACID 650 MG/1
TABLET ORAL
Qty: 30 TABLET | Refills: 1 | Status: SHIPPED | OUTPATIENT
Start: 2023-09-20

## 2023-09-20 NOTE — TELEPHONE ENCOUNTER
Received Renewal  request via [facility]  for Tranexamic Acid 650 MG Tab   (Quantity:30, Day Supply:5)     Insurance: [facility]  Member ID:  [id number]  BIN: [id number]  PCN: [id number]  Group: [id number]     Ran Test claim via [place] & medication Pays for a $0.00 copay. Will outreach to patient to offer specialty pharmacy services and or release to preferred pharmacy    Cary Espinosa [facility]   Pharmacy Liaison  [phone]  9/20/2023 2:32 PM

## 2023-10-22 DIAGNOSIS — E84.9 CYSTIC FIBROSIS (HCC): ICD-10-CM

## 2023-10-23 NOTE — TELEPHONE ENCOUNTER
Received request via: Pharmacy    Was the patient seen in the last year in this department? Yes    Does the patient have an active prescription (recently filled or refills available) for medication(s) requested? No    Does the patient have FPC Plus and need 100 day supply (blood pressure, diabetes and cholesterol meds only)? Patient does not have SCP      Last Office Visit:07/19/2023

## 2023-10-24 RX ORDER — IVACAFTOR 150 MG/1
TABLET, FILM COATED ORAL
Qty: 56 TABLET | Refills: 0 | Status: SHIPPED | OUTPATIENT
Start: 2023-10-24 | End: 2023-11-14 | Stop reason: SDUPTHER

## 2023-10-25 ENCOUNTER — HOSPITAL ENCOUNTER (OUTPATIENT)
Facility: MEDICAL CENTER | Age: 63
End: 2023-10-25
Attending: INTERNAL MEDICINE
Payer: COMMERCIAL

## 2023-10-25 ENCOUNTER — OFFICE VISIT (OUTPATIENT)
Dept: PEDIATRIC PULMONOLOGY | Facility: MEDICAL CENTER | Age: 63
End: 2023-10-25
Attending: INTERNAL MEDICINE
Payer: COMMERCIAL

## 2023-10-25 VITALS
WEIGHT: 133.16 LBS | RESPIRATION RATE: 18 BRPM | OXYGEN SATURATION: 94 % | HEIGHT: 66 IN | BODY MASS INDEX: 21.4 KG/M2 | HEART RATE: 93 BPM

## 2023-10-25 DIAGNOSIS — E84.9 CYSTIC FIBROSIS (HCC): ICD-10-CM

## 2023-10-25 DIAGNOSIS — Z71.85 VACCINE COUNSELING: ICD-10-CM

## 2023-10-25 PROCEDURE — 94010 BREATHING CAPACITY TEST: CPT | Performed by: INTERNAL MEDICINE

## 2023-10-25 PROCEDURE — 87077 CULTURE AEROBIC IDENTIFY: CPT | Mod: 91

## 2023-10-25 PROCEDURE — 99214 OFFICE O/P EST MOD 30 MIN: CPT | Mod: 25 | Performed by: INTERNAL MEDICINE

## 2023-10-25 PROCEDURE — 94010 BREATHING CAPACITY TEST: CPT | Mod: 26 | Performed by: INTERNAL MEDICINE

## 2023-10-25 PROCEDURE — 87186 SC STD MICRODIL/AGAR DIL: CPT

## 2023-10-25 PROCEDURE — 87070 CULTURE OTHR SPECIMN AEROBIC: CPT

## 2023-10-25 PROCEDURE — 99214 OFFICE O/P EST MOD 30 MIN: CPT | Performed by: INTERNAL MEDICINE

## 2023-10-25 PROCEDURE — 87181 SC STD AGAR DILUTION PER AGT: CPT

## 2023-10-25 PROCEDURE — 87205 SMEAR GRAM STAIN: CPT

## 2023-10-25 ASSESSMENT — FIBROSIS 4 INDEX: FIB4 SCORE: 0.82

## 2023-10-25 NOTE — PROCEDURES
"Pulmonary Function Test Results (PFT)    Spirometry Actual Predicted % Predicted   FVC (L) 2.96 3.25 91   FEV1 ((L) 2.01 2.54 79   FEV1/FVC (%) 67.92 78.79 86   FEF 25-75% (L/sec) 1.00 2.22 44     Please see  PFT in \"Media Tab\" of Notes activity  (EMR)    Provider Interpretation: Moderate obstruction. FEV1 of 79% is improved from baseline 73%. (1.58L)     "

## 2023-10-25 NOTE — PROGRESS NOTES
"CC: follow up cystic fibrosis    PCP:  Marleen Hargrove P.A.-C.   [id number] [id number] / [place] [id number]     SUBJECTIVE:   Kenia Soto is a 62 y.o. 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[facility]) 06/16/2020    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 11/13/2019    Cystic fibrosis ([facility]) 11/13/2019    Cystic fibrosis with pulmonary manifestations ([facility]) 11/13/2019    Herpes simplex 07/16/2019    Osteopenia of multiple sites 07/16/2019    CF (3849+10k bC>T, [id number]) ([facility]) 06/13/2019    Exocrine pancreatic insufficiency 06/13/2019    Vitamin K deficiency 06/13/2019    Partial thromboplastin time increased 08/22/2017    Moderate COPD (chronic obstructive pulmonary disease) ([facility]) 04/07/2015    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   Last hospitalization: [10/21/20]    Respiratory:   Cough frequency: episodic, decreased  Cough character: productive  Sputum quantity: baseline  Sputum color: yellow  Shortness of breath:never  Chest Pain:never  Hemoptysis:occasional    Doctor visits: none   Antibiotics: last month as prophylaxis before a vacation.   Pulmonary toilet:   Albuterol: 2/day  7% hypertonic saline: 2/day 8ml mixed with glutathione  Pulmozyme: 0/day  Chest Physiotherapy: aerobika bid  Oxygen: none  Respiratory assist: none  Kalydeco bid  Advair 1-2 times/day    Compliance: compliant most of the time     Sinus symptoms:  Nasal Congestion: no  Nasal Drainage: no  Headache/sinus pressure: never       Activity / Energy: normal for age   Change in activity/energy: none   School/ Work attendance: no absences   School/ Work performance: no problem  Emotional assessment: positive       GI: no problem   Appetite: normal  Enzymes:  daily  Creon 6K units 1 with dinner  Stool: " 1-2/day, characteristics: formed        Medications:     Current Outpatient Medications:     Kalydeco, TAKE 1 TABLET TWICE DAILY WITH FAT CONTAINING FOOD, Taking    Tranexamic Acid, TAKE 1-2 TABLETS BY MOUTH EVERY 8 HOURS AS NEEDED FOR HEMOPTYSIS, PRN    acyclovir, TAKE 1 TABLET BY MOUTH 4 TIMES A DAY FOR 14 DAYS, THEN TWICE DAILY FOR 3 MONTHS, Taking    fluticasone-salmeterol, 1 Puff, Inhalation, BID, Taking    fluticasone, 1-2 Spray, Nasal, DAILY, Taking    phytonadione, 5 mg, Oral, DAILY, Taking    albuterol, 2.5 mg, Nebulization, TID (Patient taking differently: 2.5 mg, Nebulization, 2 Times Daily (BID), Increase with illness), Taking Differently    [place]ston, 1 mL, Inhalation, TID, Taking    Creon, TAKE 1-2 CAPSULES 4 TIMES A DAY AS NEEDED WITH MEALS OR SNACKS **KEEP IN ORIGINAL BOTTLE**, Taking    Tresiba FlexTouch, INJECT 4 UNITS SUBCUTANEOUSLY DAILY (375 DAYS), Taking    sodium chloride, 4 mL, Nebulization, 4X/DAY, Taking    sodium chloride 3%, 4 mL, Nebulization, 4X/DAY PRN, PRN    HumaLOG KwikPen, 12-15 Units, Subcutaneous, TID AC, Taking    BD Pen Needle Radha 2nd Gen, 1 EACH BY OTHER ROUTE 4 TIMES A DAY,BEFORE MEALS AND AT BEDTIME. FOR INSULIN ADMINISTRATION., Taking    OneTouch Verio, TEST 3 TIMES DAILY FOR INSULIN ADJUSTMENT AND AS NEEDED SYMPTOMS OF HIGH OR LOW BLOOD SUGAR, Taking    Dexcom G6 Sensor, 1 Each, Does not apply, Q10 DAYS, Taking    Dexcom G6 Transmitter, 1 Each, Does not apply, Continuous, Taking    mupirocin, Apply BID for up to 10 days, PRN    Multiple Vitamins-Minerals (DEKAS PLUS PO), 1 Capsule, Oral, BID, Taking    magnesium oxide, 200 mg, Oral, BID, Taking    Vitamin C, 1,000 mg, Oral, BID, Taking    Probiotic Product (PROBIOTIC-10 PO), 1 Tablet, Oral, DAILY, Taking    vitamin D3, 10,000 Units, Oral, BID, Taking  Other Medications: none  Central Line: none    ALLERGIES:  Bactrim [sulfamethoxazole-trimethoprim], Ciprofloxacin, Levofloxacin, and Tobramycin    Review of System:  All other  "systems reviewed and negative    Ears, nose, mouth, throat, and face: negative  Cardiovascular: Negative  Gastrointestinal: Negative  Allergic/Immunologic: negative      Social/Environmental:  Social History     Tobacco Use    Smoking status: Never    Smokeless tobacco: Never   Vaping Use    Vaping Use: Never used   Substance Use Topics    Alcohol use: Yes     Alcohol/week: 1.8 oz     Types: 3 Standard drinks or equivalent per week     Comment: Socially    Drug use: No       Home Environment       Pet Exposures    Dogs Yes     Reptiles Yes        Tobacco use: never  Pets: none    Past Medical History:  Past Medical History:   Diagnosis Date    Allergy     Anemia     Asthma     Breath shortness     Bronchitis     CF (cystic fibrosis) (HCC)     Coughing blood     Diabetes (HCC)     Head ache     Hemorrhagic disorder (HCC)     Herpes simplex     Pneumonia     Shoulder fracture      Respiratory hospitalizations: [10/21/20]      Past surgical History:  Past Surgical History:   Procedure Laterality Date    BRONCHOSCOPY-ENDO  7/22/2019    Procedure: BRONCHOSCOPY - W/BAL;  Surgeon: Arelis Fleming M.D.;  Location: ENDOSCOPY [facility];  Service: Ent    DENTAL SURGERY      EYE SURGERY      SINUS WASHING           Family History:   Family History   Problem Relation Age of Onset    Other Daughter         CF carrier    Alcohol/Drug Father     Heart Disease Father     Cystic Fibrosis Sister        OBJECTIVE:  Physical Exam:  Pulse 93   Resp 18   Ht 1.665 m (5' 5.55\")   Wt 60.4 kg (133 lb 2.5 oz)   SpO2 94%   BMI 21.79 kg/m²      Physical Exam  Vitals reviewed. Exam conducted with a chaperone present.   Constitutional:       General: She is not in acute distress.     Appearance: Normal appearance. She is not ill-appearing, toxic-appearing or diaphoretic.   HENT:      Right Ear: External ear normal.      Left Ear: External ear normal.      Nose: Nose normal.   Eyes:      General:         Right eye: No discharge.         " "Left eye: No discharge.      Conjunctiva/sclera: Conjunctivae normal.   Cardiovascular:      Rate and Rhythm: Normal rate and regular rhythm.      Pulses: Normal pulses.   Pulmonary:      Effort: Pulmonary effort is normal.      Breath sounds: Normal breath sounds.   Musculoskeletal:      Right lower leg: No edema.      Left lower leg: No edema.   Skin:     General: Skin is warm.      Capillary Refill: Capillary refill takes less than 2 seconds.      Coloration: Skin is not jaundiced.   Neurological:      General: No focal deficit present.      Mental Status: She is alert and oriented to person, place, and time.           PFT's:  Pulmonary Function Test Results (PFT)    Spirometry Actual Predicted % Predicted   FVC (L) 2.96 3.25 91   FEV1 ((L) 2.01 2.54 79   FEV1/FVC (%) 67.92 78.79 86   FEF 25-75% (L/sec) 1.00 2.22 44     Please see  PFT in \"Media Tab\" of Notes activity  (EMR)    Provider Interpretation: Moderate obstruction. FEV1 of 79% is improved from baseline 73%. (1.58L)       Labs reviewed:     Lab Results   Component Value Date/Time    SIGIND POS (POS) 07/19/2023 11:37 AM    SIGIND . 07/19/2023 11:37 AM    SOURCE RESP 07/19/2023 11:37 AM    SOURCE RESP 07/19/2023 11:37 AM    SITE Sputum 07/19/2023 11:37 AM    SITE Sputum 07/19/2023 11:37 AM    CULTRSULT (A) 07/19/2023 11:37 AM     Moderate growth usual upper respiratory demetrius including  yeast.  Respiratory cultures from cystic fibrosis patients are  routinely screened for Staphylococcus aureus, Pseudomonas  aeruginosa, Burkholderia cepacia, Haemophilus influenzae,  Stenotrophomonas maltophilia, Achromobacter sp., and  Streptococcus pneumonia.      CULTRSULT Pseudomonas aeruginosa  Heavy growth  Non-mucoid   (A) 07/19/2023 11:37 AM    CULTRSULT (A) 07/19/2023 11:37 AM     Pseudomonas aeruginosa  Heavy growth  Mucoid phenotype       Immunization History   Administered Date(s) Administered    Hepatitis B Vaccine Adolescent/Pediatric 10/27/1999, 12/14/1999, " 08/04/2000    Influenza (IM) Preservative Free - HISTORICAL DATA 10/25/1999, 11/03/2008, 10/27/2009, 10/26/2010, 11/02/2011, 11/06/2012    Influenza Seasonal Injectable - Historical Data 11/04/2015, 10/14/2016, 10/30/2017    Influenza Vaccine Adult HD 10/27/2009, 10/01/2019    Influenza Vaccine Pediatric Split - Historical Data 10/23/1995, 10/21/1996, 10/15/1997, 10/29/1998, 11/14/2000, 11/14/2001, 11/05/2002, 10/27/2003, 10/18/2004, 10/24/2005, 11/13/2006, 10/22/2007, 11/18/2013, 10/22/2014    Influenza Vaccine Quad Inj (Pf) 10/01/2019, 11/02/2020    Influenza, Unspecified - HISTORICAL DATA 10/09/2018    Pneumococcal polysaccharide vaccine (PPSV-23) 10/27/1999    TD Vaccine 10/27/1999, 12/07/2018    Tdap Vaccine 06/01/2008    Zoster Vaccine Live (ZVL) (Zostavax) - HISTORICAL DATA 04/22/2015       ASSESSMENT/PLAN:   1. Cystic fibrosis with pulmonary manifestations (HCC)  Continue current airway clearance  Has h/o recurrent hemoptysis: keeps TXA on hand if needed  - Spirometry; Future  - [facility] - CF Resp Culture w/ Gram Stain; Future  - Spirometry was her best since 2019 today  - vaccine counseling provided. She declines most vaccines including Influenza, COVID-19, and RSV despite counseling that she is a high risk patient and that these vaccines offer protection against deterioration, hospitalization, and death.     2. Diabetes mellitus related to CF (cystic fibrosis) (HCC)  New referral for endo needed, done  - Referral to Endocrinology    3. Exocrine pancreatic insufficiency  Stable  Continue enzymes before meals    4. 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  Most recent sputum culture with mucoid and nonmucoid pseudomonas. Sensitive to Zosyn in vitro.       Pulmonary Exacerbation: absent    Seen by Dietician:  Yes  Seen by Respiratory Therapy: Yes  Seen by :  Yes        Follow Up:  No follow-ups on file.    Electronically signed by   Fredo Squires D.O.  Pediatric Pulmonology

## 2023-10-25 NOTE — PATIENT INSTRUCTIONS
Kenia Soto  1960    CF Mutations: 3849+10kbC->T, [id number]  CFTR Modulator: Kalydeco    Respiratory  Baseline FEV1: 71% Today's FEV1: __62/79___%  Airway Clearance Routine:  Albuterol (2 x day) / (3-4x/day when sick)  Hypertonic Saline (2 x day) / (3-4x/day when sick)  Pulmozyme (1 x day) / (2x/day when sick)  ACT Vest and/or Acapella (2 x day) / (3-4 x/day when sick)  Inhaled antibiotics: [place], COMPLETE 2 MORE WEEKS  ADD BUDESONIDE 2 ML TO SINUS RINSES 2 TIMES PER DAY  Equipment Check (Annually) Date scheduled:  CF Culture: [facility]  Nutrition/Gastrointestinal  BMI: Current: 21.8, Goal: 22 - 23     Enzymes: Creon 6 (0-3 depending on meal content)  Vitamins: general MVi, DEKAs Plus softgel, magnesium, D, K, A , probiotics, Nuun electrolytes  Exercise: goal of 3 days per week = good job!  Social  Insurance: [email or web address]  Transportation: yes  Has access to food resources: yes  Financial Resources: none  School / Work Needs: none  Mental health screening completed:   3/8/2023      Tasks:    Goals  Annual Labs: last done 7/2023, next due 7/2024  LFTs: last done 7/2023, next due 7/2024  Oral Glucose Tolerance Test: CFRD  If CFRD, how is it managed: Dietary Change, Oral hypoglycemic agents, intermittent insulin (with illness, steroids, etc.), chronic insulin, other diabetes drugs  Sputum Cultures: #1 ( 2/7/23 with AFB and Fungal Cx  )  , #2 ( 3/8/23 ), #3  (7/19/23 ),  #4 ( 10/25/23 ) (Dates)  DEXA scan: last done 7/2019, due again by 2024  Chest X-ray: last done 2/8/23, next due 2/2024   Eye Exam: last done 12/2021, recommended again 12/2022 ([facility] [place])   Notes  Has outstanding bill with [facility] that she doesn't owe, having problems resolving this issue

## 2023-10-25 NOTE — PROGRESS NOTES
Nutrition Screen & Progress Note for Adult CF Clinic  BMI: 21.8       Points: 0  IBW (kg): 58  % IBW: 104       Points: 0  Current weight (kg): 60.4   Weight last clinic visit: 60 kg on 7/19/23  % weight change: stable     Points: 0  FEV1 % predicted: 79      Points: 0           Total Points: 0          Nutrition Risk: low    BM: dailiy  PERT: Creon 6 (0-3 with meals, usually only with dinner) = avg of 2 per day  Lipase units/kg/meal: 0-200  CFTR modulator: Kalydeco  Other GI meds: no  Typical diet: 3 meals and 0-1 snacks  Vitamins: general MVi, DEKAs Plus softgel, magnesium (400 mg), vitamin D (5000 IU), vitamin A (10,000 IU), vitamin K (5 mg)  Other supplements: Nuun electrolytes, probiotic    Visit details: Marina is here for follow up, she looks great and she has been healthy. She exercises ~3 days per week.  She just celebrated her 63rd birthday, she was eating richer/heavier food so she was taking more PERT. Yesterday she had some lower abd pain but it has resolved.    Marina has no nutrition questions/concerns today. Hoping she can stay healthy this winter.  Initial PFT today was only 62; repeated with a different machine and it was much better at 79. She feels her lungs are healthy.    Followed by pelon KLEIN d/t CFRD.    Recommendations/Plan: continue with CFRD diet and exercise program  Follow-up: 3 months    Time spent: 16 minutes

## 2023-10-26 LAB
GRAM STN SPEC: NORMAL
SIGNIFICANT IND 70042: NORMAL
SITE SITE: NORMAL
SOURCE SOURCE: NORMAL

## 2023-11-14 DIAGNOSIS — E84.9 CYSTIC FIBROSIS (HCC): ICD-10-CM

## 2023-11-14 RX ORDER — IVACAFTOR 150 MG/1
TABLET, FILM COATED ORAL
Qty: 56 TABLET | Refills: 0 | Status: SHIPPED | OUTPATIENT
Start: 2023-11-14 | End: 2023-12-12

## 2023-11-14 NOTE — TELEPHONE ENCOUNTER
Received request via: Patient    Was the patient seen in the last year in this department? Yes    Does the patient have an active prescription (recently filled or refills available) for medication(s) requested? No    Does the patient have [facility] Plus and need 100 day supply (blood pressure, diabetes and cholesterol meds only)? Patient does not have SCP      Last Office Visit:07/19/2023

## 2023-11-20 DIAGNOSIS — E84.0 CYSTIC FIBROSIS WITH PULMONARY EXACERBATION (HCC): ICD-10-CM

## 2023-11-21 RX ORDER — SODIUM CHLORIDE FOR INHALATION 7 %
4 VIAL, NEBULIZER (ML) INHALATION 4 TIMES DAILY
Qty: 480 ML | Refills: 3 | Status: SHIPPED | OUTPATIENT
Start: 2023-11-21 | End: 2023-11-27 | Stop reason: SDUPTHER

## 2023-11-21 NOTE — TELEPHONE ENCOUNTER
Received request via: Pharmacy    Was the patient seen in the last year in this department? Yes    Does the patient have an active prescription (recently filled or refills available) for medication(s) requested? No    Last Visit- 07/19/2023  Next Visit-  01/03/2024

## 2023-11-27 ENCOUNTER — PATIENT MESSAGE (OUTPATIENT)
Dept: PEDIATRIC PULMONOLOGY | Facility: MEDICAL CENTER | Age: 63
End: 2023-11-27
Payer: COMMERCIAL

## 2023-11-27 DIAGNOSIS — E84.0 CYSTIC FIBROSIS WITH PULMONARY EXACERBATION (HCC): ICD-10-CM

## 2023-11-27 RX ORDER — SODIUM CHLORIDE FOR INHALATION 7 %
4 VIAL, NEBULIZER (ML) INHALATION 4 TIMES DAILY
Qty: 1440 ML | Refills: 3 | Status: SHIPPED | OUTPATIENT
Start: 2023-11-27

## 2023-12-09 DIAGNOSIS — E84.9 CYSTIC FIBROSIS (HCC): ICD-10-CM

## 2023-12-11 NOTE — TELEPHONE ENCOUNTER
Received request via: Pharmacy    Was the patient seen in the last year in this department? Yes    Does the patient have an active prescription (recently filled or refills available) for medication(s) requested? No    Does the patient have [facility] and need 100 day supply (blood pressure, diabetes and cholesterol meds only)? Patient does not have SCP      Last Office Visit:07/19/2023  Next Appt 01/03/2024

## 2023-12-12 ENCOUNTER — PATIENT MESSAGE (OUTPATIENT)
Dept: PEDIATRIC PULMONOLOGY | Facility: MEDICAL CENTER | Age: 63
End: 2023-12-12
Payer: COMMERCIAL

## 2023-12-12 ENCOUNTER — TELEPHONE (OUTPATIENT)
Dept: PHARMACY | Facility: MEDICAL CENTER | Age: 63
End: 2023-12-12
Payer: COMMERCIAL

## 2023-12-12 DIAGNOSIS — B96.5 PSEUDOMONAS RESPIRATORY INFECTION: ICD-10-CM

## 2023-12-12 DIAGNOSIS — E84.9 CYSTIC FIBROSIS (HCC): ICD-10-CM

## 2023-12-12 DIAGNOSIS — J98.8 PSEUDOMONAS RESPIRATORY INFECTION: ICD-10-CM

## 2023-12-12 RX ORDER — AZTREONAM 75 MG/ML
1 KIT INHALATION 3 TIMES DAILY
Qty: 84 ML | Refills: 3 | Status: SHIPPED | OUTPATIENT
Start: 2023-12-12

## 2023-12-12 RX ORDER — IVACAFTOR 150 MG/1
TABLET, FILM COATED ORAL
Qty: 56 TABLET | Refills: 6 | Status: SHIPPED | OUTPATIENT
Start: 2023-12-12

## 2023-12-12 NOTE — PATIENT COMMUNICATION
Received request via: Patient    Was the patient seen in the last year in this department? Yes    Does the patient have an active prescription (recently filled or refills available) for medication(s) requested? No    Does the patient have [facility] Plus and need 100 day supply (blood pressure, diabetes and cholesterol meds only)? Patient does not have SCP    Last office Visit:10/25/2023  Next Appt:01/03/2024

## 2023-12-12 NOTE — TELEPHONE ENCOUNTER
PA for CAYSTON 75 MG Recon Soln  has been approved for a quantity of 84 ml , day supply 28    PA reference number: [id number]  Insurance: BCBS  Effective dates: 12/12/23 to 12/11/25  Copay: $0     Is patient eligible to fill with [facility]? Yes    Next Steps: The Patients copay is less than $5.00. Will contact the patient to determine choice of pharmacy, if applicable.    Cary Espinosa [facility]   Pharmacy Liaison  [phone]  12/12/2023 2:07 PM

## 2023-12-12 NOTE — TELEPHONE ENCOUNTER
Received Renewal  request via [employer]  for CAYSTON 75 MG Recon Soln  . (Quantity:84 ml, Day Supply:28)     Insurance: [facility]  Member ID:  [id number]  BIN: [id number]  PCN: [id number]  Group: [id number]     Ran Test claim via [place] [facility] medication Rejects stating prior authorization is required.    Cary Espinosa [facility]   Pharmacy Liaison  [phone]  12/12/2023 1:27 PM

## 2023-12-12 NOTE — TELEPHONE ENCOUNTER
Prior Authorization for CAYSTON 75 MG Recon Soln (Quantity: 84 ml, Days: 28) has been submitted via Cover My Meds: Key ([id number])    Insurance: BCBS    Will follow up in 24-48 business hours.     Cary Espinosa [facility]   Pharmacy Liaison  [phone]  12/12/2023 2:04 PM

## 2023-12-19 ENCOUNTER — HOSPITAL ENCOUNTER (OUTPATIENT)
Dept: RADIOLOGY | Facility: MEDICAL CENTER | Age: 63
End: 2023-12-19
Attending: PHYSICIAN ASSISTANT
Payer: COMMERCIAL

## 2023-12-19 DIAGNOSIS — Z12.31 VISIT FOR SCREENING MAMMOGRAM: ICD-10-CM

## 2023-12-19 PROCEDURE — 77063 BREAST TOMOSYNTHESIS BI: CPT

## 2023-12-22 NOTE — PATIENT INSTRUCTIONS
Kenia Soto  1960    CF Mutations: 3849+10kbC->T, [id number]  CFTR Modulator: Kalydeco    Respiratory  Baseline FEV1: 71% Today's FEV1: _____%  Airway Clearance Routine:  Albuterol (2 x day) / (3-4x/day when sick)  Hypertonic Saline (2 x day) / (3-4x/day when sick)  Pulmozyme (1 x day) / (2x/day when sick)  ACT Vest and/or Acapella (2 x day) / (3-4 x/day when sick)  Inhaled antibiotics: [place], COMPLETE 2 MORE WEEKS  ADD BUDESONIDE 2 ML TO SINUS RINSES 2 TIMES PER DAY  Equipment Check (Annually) Date scheduled:  CF Culture: [facility]  Nutrition/Gastrointestinal  BMI: Current: unknown d/t virtual visit. Goal: 22 - 23     Enzymes: Creon 6 (0-3 depending on meal content)  Vitamins: general MVi, DEKAs Plus softgel, magnesium, D, K, A , probiotics, Nuun electrolytes  Exercise: goal of 3 days per week = good job!  Social  Insurance: [employer]  Transportation: yes  Has access to food resources: yes  Financial Resources: none  School / Work Needs: none  Mental health screening completed:   3/8/2023      Tasks:    Goals  Annual Labs: last done 7/2023, next due 7/2024  LFTs: last done 7/2023, next due 7/2024  Oral Glucose Tolerance Test: CFRD  If CFRD, how is it managed: Dietary Change, Oral hypoglycemic agents, intermittent insulin (with illness, steroids, etc.), chronic insulin, other diabetes drugs  Sputum Cultures: #1 (   )  , #2 (  ), #3  ( ),  #4 (  ) (Dates)  DEXA scan: last done 7/2019, due again by 2024  Chest X-ray: last done 2/8/23, next due 2/2024   Eye Exam: last done 12/2021, recommended again 12/2022 ([facility] [place])   Notes

## 2023-12-27 DIAGNOSIS — J01.01 ACUTE RECURRENT MAXILLARY SINUSITIS: ICD-10-CM

## 2023-12-27 NOTE — TELEPHONE ENCOUNTER
Caller Name: Marina  Call Back Number: [phone]    Pt called requesting message to be send to Dr. Fleming or Dr. Payne regarding Doxycyline being refill to have on hand.    Please advice     Received request via: Patient    Was the patient seen in the last year in this department? Yes    Does the patient have an active prescription (recently filled or refills available) for medication(s) requested? No    Does the patient have [facility] and need 100 day supply (blood pressure, diabetes and cholesterol meds only)? Patient does not have SCP      Last office Viist:10/25/2023  Next appt:01/03/2024

## 2024-01-03 ENCOUNTER — TELEMEDICINE (OUTPATIENT)
Dept: PEDIATRIC PULMONOLOGY | Facility: MEDICAL CENTER | Age: 64
End: 2024-01-03
Attending: INTERNAL MEDICINE
Payer: COMMERCIAL

## 2024-01-03 DIAGNOSIS — M85.89 OSTEOPENIA OF MULTIPLE SITES: ICD-10-CM

## 2024-01-03 DIAGNOSIS — K86.81 EXOCRINE PANCREATIC INSUFFICIENCY: ICD-10-CM

## 2024-01-03 DIAGNOSIS — E08.9 DIABETES MELLITUS DUE TO CYSTIC FIBROSIS (HCC): ICD-10-CM

## 2024-01-03 DIAGNOSIS — E84.9 CYSTIC FIBROSIS (HCC): ICD-10-CM

## 2024-01-03 DIAGNOSIS — E84.9 DIABETES MELLITUS DUE TO CYSTIC FIBROSIS (HCC): ICD-10-CM

## 2024-01-03 PROCEDURE — 99214 OFFICE O/P EST MOD 30 MIN: CPT | Mod: 95 | Performed by: INTERNAL MEDICINE

## 2024-01-03 RX ORDER — DOXYCYCLINE HYCLATE 100 MG/1
100 CAPSULE ORAL 2 TIMES DAILY
Qty: 20 CAPSULE | Refills: 0 | OUTPATIENT
Start: 2024-01-03 | End: 2024-01-13

## 2024-01-03 NOTE — PROGRESS NOTES
Nutrition Screen & Progress Note for Adult CF Clinic  BMI: unknown d/t virtual visit but Marina denies wt loss  Points: 0  IBW (kg): 58  % IBW: was 104 last visit      Points: 0  Current weight (kg): probably ~60 kg   Weight last clinic visit: 60.4 kg on 10/25/23  % weight change: -       Points: 0  FEV1 % predicted: unknown with virtual visit    Points: 0             Total Points: 0            Nutrition Risk: low    BM: daily  PERT: Creon 6 (0-3 with meals, usually only with dinner) = avg of 2 per day  Lipase units/kg/meal: 200  CFTR modulator: Kalydeco  Other GI meds: no  Typical diet: 3 meals and 0-1 snacks  Vitamins: general MVi, DEKAs Plus softgel, magnesium (400 mg), vitamin D (5000 IU) vitamin A (10,000 IU), vitamin K (5 mg)  Other supplements: Nuun electrolytes, probiotic    Visit details: Marina is present for virtual visit today, she is in [place].  She looks great. She got sick right after last CF visit but got over it quickly.  She continues to exercise 3 days per week, she got a new trampoline.    She is followed by endo d/t CFRD. Last glyco was 5.8% in July of '23.   She modifies her PERT dose based on what she is eating, she does not require much PERT.  She feels wt is stable.    Had a nice visit with her daughters over the holiday.  She has no nutrition questions or concerns today.   Recommendations/Plan: continue with healthful diet and exercise program.    Follow-up: 3 months    Time spent: 15 minutes

## 2024-01-03 NOTE — PROGRESS NOTES
Adult Cystic Fibrosis Clinic Follow Up     This evaluation was conducted via Zoom using secure and encrypted videoconferencing technology. The patient was in their home in the state [facility].    The patient's identity was confirmed and verbal consent was obtained for this virtual visit.     CC: follow up cystic fibrosis    PCP:  Marleen Hargrove P.A.-C.   [id number] [id number] / [place] [id number]     SUBJECTIVE:   Kenia Soto is a 62 y.o. 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[facility]) 06/16/2020    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 11/13/2019    Cystic fibrosis ([facility]) 11/13/2019    Cystic fibrosis with pulmonary manifestations ([facility]) 11/13/2019    Herpes simplex 07/16/2019    Osteopenia of multiple sites 07/16/2019    CF (3849+10k bC>T, [id number]) ([facility]) 06/13/2019    Exocrine pancreatic insufficiency 06/13/2019    Vitamin K deficiency 06/13/2019    Partial thromboplastin time increased 08/22/2017    Moderate COPD (chronic obstructive pulmonary disease) ([facility]) 04/07/2015    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  She has not been sick this winter, last mild respiratory illness was October 2023 which she treated at home with doxycycline.  She reports that she has continued to be compliant.  She has no respiratory or GI complaints.  Denies any sinus involvement.    Last hospitalization: [10/21/20]    Respiratory:   Cough frequency: episodic, decreased  Cough character: productive  Sputum quantity: baseline  Sputum color: yellow  Shortness of breath:never  Chest Pain:never  Hemoptysis:occasional    Doctor visits: none   Antibiotics: none, doxy if needed when sick   Pulmonary toilet:   Albuterol: 2/day  7% hypertonic saline: 2/day 8ml mixed with glutathione since  2012  Pulmozyme: 0/day - off was on trial with it, had blood streaking with it  Chest Physiotherapy: aerobika bid  Oxygen: none  Respiratory assist: none  Kalydeco bid  Advair 1-2 times/day    Compliance: compliant most of the time     Sinus symptoms:  Nasal Congestion: no  Nasal Drainage: no  Headache/sinus pressure: never       Activity / Energy: normal for age   Change in activity/energy: none   School/ Work attendance: no absences   School/ Work performance: no problem  Emotional assessment: positive       GI: no problem   Appetite: normal  Enzymes:  daily  Creon 6K units 1 with dinner  Stool: 1-2/day, characteristics: formed      Medications:     Current Outpatient Medications:     doxycycline, 100 mg, Oral, BID, Taking    Kalydeco, TAKE 1 TABLET BY MOUTH TWICE DAILY WITH FAT CONTAINING FOOD, Taking    Cayston, 1 mL, Inhalation, TID, Taking    sodium chloride, 4 mL, Nebulization, 4X/DAY, Taking    Tranexamic Acid, TAKE 1-2 TABLETS BY MOUTH EVERY 8 HOURS AS NEEDED FOR HEMOPTYSIS, PRN    acyclovir, TAKE 1 TABLET BY MOUTH 4 TIMES A DAY FOR 14 DAYS, THEN TWICE DAILY FOR 3 MONTHS, PRN    fluticasone-salmeterol, 1 Puff, Inhalation, BID, Taking    fluticasone, 1-2 Spray, Nasal, DAILY, Taking    phytonadione, 5 mg, Oral, DAILY, Taking    albuterol, 2.5 mg, Nebulization, TID (Patient taking differently: 2.5 mg, Nebulization, 2 Times Daily (BID), Increase with illness), Taking    Creon, TAKE 1-2 CAPSULES 4 TIMES A DAY AS NEEDED WITH MEALS OR SNACKS **KEEP IN ORIGINAL BOTTLE**, Taking    Tresiba FlexTouch, INJECT 4 UNITS SUBCUTANEOUSLY DAILY (375 DAYS), Taking    sodium chloride 3%, 4 mL, Nebulization, 4X/DAY PRN, PRN    HumaLOG KwikPen, 12-15 Units, Subcutaneous, TID AC, Taking    BD Pen Needle Radha 2nd Gen, 1 EACH BY OTHER ROUTE 4 TIMES A DAY,BEFORE MEALS AND AT BEDTIME. FOR INSULIN ADMINISTRATION., Taking    OneTouch Verio, TEST 3 TIMES DAILY FOR INSULIN ADJUSTMENT AND AS NEEDED SYMPTOMS OF HIGH OR LOW BLOOD SUGAR,  Taking    Dexcom G6 Sensor, 1 Each, Does not apply, Q10 DAYS, Taking    Dexcom G6 Transmitter, 1 Each, Does not apply, Continuous, Taking    mupirocin, Apply BID for up to 10 days, Taking    Multiple Vitamins-Minerals (DEKAS PLUS PO), 1 Capsule, Oral, BID, Taking    magnesium oxide, 200 mg, Oral, BID, Taking    Vitamin C, 1,000 mg, Oral, BID, Taking    Probiotic Product (PROBIOTIC-10 PO), 1 Tablet, Oral, DAILY, Taking    vitamin D3, 10,000 Units, Oral, BID, Taking    Other Medications: none  Central Line: none    ALLERGIES:  Bactrim [sulfamethoxazole-trimethoprim], Ciprofloxacin, Levofloxacin, and Tobramycin    Review of System:  All other systems reviewed and negative    Ears, nose, mouth, throat, and face: negative  Cardiovascular: Negative  Gastrointestinal: Negative  Allergic/Immunologic: negative      Social/Environmental:  Social History     Tobacco Use    Smoking status: Never    Smokeless tobacco: Never   Vaping Use    Vaping Use: Never used   Substance Use Topics    Alcohol use: Yes     Alcohol/week: 1.8 oz     Types: 3 Standard drinks or equivalent per week     Comment: Socially    Drug use: No       Home Environment       Pet Exposures    Dogs Yes     Reptiles Yes        Tobacco use: never  Pets: none    Past Medical History:  Past Medical History:   Diagnosis Date    Allergy     Anemia     Asthma     Breath shortness     Bronchitis     CF (cystic fibrosis) (HCC)     Coughing blood     Diabetes (HCC)     Head ache     Hemorrhagic disorder (HCC)     Herpes simplex     Pneumonia     Shoulder fracture     Vaccine counseling 10/25/2023    Declines vaccines. Counseled that she is a high risk patient and that vaccines reduce the risk of hospitalization and death from communicable illnesses.      Respiratory hospitalizations: [10/21/20]    Past surgical History:  Past Surgical History:   Procedure Laterality Date    BRONCHOSCOPY-ENDO  7/22/2019    Procedure: BRONCHOSCOPY - W/BAL;  Surgeon: Arelis Fleming M.D.;   Location: [facility];  Service: Ent    DENTAL SURGERY      EYE SURGERY      SINUS WASHING       Family History:   Family History   Problem Relation Age of Onset    Other Daughter         CF carrier    Alcohol/Drug Father     Heart Disease Father     Cystic Fibrosis Sister      OBJECTIVE:  Physical Exam:  There were no vitals taken for this visit. Due to virtual visit     Physical Exam  Alert, awake, no distress   Visit done virtually    PFT's:   FEV1 79% in October    Labs reviewed:     Lab Results   Component Value Date/Time    SIGIND POS (POS) 10/25/2023 11:17 AM    SIGIND . 10/25/2023 11:17 AM    SOURCE RESP 10/25/2023 11:17 AM    SOURCE RESP 10/25/2023 11:17 AM    SITE Sputum 10/25/2023 11:17 AM    SITE Sputum 10/25/2023 11:17 AM    CULTRSULT (A) 10/25/2023 11:17 AM     Moderate growth usual upper respiratory demetrius  Respiratory cultures from cystic fibrosis patients are  routinely screened for Staphylococcus aureus, Pseudomonas  aeruginosa, Burkholderia cepacia, Haemophilus influenzae,  Stenotrophomonas maltophilia, Achromobacter sp., and  Streptococcus pneumonia.      CULTRSULT (A) 10/25/2023 11:17 AM     Pseudomonas aeruginosa  Moderate growth  Non-mucoid Phenotype      CULTRSULT (A) 10/25/2023 11:17 AM     Pseudomonas aeruginosa  Moderate growth  Mucoid Phenotype       Immunization History   Administered Date(s) Administered    Hepatitis B Vaccine Adolescent/Pediatric 10/27/1999, 12/14/1999, 08/04/2000    Influenza (IM) Preservative Free - HISTORICAL DATA 10/25/1999, 11/03/2008, 10/27/2009, 10/26/2010, 11/02/2011, 11/06/2012    Influenza Seasonal Injectable - Historical Data 11/04/2015, 10/14/2016, 10/30/2017    Influenza Vaccine Adult HD 10/27/2009, 10/01/2019    Influenza Vaccine Pediatric Split - Historical Data 10/23/1995, 10/21/1996, 10/15/1997, 10/29/1998, 11/14/2000, 11/14/2001, 11/05/2002, 10/27/2003, 10/18/2004, 10/24/2005, 11/13/2006, 10/22/2007, 11/18/2013, 10/22/2014    Influenza  Vaccine Quad Inj (Pf) 10/01/2019, 11/02/2020    Influenza, Unspecified - HISTORICAL DATA 10/09/2018    Pneumococcal polysaccharide vaccine (PPSV-23) 10/27/1999    TD Vaccine 10/27/1999, 12/07/2018    Tdap Vaccine 06/01/2008    Zoster Vaccine Live (ZVL) (Zostavax) - HISTORICAL DATA 04/22/2015       ASSESSMENT/PLAN:   1. Cystic fibrosis (HCC)  Grows nonmucoid and mucoid phenotype Pseudomonas    Continue current airway clearance, uses hypertonic but does not use dornase due to hemoptysis  Has h/o recurrent hemoptysis: keeps TXA on hand if needed  Continue with Kalydeco    2. Exocrine pancreatic insufficiency  Stable  Continue enzymes before meals    3. Osteopenia of multiple sites  Follow-up repeat DEXA scan  Patient cannot tolerate calcium due to constipation, she has even tried Tums and had the same effect  Continue with magnesium and vitamin D  Patient does exercise regularly    4. 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

## 2024-01-03 NOTE — PROGRESS NOTES
Medical Social Work    Referral: CF Clinic / Dr. Mccabe    Intervention: Patient presents to CF clinic for routine cystic fibrosis follow-up via Zoom using secure and encrypted videoconferencing technology. SW completed visit with RD, [place].       Patient reports she is doing well since last visit. Patient spent time with her mother and daughters in November. Patient states this was the first time in a long time since they all were able to be together at the same time.     SW screened for transportation, food resources, school/work, financial, medication, and mental health needs. Patient denies having concerns in these areas. Patient continues with her workouts, and has no issues accessing medications.    Plan: SW will continue to follow in clinic.    Time Spent: 15 minutes

## 2024-01-30 NOTE — PROGRESS NOTES
"Assumed pt care at 0700. Received report from Eryn COKER. Pt A&O x4. Pt c/o 0/10 pain at this time. Respirations are even and unlabored on room air 94%.   Updated on POC, communication board updated. Bed locked and in lowest position. Call light and belongings within reach. Non-skid socks in place. Needs met, will continue to monitor.     Pt refusing to take morning meds stating \" I take my morning medication after I eat my breakfast\". Pt also states \" I need my morning insulin. I always take 9 units every morning\". Advised patient I will contact the MD about this as the current order for Insulin does not require a dose. Morning accucheck is within parameters and insulin is not indicated. Pt also stated \"I do not eat Arabic toast and need another breakfast tray\". Phone and number for kitchen provided to patient.     MD paged.  " Occupational Therapy    Visit Type: treatment  Co-treat with: Physical therapist (due to agitation per RN)  SUBJECTIVE  Patient agreed to participate in therapy this date.  RN in agreement to work with patient for therapy session.  Patient stated \"I need to use the toilet\"    OBJECTIVE     Cognitive Status   Level of Consciousness   - alert    Respiratory:   Patient was at room air             Bed Mobility  - Repositioning in bed: supervision (trendelenburg)  - Supine to sit: minimal assist, with tactile cues, with verbal cues (with head of bed elevated)  - Sit to supine: minimal assist, with verbal cues, with tactile cues  Transfers  Assistive devices: gait belt, 2-wheeled walker  - Sit to stand: maximal assist, with verbal cues, with tactile cues  - Stand to sit: maximal assist      Functional Ambulation  - Assistance: minimal assist  - Assistive device: 2-wheeled walker and gait belt  - Surface: even  Patient requires Min assist to ambulate to the bathroom however going back to bed required Mod assist.  Activities of Daily Living (ADLs)  Lower Body Dressing:   - Footwear:       - Assistance: supervision       - Position: edge of bed       - Type: socks  - Assist needed for: supervision/safety, verbal cueing and steadying  Toileting:   - Toilet transfer:        - Assist: minimal assist       - Device: 2-wheeled walker and gait belt  - Assist: supervision          ASSESSMENT   Progress: progressing toward goals    Discharge needs based on today's assessment:  - Current level of function: slightly below baseline level of function  - Therapy needs at discharge: therapy 1-3 times per week  - Activities of daily living (ADLs) requiring support at discharge: bed mobility, transfers, ambulation, dressing, toileting, continence and bathing  - Impairments that require further therapy intervention: strength, ROM, activity tolerance, cognition and balance  AM-PAC  - Prior Level of Function: IND/MOD I ([facility] 22-24)       Key:  MOD A=moderate assistance, IND/MOD I=independent/modified independent  - Generalized Current Level of Function     - Current Self-Cares: 21       Scoring Key= >21 Modified Independent; 20-21 Supervision; 18-19 Minimal assist; 13-17 Moderate assist; 9-12 Max assist; <9 Total assist      PLAN (while hospitalized)  Suggestions for next session as indicated:     OT Frequency: 3-5 x per week      PT/OT Mobility Equipment for Discharge: owns straight cane, 2WW  PT/OT ADL Equipment for Discharge: no recommendations at this time      GOALS  Long Term Goals: (to be met by time of discharge from hospital)  Lower body dressing: Patient will complete lower body dressing in sitting supervision.  Status: partially met   Toileting: Patient will complete toileting supervision.  Status: met   Stand (even surface): Patient will stand on even surface for supervision.   Status: progressing/ongoing  Toilet transfer: Patient will complete toilet transfer with gait belt and 2-wheeled walker, toilet riser, supervision.   Status: progressing/ongoing  Training: patient training to be completed.   Home program: patient independent with home program as instructed.   Status: progressing/ongoing (BL UE exercises in all 4quadrants)    Documented in the chart in the following areas: Assessment/Plan.    Patient at End of Session:   Location: in bed  Safety measures: call light within reach, lines intact, bed rails x2 and alarm system in place/re-engaged  Handoff to: nurse      Therapy procedure time and total treatment time can be found documented on the Time Entry flowsheet

## 2024-02-09 ENCOUNTER — PATIENT MESSAGE (OUTPATIENT)
Dept: PEDIATRIC PULMONOLOGY | Facility: MEDICAL CENTER | Age: 64
End: 2024-02-09
Payer: COMMERCIAL

## 2024-02-21 DIAGNOSIS — E84.9 CF (CYSTIC FIBROSIS) (HCC): ICD-10-CM

## 2024-02-21 DIAGNOSIS — K86.81 EXOCRINE PANCREATIC INSUFFICIENCY: ICD-10-CM

## 2024-02-22 NOTE — TELEPHONE ENCOUNTER
Last office Visit:01/03/2024  Next Appt:05/01/2024    Received request via: Pharmacy    Was the patient seen in the last year in this department? Yes    Does the patient have an active prescription (recently filled or refills available) for medication(s) requested? No    [facility]    Does the patient have assisted Plus and need 100 day supply (blood pressure, diabetes and cholesterol meds only)? Patient does not have SCP

## 2024-02-22 NOTE — TELEPHONE ENCOUNTER
Last office visit:01/03/2024  Next Appt:05/01/2024  Received request via: Pharmacy    Was the patient seen in the last year in this department? Yes    Does the patient have an active prescription (recently filled or refills available) for medication(s) requested? No    [facility]    Does the patient have MCFP Plus and need 100 day supply (blood pressure, diabetes and cholesterol meds only)? Patient does not have SCP

## 2024-02-23 ENCOUNTER — TELEPHONE (OUTPATIENT)
Dept: PEDIATRIC PULMONOLOGY | Facility: MEDICAL CENTER | Age: 64
End: 2024-02-23
Payer: COMMERCIAL

## 2024-02-23 RX ORDER — ALBUTEROL SULFATE 2.5 MG/3ML
2.5 SOLUTION RESPIRATORY (INHALATION) 3 TIMES DAILY
Qty: 525 ML | Refills: 4 | Status: SHIPPED | OUTPATIENT
Start: 2024-02-23

## 2024-02-23 RX ORDER — PANCRELIPASE 30000; 6000; 19000 [USP'U]/1; [USP'U]/1; [USP'U]/1
CAPSULE, DELAYED RELEASE PELLETS ORAL
Qty: 500 CAPSULE | Refills: 6 | Status: SHIPPED | OUTPATIENT
Start: 2024-02-23

## 2024-02-23 NOTE — TELEPHONE ENCOUNTER
Drug: CREON [id number] units Cap DR Particles      System is still down and electronic scripts cannot be released to external pharmacies at this time.     Please print, sign, and fax to preferred pharmacy    Thank you    Cary Espinosa [facility]   Pharmacy Liaison  [phone]  2/23/2024 9:43 AM

## 2024-02-28 ENCOUNTER — HOSPITAL ENCOUNTER (OUTPATIENT)
Dept: RADIOLOGY | Facility: MEDICAL CENTER | Age: 64
End: 2024-02-28
Attending: INTERNAL MEDICINE
Payer: COMMERCIAL

## 2024-02-28 DIAGNOSIS — E84.9 CYSTIC FIBROSIS (HCC): ICD-10-CM

## 2024-02-28 PROCEDURE — 77080 DXA BONE DENSITY AXIAL: CPT

## 2024-02-29 DIAGNOSIS — E84.9 CYSTIC FIBROSIS (HCC): ICD-10-CM

## 2024-02-29 RX ORDER — FLUTICASONE PROPIONATE AND SALMETEROL 50; 250 UG/1; UG/1
POWDER RESPIRATORY (INHALATION)
Qty: 60 EACH | Refills: 5 | Status: SHIPPED | OUTPATIENT
Start: 2024-02-29

## 2024-02-29 NOTE — TELEPHONE ENCOUNTER
Last Visit: 1/3/2024  Next Visit: 5/1/24    Received request via: Pharmacy    Was the patient seen in the last year in this department? Yes    Does the patient have an active prescription (recently filled or refills available) for medication(s) requested? No     Pharmacy Name: [facility] [place]

## 2024-03-18 NOTE — TELEPHONE ENCOUNTER
Received request via: Pharmacy    Was the patient seen in the last year in this department? Yes  8/23/22  Does the patient have an active prescription (recently filled or refills available) for medication(s) requested? No     Statement Selected

## 2024-03-28 NOTE — TELEPHONE ENCOUNTER
[facility]  Progress Note    Patient Name: Irvin Diaz Jr.  MRN: [id number]  Patient Class: IP- Inpatient   Admission Date: 3/27/2024  Length of Stay: 1 days  Attending Physician: Sharon Jarvis MD  Primary Care Provider: Edouard Gibson MD        Subjective:     Principal Problem:Gastrointestinal hemorrhage        HPI:  49-year-old male presented to ED for eval of bleeding. Patient reported he has been having intermittent epistaxis since yesterday. Patient also reported today he had hematemesis and tarry stools. Patient has hx of alcoholic cirrhosis. Patient reported he had not had a drink for about 2 months prior to 2 days ago when he did consume alcohol. Patient has had episodes similar to this in the past and has required transfusions. Denies taking blood thinners or NSAIDs. Denied abd pain. Denied fever. Hgb & hct stable. Noted with thrombocytopenia, appears to be around baseline. Noted to have bright red bloody emesis to emesis bag on exam, epistaxis has ceased. Heme occult positive in ED. GI consulted in ED. Admit to hospital medicine for further eval.     Overview/Hospital Course:  The patient was admitted and observed for any bleeding, persistent vomiting or change in blood pressure.  He was continued on octreotide and ppi. GI was consulted and planned an EGD. The patient has chronic thrombocytopenia, and this was monitored closely, with the plan to transfuse platelets for count less than 10,000 and stop dvt prophylaxis for count less then 50,000.     Interval History: Patient without bleeding since admission    Review of Systems   Constitutional:  Negative for activity change, appetite change, chills and fever.   HENT:  Negative for congestion, ear pain, nosebleeds and sinus pain.    Eyes:  Negative for discharge and itching.   Respiratory:  Negative for apnea, cough, chest tightness and shortness of breath.    Cardiovascular:  Negative for chest pain,  Call back received from pt.  Informed her of lab results and Dr. Fleming's recommendations in detail.  Pt verbalizes understanding of all information and has no questions.  Pt states she is leaving for a trip to [place] on 8/10/19, so she would like the appt with Dr. Fleming to be after they return from that trip.  Scheduled pt an appt on 8/29/19 at 1:00pm with Dr. Flemnig.  Informed pt that she will be given the glucose monitor and instructions for use at that appt.  Also notified pt that when she gets a call to schedule the endocrinology appt, she needs to make that appt after she sees Dr. Fleming on 8/29/19 and has a few days to check her sugars at home before seeing endocrinologist.  Pt verbalizes understanding of all information and agrees with plan.   palpitations and leg swelling.   Gastrointestinal:  Positive for abdominal pain, blood in stool, diarrhea, nausea and vomiting. Negative for abdominal distention.   Genitourinary:  Negative for difficulty urinating, dysuria, flank pain and frequency.   Musculoskeletal:  Negative for arthralgias, back pain, joint swelling and myalgias.   Skin:  Negative for color change, pallor and rash.   Neurological:  Negative for dizziness, weakness, light-headedness and headaches.   Psychiatric/Behavioral:  Negative for agitation, behavioral problems, confusion and suicidal ideas.      Objective:     Vital Signs (Most Recent):  Temp: 97.3 °F (36.3 °C) (03/28/24 1148)  Pulse: 63 (03/28/24 1207)  Resp: (!) 3 (03/28/24 1207)  BP: 127/66 (03/28/24 1200)  SpO2: 97 % (03/28/24 1207) Vital Signs (24h Range):  Temp:  [97.3 °F (36.3 °C)-98.3 °F (36.8 °C)] 97.3 °F (36.3 °C)  Pulse:  [62-93] 63  Resp:  [3-33] 3  SpO2:  [95 %-100 %] 97 %  BP: ()/() 127/66     Weight: 106.6 kg (235 lb)  Body mass index is 31.87 kg/m².    Intake/Output Summary (Last 24 hours) at 3/28/2024 1223  Last data filed at 3/28/2024 1146  Gross per 24 hour   Intake 300 ml   Output 775 ml   Net -475 ml         Physical Exam  Vitals reviewed.   Constitutional:       General: He is not in acute distress.     Appearance: Normal appearance. He is normal weight. He is not ill-appearing.   HENT:      Head: Normocephalic and atraumatic.      Nose: Nose normal.      Mouth/Throat:      Mouth: Mucous membranes are moist.      Pharynx: Oropharynx is clear.   Eyes:      General: No scleral icterus.     Extraocular Movements: Extraocular movements intact.      Conjunctiva/sclera: Conjunctivae normal.      Pupils: Pupils are equal, round, and reactive to light.   Cardiovascular:      Rate and Rhythm: Normal rate and regular rhythm.      Pulses: Normal pulses.      Heart sounds: Normal heart sounds. No murmur heard.     No gallop.   Pulmonary:      Effort: Pulmonary effort  is normal. No respiratory distress.      Breath sounds: Normal breath sounds. No wheezing, rhonchi or rales.   Chest:      Chest wall: No tenderness.   Abdominal:      General: Abdomen is flat. There is no distension.      Palpations: Abdomen is soft.      Tenderness: There is no abdominal tenderness.   Musculoskeletal:         General: No swelling or tenderness.      Cervical back: Normal range of motion and neck supple. No rigidity or tenderness.      Right lower leg: No edema.      Left lower leg: No edema.   Skin:     General: Skin is warm and dry.   Neurological:      General: No focal deficit present.      Mental Status: He is alert and oriented to person, place, and time. Mental status is at baseline.      Motor: No weakness.   Psychiatric:         Mood and Affect: Mood normal.         Behavior: Behavior normal.         Thought Content: Thought content normal.             Significant Labs: All pertinent labs within the past 24 hours have been reviewed.  BMP:   Recent Labs   Lab 03/28/24  0607   *      K 3.7      CO2 26   BUN 29*   CREATININE 1.7*   CALCIUM 8.3*   MG 1.7     CBC:   Recent Labs   Lab 03/27/24  0850 03/27/24  1935 03/27/24  2348 03/28/24  0607   WBC 6.16  --   --  5.49   HGB 11.9* 10.7* 9.8* 9.1*  9.1*   HCT 36.2*  --   --  27.0*   PLT 69*  --   --  50*     CMP:   Recent Labs   Lab 03/27/24  0850 03/28/24  0607    138   K 4.1 3.7    105   CO2 25 26    122*   BUN 31* 29*   CREATININE 1.6* 1.7*   CALCIUM 8.9 8.3*   PROT 8.6* 6.7   ALBUMIN 3.8 3.1*   BILITOT 3.9* 3.2*   ALKPHOS 102 75   AST 47* 33   ALT 26 18   ANIONGAP 11 7*       Significant Imaging: I have reviewed all pertinent imaging results/findings within the past 24 hours.    Assessment/Plan:      * Gastrointestinal hemorrhage  GI plans EGD today  NPO.  Follow H/H closely, serial h&h q6h x 4.   Type and screen, transfuse blood as needed hgb<7.  IV Protonix and Octreotide transfusions, monitor plts  closely  IV hydration.  IV antiemetics prn.       HTN (hypertension)  Chronic, uncontrolled. Latest blood pressure and vitals reviewed-     Temp:  [97.3 °F (36.3 °C)-98.3 °F (36.8 °C)]   Pulse:  [62-93]   Resp:  [3-33]   BP: ()/()   SpO2:  [95 %-100 %] .   Home meds for hypertension were reviewed and noted below.   Hypertension Medications               amLODIPine (NORVASC) 10 MG tablet Take 1 tablet (10 mg total) by mouth once daily.    carvediloL (COREG) 12.5 MG tablet Take 1 tablet (12.5 mg total) by mouth 2 (two) times daily with meals.    furosemide (LASIX) 20 MG tablet Take 1 tablet (20 mg total) by mouth once daily.    spironolactone (ALDACTONE) 50 MG tablet Take 1 tablet (50 mg total) by mouth once daily.            While in the hospital, will manage blood pressure as follows; Adjust home antihypertensive regimen as follows- IV labetalol, holding home PO meds given varices and active hematemesis    Will utilize p.r.n. blood pressure medication only if patient's blood pressure greater than 180/110 and he develops symptoms such as worsening chest pain or shortness of breath.    Alcohol abuse  Banana bag  CIWA precautions  Ativan PRN withdrawal  Tele monitoring      CKD (chronic kidney disease) stage 3, GFR 30-59 ml/min  Creatine stable for now. BMP reviewed- noted Estimated Creatinine Clearance: 66.3 mL/min (A) (based on SCr of 1.7 mg/dL (H)). according to latest data. Based on current GFR, CKD stage is stage 3 - GFR 30-59.  Monitor UOP and serial BMP and adjust therapy as needed. Renally dose meds. Avoid nephrotoxic medications and procedures.    Alcoholic cirrhosis of liver  Patient with known Cirrhosis with Child's class B. Co-morbidities are present and inclusive of esophageal varices and anemia.  MELD-Na score calculated; MELD 3.0: 19 at 3/28/2024  6:07 AM  MELD-Na: 18 at 3/28/2024  6:07 AM  Calculated from:  Serum Creatinine: 1.7 mg/dL at 3/28/2024  6:07 AM  Serum Sodium: 138 mmol/L (Using  max of 137 mmol/L) at 3/28/2024  6:07 AM  Total Bilirubin: 3.2 mg/dL at 3/28/2024  6:07 AM  Serum Albumin: 3.1 g/dL at 3/28/2024  6:07 AM  INR(ratio): 1.2 at 3/28/2024  6:07 AM  Age at listing (hypothetical): 49 years  Sex: Male at 3/28/2024  6:07 AM      Continue chronic meds. Etiology likely ETOH. Will avoid any hepatotoxic meds, and monitor CBC/CMP/INR for synthetic function.     Thrombocytopenia  Patient was found to have thrombocytopenia, the likely etiology is secondary to cirrhosis/portal hypertension, will monitor the platelets Daily. Will transfuse if platelet count is <10k. Hold DVT prophylaxis if platelets are <50k. The patient's platelet results have been reviewed and are listed below.  Recent Labs   Lab 03/28/24  0607   PLT 50*             VTE Risk Mitigation (From admission, onward)           Ordered     Reason for No Pharmacological VTE Prophylaxis  Once        Question:  Reasons:  Answer:  Active Bleeding    03/27/24 1457     IP VTE HIGH RISK PATIENT  Once         03/27/24 1457     Place sequential compression device  Until discontinued         03/27/24 1457                    Discharge Planning   RADHA: 3/31/2024     Code Status: Full Code   Is the patient medically ready for discharge?:     Reason for patient still in hospital (select all that apply): Patient trending condition                     Sharon Jarvis MD, MD  Department of Hospital Medicine   [facility]

## 2024-04-08 DIAGNOSIS — B96.5 PSEUDOMONAS RESPIRATORY INFECTION: ICD-10-CM

## 2024-04-08 DIAGNOSIS — J98.8 PSEUDOMONAS RESPIRATORY INFECTION: ICD-10-CM

## 2024-04-08 DIAGNOSIS — E84.9 CYSTIC FIBROSIS (HCC): ICD-10-CM

## 2024-04-08 RX ORDER — DOXYCYCLINE HYCLATE 100 MG/1
100 CAPSULE ORAL 2 TIMES DAILY
Qty: 28 CAPSULE | Refills: 1 | Status: SHIPPED | OUTPATIENT
Start: 2024-04-08 | End: 2024-05-06

## 2024-04-08 NOTE — ASSESSMENT & PLAN NOTE
Goal Outcome Evaluation:    Patient is POD #0 for lumbar surgery.  Patient is alert x4.  Dressing is dry, and intact.  Patient was able to ambulate from the stretcher to the bed.  Voiding function is intact. Dilaudid, and roxicodone were ordered for pain management.  Teaching provided on how to use the incentive spirometer.  No signs of distress noted.  Will continue to monitor, update accordingly.                                            R/o COVID  Rx for pseudomonas and start steroids 40 mg qday  Attain sputum culture  Reviewed with colleague with CF experience and at this time no indication for bronchoscopy as no hx of aspiration or overt mucous plugging  Goal is to decrease inflammation and that should help with hemoptysis  Reviewed with patient who would like to discuss further with her CF pulmonologist.  Dr. Shah will follow tomorrow

## 2024-04-08 NOTE — TELEPHONE ENCOUNTER
Last Visit: 1/3/24  Next Visit: 5/1/24    Received request via: Patient    Was the patient seen in the last year in this department? Yes    Does the patient have an active prescription (recently filled or refills available) for medication(s) requested? No     Pharmacy Name: [facility]    Patient currently having illness with sinus/nasal congestion and has been using doxycyline. Requesting refill to have on hand for future.

## 2024-04-10 ENCOUNTER — PATIENT MESSAGE (OUTPATIENT)
Dept: PEDIATRIC PULMONOLOGY | Facility: MEDICAL CENTER | Age: 64
End: 2024-04-10
Payer: COMMERCIAL

## 2024-04-10 NOTE — PATIENT COMMUNICATION
Caller: Marina   Chief complaint: increased cough, sputum  Number of days: last month, worsening sputum yesterday    Sick symptoms for about a month. Completed 1 round of Cayston, started doxy on Friday for ongoing sinus pressure/pain and worsening respiratory symptoms, started cayston round again over the weekend. Saturday had low grade fever, but normal amount of mucous. Yesterday, sputum was thick, dark. Increased ACT to 4 treatments, much less mucous today, more energy today. Sinus pressure somewhat improved but still there.     She is not sure what to do or try next. She does have prednisone 20mg tablets at home (). Wondering if she should do a round of IV antibiotics. Also wondering about inhaled meropenem instead of Cayston. Colymycin caused facial nerve pain in past and is allergic to Cayetano    Message routed to provider for further instructions.     Symptoms:  Increased cough: Yes  Sputum production (in infant rattly chest): Yes  Change in sputum color: Yes, thick, dark  Coughing up blood: No  Shortness of breath: No  Decreased energy: Yes,  Fever: Yes, low grade intermittent since Saturday  Severe congestion/sinus: Yes, sinus pressure/pain  Abdominal pain: No  Vomiting: No  SpO2 if available:     Treatments:  Albuterol: Yes,   How often:   Hypertonic saline: Yes,   How often:  Pulmozyme: No   How often:  Vest: No, aerobika  How often:  Oxygen: No  How much:  Antibiotic inhaled or oral: Yes, Cayston, Completed 1 round, started 2nd round this weekend  When started:     Instructions:  Increase airway clearance to 3-4 times per day (add extra albuterol, hypertonic saline and vest). If short of breath, fever >102, SpO2 <92, coughing blood: same day appointment. If increased cough and sputum production, appointment within 72 hours. If severe SOB, severe vomiting or severe abdominal pain: ED    Notify caller that if the patient's condition changes to please call our office asap so we can make other  recommendations as appropriate.  Notify caller that we may need to send the patient to the ER when they arrive in the event that we cannot manage their condition in the office.

## 2024-04-16 DIAGNOSIS — J98.8 PSEUDOMONAS RESPIRATORY INFECTION: ICD-10-CM

## 2024-04-16 DIAGNOSIS — B96.5 PSEUDOMONAS RESPIRATORY INFECTION: ICD-10-CM

## 2024-04-16 DIAGNOSIS — E84.0 CYSTIC FIBROSIS WITH PULMONARY EXACERBATION (HCC): ICD-10-CM

## 2024-04-16 RX ORDER — MEROPENEM 500 MG/1
INJECTION, POWDER, FOR SOLUTION INTRAVENOUS
Qty: 14 EACH | Refills: 0 | Status: SHIPPED | OUTPATIENT
Start: 2024-04-16 | End: 2024-04-18 | Stop reason: SDUPTHER

## 2024-04-18 DIAGNOSIS — E84.0 CYSTIC FIBROSIS WITH PULMONARY EXACERBATION (HCC): ICD-10-CM

## 2024-04-18 DIAGNOSIS — B96.5 PSEUDOMONAS RESPIRATORY INFECTION: ICD-10-CM

## 2024-04-18 DIAGNOSIS — J98.8 PSEUDOMONAS RESPIRATORY INFECTION: ICD-10-CM

## 2024-04-18 RX ORDER — MEROPENEM 500 MG/1
INJECTION, POWDER, FOR SOLUTION INTRAVENOUS
Qty: 14 EACH | Refills: 0 | Status: SHIPPED | OUTPATIENT
Start: 2024-04-18 | End: 2024-04-19 | Stop reason: SDUPTHER

## 2024-04-19 ENCOUNTER — OFFICE VISIT (OUTPATIENT)
Dept: MEDICAL GROUP | Facility: LAB | Age: 64
End: 2024-04-19
Payer: COMMERCIAL

## 2024-04-19 ENCOUNTER — PATIENT MESSAGE (OUTPATIENT)
Dept: PEDIATRIC PULMONOLOGY | Facility: MEDICAL CENTER | Age: 64
End: 2024-04-19

## 2024-04-19 VITALS
HEIGHT: 66 IN | TEMPERATURE: 97.3 F | WEIGHT: 132.28 LBS | HEART RATE: 92 BPM | RESPIRATION RATE: 16 BRPM | DIASTOLIC BLOOD PRESSURE: 50 MMHG | OXYGEN SATURATION: 95 % | SYSTOLIC BLOOD PRESSURE: 116 MMHG | BODY MASS INDEX: 21.26 KG/M2

## 2024-04-19 DIAGNOSIS — R20.2 TINGLING OF BOTH FEET: ICD-10-CM

## 2024-04-19 DIAGNOSIS — B96.5 PSEUDOMONAS RESPIRATORY INFECTION: ICD-10-CM

## 2024-04-19 DIAGNOSIS — J98.8 PSEUDOMONAS RESPIRATORY INFECTION: ICD-10-CM

## 2024-04-19 DIAGNOSIS — E84.0 CYSTIC FIBROSIS WITH PULMONARY EXACERBATION (HCC): ICD-10-CM

## 2024-04-19 PROCEDURE — 3078F DIAST BP <80 MM HG: CPT | Performed by: PHYSICIAN ASSISTANT

## 2024-04-19 PROCEDURE — 3074F SYST BP LT 130 MM HG: CPT | Performed by: PHYSICIAN ASSISTANT

## 2024-04-19 PROCEDURE — 99214 OFFICE O/P EST MOD 30 MIN: CPT | Performed by: PHYSICIAN ASSISTANT

## 2024-04-19 RX ORDER — ACYCLOVIR 400 MG/1
TABLET ORAL
Qty: 180 TABLET | Refills: 1 | Status: SHIPPED | OUTPATIENT
Start: 2024-04-19

## 2024-04-19 RX ORDER — MEROPENEM 500 MG/1
INJECTION, POWDER, FOR SOLUTION INTRAVENOUS
Qty: 14 EACH | Refills: 0 | Status: SHIPPED | OUTPATIENT
Start: 2024-04-19

## 2024-04-19 ASSESSMENT — FIBROSIS 4 INDEX: FIB4 SCORE: 0.82

## 2024-04-19 ASSESSMENT — PATIENT HEALTH QUESTIONNAIRE - PHQ9: CLINICAL INTERPRETATION OF PHQ2 SCORE: 0

## 2024-04-19 NOTE — PROGRESS NOTES
Subjective:     CC: Tingling toes  HPI:   Kenia here today with     Pt reports symptoms started in March with URI viral illness. Pt reports she got sick again 2 weeks ago with similar symptoms and tingling in the feet, especially the toes. Sensation intially started at the big toes and has since spread to the other toes. Denies ulceration, skin texture changes.  No skin color changes  Prev tx with doxy x 2 courses, inhaled cayston, steroids  Hx of diabetes, but well controlled  History of cystic fibrosis for which she follows pulmonology closely         ROS:  All ROS negative except for pertinent positives listed above.       Current Outpatient Medications Ordered in Epic   Medication Sig Dispense Refill    meropenem (MERREM) 500 MG Recon Soln Mix 500 mg vial with 8 ml sterile water. Then nebulize 4 ml BID x 14 days. 14 Each 0    doxycycline (VIBRAMYCIN) 100 MG Cap Take 1 Capsule by mouth 2 times a day for 28 days. For 14 days. 28 Capsule 1    ADVAIR DISKUS 250-50 MCG/ACT AEROSOL POWDER, BREATH ACTIVATED INHALE 1 PUFF 2 TIMES A DAY. RINSE MOUTH AFTER EACH USE. 60 Each 5    CREON 6000-79940 units Cap DR Particles TAKE 1-2 CAPSULES BY MOUTH 4 TIMES A DAY AS NEEDED WITH MEALS OR SNACKS **KEEP IN ORIGINAL BOTTLE** 500 Capsule 6    albuterol (PROVENTIL) 2.5mg/3ml Nebu Soln solution for nebulization TAKE 3 ML BY NEBULIZATION 3 TIMES A  mL 4    Ivacaftor (KALYDECO) 150 MG Tab TAKE 1 TABLET BY MOUTH TWICE DAILY WITH FAT CONTAINING FOOD 56 Tablet 6    CAYSTON 75 MG Recon Soln Inhale 1 mL 3 times a day. Inhale 1 mL by mouth 3 times a day for 28 days. Repeat every other month as needed. 84 mL 3    sodium chloride (HYPER-SAL) 7 % Nebu Soln Take 4 mL by nebulization 4 times a day. 1440 mL 3    Tranexamic Acid 650 MG Tab TAKE 1-2 TABLETS BY MOUTH EVERY 8 HOURS AS NEEDED FOR HEMOPTYSIS 30 Tablet 1    acyclovir (ZOVIRAX) 400 MG tablet TAKE 1 TABLET BY MOUTH 4 TIMES A DAY FOR 14 DAYS, THEN TWICE DAILY FOR 3 MONTHS 180 Tablet  "1    fluticasone (FLONASE) 50 MCG/ACT nasal spray ADMINISTER 1-2 SPRAYS INTO AFFECTED NOSTRIL(S) EVERY DAY. 48 mL 2    phytonadione (MEPHYTON) 5 MG Tab TAKE 1 TABLET BY MOUTH EVERY DAY 90 Tablet 3    TRESIBA FLEXTOUCH 100 UNIT/ML Solution Pen-injector INJECT 4 UNITS SUBCUTANEOUSLY DAILY (375 DAYS)      sodium chloride 3% 3 % nebulizer solution Take 4 mL by nebulization 4 times a day as needed (Cough). TAKE 4 ML BY NEBULIZATION 4 TIMES A DAY AS NEEDED (COUGH). 480 mL 3    HUMALOG KWIKPEN 100 UNIT/ML Solution Pen-injector injection PEN INJECT 12-15 UNITS UNDER THE SKIN 3 TIMES A DAY BEFORE MEALS. 45 mL 3    BD PEN NEEDLE TAMIKO 2ND GEN 1 EACH BY OTHER ROUTE 4 TIMES A DAY,BEFORE MEALS AND AT BEDTIME. FOR INSULIN ADMINISTRATION. 400 Each 3    glucose blood (ONETOUCH VERIO) strip TEST 3 TIMES DAILY FOR INSULIN ADJUSTMENT AND AS NEEDED SYMPTOMS OF HIGH OR LOW BLOOD SUGAR 300 Strip 3    Continuous Blood Gluc Sensor (DEXCOM G6 SENSOR) Misc 1 Each every 10 days. 9 Each 4    Continuous Blood Gluc Transmit (DEXCOM G6 TRANSMITTER) Misc 1 Each continuous. Change every 3 months 1 Each 4    mupirocin (BACTROBAN) 2 % Ointment Apply BID for up to 10 days 30 g 0    Multiple Vitamins-Minerals (DEKAS PLUS PO) Take 1 Cap by mouth 2 times a day.      magnesium oxide (MAG-OX) 400 MG Tab Take 200 mg by mouth 2 times a day.      Ascorbic Acid (VITAMIN C) 1000 MG Tab Take 1,000 mg by mouth 2 Times a Day.      Probiotic Product (PROBIOTIC-10 PO) Take 1 Tab by mouth every day.      Cholecalciferol 5000 units Tab Take 10,000 Units by mouth 2 Times a Day.       No current [facility]-ordered facility-administered medications on file.         Objective:     Exam:  /50 (BP Location: Right arm, Patient Position: Sitting, BP Cuff Size: Adult)   Pulse 92   Temp 36.3 °C (97.3 °F) (Temporal)   Resp 16   Ht 1.665 m (5' 5.55\")   Wt 60 kg (132 lb 4.4 oz)   SpO2 95%   BMI 21.64 kg/m²  Body mass index is 21.64 kg/m².    Gen: Alert and oriented, No " apparent distress.  Constitutional: Alert, no distress, well-groomed.  Skin: Warm, dry, good turgor, no rashes in visible areas.  Eye: Equal, round and reactive, conjunctiva clear, lids normal.  ENMT: Lips without lesions, good dentition, moist mucous membranes.  Neck: Trachea midline, no masses, no thyromegaly.  Respiratory: Unlabored respiratory effort, no cough.  MSK: Normal gait, moves all extremities.  Neuro: Grossly non-focal.   Psych: Alert and oriented x3, normal affect and mood.  Ankles: RIGHT: No noticeable deformity, swelling or bruising. ROM intact. Tenderness to palpation negative. No tenderness to palpation along posterior edge of lateral and medial malleoli, base of 5th metatarsal or navicular bone. No tenderness along fibula. Squeeze test negative. External rotation test negative. Anterior drawer negative. Talar tilt negative. 5/5 strength bilaterally. Sensation intact. 2+ pedal pulses. Ankle reflex 2+.  Toes feel slightly cold to the touch but are not otherwise discolored.  Normal cap refill  Ankles: LEFT: No noticeable deformity, swelling or bruising. ROM intact. Tenderness to palpation negative. No tenderness to palpation along posterior edge of lateral and medial malleoli, base of 5th metatarsal or navicular bone. No tenderness along fibula. Squeeze test negative. External rotation test negative. Anterior drawer negative. Talar tilt negative. 5/5 strength bilaterally. Sensation intact. 2+ pedal pulses. Ankle reflex 2+. Toes feel slightly cold to the touch but are not otherwise discolored.  Normal cap refill      Assessment & Plan:     63 y.o. female with the following -     1. Tingling of both feet  New problem   no alarm symptoms on physical exam today although toes are noted to be somewhat cold, cap refill appears within normal limits and toes are not discolored.  Discussed several possible etiologies of tingling sensation in the toes.  Discussed this could be an inflammatory process secondary  to recent viral respiratory illnesses.  We will proceed with EMG for evaluation of possible neuropathy.  We also discussed trial of low-dose of gabapentin to see if this improves symptoms, patient elected to defer at this time  - Referral to Neurodiagnostics (EEG,EP,EMG/NCS/DBS)      I spent a total of 22 minutes with record review, exam, communication with the patient, communication with other providers, and documentation of this encounter.      No follow-ups on file.    Please note that this dictation was created using voice recognition software. I have made every reasonable attempt to correct obvious errors, but there may be errors of grammar and possibly content that I did not discover before finalizing the note.

## 2024-04-26 ENCOUNTER — PATIENT MESSAGE (OUTPATIENT)
Dept: MEDICAL GROUP | Facility: LAB | Age: 64
End: 2024-04-26
Payer: COMMERCIAL

## 2024-04-26 DIAGNOSIS — R20.2 TINGLING OF BOTH FEET: ICD-10-CM

## 2024-04-30 ENCOUNTER — PATIENT MESSAGE (OUTPATIENT)
Dept: PEDIATRIC PULMONOLOGY | Facility: MEDICAL CENTER | Age: 64
End: 2024-04-30
Payer: COMMERCIAL

## 2024-05-01 ENCOUNTER — HOSPITAL ENCOUNTER (OUTPATIENT)
Facility: MEDICAL CENTER | Age: 64
End: 2024-05-01
Attending: INTERNAL MEDICINE
Payer: COMMERCIAL

## 2024-05-01 ENCOUNTER — OFFICE VISIT (OUTPATIENT)
Dept: PEDIATRIC PULMONOLOGY | Facility: MEDICAL CENTER | Age: 64
End: 2024-05-01
Attending: INTERNAL MEDICINE
Payer: COMMERCIAL

## 2024-05-01 VITALS
DIASTOLIC BLOOD PRESSURE: 62 MMHG | BODY MASS INDEX: 21.31 KG/M2 | HEART RATE: 101 BPM | SYSTOLIC BLOOD PRESSURE: 114 MMHG | HEIGHT: 66 IN | RESPIRATION RATE: 16 BRPM | OXYGEN SATURATION: 93 % | WEIGHT: 132.6 LBS

## 2024-05-01 DIAGNOSIS — E84.9 CYSTIC FIBROSIS (HCC): ICD-10-CM

## 2024-05-01 PROCEDURE — 3078F DIAST BP <80 MM HG: CPT | Performed by: INTERNAL MEDICINE

## 2024-05-01 PROCEDURE — 94010 BREATHING CAPACITY TEST: CPT | Mod: 26 | Performed by: INTERNAL MEDICINE

## 2024-05-01 PROCEDURE — 3074F SYST BP LT 130 MM HG: CPT | Performed by: INTERNAL MEDICINE

## 2024-05-01 PROCEDURE — 99214 OFFICE O/P EST MOD 30 MIN: CPT | Mod: 25 | Performed by: INTERNAL MEDICINE

## 2024-05-01 ASSESSMENT — FIBROSIS 4 INDEX: FIB4 SCORE: 0.82

## 2024-05-01 NOTE — PROGRESS NOTES
Nutrition Screen & Progress Note for Adult CF Clinic    BMI: 21.7      Points: 0  IBW (kg): 58   % IBW: 104      Points: 0  Current weight (kg): 60.1   Weight last clinic visit: 60.4 kg on 10/25/23  % weight change: -     Points: 0  FEV1 % predicted: 62     Points: 1         Total Points: 1         Nutrition Risk: low    BM: daily  PERT: Creon 6 (0-3 with meals, usually only with dinner) = avg of 2 per day  Lipase units/kg/meal: 200  Lipase units/kg/day: 200  CFTR modulator: Kalydeco  Other GI meds: no  Typical diet: 3 meals and 0-1 snacks  Vitamins: general MVi, DEKAs Plus softgel, magnesium (400 mg), vitamin D (5000 IU), vitamin A (10,000 IU), vitamin K (5 mg)  Other supplements: Nuun electrolytes, probiotic    Visit details: Marina is here for CF visit, she looks great. She says she has been sick for 2 months, and also realized she has seasonal allergies so now takes Allegra.    She has not been exercising for a couple months d/t being sick.  She started back on her exercise regimen last week.    Her recent DEXA scan showed osteoporosis, last one showed osteopenia.  This is surprising as she has a long h/o weight bearing exercise.  Discussed her supplements, feel she is taking too much magnesium (UL = 350 mg/day) which can block calcium absorption. She says her meds make her body lose magnesium so she doesn't want to reduce this supplement.  She eats cheese but not enough dairy so she is not meeting her calcium goal of 1200 mg per day.  This could be contributing to her bone loss. Recommend she see PCP to discuss these results as she may need medication or even HRT; however, she does not want to take either.  She has been out of menopause for about 15 years.  She hasn't seen PCP in a long time.     Her daughter from [place] is getting a divorce and moving to [place] to live with her other daughter, Marina is happy they will both be local.    Recommendations/Plan: see PCP to discuss DEXA and re-establish care.  Also  need to follow up with pelon KLEIN.  Add 1000 mg calcium supplement to her regimen and recommend decrease magnesium to only 200 mg/day.    Follow-up: 3 months

## 2024-05-01 NOTE — PATIENT INSTRUCTIONS
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  CFTR Modulator: Kalydeco    Respiratory  Baseline FEV1: 71% Today's FEV1: _____%  Airway Clearance Routine:  Albuterol (2 x day) / (3-4x/day when sick)  Hypertonic Saline (2 x day) / (3-4x/day when sick)  Pulmozyme (1 x day) / (2x/day when sick)  ACT Vest and/or Acapella (2 x day) / (3-4 x/day when sick)  Inhaled antibiotics: Cayston, COMPLETE 2 MORE WEEKS  Started end of April inhaled Meropenem 250mg BID x 14 days  ADD BUDESONIDE 2 ML TO SINUS RINSES 2 TIMES PER DAY  Equipment Check (Annually) Date scheduled:  CF Culture: [facility]  Nutrition/Gastrointestinal  BMI: Current: 21.7, goal = 22 - 23     Enzymes: Creon 6 (0-3 depending on meal content)  Vitamins: general MVi, DEKAs Plus softgel, magnesium, D, K, A , probiotics, Nuun electrolytes. Recommend add 1000 mg calcium per day and reduce magnesium to 200 mg/day.  See PCP to discuss DEXA.   Exercise: goal of 3-5 days per week = do weight bearing exercise for the bones  Social  Insurance: [employer]  Transportation: yes  Has access to food resources: yes  Financial Resources: none  School / Work Needs: none  Mental health screening completed:   3/8/2023      Tasks:    Goals  Annual Labs: last done 7/2023, next due 7/2024  LFTs: last done 7/2023, next due 7/2024  Oral Glucose Tolerance Test: CFRD  If CFRD, how is it managed: Dietary Change, Oral hypoglycemic agents, intermittent insulin (with illness, steroids, etc.), chronic insulin, other diabetes drugs  Sputum Cultures: #1 (   )  , #2 (  ), #3  ( ),  #4 (  ) (Dates)  DEXA scan: last done 2/28/24, due again by 2026  According to the World Health Organization classification, bone mineral density of this patient is osteoporosis for both lumbar spine and proximal left femur.   Chest X-ray: last done 2/8/23, next due 2/2024   Eye Exam: last done 12/2021, recommended again 12/2022 ([facility])   Notes

## 2024-05-01 NOTE — PROGRESS NOTES
Respiratory -  Cystic Fibrosis Airway Clearance Therapy (ACT)      Kenia seen today at [facility]. Testing today included sputum sample and PFT.  Current plan for Respiratory medications and ACT is:        AM  Albuterol  Hypertonic Saline  Vest     PM  Albuterol  Hypertonic saline  ACT vest     Leo PRN when sick     Pt. given copy of PFT results.

## 2024-05-01 NOTE — PROCEDURES
"Pulmonary Function Test Results (PFT)    Spirometry Actual Predicted % Predicted   FVC (L) 2.64 3.25 81   FEV1 ((L) 1.59 2.54 62   FEV1/FVC (%) 60.19 78.79 76   FEF 25-75% (L/sec) 0.66 2.22 29     Please see  PFT in \"Media Tab\" of Notes activity  (EMR)    Provider Interpretation  No obstruction, FEV1 is 62%, which is near her baseline  "

## 2024-05-02 DIAGNOSIS — J98.8 PSEUDOMONAS RESPIRATORY INFECTION: ICD-10-CM

## 2024-05-02 DIAGNOSIS — B96.5 PSEUDOMONAS RESPIRATORY INFECTION: ICD-10-CM

## 2024-05-02 DIAGNOSIS — E84.0 CYSTIC FIBROSIS WITH PULMONARY EXACERBATION (HCC): ICD-10-CM

## 2024-05-02 LAB
FUNGUS SPEC FUNGUS STN: NORMAL
MYCOBACTERIUM SPEC CULT: NORMAL
RHODAMINE-AURAMINE STN SPEC: NORMAL
RHODAMINE-AURAMINE STN SPEC: NORMAL
SIGNIFICANT IND 70042: NORMAL
SITE SITE: NORMAL
SOURCE SOURCE: NORMAL

## 2024-05-02 RX ORDER — WATER 10 ML/10ML
INJECTION INTRAMUSCULAR; INTRAVENOUS; SUBCUTANEOUS
Qty: 150 ML | Refills: 0 | Status: SHIPPED | OUTPATIENT
Start: 2024-05-02

## 2024-05-02 RX ORDER — MEROPENEM 500 MG/1
INJECTION, POWDER, FOR SOLUTION INTRAVENOUS
Qty: 14 EACH | Refills: 0 | Status: SHIPPED | OUTPATIENT
Start: 2024-05-02

## 2024-05-02 NOTE — TELEPHONE ENCOUNTER
Last Office Visit:05/01/2024  Next Appt:08/07/2024    Received request via: Pharmacy    Was the patient seen in the last year in this department? Yes    Does the patient have an active prescription (recently filled or refills available) for medication(s) requested? No    [facility] Name: [facility]

## 2024-05-06 NOTE — PROGRESS NOTES
Adult Cystic Fibrosis Clinic Follow Up     CC: follow up cystic fibrosis    PCP:  Marleen Hargrove P.A.-C.   [id number] [id number] / [place] [id number]     SUBJECTIVE:   Kenia Soto is a 62 y.o. 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[facility]) 06/16/2020    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 11/13/2019    Cystic fibrosis ([facility]) 11/13/2019    Cystic fibrosis with pulmonary manifestations ([facility]) 11/13/2019    Herpes simplex 07/16/2019    Osteopenia of multiple sites 07/16/2019    CF (3849+10k bC>T, [id number]) ([facility]) 06/13/2019    Exocrine pancreatic insufficiency 06/13/2019    Vitamin K deficiency 06/13/2019    Partial thromboplastin time increased 08/22/2017    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  She feels like she is finally starting to get back to normal.  She had been using her Cayston and when she came off it became sick so she restarted it.  She noticed tingling in her feet.  She stopped the medication and the tingling in her feet got better.  She has previously use nebulized meropenem for sickness and now has very prescription for that as she has been able to tolerate the Cayston.    Last hospitalization: [10/21/20]    Respiratory:   Cough frequency: episodic, decreased  Cough character: productive  Sputum quantity: baseline  Sputum color: yellow  Shortness of breath:never  Chest Pain:never  Hemoptysis:occasional    Doctor visits: none   Antibiotics:   Cayston, on hold due to possible neuropathy, neurology evaluation pending  Meropenem as needed  Pulmonary toilet:   Albuterol: 2/day  7% hypertonic saline: 2/day 8ml mixed with glutathione since 2012  Pulmozyme: 0/day - off was on trial with it, had blood streaking with it  Chest  Physiotherapy: aerobika bid  Oxygen: none  Respiratory assist: none  Kalydeco bid  Advair 1-2 times/day    Compliance: compliant most of the time     Sinus symptoms:  Nasal Congestion: no  Nasal Drainage: no  Headache/sinus pressure: never       Activity / Energy: normal for age   Change in activity/energy: none   School/ Work attendance: no absences   School/ Work performance: no problem  Emotional assessment: positive       GI: no problem   Appetite: normal  Enzymes:  daily  Creon 6K units 1 with dinner  Stool: 1-2/day, characteristics: formed      Medications:     Current Outpatient Medications:     sterile water, USE TO MIX MEROPENEM 500MG VIAL WITH 8ML    meropenem,  MG VIAL WITH 8 ML STERILE WATER. THEN NEBULIZE 4 ML TWICE DAILY FOR 14 DAYS.    acyclovir, TAKE 1 TABLET BY MOUTH 4 TIMES A DAY FOR 14 DAYS, THEN TWICE DAILY FOR 3 MONTHS    doxycycline, 100 mg, Oral, BID    Advair Diskus, INHALE 1 PUFF 2 TIMES A DAY. RINSE MOUTH AFTER EACH USE.    Creon, TAKE 1-2 CAPSULES BY MOUTH 4 TIMES A DAY AS NEEDED WITH MEALS OR SNACKS **KEEP IN ORIGINAL BOTTLE**    albuterol, 2.5 mg, Nebulization, TID    Kalydeco, TAKE 1 TABLET BY MOUTH TWICE DAILY WITH FAT CONTAINING FOOD    Cayston, 1 mL, Inhalation, TID    sodium chloride, 4 mL, Nebulization, 4X/DAY    Tranexamic Acid, TAKE 1-2 TABLETS BY MOUTH EVERY 8 HOURS AS NEEDED FOR HEMOPTYSIS    fluticasone, 1-2 Spray, Nasal, DAILY    phytonadione, 5 mg, Oral, DAILY    Tresiba FlexTouch, INJECT 4 UNITS SUBCUTANEOUSLY DAILY (375 DAYS)    sodium chloride 3%, 4 mL, Nebulization, 4X/DAY PRN    HumaLOG KwikPen, 12-15 Units, Subcutaneous, TID AC    BD Pen Needle Radha 2nd Gen, 1 EACH BY OTHER ROUTE 4 TIMES A DAY,BEFORE MEALS AND AT BEDTIME. FOR INSULIN ADMINISTRATION.    OneTouch Verio, TEST 3 TIMES DAILY FOR INSULIN ADJUSTMENT AND AS NEEDED SYMPTOMS OF HIGH OR LOW BLOOD SUGAR    Dexcom G6 Sensor, 1 Each, Does not apply, Q10 DAYS    Dexcom G6 Transmitter, 1 Each, Does not apply,  "Continuous    mupirocin, Apply BID for up to 10 days    Multiple Vitamins-Minerals (DEKAS PLUS PO), 1 Capsule, Oral, BID    magnesium oxide, 200 mg, Oral, BID    Vitamin C, 1,000 mg, Oral, BID    Probiotic Product (PROBIOTIC-10 PO), 1 Tablet, Oral, DAILY    vitamin D3, 10,000 Units, Oral, BID    Other Medications: none  Central Line: none    ALLERGIES:  Bactrim [sulfamethoxazole-trimethoprim], Ciprofloxacin, Levofloxacin, and Tobramycin    Review of System:  All other systems reviewed and negative    Ears, nose, mouth, throat, and face: negative  Cardiovascular: Negative  Gastrointestinal: Negative  Allergic/Immunologic: negative      Social/Environmental:  Social History     Tobacco Use    Smoking status: Never    Smokeless tobacco: Never   Vaping Use    Vaping Use: Never used   Substance Use Topics    Alcohol use: Yes     Alcohol/week: 1.8 oz     Types: 3 Standard drinks or equivalent per week     Comment: Socially    Drug use: No       Home Environment       Pet Exposures    Dogs Yes     Reptiles Yes        Tobacco use: never  Pets: none    Respiratory hospitalizations: [10/21/20]      OBJECTIVE:  Physical Exam:  /62 (BP Location: Right arm, Patient Position: Sitting)   Pulse (!) 101   Resp 16   Ht 1.665 m (5' 5.55\")   Wt 60.1 kg (132 lb 9.6 oz)   SpO2 93%   BMI 21.70 kg/m²  Due to virtual visit     Physical Exam  Vitals reviewed.   Constitutional:       General: She is not in acute distress.     Appearance: Normal appearance.   HENT:      Head: Normocephalic.   Eyes:      Conjunctiva/sclera: Conjunctivae normal.   Cardiovascular:      Rate and Rhythm: Normal rate and regular rhythm.   Pulmonary:      Effort: Pulmonary effort is normal.      Breath sounds: Normal breath sounds.   Abdominal:      Palpations: Abdomen is soft.   Skin:     General: Skin is warm and dry.   Neurological:      Mental Status: She is alert and oriented to person, place, and time. Mental status is at baseline.   Psychiatric:    " "     Mood and Affect: Mood normal.         Behavior: Behavior normal.       PFT's:  Pulmonary Function Test Results (PFT)    Spirometry Actual Predicted % Predicted   FVC (L) 2.64 3.25 81   FEV1 ((L) 1.59 2.54 62   FEV1/FVC (%) 60.19 78.79 76   FEF 25-75% (L/sec) 0.66 2.22 29     Please see  PFT in \"Media Tab\" of Notes activity  (EMR)    Provider Interpretation  No obstruction, FEV1 is 62%, which is near her baseline      Labs reviewed:     Lab Results   Component Value Date/Time    SIGIND NEG 05/01/2024 11:49 AM    SIGIND NEG 05/01/2024 11:49 AM    SIGIND NEG 05/01/2024 11:49 AM    SIGIND NEG 05/01/2024 11:49 AM    SOURCE RESP 05/01/2024 11:49 AM    SOURCE RESP 05/01/2024 11:49 AM    SOURCE RESP 05/01/2024 11:49 AM    SOURCE RESP 05/01/2024 11:49 AM    SITE Sputum (coughed) 05/01/2024 11:49 AM    SITE Sputum (coughed) 05/01/2024 11:49 AM    SITE Sputum (coughed) 05/01/2024 11:49 AM    SITE Sputum (coughed) 05/01/2024 11:49 AM    CULTRSULT Culture in progress. 05/01/2024 11:49 AM    CULTRSULT Culture in progress. 05/01/2024 11:49 AM     Immunization History   Administered Date(s) Administered    Hepatitis B Vaccine Adolescent/Pediatric 10/27/1999, 12/14/1999, 08/04/2000    Influenza (IM) Preservative Free - HISTORICAL DATA 10/25/1999, 11/03/2008, 10/27/2009, 10/26/2010, 11/02/2011, 11/06/2012    Influenza Seasonal Injectable - Historical Data 11/04/2015, 10/14/2016, 10/30/2017    Influenza Vaccine Adult HD 10/27/2009, 10/01/2019    Influenza Vaccine Pediatric Split - Historical Data 10/23/1995, 10/21/1996, 10/15/1997, 10/29/1998, 11/14/2000, 11/14/2001, 11/05/2002, 10/27/2003, 10/18/2004, 10/24/2005, 11/13/2006, 10/22/2007, 11/18/2013, 10/22/2014    Influenza Vaccine Quad Inj (Pf) 10/01/2019, 11/02/2020    Influenza, Unspecified - HISTORICAL DATA 10/09/2018    Pneumococcal polysaccharide vaccine (PPSV-23) 10/27/1999    TD Vaccine 10/27/1999, 12/07/2018    Tdap Vaccine 06/01/2008    Zoster Vaccine Live (ZVL) " (Zostavax) - HISTORICAL DATA 04/22/2015       ASSESSMENT/PLAN:   1. Cystic fibrosis (HCC)  Grows nonmucoid and mucoid phenotype Pseudomonas  Continue current airway clearance, uses hypertonic but does not use dornase due to hemoptysis  Has h/o recurrent hemoptysis: keeps TXA on hand if needed  Continue with Kalydeco  Nebulized meropenem as needed for illness  Routine labs ordered today    2. Exocrine pancreatic insufficiency  Stable  Continue enzymes before meals    3. Osteopenia of multiple sites  Highly recommend calcium, she is going to do some research regarding what kind does not cause constipation  Continue with magnesium and vitamin D  Patient does exercise regularly    4. 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

## 2024-05-08 ENCOUNTER — HOSPITAL ENCOUNTER (OUTPATIENT)
Dept: RADIOLOGY | Facility: MEDICAL CENTER | Age: 64
End: 2024-05-08
Attending: INTERNAL MEDICINE
Payer: COMMERCIAL

## 2024-05-08 ENCOUNTER — OFFICE VISIT (OUTPATIENT)
Dept: PHYSICAL MEDICINE AND REHAB | Facility: MEDICAL CENTER | Age: 64
End: 2024-05-08
Payer: COMMERCIAL

## 2024-05-08 VITALS
BODY MASS INDEX: 22.41 KG/M2 | HEIGHT: 65 IN | WEIGHT: 134.48 LBS | SYSTOLIC BLOOD PRESSURE: 122 MMHG | TEMPERATURE: 97 F | OXYGEN SATURATION: 94 % | DIASTOLIC BLOOD PRESSURE: 78 MMHG | HEART RATE: 101 BPM

## 2024-05-08 DIAGNOSIS — R20.2 TINGLING OF BOTH FEET: ICD-10-CM

## 2024-05-08 DIAGNOSIS — E84.9 CYSTIC FIBROSIS (HCC): ICD-10-CM

## 2024-05-08 PROCEDURE — 3074F SYST BP LT 130 MM HG: CPT | Performed by: STUDENT IN AN ORGANIZED HEALTH CARE EDUCATION/TRAINING PROGRAM

## 2024-05-08 PROCEDURE — 99203 OFFICE O/P NEW LOW 30 MIN: CPT | Performed by: STUDENT IN AN ORGANIZED HEALTH CARE EDUCATION/TRAINING PROGRAM

## 2024-05-08 PROCEDURE — 3078F DIAST BP <80 MM HG: CPT | Performed by: STUDENT IN AN ORGANIZED HEALTH CARE EDUCATION/TRAINING PROGRAM

## 2024-05-08 PROCEDURE — 1126F AMNT PAIN NOTED NONE PRSNT: CPT | Performed by: STUDENT IN AN ORGANIZED HEALTH CARE EDUCATION/TRAINING PROGRAM

## 2024-05-08 ASSESSMENT — PAIN SCALES - GENERAL: PAINLEVEL: NO PAIN

## 2024-05-08 ASSESSMENT — FIBROSIS 4 INDEX: FIB4 SCORE: 0.82

## 2024-05-08 NOTE — PROGRESS NOTES
New Patient Note    Interventional Pain and Spine  Physiatry (Physical Medicine and Rehabilitation)     Patient Name: Kenia Soto  : 1960  Date of Service: 2024  PCP: Marleen Hargrove P.A.-C.  Referring Provider: Marleen Hargrove P.*    Chief Complaint:   Chief Complaint   Patient presents with    New Patient     Tingling of both feet       HPI  HISTORY FROM INITIAL VISIT (2024):  Kenia Soto is a 63 y.o. female who presents today with as referred for EMG for evaluation of numbness and tingling in all of her toes and the ball of her feet. She first noticed it in March but thinks she might've had it for a while.  Around this time, she took inhaled antibiotics for cystic fibrosis. The tingling worsened and started impairing her ability to sleep. Denies pain or weakness. Hasn't tried any meds for this.  Denies pain radiating from her low back down her legs.  Denies low back pain.    Psychological testing for pain as depression and pain commonly coexist and need to both be treated.     Opioid Risk Score: 1      Interpretation of Opioid Risk Score   Score 0-3 = Low risk of abuse. Do UDS at least once per year.  Score 4-7 = Moderate risk of abuse. Do UDS 1-4 times per year.  Score 8+ = High risk of abuse. Refer to specialist.    PHQ      2022    12:00 PM 3/8/2023    11:00 AM 2024     1:20 PM   Depression Screen (PHQ-2/PHQ-9)   PHQ-2 Total Score 0     PHQ-2 Total Score  0 0   PHQ-9 Total Score 3         Interpretation of PHQ-9 Total Score   Score Severity   1-4 No Depression   5-9 Mild Depression   10-14 Moderate Depression   15-19 Moderately Severe Depression   20-27 Severe Depression      Medical records review:  I reviewed the note from the referring provider Marleen Hargrove P.* including the note dated 2024.    ROS:   Red Flags ROS:   Fever, Chills, Sweats: Denies  Involuntary Weight Loss: Denies  Bladder Incontinence: Denies  Bowel Incontinence: denies  Saddle  Anesthesia: Denies    All other systems reviewed and negative.     PMHx:   Past Medical History:   Diagnosis Date    Allergy     Anemia     Asthma     Breath shortness     Bronchitis     CF (cystic fibrosis) (HCC)     Coughing blood     Diabetes (HCC)     Head ache     Hemorrhagic disorder (HCC)     Herpes simplex     Pneumonia     Shoulder fracture     Vaccine counseling 10/25/2023    Declines vaccines. Counseled that she is a high risk patient and that vaccines reduce the risk of hospitalization and death from communicable illnesses.        PSHx:   Past Surgical History:   Procedure Laterality Date    BRONCHOSCOPY-ENDO  7/22/2019    Procedure: BRONCHOSCOPY - W/BAL;  Surgeon: Arelis Fleming M.D.;  Location: ENDOSCOPY [facility];  Service: Ent    DENTAL SURGERY      EYE SURGERY      SINUS WASHING         Family Hx:   Family History   Problem Relation Age of Onset    Other Daughter         CF carrier    Alcohol/Drug Father     Heart Disease Father     Cystic Fibrosis Sister        Social Hx:  Social History     Socioeconomic History    Marital status:      Spouse name: Not on file    Number of children: Not on file    Years of education: Not on file    Highest education level: Bachelor's degree (e.g., BA, AB, BS)   Occupational History    Not on file   Tobacco Use    Smoking status: Never    Smokeless tobacco: Never   Vaping Use    Vaping Use: Never used   Substance and Sexual Activity    Alcohol use: Yes     Alcohol/week: 1.8 oz     Types: 3 Standard drinks or equivalent per week     Comment: Socially    Drug use: No    Sexual activity: Yes     Partners: Male     Comment: PM    Other Topics Concern    Not on file   Social History Narrative    Has 2 daughters who are both CF carriers. Enjoys staying active.     Retired .     Recently moved to [place] from [place]     Social Determinants of Health     Financial Resource Strain: Low Risk  (11/15/2022)    Overall Financial Resource Strain  (CARDIA)     Difficulty of Paying Living Expenses: Not hard at all   Food Insecurity: No Food Insecurity (11/15/2022)    Hunger Vital Sign     Worried About Running Out of Food in the Last Year: Never true     Ran Out of Food in the Last Year: Never true   Transportation Needs: No Transportation Needs (11/15/2022)    PRAPARE - Transportation     Lack of Transportation (Medical): No     Lack of Transportation (Non-Medical): No   Physical Activity: Sufficiently Active (11/15/2022)    Exercise Vital Sign     Days of Exercise per Week: 4 days     Minutes of Exercise per Session: 40 min   Stress: No Stress Concern Present (11/15/2022)    [employer] - Occupational Stress Questionnaire     Feeling of Stress : Not at all   Social Connections: Socially Integrated (11/15/2022)    Social Connection and Isolation Panel [NHANES]     Frequency of Communication with Friends and Family: More than three times a week     Frequency of Social Gatherings with Friends and Family: Twice a week     Attends [employer] Services: More than 4 times per year     Active Member of Clubs or Organizations: Yes     Attends Club or Organization Meetings: More than 4 times per year     Marital Status:    Intimate Partner Violence: Not on file   Housing Stability: Low Risk  (11/15/2022)    Housing Stability Vital Sign     Unable to Pay for Housing in the Last Year: No     Number of Places Lived in the Last Year: 1     Unstable Housing in the Last Year: No       Allergies:  Allergies   Allergen Reactions    Bactrim [Sulfamethoxazole-Trimethoprim]      Rash, patient thinks rash might have been side effect from other medication    Ciprofloxacin      Joint swelling    Levofloxacin      Unknown reaction  Possibly myalgias, arthralgias, nightmares    Tobramycin Hives     Hives       Medications: reviewed on epic.   Outpatient Medications Marked as Taking for the 5/8/24 encounter (Office Visit) with Rox Jimenes M.D.    Medication Sig Dispense Refill    Water For Injection Sterile (STERILE WATER) Solution USE TO MIX MEROPENEM 500MG VIAL WITH 8ML 150 mL 0    meropenem (MERREM) 500 MG Recon Soln  MG VIAL WITH 8 ML STERILE WATER. THEN NEBULIZE 4 ML TWICE DAILY FOR 14 DAYS. 14 Each 0    acyclovir (ZOVIRAX) 400 MG tablet TAKE 1 TABLET BY MOUTH 4 TIMES A DAY FOR 14 DAYS, THEN TWICE DAILY FOR 3 MONTHS 180 Tablet 1    ADVAIR DISKUS 250-50 MCG/ACT AEROSOL POWDER, BREATH ACTIVATED INHALE 1 PUFF 2 TIMES A DAY. RINSE MOUTH AFTER EACH USE. 60 Each 5    CREON 6000-07324 units Cap DR Particles TAKE 1-2 CAPSULES BY MOUTH 4 TIMES A DAY AS NEEDED WITH MEALS OR SNACKS **KEEP IN ORIGINAL BOTTLE** 500 Capsule 6    albuterol (PROVENTIL) 2.5mg/3ml Nebu Soln solution for nebulization TAKE 3 ML BY NEBULIZATION 3 TIMES A  mL 4    Ivacaftor (KALYDECO) 150 MG Tab TAKE 1 TABLET BY MOUTH TWICE DAILY WITH FAT CONTAINING FOOD 56 Tablet 6    CAYSTON 75 MG Recon Soln Inhale 1 mL 3 times a day. Inhale 1 mL by mouth 3 times a day for 28 days. Repeat every other month as needed. 84 mL 3    sodium chloride (HYPER-SAL) 7 % Nebu Soln Take 4 mL by nebulization 4 times a day. 1440 mL 3    Tranexamic Acid 650 MG Tab TAKE 1-2 TABLETS BY MOUTH EVERY 8 HOURS AS NEEDED FOR HEMOPTYSIS 30 Tablet 1    fluticasone (FLONASE) 50 MCG/ACT nasal spray ADMINISTER 1-2 SPRAYS INTO AFFECTED NOSTRIL(S) EVERY DAY. 48 mL 2    phytonadione (MEPHYTON) 5 MG Tab TAKE 1 TABLET BY MOUTH EVERY DAY 90 Tablet 3    TRESIBA FLEXTOUCH 100 UNIT/ML Solution Pen-injector INJECT 4 UNITS SUBCUTANEOUSLY DAILY (375 DAYS)      sodium chloride 3% 3 % nebulizer solution Take 4 mL by nebulization 4 times a day as needed (Cough). TAKE 4 ML BY NEBULIZATION 4 TIMES A DAY AS NEEDED (COUGH). 480 mL 3    HUMALOG KWIKPEN 100 UNIT/ML Solution Pen-injector injection PEN INJECT 12-15 UNITS UNDER THE SKIN 3 TIMES A DAY BEFORE MEALS. 45 mL 3    BD PEN NEEDLE TAMIKO 2ND GEN 1 EACH BY OTHER ROUTE 4 TIMES A  DAY,BEFORE MEALS AND AT BEDTIME. FOR INSULIN ADMINISTRATION. 400 Each 3    glucose blood (ONETOUCH VERIO) strip TEST 3 TIMES DAILY FOR INSULIN ADJUSTMENT AND AS NEEDED SYMPTOMS OF HIGH OR LOW BLOOD SUGAR 300 Strip 3    Continuous Blood Gluc Sensor (DEXCOM G6 SENSOR) Misc 1 Each every 10 days. 9 Each 4    Continuous Blood Gluc Transmit (DEXCOM G6 TRANSMITTER) Misc 1 Each continuous. Change every 3 months 1 Each 4    mupirocin (BACTROBAN) 2 % Ointment Apply BID for up to 10 days 30 g 0    Multiple Vitamins-Minerals (DEKAS PLUS PO) Take 1 Cap by mouth 2 times a day.      magnesium oxide (MAG-OX) 400 MG Tab Take 200 mg by mouth 2 times a day.      Ascorbic Acid (VITAMIN C) 1000 MG Tab Take 1,000 mg by mouth 2 Times a Day.      Probiotic Product (PROBIOTIC-10 PO) Take 1 Tab by mouth every day.      Cholecalciferol 5000 units Tab Take 10,000 Units by mouth 2 Times a Day.          Current Outpatient Medications on File Prior to Visit   Medication Sig Dispense Refill    Water For Injection Sterile (STERILE WATER) Solution USE TO MIX MEROPENEM 500MG VIAL WITH 8ML 150 mL 0    meropenem (MERREM) 500 MG Recon Soln  MG VIAL WITH 8 ML STERILE WATER. THEN NEBULIZE 4 ML TWICE DAILY FOR 14 DAYS. 14 Each 0    acyclovir (ZOVIRAX) 400 MG tablet TAKE 1 TABLET BY MOUTH 4 TIMES A DAY FOR 14 DAYS, THEN TWICE DAILY FOR 3 MONTHS 180 Tablet 1    ADVAIR DISKUS 250-50 MCG/ACT AEROSOL POWDER, BREATH ACTIVATED INHALE 1 PUFF 2 TIMES A DAY. RINSE MOUTH AFTER EACH USE. 60 Each 5    CREON 6000-36469 units Cap DR Particles TAKE 1-2 CAPSULES BY MOUTH 4 TIMES A DAY AS NEEDED WITH MEALS OR SNACKS **KEEP IN ORIGINAL BOTTLE** 500 Capsule 6    albuterol (PROVENTIL) 2.5mg/3ml Nebu Soln solution for nebulization TAKE 3 ML BY NEBULIZATION 3 TIMES A  mL 4    Ivacaftor (KALYDECO) 150 MG Tab TAKE 1 TABLET BY MOUTH TWICE DAILY WITH FAT CONTAINING FOOD 56 Tablet 6    CAYSTON 75 MG Recon Soln Inhale 1 mL 3 times a day. Inhale 1 mL by mouth 3 times a  day for 28 days. Repeat every other month as needed. 84 mL 3    sodium chloride (HYPER-SAL) 7 % Nebu Soln Take 4 mL by nebulization 4 times a day. 1440 mL 3    Tranexamic Acid 650 MG Tab TAKE 1-2 TABLETS BY MOUTH EVERY 8 HOURS AS NEEDED FOR HEMOPTYSIS 30 Tablet 1    fluticasone (FLONASE) 50 MCG/ACT nasal spray ADMINISTER 1-2 SPRAYS INTO AFFECTED NOSTRIL(S) EVERY DAY. 48 mL 2    phytonadione (MEPHYTON) 5 MG Tab TAKE 1 TABLET BY MOUTH EVERY DAY 90 Tablet 3    TRESIBA FLEXTOUCH 100 UNIT/ML Solution Pen-injector INJECT 4 UNITS SUBCUTANEOUSLY DAILY (375 DAYS)      sodium chloride 3% 3 % nebulizer solution Take 4 mL by nebulization 4 times a day as needed (Cough). TAKE 4 ML BY NEBULIZATION 4 TIMES A DAY AS NEEDED (COUGH). 480 mL 3    HUMALOG KWIKPEN 100 UNIT/ML Solution Pen-injector injection PEN INJECT 12-15 UNITS UNDER THE SKIN 3 TIMES A DAY BEFORE MEALS. 45 mL 3    BD PEN NEEDLE TAMIKO 2ND GEN 1 EACH BY OTHER ROUTE 4 TIMES A DAY,BEFORE MEALS AND AT BEDTIME. FOR INSULIN ADMINISTRATION. 400 Each 3    glucose blood (ONETOUCH VERIO) strip TEST 3 TIMES DAILY FOR INSULIN ADJUSTMENT AND AS NEEDED SYMPTOMS OF HIGH OR LOW BLOOD SUGAR 300 Strip 3    Continuous Blood Gluc Sensor (DEXCOM G6 SENSOR) Misc 1 Each every 10 days. 9 Each 4    Continuous Blood Gluc Transmit (DEXCOM G6 TRANSMITTER) Misc 1 Each continuous. Change every 3 months 1 Each 4    mupirocin (BACTROBAN) 2 % Ointment Apply BID for up to 10 days 30 g 0    Multiple Vitamins-Minerals (DEKAS PLUS PO) Take 1 Cap by mouth 2 times a day.      magnesium oxide (MAG-OX) 400 MG Tab Take 200 mg by mouth 2 times a day.      Ascorbic Acid (VITAMIN C) 1000 MG Tab Take 1,000 mg by mouth 2 Times a Day.      Probiotic Product (PROBIOTIC-10 PO) Take 1 Tab by mouth every day.      Cholecalciferol 5000 units Tab Take 10,000 Units by mouth 2 Times a Day.       No current facility-administered medications on file prior to visit.         EXAMINATION     Physical Exam:   /78 (BP  "Location: Right arm, Patient Position: Sitting, BP Cuff Size: Adult)   Pulse (!) 101   Temp 36.1 °C (97 °F) (Temporal)   Ht 1.651 m (5' 5\")   Wt 61 kg (134 lb 7.7 oz)   SpO2 94%     Constitutional:   Body Habitus: Body mass index is 22.38 kg/m².  Cooperation: Fully cooperates with exam  Appearance: Well-groomed, well-nourished.    Eyes: No scleral icterus to suggest severe liver disease, no proptosis to suggest severe hyperthyroidism    ENT -no obvious auditory deficits, no noticeable facial droop     Skin -no rashes or lesions noted     Respiratory-  breathing comfortably on room air, no audible wheezing    Cardiovascular-distal extremities warm and well perfused.  No lower extremity edema is noted.     Gastrointestinal - no obvious abdominal masses, non-distended    Psychiatric- alert and oriented ×3. Normal affect.     Gait - normal gait, no use of ambulatory device, nonantalgic. Heel walking and toe walking intact.    Musculoskeletal and Neuro -         Thoracic/Lumbar Spine/Sacral Spine/Hips   Inspection: No evidence of atrophy in bilateral lower extremities throughout     Facet loading maneuver negative bilaterally    Palpation:   No tenderness to palpation over the midline of lumbosacral spine, paraspinal muscles bilaterally, and lumbar facets bilaterally spanning approximately L1-L5 levels.         Impaired sensation to light touch at bilateral toes and balls of feet and bilateral arches.  Otherwise sensation intact to light touch at Key points for the international standards for neurological classification of spinal cord injury (ISNCSCI) to light touch.   Dermatome R L   L2 2 2   L3 2 2   L4 2 2   L5 2 2   S1 2 2   S2 2 2       Motor Exam Lower Extremities  ? Myotome R L   Hip flexion L2 5 5   Knee extension L3 5 5   Ankle dorsiflexion L4 5 5   Toe extension L5 5 5   Ankle plantarflexion S1 5 5       Reflexes  ?  R L   Patella  2+ 2+   Achilles   2+ 2+     Clonus of the ankle negative bilaterally "       MEDICAL DECISION MAKING    Medical records review: see under HPI section.     DATA    Labs: Personally reviewed at today's visit:     Lab Results   Component Value Date/Time    SODIUM 141 07/25/2023 08:07 AM    POTASSIUM 4.7 07/25/2023 08:07 AM    CHLORIDE 105 07/25/2023 08:07 AM    CO2 28 07/25/2023 08:07 AM    ANION 8.0 07/25/2023 08:07 AM    GLUCOSE 99 07/25/2023 08:07 AM    BUN 14 07/25/2023 08:07 AM    CREATININE 0.71 07/25/2023 08:07 AM    CALCIUM 9.7 07/25/2023 08:07 AM    ASTSGOT 26 07/25/2023 08:07 AM    ALTSGPT 27 07/25/2023 08:07 AM    TBILIRUBIN 0.3 07/25/2023 08:07 AM    ALBUMIN 4.3 07/25/2023 08:07 AM    TOTPROTEIN 6.9 07/25/2023 08:07 AM    GLOBULIN 2.6 07/25/2023 08:07 AM    AGRATIO 1.7 07/25/2023 08:07 AM       Lab Results   Component Value Date/Time    PROTHROMBTM 13.7 07/25/2023 08:07 AM    INR 1.06 07/25/2023 08:07 AM        Lab Results   Component Value Date/Time    WBC 5.6 07/25/2023 08:07 AM    RBC 4.99 07/25/2023 08:07 AM    HEMOGLOBIN 14.6 07/25/2023 08:07 AM    HEMATOCRIT 47.4 (H) 07/25/2023 08:07 AM    MCV 95.0 07/25/2023 08:07 AM    MCH 29.3 07/25/2023 08:07 AM    MCHC 30.8 (L) 07/25/2023 08:07 AM    MPV 10.2 07/25/2023 08:07 AM    NEUTSPOLYS 53.40 07/25/2023 08:07 AM    LYMPHOCYTES 29.50 07/25/2023 08:07 AM    MONOCYTES 11.40 07/25/2023 08:07 AM    EOSINOPHILS 3.90 07/25/2023 08:07 AM    BASOPHILS 1.60 07/25/2023 08:07 AM        Lab Results   Component Value Date/Time    HBA1C 5.8 (H) 07/25/2023 08:07 AM        Imaging:   I personally reviewed following images, these are my reads  No relevant imaging available for review at the time of today's visit      IMAGING radiology reads. I reviewed the following radiology reads                                                      Results for orders placed during the hospital encounter of 04/20/22    MR-BREAST-BILATERAL-WITH & W/O    Impression  1.  No MRI evidence of malignancy in either breast.    2.  No MRI evidence of mass or abnormality  in the area of palpable concern in the upper outer quadrant of the right breast.    3.  Changes in the lungs consistent with history of cystic fibrosis with again probable reactive mediastinal and hilar lymphadenopathy. Consider further evaluation with chest CT with contrast.      R1 - Category 1:  Negative                Results for orders placed during the hospital encounter of 23    DX-CHEST-2 VIEWS    Impression  1.  New consolidation within the right upper lobe consistent with pneumonia.    2.  Again seen chronic interstitial and airspace infiltrates as well as bronchiectasis consistent with patient's history of cystic fibrosis.                   Results for orders placed in visit on 20    DX-KNEE 3 VIEWS LEFT    Impression  No significant abnormality.                            Diagnosis  Visit Diagnoses     ICD-10-CM   1. Tingling of both feet  R20.2         ASSESSMENT AND PLAN:  Kenia Soto ( 1960) is a female      Marina was seen today for new patient.    Diagnoses and all orders for this visit:    Tingling of both feet  -     Referral to Pain Clinic          PLAN  Plan for EMG with me.      Follow-up: Next available for EMG with me    Orders Placed This Encounter    Referral to Pain Clinic       Rox Jimenes MD  Interventional Pain and Spine  Physical Medicine and Rehabilitation  [facility]    [place]leen Woods P.*     The above note documents my personal evaluation of this patient. In addition, I have reviewed and confirmed with the patient and MA the supportive information documented in today's Patient Health Questionnaire and Office Note.     Please note that this dictation was created using voice recognition software. I have made every reasonable attempt to correct obvious errors, but I expect that there are errors of grammar and possibly content that I did not discover before finalizing the note.

## 2024-05-10 DIAGNOSIS — R04.2 HEMOPTYSIS: ICD-10-CM

## 2024-05-10 DIAGNOSIS — E84.0 CYSTIC FIBROSIS WITH PULMONARY EXACERBATION (HCC): ICD-10-CM

## 2024-05-10 RX ORDER — TRANEXAMIC ACID 650 MG/1
TABLET ORAL
Qty: 30 TABLET | Refills: 1 | Status: SHIPPED | OUTPATIENT
Start: 2024-05-10

## 2024-05-11 NOTE — TELEPHONE ENCOUNTER
Last Visit: 5/1/24  Next Visit: 8/7/24    Received request via: Pharmacy    Was the patient seen in the last year in this department? Yes    Does the patient have an active prescription (recently filled or refills available) for medication(s) requested? No     Pharmacy Name: [facility]

## 2024-05-17 ENCOUNTER — OFFICE VISIT (OUTPATIENT)
Dept: PHYSICAL MEDICINE AND REHAB | Facility: MEDICAL CENTER | Age: 64
End: 2024-05-17
Payer: COMMERCIAL

## 2024-05-17 VITALS
SYSTOLIC BLOOD PRESSURE: 104 MMHG | TEMPERATURE: 99.1 F | HEART RATE: 84 BPM | HEIGHT: 65 IN | OXYGEN SATURATION: 94 % | WEIGHT: 134.26 LBS | BODY MASS INDEX: 22.37 KG/M2 | DIASTOLIC BLOOD PRESSURE: 62 MMHG

## 2024-05-17 DIAGNOSIS — R20.2 NUMBNESS AND TINGLING OF BOTH FEET: ICD-10-CM

## 2024-05-17 DIAGNOSIS — R20.0 NUMBNESS AND TINGLING OF BOTH FEET: ICD-10-CM

## 2024-05-17 PROCEDURE — 3078F DIAST BP <80 MM HG: CPT | Performed by: STUDENT IN AN ORGANIZED HEALTH CARE EDUCATION/TRAINING PROGRAM

## 2024-05-17 PROCEDURE — 95909 NRV CNDJ TST 5-6 STUDIES: CPT | Performed by: STUDENT IN AN ORGANIZED HEALTH CARE EDUCATION/TRAINING PROGRAM

## 2024-05-17 PROCEDURE — 3074F SYST BP LT 130 MM HG: CPT | Performed by: STUDENT IN AN ORGANIZED HEALTH CARE EDUCATION/TRAINING PROGRAM

## 2024-05-17 ASSESSMENT — PATIENT HEALTH QUESTIONNAIRE - PHQ9: CLINICAL INTERPRETATION OF PHQ2 SCORE: 0

## 2024-05-17 ASSESSMENT — FIBROSIS 4 INDEX: FIB4 SCORE: 0.82

## 2024-05-17 ASSESSMENT — PAIN SCALES - GENERAL: PAINLEVEL: NO PAIN

## 2024-05-17 NOTE — Clinical Note
Tl Hawley,  I completed Marina's EMG which was essentially normal.  I informed her of the results and advised her to follow-up with you.  I discussed that vascular studies could be considered pending your discretion, as well as a repeat EMG in 3-12 months if her symptoms persist.  Thanks,  Rox

## 2024-05-19 NOTE — PROCEDURES
"      Test Date:  2024    Patient: Marina Soto : 1960 Physician: ofelia   Sex: Female Height: ' 0\" Ref Phys:    ID#:  Weight:  lbs. Technician:      History and Physical Examination  Marina Soto is a 63 y.o. female with numbness and tingling in all of her toes and the ball of her feet. She first noticed it in March but thinks she might've had it for a while.  Around this time, she took inhaled antibiotics for cystic fibrosis. The tingling worsened and started impairing her ability to sleep. Denies pain or weakness. Hasn't tried any meds for this.  Denies pain radiating from her low back down her legs.  Denies low back pain.     Gait - normal gait, no use of ambulatory device, nonantalgic. Heel walking and toe walking intact.     Musculoskeletal and Neuro -            Thoracic/Lumbar Spine/Sacral Spine/Hips   Inspection: No evidence of atrophy in bilateral lower extremities throughout      Facet loading maneuver negative bilaterally     Palpation:   No tenderness to palpation over the midline of lumbosacral spine, paraspinal muscles bilaterally, and lumbar facets bilaterally spanning approximately L1-L5 levels.            Impaired sensation to light touch at bilateral toes and balls of feet and bilateral arches.  Otherwise sensation intact to light touch at Key points for the international standards for neurological classification of spinal cord injury (ISNCSCI) to light touch.   Dermatome R L   L2 2 2   L3 2 2   L4 2 2   L5 2 2   S1 2 2   S2 2 2         Motor Exam Lower Extremities  ? Myotome R L   Hip flexion L2 5 5   Knee extension L3 5 5   Ankle dorsiflexion L4 5 5   Toe extension L5 5 5   Ankle plantarflexion S1 5 5         Reflexes  ?   R L   Patella   2+ 2+   Achilles    2+ 2+      Clonus of the ankle negative bilaterally     NCV & EMG Findings:  The patient's temperature remained at 27 degrees C at bilateral medial malleoli throughout the duration of the study. For motor and sensory distal latency, " a temperature correction of 0.2ms/degree Celsius was used, to a standard skin temperature of 32 degrees C. For motor and sensory NCV, a temperature correction of 1.4ms/degree Celsius was used, to a standard skin temperature of 32 degrees C.    The amplitude of the left sural antisensory nerve was 4.0 µV, reduced from the normal range of greater than 5.0 µV.  All other tested nerves were within normal limits, when corrected for temperature.    Diagnostic Interpretation  This is an essentially normal study when findings are corrected for temperature.  The amplitude of the left sural antisensory nerve was 4.0 µV, reduced from the normal range of >5.0 µV.  This is of unclear clinical significance, as no demyelination of this nerve was seen and it would be rare for a purely axonal lesion to be isolated to the left sural nerve.  This finding is possibly due to technical factors.  Clinical correlation advised.    Notes  Findings discussed with patient  2. Patient advised to follow-up with PCP  3. If symptoms persist, could consider repeat EMG in 3-12 months for comparison    ___________________________  Rox Jimenes MD  Physical Medicine and Rehabilitation  [facility]          Nerve Conduction Studies  Anti Sensory Summary Table    RESULTS AT 27 DEGREES CELSIUS   Stim Site NR Peak (ms) Norm Peak (ms) P-T Amp (µV) Norm P-T Amp Site1 Site2 Delta-P (ms) Dist (cm) David (m/s) Norm David (m/s)   Left Sural Anti Sensory (Lat Mall)   Calf    4.7 <4.0 4.0 >5.0 Calf Lat Mall 4.7 14.0 30 >35   Right Sural Anti Sensory (Lat Mall)   Calf    4.3 <4.0 17.8 >5.0 Calf Lat Mall 4.3 14.0 33 >35     Motor Summary Table     Stim Site NR Onset (ms) Norm Onset (ms) O-P Amp (mV) Norm O-P Amp Site1 Site2 Delta-0 (ms) Dist (cm) David (m/s) Norm David (m/s)   Left Fibular Motor (Ext Dig Brev)   Ankle    7.1 <6.1 3.0 >2.5 B Fib Ankle 6.5 27.0 42 >38   B Fib    13.8  3.3  Poplt B Fib 1.7 10.0 59 >40   Poplt    15.5  3.5          Right Fibular Motor  (Ext Dig Brev)   Ankle    5.8 <6.1 3.2 >2.5 B Fib Ankle 5.4 28.0 52 >38   B Fib    11.2  5.2  Poplt B Fib 2.2 9.0 41 >40   Poplt    13.4  5.7          Left Tibial Motor (Abd Arias Brev)   Ankle    4.3 <6.1 4.6 >3.0 Knee Ankle 8.7 35.0 40 >35   Knee    13.0  6.3          Right Tibial Motor (Abd Arias Brev)   Ankle    4.8 <6.1 8.7 >3.0 Knee Ankle 8.1 37.5 46 >35   Knee    12.9  8.0                Waveforms:

## 2024-05-20 DIAGNOSIS — E84.0 CYSTIC FIBROSIS WITH PULMONARY MANIFESTATIONS (HCC): ICD-10-CM

## 2024-05-20 DIAGNOSIS — E84.0 CYSTIC FIBROSIS WITH PULMONARY EXACERBATION (HCC): ICD-10-CM

## 2024-05-20 RX ORDER — SODIUM CHLORIDE FOR INHALATION 7 %
VIAL, NEBULIZER (ML) INHALATION
Qty: 1440 ML | Refills: 1 | Status: SHIPPED | OUTPATIENT
Start: 2024-05-20

## 2024-05-20 NOTE — TELEPHONE ENCOUNTER
Last Visit: 5/1/24  Next Visit: 8/7/24    Received request via: Pharmacy    Was the patient seen in the last year in this department? Yes    Does the patient have an active prescription (recently filled or refills available) for medication(s) requested? No     Pharmacy Name: [facility]

## 2024-05-23 DIAGNOSIS — E84.0 CYSTIC FIBROSIS WITH PULMONARY MANIFESTATIONS (HCC): ICD-10-CM

## 2024-05-23 RX ORDER — FLUTICASONE PROPIONATE 50 MCG
1-2 SPRAY, SUSPENSION (ML) NASAL DAILY
Qty: 48 ML | Refills: 2 | Status: SHIPPED | OUTPATIENT
Start: 2024-05-23

## 2024-05-23 NOTE — TELEPHONE ENCOUNTER
Last Visit:05/01/2024  Next Visit: 08/07/2024    Received request via: Pharmacy    Was the patient seen in the last year in this department? Yes    Does the patient have an active prescription (recently filled or refills available) for medication(s) requested? No     Pharmacy Name: [facility]     1

## 2024-05-24 DIAGNOSIS — E56.1 VITAMIN K DEFICIENCY: ICD-10-CM

## 2024-05-24 RX ORDER — PHYTONADIONE 5 MG/1
5 TABLET ORAL DAILY
Qty: 90 TABLET | Refills: 3 | Status: SHIPPED | OUTPATIENT
Start: 2024-05-24

## 2024-07-22 DIAGNOSIS — E84.9 CYSTIC FIBROSIS (HCC): ICD-10-CM

## 2024-07-22 RX ORDER — IVACAFTOR 150 MG/1
TABLET, FILM COATED ORAL
Qty: 56 TABLET | Refills: 11 | Status: SHIPPED | OUTPATIENT
Start: 2024-07-22

## 2024-07-23 ENCOUNTER — TELEPHONE (OUTPATIENT)
Dept: PHARMACY | Facility: MEDICAL CENTER | Age: 64
End: 2024-07-23
Payer: COMMERCIAL

## 2024-08-06 NOTE — PATIENT INSTRUCTIONS
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, [id number]  CFTR Modulator: Kalydeco    Respiratory  Baseline FEV1: 71% Today's FEV1: _____%  Airway Clearance Routine:  Albuterol (2 x day) / (3-4x/day when sick)  Hypertonic Saline (2 x day) / (3-4x/day when sick)  Pulmozyme (1 x day) / (2x/day when sick)  ACT Vest and/or Acapella (2 x day) / (3-4 x/day when sick)  Inhaled antibiotics: [place], COMPLETE 2 MORE WEEKS  Inhaled Meropenem 250mg BID x 14 days next time needed  ADD BUDESONIDE 2 ML TO SINUS RINSES 2 TIMES PER DAY  Equipment Check (Annually) Date scheduled:  CF Culture: [facility]  Nutrition/Gastrointestinal  BMI: Current: _____ Goal: 22 - 23     Enzymes: Creon 6 (0-3 depending on meal content)  Vitamins: general MVi, DEKAs Plus softgel, magnesium, D, K, A , probiotics, Nuun electrolytes  Exercise: goal of 3 days per week = good job!  Social  Insurance: [employer]  Transportation: yes  Has access to food resources: yes  Financial Resources: none  School / Work Needs: none  Mental health screening completed:   3/8/2023      Tasks:    Goals  Annual Labs: last done 7/2023, next due 7/2024  LFTs: last done 7/2023, next due 7/2024  Oral Glucose Tolerance Test: CFRD  If CFRD, how is it managed: Dietary Change, Oral hypoglycemic agents, intermittent insulin (with illness, steroids, etc.), chronic insulin, other diabetes drugs  Sputum Cultures: #1 ( 5/1/24  )  , #2 (  ), #3  ( ),  #4 (  ) (Dates)  DEXA scan: last done 2/28/24, due again by 2026  According to the World Health Organization classification, bone mineral density of this patient is osteoporosis for both lumbar spine and proximal left femur.   Chest X-ray: last done 5/8/24, next due 5/2025  Colonoscopy:   Eye Exam: last done 12/2021, recommended again 12/2022 ([facility] [place])   Notes

## 2024-08-07 ENCOUNTER — TELEMEDICINE (OUTPATIENT)
Dept: PEDIATRIC PULMONOLOGY | Facility: MEDICAL CENTER | Age: 64
End: 2024-08-07
Attending: INTERNAL MEDICINE
Payer: COMMERCIAL

## 2024-08-07 VITALS — WEIGHT: 133 LBS | BODY MASS INDEX: 21.38 KG/M2 | HEIGHT: 66 IN

## 2024-08-07 DIAGNOSIS — E84.9 CYSTIC FIBROSIS (HCC): ICD-10-CM

## 2024-08-07 PROCEDURE — 999999 HB NO CHARGE: Performed by: INTERNAL MEDICINE

## 2024-08-07 PROCEDURE — 99214 OFFICE O/P EST MOD 30 MIN: CPT | Mod: 95 | Performed by: INTERNAL MEDICINE

## 2024-08-07 ASSESSMENT — FIBROSIS 4 INDEX: FIB4 SCORE: 0.82

## 2024-08-07 NOTE — PROGRESS NOTES
Medical Social Work    Referral: CF Clinic / Dr. Mccabe    Intervention: Patient presents to CF clinic for routine cystic fibrosis follow-up via telemedicine. Patient reports she is doing well overall, despite being unable to exercise to her fullest potential d/t an injury on the balls of both feet. Patient attributes the injury to a small trampoline she was using for cardio. Patient is able to do other workouts in the meantime until her feet heal.     Patient has several small trips coming up, including her daughters wedding in [place], [place]. Patient is excited for her daughter and states she has remained in the background if her daughter needs any help with planning.      SW screened for transportation, food resources, school/work, financial, medication, and mental health needs. Patient denies having any concerns in these areas.    Plan: SW will continue to follow in clinic.

## 2024-08-07 NOTE — PROGRESS NOTES
Nutrition Screen & Progress Note for Adult CF Clinic    BMI: 21.8       Points: 0  IBW (kg): 58  % IBW: 104       Points: 0  Current weight (kg): 60.3   Weight last clinic visit: 60.1 kg on 5/1/24  % weight change: stable     Points: 0  FEV1 % predicted: virtual visit so no PFT   Points: -           Total Points: 0          Nutrition Risk: low    BM: daily  PERT: Creon 6 (0-3 with meals, usually only with dinner) = avg of 2 per day  Lipase units/kg/meal: 199  Lipase units/kg/day: 199  CFTR modulator: kalydeco  Other GI meds: no  Typical diet: 3 meals and 0-1 snacks  Vitamins: general MVi, DEKAs plus softgel, magnesium (400 mg), vitamin D (5000 IU), vitamin A (10,000 IU), vitamin K (5 mg)  Other supplements: Nuun electrolytes, probiotics, Tums for calcium    Visit details: Marina had virtual visit today, she looks great. She has been feeling well. She started having foot problems from her trampoline so she is on a break from exercise. She is looking forward to resuming exercise on a different trampoline.    She is eating well, no concerns.  She is taking Tums to get some calcium.  She knows that the magnesium she is taking exceeds the UL of 350 mg but she says her meds make her body lose magnesium.    She has been enjoying time with her daughters as they both now live locally, one of them is getting  next month.    Marina does a great job of taking care of herself as evidenced by good health with CF at age 63.    Recommendations/Plan: continue with CFRD diet and resume physical activity as able.  Get annual labs done before next CF visit.  Follow-up: 3 months

## 2024-08-07 NOTE — PROGRESS NOTES
Adult Cystic Fibrosis Clinic Follow Up     This evaluation was conducted via Teams using secure and encrypted videoconferencing technology. The patient was in their home in the [facility].    The patient's identity was confirmed and verbal consent was obtained for this virtual visit.    CC: follow up cystic fibrosis    PCP:  Marleen Hargrove P.A.-C.   [address] / [place] [address]     SUBJECTIVE:   Kenia Soto is a 62 y.o. 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[facility]) 06/16/2020    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 11/13/2019    Cystic fibrosis ([facility]) 11/13/2019    Cystic fibrosis with pulmonary manifestations ([facility]) 11/13/2019    Herpes simplex 07/16/2019    Osteopenia of multiple sites 07/16/2019    CF (3849+10k bC>T, R0231M) ([facility]) 06/13/2019    Exocrine pancreatic insufficiency 06/13/2019    Vitamin K deficiency 06/13/2019    Partial thromboplastin time increased 08/22/2017    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  She has felt well over the summer and has not required any antibiotics.  She is due for her annual labs.  She reports that the smoke does bother her, but she has been staying inside.  She has not required the nebulized meropenem.    Last hospitalization: [10/21/20]    Respiratory:   Cough frequency: episodic, decreased  Cough character: productive  Sputum quantity: baseline  Sputum color: yellow  Shortness of breath:never  Chest Pain:never  Hemoptysis:occasional    Doctor visits: none   Antibiotics:   Meropenem as needed  Pulmonary toilet:   Albuterol: 2/day  7% hypertonic saline: 2/day 8ml mixed with glutathione since 2012  Pulmozyme: 0/day - off was on trial with it, had blood streaking with it  Chest Physiotherapy: adryan  bid  Oxygen: none  Respiratory assist: none  Kalydeco bid  Advair 1-2 times/day    Compliance: compliant most of the time     Sinus symptoms:  Nasal Congestion: no  Nasal Drainage: no  Headache/sinus pressure: never       Activity / Energy: normal for age   Change in activity/energy: none   School/ Work attendance: no absences   School/ Work performance: no problem  Emotional assessment: positive       GI: no problem   Appetite: normal  Enzymes:  daily  Creon 6K units 1 with dinner  Stool: 1-2/day, characteristics: formed      Medications:     Current Outpatient Medications:     Kalydeco, TAKE 1 TABLET BY MOUTH TWICE DAILY WITH FAT CONTAINING FOOD, Taking    phytonadione, 5 mg, Oral, DAILY, Taking    fluticasone, 1-2 Spray, Nasal, DAILY, PRN    sodium chloride, INHALE 4 ML BY NEBULIZATION 4 TIMES A DAY., Taking    Tranexamic Acid, TAKE 1-2 TABLETS BY MOUTH EVERY 8 HOURS AS NEEDED FOR HEMOPTYSIS, PRN    sterile water, USE TO MIX MEROPENEM 500MG VIAL WITH 8ML, Taking    meropenem,  MG VIAL WITH 8 ML STERILE WATER. THEN NEBULIZE 4 ML TWICE DAILY FOR 14 DAYS., PRN    acyclovir, TAKE 1 TABLET BY MOUTH 4 TIMES A DAY FOR 14 DAYS, THEN TWICE DAILY FOR 3 MONTHS, PRN    Advair Diskus, INHALE 1 PUFF 2 TIMES A DAY. RINSE MOUTH AFTER EACH USE., Taking    Creon, TAKE 1-2 CAPSULES BY MOUTH 4 TIMES A DAY AS NEEDED WITH MEALS OR SNACKS **KEEP IN ORIGINAL BOTTLE**, Taking    albuterol, 2.5 mg, Nebulization, TID, Taking    Cayston, 1 mL, Inhalation, TID, PRN    Tresiba FlexTouch, INJECT 4 UNITS SUBCUTANEOUSLY DAILY (375 DAYS), Taking    sodium chloride 3%, 4 mL, Nebulization, 4X/DAY PRN, PRN    HumaLOG KwikPen, 12-15 Units, Subcutaneous, TID AC, Taking    BD Pen Needle Radha 2nd Gen, 1 EACH BY OTHER ROUTE 4 TIMES A DAY,BEFORE MEALS AND AT BEDTIME. FOR INSULIN ADMINISTRATION., Taking    OneTouch Verio, TEST 3 TIMES DAILY FOR INSULIN ADJUSTMENT AND AS NEEDED SYMPTOMS OF HIGH OR LOW BLOOD SUGAR, Taking    Dexcom G6 Sensor, 1 Each,  Does not apply, Q10 DAYS, Taking    Dexcom G6 Transmitter, 1 Each, Does not apply, Continuous, Taking    Multiple Vitamins-Minerals (DEKAS PLUS PO), 1 Capsule, Oral, BID, Taking    magnesium oxide, 200 mg, Oral, BID, Taking    Vitamin C, 1,000 mg, Oral, BID, Taking    Probiotic Product (PROBIOTIC-10 PO), 1 Tablet, Oral, DAILY, Taking    vitamin D3, 10,000 Units, Oral, BID, Taking    mupirocin, Apply BID for up to 10 days (Patient not taking: Reported on 8/7/2024), Not Taking    Other Medications: none  Central Line: none    ALLERGIES:  Bactrim [sulfamethoxazole-trimethoprim], Ciprofloxacin, Levofloxacin, and Tobramycin    Review of System:  All other systems reviewed and negative    Ears, nose, mouth, throat, and face: negative  Cardiovascular: Negative  Gastrointestinal: Negative  Allergic/Immunologic: negative      Social/Environmental:  Social History     Tobacco Use    Smoking status: Never    Smokeless tobacco: Never   Vaping Use    Vaping status: Never Used   Substance Use Topics    Alcohol use: Yes     Alcohol/week: 1.8 oz     Types: 3 Standard drinks or equivalent per week     Comment: Socially    Drug use: No       Home Environment       Pet Exposures    Dogs Yes     Reptiles Yes        Tobacco use: never  Pets: none    Respiratory hospitalizations: [10/21/20]      OBJECTIVE:  Physical Exam:  Wt 60.3 kg (133 lb) Comment: stated  BMI 22.13 kg/m²  Due to virtual visit     Physical Exam  Constitutional:       General: She is not in acute distress.     Appearance: Normal appearance.   HENT:      Head: Normocephalic.   Eyes:      Conjunctiva/sclera: Conjunctivae normal.   Neurological:      Mental Status: She is alert and oriented to person, place, and time. Mental status is at baseline.   Psychiatric:         Mood and Affect: Mood normal.         Behavior: Behavior normal.       PFT's:   None today - virtual     Labs reviewed:     Lab Results   Component Value Date/Time    SIGIND NEG 05/01/2024 11:49 AM     SIGIND NEG 05/01/2024 11:49 AM    SIGIND NEG 05/01/2024 11:49 AM    SIGIND NEG 05/01/2024 11:49 AM    SOURCE RESP 05/01/2024 11:49 AM    SOURCE RESP 05/01/2024 11:49 AM    SOURCE RESP 05/01/2024 11:49 AM    SOURCE RESP 05/01/2024 11:49 AM    SITE Sputum (coughed) 05/01/2024 11:49 AM    SITE Sputum (coughed) 05/01/2024 11:49 AM    SITE Sputum (coughed) 05/01/2024 11:49 AM    SITE Sputum (coughed) 05/01/2024 11:49 AM    CULTRSULT No fungal growth. 05/01/2024 11:49 AM    CULTRSULT No acid fast bacilli detected. 05/01/2024 11:49 AM     Immunization History   Administered Date(s) Administered    Hepatitis B Vaccine Adolescent/Pediatric 10/27/1999, 12/14/1999, 08/04/2000    Influenza (IM) Preservative Free - HISTORICAL DATA 10/25/1999, 11/03/2008, 10/27/2009, 10/26/2010, 11/02/2011, 11/06/2012    Influenza Seasonal Injectable - Historical Data 11/04/2015, 10/14/2016, 10/30/2017    Influenza Vaccine Adult HD 10/27/2009, 10/01/2019    Influenza Vaccine Pediatric Split - Historical Data 10/23/1995, 10/21/1996, 10/15/1997, 10/29/1998, 11/14/2000, 11/14/2001, 11/05/2002, 10/27/2003, 10/18/2004, 10/24/2005, 11/13/2006, 10/22/2007, 11/18/2013, 10/22/2014    Influenza Vaccine Quad Inj (Pf) 10/01/2019, 11/02/2020    Influenza, Unspecified - HISTORICAL DATA 10/09/2018    Pneumococcal polysaccharide vaccine (PPSV-23) 10/27/1999    TD Vaccine 10/27/1999, 12/07/2018    Tdap Vaccine 06/01/2008    Zoster Vaccine Live (ZVL) (Zostavax) - HISTORICAL DATA 04/22/2015       ASSESSMENT/PLAN:   1. Cystic fibrosis (HCC)  Grows nonmucoid and mucoid phenotype Pseudomonas  Continue current airway clearance, uses hypertonic but does not use dornase due to hemoptysis  Has h/o recurrent hemoptysis: keeps TXA on hand if needed  Continue with Kalydeco  Nebulized meropenem as needed for illness  Routine labs needed    2. Exocrine pancreatic insufficiency  Stable  Continue enzymes before meals    3. Osteopenia of multiple sites  Highly recommend  calcium  Continue with magnesium and vitamin D  Patient does exercise regularly    4. 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

## 2024-08-13 PROBLEM — E84.9 CYSTIC FIBROSIS (HCC): Status: RESOLVED | Noted: 2019-11-13 | Resolved: 2024-08-13

## 2024-08-20 RX ORDER — ACYCLOVIR 400 MG/1
TABLET ORAL
Qty: 208 TABLET | Refills: 1 | Status: SHIPPED | OUTPATIENT
Start: 2024-08-20

## 2024-10-04 DIAGNOSIS — R04.2 HEMOPTYSIS: ICD-10-CM

## 2024-10-04 DIAGNOSIS — J98.8 PSEUDOMONAS RESPIRATORY INFECTION: ICD-10-CM

## 2024-10-04 DIAGNOSIS — B96.5 PSEUDOMONAS RESPIRATORY INFECTION: ICD-10-CM

## 2024-10-04 DIAGNOSIS — E84.9 CYSTIC FIBROSIS (HCC): ICD-10-CM

## 2024-10-04 DIAGNOSIS — E84.0 CYSTIC FIBROSIS WITH PULMONARY EXACERBATION (HCC): ICD-10-CM

## 2024-10-04 DIAGNOSIS — E84.9 CF (CYSTIC FIBROSIS) (HCC): ICD-10-CM

## 2024-10-04 RX ORDER — TRANEXAMIC ACID 650 MG/1
TABLET ORAL
Qty: 30 TABLET | Refills: 1 | Status: SHIPPED | OUTPATIENT
Start: 2024-10-04

## 2024-10-04 RX ORDER — DOXYCYCLINE 100 MG/1
100 CAPSULE ORAL 2 TIMES DAILY
Qty: 28 CAPSULE | Refills: 1 | Status: SHIPPED | OUTPATIENT
Start: 2024-10-04 | End: 2024-11-01

## 2024-10-04 RX ORDER — ALBUTEROL SULFATE 0.83 MG/ML
2.5 SOLUTION RESPIRATORY (INHALATION) 3 TIMES DAILY
Qty: 1050 ML | Refills: 2 | Status: SHIPPED | OUTPATIENT
Start: 2024-10-04

## 2024-10-07 ENCOUNTER — TELEPHONE (OUTPATIENT)
Dept: PEDIATRIC PULMONOLOGY | Facility: MEDICAL CENTER | Age: 64
End: 2024-10-07
Payer: COMMERCIAL

## 2024-10-15 ENCOUNTER — HOSPITAL ENCOUNTER (OUTPATIENT)
Dept: LAB | Facility: MEDICAL CENTER | Age: 64
End: 2024-10-15
Attending: INTERNAL MEDICINE
Payer: COMMERCIAL

## 2024-10-15 DIAGNOSIS — E84.9 CYSTIC FIBROSIS (HCC): ICD-10-CM

## 2024-10-15 LAB
25(OH)D3 SERPL-MCNC: 64 NG/ML (ref 30–100)
ALBUMIN SERPL BCP-MCNC: 4.2 G/DL (ref 3.2–4.9)
ALBUMIN/GLOB SERPL: 1.8 G/DL
ALP SERPL-CCNC: 147 U/L (ref 30–99)
ALT SERPL-CCNC: 22 U/L (ref 2–50)
ANION GAP SERPL CALC-SCNC: 10 MMOL/L (ref 7–16)
AST SERPL-CCNC: 24 U/L (ref 12–45)
BASOPHILS # BLD AUTO: 1.2 % (ref 0–1.8)
BASOPHILS # BLD: 0.1 K/UL (ref 0–0.12)
BILIRUB SERPL-MCNC: 0.3 MG/DL (ref 0.1–1.5)
BUN SERPL-MCNC: 12 MG/DL (ref 8–22)
CALCIUM ALBUM COR SERPL-MCNC: 9.7 MG/DL (ref 8.5–10.5)
CALCIUM SERPL-MCNC: 9.9 MG/DL (ref 8.5–10.5)
CHLORIDE SERPL-SCNC: 102 MMOL/L (ref 96–112)
CHOLEST SERPL-MCNC: 164 MG/DL (ref 100–199)
CO2 SERPL-SCNC: 28 MMOL/L (ref 20–33)
CREAT SERPL-MCNC: 0.61 MG/DL (ref 0.5–1.4)
EOSINOPHIL # BLD AUTO: 0.28 K/UL (ref 0–0.51)
EOSINOPHIL NFR BLD: 3.4 % (ref 0–6.9)
ERYTHROCYTE [DISTWIDTH] IN BLOOD BY AUTOMATED COUNT: 43.9 FL (ref 35.9–50)
EST. AVERAGE GLUCOSE BLD GHB EST-MCNC: 114 MG/DL
GFR SERPLBLD CREATININE-BSD FMLA CKD-EPI: 100 ML/MIN/1.73 M 2
GGT SERPL-CCNC: 10 U/L (ref 7–34)
GLOBULIN SER CALC-MCNC: 2.4 G/DL (ref 1.9–3.5)
GLUCOSE SERPL-MCNC: 104 MG/DL (ref 65–99)
HBA1C MFR BLD: 5.6 % (ref 4–5.6)
HCT VFR BLD AUTO: 47.9 % (ref 37–47)
HDLC SERPL-MCNC: 47 MG/DL
HGB BLD-MCNC: 15 G/DL (ref 12–16)
IMM GRANULOCYTES # BLD AUTO: 0.02 K/UL (ref 0–0.11)
IMM GRANULOCYTES NFR BLD AUTO: 0.2 % (ref 0–0.9)
INR PPP: 1.05 (ref 0.87–1.13)
LDLC SERPL CALC-MCNC: 92 MG/DL
LYMPHOCYTES # BLD AUTO: 1.76 K/UL (ref 1–4.8)
LYMPHOCYTES NFR BLD: 21.1 % (ref 22–41)
MCH RBC QN AUTO: 29.2 PG (ref 27–33)
MCHC RBC AUTO-ENTMCNC: 31.3 G/DL (ref 32.2–35.5)
MCV RBC AUTO: 93.4 FL (ref 81.4–97.8)
MONOCYTES # BLD AUTO: 0.77 K/UL (ref 0–0.85)
MONOCYTES NFR BLD AUTO: 9.2 % (ref 0–13.4)
NEUTROPHILS # BLD AUTO: 5.42 K/UL (ref 1.82–7.42)
NEUTROPHILS NFR BLD: 64.9 % (ref 44–72)
NRBC # BLD AUTO: 0 K/UL
NRBC BLD-RTO: 0 /100 WBC (ref 0–0.2)
PLATELET # BLD AUTO: 458 K/UL (ref 164–446)
PMV BLD AUTO: 10.1 FL (ref 9–12.9)
POTASSIUM SERPL-SCNC: 4.5 MMOL/L (ref 3.6–5.5)
PROT SERPL-MCNC: 6.6 G/DL (ref 6–8.2)
PROTHROMBIN TIME: 13.8 SEC (ref 12–14.6)
RBC # BLD AUTO: 5.13 M/UL (ref 4.2–5.4)
SODIUM SERPL-SCNC: 140 MMOL/L (ref 135–145)
TRIGL SERPL-MCNC: 124 MG/DL (ref 0–149)
WBC # BLD AUTO: 8.4 K/UL (ref 4.8–10.8)

## 2024-10-15 PROCEDURE — 83036 HEMOGLOBIN GLYCOSYLATED A1C: CPT

## 2024-10-15 PROCEDURE — 85025 COMPLETE CBC W/AUTO DIFF WBC: CPT

## 2024-10-15 PROCEDURE — 85610 PROTHROMBIN TIME: CPT

## 2024-10-15 PROCEDURE — 82306 VITAMIN D 25 HYDROXY: CPT

## 2024-10-15 PROCEDURE — 36415 COLL VENOUS BLD VENIPUNCTURE: CPT

## 2024-10-15 PROCEDURE — 84446 ASSAY OF VITAMIN E: CPT

## 2024-10-15 PROCEDURE — 84590 ASSAY OF VITAMIN A: CPT

## 2024-10-15 PROCEDURE — 82977 ASSAY OF GGT: CPT

## 2024-10-15 PROCEDURE — 80061 LIPID PANEL: CPT

## 2024-10-15 PROCEDURE — 80053 COMPREHEN METABOLIC PANEL: CPT

## 2024-10-18 LAB
A-TOCOPHEROL VIT E SERPL-MCNC: 13.7 MG/L (ref 5.5–18)
ANNOTATION COMMENT IMP: NORMAL
BETA+GAMMA TOCOPHEROL SERPL-MCNC: 0.7 MG/L (ref 0–6)
RETINYL PALMITATE SERPL-MCNC: 0.05 MG/L (ref 0–0.1)
VIT A SERPL-MCNC: 0.33 MG/L (ref 0.3–1.2)

## 2024-10-23 DIAGNOSIS — E84.0 CYSTIC FIBROSIS WITH PULMONARY MANIFESTATIONS (HCC): ICD-10-CM

## 2024-10-23 RX ORDER — SODIUM CHLORIDE FOR INHALATION 7 %
VIAL, NEBULIZER (ML) INHALATION
Qty: 1440 ML | Refills: 5 | Status: SHIPPED | OUTPATIENT
Start: 2024-10-23

## 2024-10-29 NOTE — PATIENT INSTRUCTIONS
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, [id number]  CFTR Modulator: Kalydeco    Respiratory  FEV1 % Pred 2023 2/7/23 64% 3/8/23 69% 7/19/23 73% 10/25/23 79%   FEV1 % Pred 2024 5/1/24 62%  11/6/24 63%   Airway Clearance Routine:  Albuterol (2 x day) / (3-4x/day when sick)  Hypertonic Saline (2 x day) / (3-4x/day when sick)  Pulmozyme (1 x day) / (2x/day when sick)  ACT Vest and/or Acapella (2 x day) / (3-4 x/day when sick)  Inhaled antibiotics: [place], COMPLETE 2 MORE WEEKS  Inhaled Meropenem 250mg BID x 14 days next time needed  ADD BUDESONIDE 2 ML TO SINUS RINSES 2 TIMES PER DAY  Equipment Check (Annually) Date scheduled:  CF Culture: [facility]  Nutrition/Gastrointestinal  BMI: Current: _____ Goal: 22 - 23     Enzymes: Creon 6 (0-3 depending on meal content)  Vitamins: general MVi, DEKAs Plus softgel, magnesium, D, K, A , probiotics, Nuun electrolytes  Exercise: goal of 3 days per week = good job!  Social  Insurance: [employer]  Transportation: yes  Has access to food resources: yes  Financial Resources: none  School / Work Needs: none  Mental health screening completed:   3/8/2023      Tasks:    Goals    Last done Next due   Last done Next due   CBC  10/15/24 10/2025 HbA1c  10/15/24   10/2025   CMP  10/15/24  10/2025 OGTT  CFRD      GGT  10/15/24  10/2025 CXR  5/8/24 5/2025   PT/INR  10/15/24  10/2025 Abd US   ?   Vit A/D/E  10/15/24   10/2025 DEXA 2/28/2024 2026   Lipid  10/15/24   10/2025  Colonscopy  cologuard completed  now   Sputum Cultures: #1 ( 5/1/24  )  , #2 (  ), #3  ( ),  #4 ( 11/6/24 ) (Dates)  Eye Exam: last done 12/2021, recommended again 12/2022 ([facility] [place])   Notes

## 2024-11-05 DIAGNOSIS — E84.9 CYSTIC FIBROSIS (HCC): ICD-10-CM

## 2024-11-05 RX ORDER — FLUTICASONE PROPIONATE AND SALMETEROL 50; 250 UG/1; UG/1
POWDER RESPIRATORY (INHALATION)
Qty: 60 EACH | Refills: 5 | Status: SHIPPED | OUTPATIENT
Start: 2024-11-05 | End: 2024-11-08

## 2024-11-06 ENCOUNTER — HOSPITAL ENCOUNTER (OUTPATIENT)
Facility: MEDICAL CENTER | Age: 64
End: 2024-11-06
Attending: INTERNAL MEDICINE
Payer: COMMERCIAL

## 2024-11-06 ENCOUNTER — OFFICE VISIT (OUTPATIENT)
Dept: PEDIATRIC PULMONOLOGY | Facility: MEDICAL CENTER | Age: 64
End: 2024-11-06
Attending: INTERNAL MEDICINE
Payer: COMMERCIAL

## 2024-11-06 VITALS
WEIGHT: 131.17 LBS | RESPIRATION RATE: 16 BRPM | HEIGHT: 65 IN | BODY MASS INDEX: 21.85 KG/M2 | HEART RATE: 88 BPM | OXYGEN SATURATION: 95 %

## 2024-11-06 DIAGNOSIS — E84.9 CYSTIC FIBROSIS (HCC): ICD-10-CM

## 2024-11-06 DIAGNOSIS — Z12.11 SCREENING FOR COLON CANCER: ICD-10-CM

## 2024-11-06 PROBLEM — Z71.85 VACCINE COUNSELING: Status: RESOLVED | Noted: 2023-10-25 | Resolved: 2024-11-06

## 2024-11-06 PROCEDURE — 87181 SC STD AGAR DILUTION PER AGT: CPT

## 2024-11-06 PROCEDURE — 94010 BREATHING CAPACITY TEST: CPT | Mod: 26 | Performed by: INTERNAL MEDICINE

## 2024-11-06 PROCEDURE — 87205 SMEAR GRAM STAIN: CPT

## 2024-11-06 PROCEDURE — 87070 CULTURE OTHR SPECIMN AEROBIC: CPT

## 2024-11-06 PROCEDURE — 99214 OFFICE O/P EST MOD 30 MIN: CPT | Mod: 25 | Performed by: INTERNAL MEDICINE

## 2024-11-06 PROCEDURE — 94010 BREATHING CAPACITY TEST: CPT | Performed by: INTERNAL MEDICINE

## 2024-11-06 PROCEDURE — 87077 CULTURE AEROBIC IDENTIFY: CPT | Mod: 91

## 2024-11-06 PROCEDURE — 99214 OFFICE O/P EST MOD 30 MIN: CPT | Performed by: INTERNAL MEDICINE

## 2024-11-06 ASSESSMENT — FIBROSIS 4 INDEX: FIB4 SCORE: 0.72

## 2024-11-06 NOTE — PROCEDURES
"Pulmonary Function Test Results (PFT)    Spirometry Actual Predicted % Predicted   FVC (L) 2.66 3.21 82   FEV1 ((L) 1.58 2.51 63   FEV1/FVC (%) 59.47 78.66 75   FEF 25-75% (L/sec) 0.63 2.18 28       Please see  PFT in \"Media Tab\" of Notes activity  (EMR)    Provider Interpretation  Moderate obstruction.    "

## 2024-11-06 NOTE — PROGRESS NOTES
Adult Cystic Fibrosis Clinic Follow Up     This evaluation was conducted via Teams using secure and encrypted videoconferencing technology. The patient was in their home in the [facility].    The patient's identity was confirmed and verbal consent was obtained for this virtual visit.    CC: follow up cystic fibrosis    PCP:  Marleen Hargrove P.A.-C.   [id number] / [place] [address]     SUBJECTIVE:   Kenia Soto is a 62 y.o. 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  She had a difficult fall with her health. Her daughter got  and she was helping with the wedding. She got sick and then had an infected tooth and then had another viral illness. She has had more limitations with her exercise because of her feet, but she is seeing a podiatrist today. It seems that she has worsening numbness in her feet when she in on [facility], she has not been taking it.   She did need Doxy in early October, but has been better since then.     Last hospitalization: [10/21/20]    Respiratory:   Cough frequency: episodic, decreased  Cough character: productive  Sputum quantity: baseline  Sputum color: yellow  Shortness of breath:never  Chest Pain:never  Hemoptysis:occasional    Doctor visits: none   Antibiotics:   Meropenem as needed  Pulmonary toilet:    Albuterol: 2/day  7% hypertonic saline: 2/day 8ml mixed with glutathione since 2012  Pulmozyme: 0/day - off was on trial with it, had blood streaking with it  Chest Physiotherapy: aerobika bid  Oxygen: none  Respiratory assist: none  Kalydeco bid  Advair 1-2 times/day    Compliance: compliant most of the time     Sinus symptoms:  Nasal Congestion: no  Nasal Drainage: no  Headache/sinus pressure: never     Activity / Energy: normal for age   Change in activity/energy: none   School/ Work attendance: no absences   School/ Work performance: no problem  Emotional assessment: positive     GI: no problem   Appetite: normal  Enzymes:  daily  Creon 6K units 1 with dinner  Stool: 1-2/day, characteristics: formed    Medications:     Current Outpatient Medications:     Advair Diskus, INHALE 1 PUFF 2 TIMES A DAY. RINSE MOUTH AFTER EACH USE. (Patient taking differently: Wixela not ok to substitute), Taking    sodium chloride, INHALE 4 ML BY NEBULIZATION 4 TIMES A DAY., Taking    Tranexamic Acid, TAKE 1-2 TABLETS BY MOUTH EVERY 8 HOURS AS NEEDED FOR HEMOPTYSIS, PRN    albuterol, 2.5 mg, Nebulization, TID, Taking    acyclovir, TAKE 1 TABLET BY MOUTH 4 TIMES A DAY FOR 14 DAYS, THEN TWICE DAILY FOR 3 MONTHS, PRN    Kalydeco, TAKE 1 TABLET BY MOUTH TWICE DAILY WITH FAT CONTAINING FOOD, Taking    phytonadione, 5 mg, Oral, DAILY, Taking    fluticasone, 1-2 Spray, Nasal, DAILY, Taking    sterile water, USE TO MIX MEROPENEM 500MG VIAL WITH 8ML, Taking    meropenem,  MG VIAL WITH 8 ML STERILE WATER. THEN NEBULIZE 4 ML TWICE DAILY FOR 14 DAYS., PRN    Creon, TAKE 1-2 CAPSULES BY MOUTH 4 TIMES A DAY AS NEEDED WITH MEALS OR SNACKS **KEEP IN ORIGINAL BOTTLE**, Taking    Cayston, 1 mL, Inhalation, TID, PRN    Tresiba FlexTouch, INJECT 4 UNITS SUBCUTANEOUSLY DAILY (375 DAYS), Taking    sodium chloride 3%, 4 mL, Nebulization, 4X/DAY PRN, Taking    HumaLOG KwikPen, 12-15 Units, Subcutaneous, TID AC, Taking    BD Pen Needle Radha 2nd Gen,  "1 EACH BY OTHER ROUTE 4 TIMES A DAY,BEFORE MEALS AND AT BEDTIME. FOR INSULIN ADMINISTRATION., Taking    OneTouch Verio, TEST 3 TIMES DAILY FOR INSULIN ADJUSTMENT AND AS NEEDED SYMPTOMS OF HIGH OR LOW BLOOD SUGAR, Taking    Dexcom G6 Sensor, 1 Each, Does not apply, Q10 DAYS, Taking    Dexcom G6 Transmitter, 1 Each, Does not apply, Continuous, Taking    mupirocin, Apply BID for up to 10 days, PRN    Multiple Vitamins-Minerals (DEKAS PLUS PO), 1 Capsule, Oral, BID, Taking    magnesium oxide, 200 mg, Oral, BID, Taking    Vitamin C, 1,000 mg, Oral, BID, Taking    Probiotic Product (PROBIOTIC-10 PO), 1 Tablet, Oral, DAILY, Taking    vitamin D3, 10,000 Units, Oral, BID, Taking    Other Medications: none  Central Line: none    ALLERGIES:  Bactrim [sulfamethoxazole-trimethoprim], Ciprofloxacin, Levofloxacin, and Tobramycin    Review of System:  All other systems reviewed and negative    Ears, nose, mouth, throat, and face: negative  Cardiovascular: Negative  Gastrointestinal: Negative  Allergic/Immunologic: negative      Social/Environmental:  Social History     Tobacco Use    Smoking status: Never    Smokeless tobacco: Never   Vaping Use    Vaping status: Never Used   Substance Use Topics    Alcohol use: Yes     Alcohol/week: 1.8 oz     Types: 3 Standard drinks or equivalent per week     Comment: Socially    Drug use: No       Home Environment       Pet Exposures    Dogs Yes     Reptiles Yes        Tobacco use: never  Pets: none    Respiratory hospitalizations: [10/21/20]      OBJECTIVE:  Physical Exam:  Pulse 88   Resp 16   Ht 1.658 m (5' 5.26\")   Wt 59.5 kg (131 lb 2.8 oz)   SpO2 95%   BMI 21.66 kg/m²      Physical Exam  Constitutional:       General: She is not in acute distress.     Appearance: Normal appearance.   HENT:      Head: Normocephalic.   Eyes:      Conjunctiva/sclera: Conjunctivae normal.   Neurological:      Mental Status: She is alert and oriented to person, place, and time. Mental status is at " "baseline.   Psychiatric:         Mood and Affect: Mood normal.         Behavior: Behavior normal.     Pulmonary Function Test Results (PFT)    Spirometry Actual Predicted % Predicted   FVC (L) 2.66 3.21 82   FEV1 ((L) 1.58 2.51 63   FEV1/FVC (%) 59.47 78.66 75   FEF 25-75% (L/sec) 0.63 2.18 28       Please see  PFT in \"Media Tab\" of Notes activity  (EMR)    Provider Interpretation  Moderate obstruction.      Labs reviewed: yes    Lab Results   Component Value Date/Time    SIGIND NEG 05/01/2024 11:49 AM    SIGIND NEG 05/01/2024 11:49 AM    SIGIND NEG 05/01/2024 11:49 AM    SIGIND NEG 05/01/2024 11:49 AM    SOURCE RESP 05/01/2024 11:49 AM    SOURCE RESP 05/01/2024 11:49 AM    SOURCE RESP 05/01/2024 11:49 AM    SOURCE RESP 05/01/2024 11:49 AM    SITE Sputum (coughed) 05/01/2024 11:49 AM    SITE Sputum (coughed) 05/01/2024 11:49 AM    SITE Sputum (coughed) 05/01/2024 11:49 AM    SITE Sputum (coughed) 05/01/2024 11:49 AM    CULTRSULT No fungal growth. 05/01/2024 11:49 AM    CULTRSULT No acid fast bacilli detected. 05/01/2024 11:49 AM     Immunization History   Administered Date(s) Administered    Hepatitis B Vaccine Adolescent/Pediatric 10/27/1999, 12/14/1999, 08/04/2000    Influenza (IM) Preservative Free - HISTORICAL DATA 10/25/1999, 11/03/2008, 10/27/2009, 10/26/2010, 11/02/2011, 11/06/2012    Influenza Seasonal Injectable - Historical Data 11/04/2015, 10/14/2016, 10/30/2017    Influenza Vaccine Adult HD 10/27/2009, 10/01/2019    Influenza Vaccine Pediatric Split - Historical Data 10/23/1995, 10/21/1996, 10/15/1997, 10/29/1998, 11/14/2000, 11/14/2001, 11/05/2002, 10/27/2003, 10/18/2004, 10/24/2005, 11/13/2006, 10/22/2007, 11/18/2013, 10/22/2014    Influenza Vaccine Quad Inj (Pf) 10/01/2019, 11/02/2020    Influenza, Unspecified - HISTORICAL DATA 10/09/2018    Pneumococcal polysaccharide vaccine (PPSV-23) 10/27/1999    TD Vaccine 10/27/1999, 12/07/2018    Tdap Vaccine 06/01/2008    Zoster Vaccine Live (ZVL) (Zostavax) - " HISTORICAL DATA 04/22/2015       ASSESSMENT/PLAN:   1. Cystic fibrosis (HCC)  Grows nonmucoid and mucoid phenotype Pseudomonas  Continue current airway clearance, uses hypertonic but does not use dornase due to hemoptysis  Has h/o recurrent hemoptysis: keeps TXA on hand if needed  Continue with Kalydeco  Nebulized meropenem as needed for illness  Routine labs completed    2. Exocrine pancreatic insufficiency  Stable  Continue enzymes before meals    3. Osteopenia of multiple sites  Highly recommend calcium  Continue with magnesium and vitamin D  Patient does exercise regularly    4. 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

## 2024-11-06 NOTE — PROGRESS NOTES
Respiratory -  Cystic Fibrosis Airway Clearance Therapy (ACT)      Kenia seen today at [facility]. Testing today included sputum sample and PFT.  Current plan for Respiratory medications and ACT is:        AM  Albuterol  Hypertonic Saline  Vest     PM  Albuterol  Hypertonic saline  ACT vest     Leo PRN when sick     Pt. given copy of PFT results.

## 2024-11-06 NOTE — TELEPHONE ENCOUNTER
Last Visit: 8/7/24  Next Visit: 11/6/24    Received request via: Pharmacy    Was the patient seen in the last year in this department? Yes    Does the patient have an active prescription (recently filled or refills available) for medication(s) requested? No     Pharmacy Name: [facility]    Addendum: Patient states pharmacy tried to dispense generic Wixela which has cause hemoptysis in the past. Order addended to LETTY with note to pharmacy.

## 2024-11-07 LAB
GRAM STN SPEC: NORMAL
SIGNIFICANT IND 70042: NORMAL
SITE SITE: NORMAL
SOURCE SOURCE: NORMAL

## 2024-11-08 RX ORDER — FLUTICASONE PROPIONATE AND SALMETEROL 50; 250 UG/1; UG/1
1 POWDER RESPIRATORY (INHALATION) 2 TIMES DAILY
Qty: 60 EACH | Refills: 5 | Status: SHIPPED | OUTPATIENT
Start: 2024-11-08

## 2024-11-10 LAB — ETEST SENSITIVITY ETEST: NORMAL

## 2024-11-12 LAB
BACTERIA WND AEROBE CULT: ABNORMAL
BACTERIA WND AEROBE CULT: ABNORMAL
GRAM STN SPEC: ABNORMAL
SIGNIFICANT IND 70042: ABNORMAL
SITE SITE: ABNORMAL
SOURCE SOURCE: ABNORMAL

## 2024-12-04 ENCOUNTER — TELEPHONE (OUTPATIENT)
Dept: PEDIATRIC PULMONOLOGY | Facility: MEDICAL CENTER | Age: 64
End: 2024-12-04
Payer: COMMERCIAL

## 2024-12-04 NOTE — TELEPHONE ENCOUNTER
Received Renewal request via MSOT  for Ivacaftor (KALYDECO) 150 MG Tab . (Quantity:56, Day Supply:28)     Insurance: [facility]  Member ID:  [id number]  BIN: [id number]  PCN: [id number]  Group: [id number]     Ran Test claim via [place] & medication  does not require a PA at this time    Prescription is currently with [facility] Espin[facility]   Pharmacy Liaison  [phone]

## 2024-12-17 ENCOUNTER — OFFICE VISIT (OUTPATIENT)
Dept: MEDICAL GROUP | Facility: LAB | Age: 64
End: 2024-12-17
Payer: COMMERCIAL

## 2024-12-17 VITALS
RESPIRATION RATE: 16 BRPM | TEMPERATURE: 97 F | HEIGHT: 65 IN | HEART RATE: 70 BPM | BODY MASS INDEX: 22.33 KG/M2 | DIASTOLIC BLOOD PRESSURE: 64 MMHG | SYSTOLIC BLOOD PRESSURE: 100 MMHG | WEIGHT: 134 LBS | OXYGEN SATURATION: 99 %

## 2024-12-17 DIAGNOSIS — M67.90 NODULE OF FLEXOR TENDON SHEATH: ICD-10-CM

## 2024-12-17 DIAGNOSIS — L30.1 DYSHIDROTIC ECZEMA: ICD-10-CM

## 2024-12-17 PROCEDURE — 3074F SYST BP LT 130 MM HG: CPT | Performed by: FAMILY MEDICINE

## 2024-12-17 PROCEDURE — 99213 OFFICE O/P EST LOW 20 MIN: CPT | Performed by: FAMILY MEDICINE

## 2024-12-17 PROCEDURE — 3078F DIAST BP <80 MM HG: CPT | Performed by: FAMILY MEDICINE

## 2024-12-17 ASSESSMENT — FIBROSIS 4 INDEX: FIB4 SCORE: 0.72

## 2024-12-17 NOTE — PROGRESS NOTES
Subjective:     Chief Complaint   Patient presents with    Bump     Left hand     Eczema     Right hand          HPI:   Kenia presents today for Left middle finger palm nodule.  Hurts now and then when she grabs the steering wheel but otherwise is not causing too much of a problem.  Is not having any triggering behaviors.  Does not get stuck.    Right hand, but also left some dryness, starts like little bumps/blisters then gets itchy then dry.  Has used 2-1/2% hydrocortisone cream at least once a day but is not super consistent with this.  It does help when she uses it.  She does have dry skin in general.              Current Outpatient Medications Ordered in Epic   Medication Sig Dispense Refill    ADVAIR DISKUS 250-50 MCG/ACT AEROSOL POWDER, BREATH ACTIVATED Inhale 1 Puff 2 times a day. INHALE 1 PUFF 2 TIMES A DAY. RINSE MOUTH AFTER EACH USE. 60 Each 5    sodium chloride (HYPER-SAL) 7 % Nebu Soln INHALE 4 ML BY NEBULIZATION 4 TIMES A DAY. 1440 mL 5    Tranexamic Acid 650 MG Tab TAKE 1-2 TABLETS BY MOUTH EVERY 8 HOURS AS NEEDED FOR HEMOPTYSIS 30 Tablet 1    albuterol (PROVENTIL) 2.5mg/3ml Nebu Soln solution for nebulization TAKE 3 ML BY NEBULIZATION 3 TIMES A DAY 1050 mL 2    acyclovir (ZOVIRAX) 400 MG tablet TAKE 1 TABLET BY MOUTH 4 TIMES A DAY FOR 14 DAYS, THEN TWICE DAILY FOR 3 MONTHS 208 Tablet 1    Ivacaftor (KALYDECO) 150 MG Tab TAKE 1 TABLET BY MOUTH TWICE DAILY WITH FAT CONTAINING FOOD 56 Tablet 11    phytonadione (MEPHYTON) 5 MG Tab TAKE 1 TABLET BY MOUTH EVERY DAY 90 Tablet 3    fluticasone (FLONASE) 50 MCG/ACT nasal spray ADMINISTER 1-2 SPRAYS INTO AFFECTED NOSTRIL(S) EVERY DAY. 48 mL 2    Water For Injection Sterile (STERILE WATER) Solution USE TO MIX MEROPENEM 500MG VIAL WITH 8ML 150 mL 0    meropenem (MERREM) 500 MG Recon Soln  MG VIAL WITH 8 ML STERILE WATER. THEN NEBULIZE 4 ML TWICE DAILY FOR 14 DAYS. 14 Each 0    CREON 6000-50072 units Cap DR Particles TAKE 1-2 CAPSULES BY MOUTH 4 TIMES A  DAY AS NEEDED WITH MEALS OR SNACKS **KEEP IN ORIGINAL BOTTLE** 500 Capsule 6    CAYSTON 75 MG Recon Soln Inhale 1 mL 3 times a day. Inhale 1 mL by mouth 3 times a day for 28 days. Repeat every other month as needed. 84 mL 3    TRESIBA FLEXTOUCH 100 UNIT/ML Solution Pen-injector INJECT 4 UNITS SUBCUTANEOUSLY DAILY (375 DAYS)      sodium chloride 3% 3 % nebulizer solution Take 4 mL by nebulization 4 times a day as needed (Cough). TAKE 4 ML BY NEBULIZATION 4 TIMES A DAY AS NEEDED (COUGH). 480 mL 3    HUMALOG KWIKPEN 100 UNIT/ML Solution Pen-injector injection PEN INJECT 12-15 UNITS UNDER THE SKIN 3 TIMES A DAY BEFORE MEALS. 45 mL 3    BD PEN NEEDLE TAMIKO 2ND GEN 1 EACH BY OTHER ROUTE 4 TIMES A DAY,BEFORE MEALS AND AT BEDTIME. FOR INSULIN ADMINISTRATION. 400 Each 3    glucose blood (ONETOUCH VERIO) strip TEST 3 TIMES DAILY FOR INSULIN ADJUSTMENT AND AS NEEDED SYMPTOMS OF HIGH OR LOW BLOOD SUGAR 300 Strip 3    Continuous Blood Gluc Sensor (DEXCOM G6 SENSOR) Misc 1 Each every 10 days. 9 Each 4    Continuous Blood Gluc Transmit (DEXCOM G6 TRANSMITTER) Misc 1 Each continuous. Change every 3 months 1 Each 4    mupirocin (BACTROBAN) 2 % Ointment Apply BID for up to 10 days 30 g 0    Multiple Vitamins-Minerals (DEKAS PLUS PO) Take 1 Cap by mouth 2 times a day.      magnesium oxide (MAG-OX) 400 MG Tab Take 200 mg by mouth 2 times a day.      Ascorbic Acid (VITAMIN C) 1000 MG Tab Take 1,000 mg by mouth 2 Times a Day.      Probiotic Product (PROBIOTIC-10 PO) Take 1 Tab by mouth every day.      Cholecalciferol 5000 units Tab Take 10,000 Units by mouth 2 Times a Day.       No current [facility]-ordered facility-administered medications on file.         ROS:  Gen: no fevers/chills, no changes in weight  Eyes: no changes in vision  ENT: no sore throat, no hearing loss, no bloody nose  Pulm: no sob, no cough  CV: no chest pain, no palpitations  GI: no nausea/vomiting, no diarrhea  : no dysuria  Neuro: no headaches, no  "numbness/tingling  Heme/Lymph: no easy bruising      Objective:     Exam:  /64   Pulse 70   Temp 36.1 °C (97 °F)   Resp 16   Ht 1.658 m (5' 5.26\")   Wt 60.8 kg (134 lb)   SpO2 99%   BMI 22.12 kg/m²  Body mass index is 22.12 kg/m².  Gen: AAOx3, NAD, well appearing  HEENT: NCAT, EOMI, Nares patent, Mucosa moist  Resp: Normal chest wall rise and fall, not SOB, no tachypnea  Skin: Dry patchy skin at the hands and into her webspace of  fingers, consistent with dyshidrotic eczema.  Psych: normal speech, not slurred, good insight, affect full  MSK: Moves all four limbs equally and normally, gait normal  Left middle finger with flexor tendon nodule present.  Mildly tender.  Does not trigger or get stuck under the pulley.      Assessment & Plan:     64 y.o. female with the following -     1. Dyshidrotic eczema  Discussed dyshidrotic eczema and using her hydrocortisone cream over-the-counter twice daily to get this really calm down.  We did discuss use of a good moisturizer multiple times throughout the day particularly because she washes her hands a lot.  Discussed possibility in the future of a prescription strength topical steroid if she is having resistance.    2. Nodule of flexor tendon sheath  We did discuss that this is not harmful or dangerous by itself.  It can cause some triggering if it gets stuck under the A1 pulley and at that point we would consider an injection.  Certainly if it also gets more painful and more disruptive to the life we can consider a steroid injection to the nodule as well.          No follow-ups on file.    Please note that this dictation was created using voice recognition software. I have made every reasonable attempt to correct obvious errors, but I expect that there are errors of grammar and possibly content that I did not discover before finalizing the note.        "

## 2025-01-02 ENCOUNTER — PATIENT MESSAGE (OUTPATIENT)
Dept: PEDIATRIC PULMONOLOGY | Facility: MEDICAL CENTER | Age: 65
End: 2025-01-02
Payer: COMMERCIAL

## 2025-01-27 DIAGNOSIS — E84.9 CYSTIC FIBROSIS (HCC): ICD-10-CM

## 2025-01-27 DIAGNOSIS — J98.8 PSEUDOMONAS RESPIRATORY INFECTION: ICD-10-CM

## 2025-01-27 DIAGNOSIS — B96.5 PSEUDOMONAS RESPIRATORY INFECTION: ICD-10-CM

## 2025-01-27 RX ORDER — AZTREONAM 75 MG/ML
KIT INHALATION
Qty: 84 ML | Refills: 2 | Status: SHIPPED | OUTPATIENT
Start: 2025-01-27

## 2025-01-27 NOTE — TELEPHONE ENCOUNTER
Last Visit: 11/6/24  Next Visit: 3/12/25    Received request via: Patient    Was the patient seen in the last year in this department? Yes    Does the patient have an active prescription (recently filled or refills available) for medication(s) requested? No     [facility]

## 2025-02-10 ENCOUNTER — PATIENT MESSAGE (OUTPATIENT)
Dept: PEDIATRIC PULMONOLOGY | Facility: MEDICAL CENTER | Age: 65
End: 2025-02-10
Payer: COMMERCIAL

## 2025-02-10 ENCOUNTER — HOSPITAL ENCOUNTER (OUTPATIENT)
Dept: RADIOLOGY | Facility: MEDICAL CENTER | Age: 65
End: 2025-02-10
Payer: COMMERCIAL

## 2025-02-10 DIAGNOSIS — E84.9 CYSTIC FIBROSIS (HCC): ICD-10-CM

## 2025-02-10 DIAGNOSIS — J15.1 PNEUMONIA OF RIGHT UPPER LOBE DUE TO PSEUDOMONAS SPECIES (HCC): ICD-10-CM

## 2025-02-10 PROCEDURE — 71046 X-RAY EXAM CHEST 2 VIEWS: CPT

## 2025-02-11 ENCOUNTER — TELEPHONE (OUTPATIENT)
Dept: PEDIATRIC PULMONOLOGY | Facility: MEDICAL CENTER | Age: 65
End: 2025-02-11
Payer: COMMERCIAL

## 2025-02-11 ENCOUNTER — HOSPITAL ENCOUNTER (OUTPATIENT)
Dept: RADIOLOGY | Facility: MEDICAL CENTER | Age: 65
End: 2025-02-11
Attending: INTERNAL MEDICINE
Payer: COMMERCIAL

## 2025-02-11 DIAGNOSIS — J15.1 PNEUMONIA OF RIGHT UPPER LOBE DUE TO PSEUDOMONAS SPECIES (HCC): ICD-10-CM

## 2025-02-11 PROCEDURE — 36573 INSJ PICC RS&I 5 YR+: CPT

## 2025-02-11 NOTE — PROGRESS NOTES
Message received this morning from patient due to increasing symptoms.  Chest x-ray ordered and showed right upper lobe pneumonia.  I reviewed patient's previous culture data and she does have mucoid type of Pseudomonas and therefore we only have an E test which shows intermediate sensitivity to ceftazidime.  However, she has previously done well with Zosyn plus ceftazidime.     I ordered a PICC line as well as Zosyn and ceftazidime for 10 days  Discussed with patient that should anything be delayed that she needs to present to the emergency room at [facility], and I will be on inpatient consults tomorrow  I am hoping that we will be able to get the PICC line and antibiotics promptly to keep her out of the hospital\ I am also in the CF clinic on Wednesday if she wants to come in for a sick visit    __________  This note was generated using voice recognition software which has a chance of producing errors of grammar and content.  I have made every reasonable attempt to find and correct any errors, but it should be expected that some may not be found prior to finalization of this note.    Herlinda Mccabe, DO   Pulmonary and Critical Care

## 2025-02-11 NOTE — TELEPHONE ENCOUNTER
Incoming call from Marina regarding PICC line. MA spoke to Dr Wade who stated since Dr Mccabe was the one who recommend PICC line she will need to contact adult pulm. MA transferred call to  and told Marina to ask for adult pulm.

## 2025-02-11 NOTE — PROGRESS NOTES
Adult Cystic Fibrosis Clinic Follow Up - Sick Visit     Patient visit was performed via telephone     CC: increased sputum, fevers, consolidation     SUBJECTIVE:   Kenia Soto is a 62 y.o. female with Cystic Fibrosis who presented with increasing sputum, fever, and a new consolidation on CXR. She is increasing frequency of her ACT.     Labs reviewed: yes    Lab Results   Component Value Date/Time    SIGIND POS (POS) 11/06/2024 11:40 AM    SIGIND . 11/06/2024 11:40 AM    SOURCE RESP 11/06/2024 11:40 AM    SOURCE RESP 11/06/2024 11:40 AM    SITE Sputum (coughed) 11/06/2024 11:40 AM    SITE Sputum (coughed) 11/06/2024 11:40 AM    CULTRSULT (A) 11/06/2024 11:40 AM     Heavy growth usual upper respiratory demetrius  Respiratory cultures from cystic fibrosis patients are  routinely screened for Staphylococcus aureus, Pseudomonas  aeruginosa, Burkholderia cepacia, Haemophilus influenzae,  Stenotrophomonas maltophilia, Achromobacter sp., and  Streptococcus pneumonia.      CULTRSULT (A) 11/06/2024 11:40 AM     Pseudomonas aeruginosa  Moderate growth  Mucoid phenotype         ASSESSMENT/PLAN:   1. Cystic fibrosis Exacerbation   Grows nonmucoid and mucoid phenotype Pseudomonas  Continue current airway clearance, increasing to QID   Has h/o recurrent hemoptysis: keeps TXA on hand if needed  Continue with Kalydeco  Start  - piperacillin-tazobactam (ZOSYN) 4-0.5 GM/100ML Solution; Infuse 100 mL into a venous catheter every 6 hours for 10 days.  Dispense: 4000 mL; Refill: 0  - cefTAZidime (FORTAZ) 2 GM Recon Soln; Infuse 2 g into a venous catheter every 8 hours for 10 days.  Dispense: 60 g; Refill: 0      Pulmonary Exacerbation: present severe    Follow Up:  1 months    Herlinda Mccabe DO   Pulmonary and Critical Care

## 2025-02-11 NOTE — PROCEDURES
PICC Insertion Final 3CG    Consents confirmed, vessel patency confirmed with ultrasound. Risks and benefits of procedure explained to patient/family and education regarding central line associated bloodstream infections provided. Questions answered.     PICC placed in LUE per MD order with ultrasound guidance. 4 Fr, single lumen PICC placed in brachial vein after 1 attempt(s). 3 cc's of 1% lidocaine injected intradermally, 21 gauge microintroducer needle and modified Seldinger technique used. 46 cm catheter inserted with good blood return. Secured at 0 cm marker. Each lumen flushed without resistance with 10 mL 0.9% normal saline. PICC line secured with Biopatch and Tegaderm.    PICC placement is confirmed by nurse using 3CG technology. PICC line is appropriate for use at this time. Pt tolerated procedure well.  Patient condition relayed to unit RN or ordering physician via this post procedure note in the EMR.     BARD Power PICC ref #[id number], Lot #[id number]

## 2025-02-12 ENCOUNTER — HOSPITAL ENCOUNTER (OUTPATIENT)
Facility: MEDICAL CENTER | Age: 65
End: 2025-02-12
Attending: INTERNAL MEDICINE
Payer: COMMERCIAL

## 2025-02-12 LAB
ANION GAP SERPL CALC-SCNC: 9 MMOL/L (ref 7–16)
BASOPHILS # BLD AUTO: 0.9 % (ref 0–1.8)
BASOPHILS # BLD: 0.08 K/UL (ref 0–0.12)
BUN SERPL-MCNC: 11 MG/DL (ref 8–22)
CALCIUM SERPL-MCNC: 9.1 MG/DL (ref 8.5–10.5)
CHLORIDE SERPL-SCNC: 100 MMOL/L (ref 96–112)
CO2 SERPL-SCNC: 26 MMOL/L (ref 20–33)
CREAT SERPL-MCNC: 0.6 MG/DL (ref 0.5–1.4)
EOSINOPHIL # BLD AUTO: 0.13 K/UL (ref 0–0.51)
EOSINOPHIL NFR BLD: 1.5 % (ref 0–6.9)
ERYTHROCYTE [DISTWIDTH] IN BLOOD BY AUTOMATED COUNT: 43.1 FL (ref 35.9–50)
GFR SERPLBLD CREATININE-BSD FMLA CKD-EPI: 100 ML/MIN/1.73 M 2
GLUCOSE SERPL-MCNC: 242 MG/DL (ref 65–99)
HCT VFR BLD AUTO: 38.2 % (ref 37–47)
HGB BLD-MCNC: 12.3 G/DL (ref 12–16)
IMM GRANULOCYTES # BLD AUTO: 0.03 K/UL (ref 0–0.11)
IMM GRANULOCYTES NFR BLD AUTO: 0.4 % (ref 0–0.9)
LYMPHOCYTES # BLD AUTO: 1.09 K/UL (ref 1–4.8)
LYMPHOCYTES NFR BLD: 12.8 % (ref 22–41)
MCH RBC QN AUTO: 30.1 PG (ref 27–33)
MCHC RBC AUTO-ENTMCNC: 32.2 G/DL (ref 32.2–35.5)
MCV RBC AUTO: 93.6 FL (ref 81.4–97.8)
MONOCYTES # BLD AUTO: 1.17 K/UL (ref 0–0.85)
MONOCYTES NFR BLD AUTO: 13.7 % (ref 0–13.4)
NEUTROPHILS # BLD AUTO: 6.03 K/UL (ref 1.82–7.42)
NEUTROPHILS NFR BLD: 70.7 % (ref 44–72)
NRBC # BLD AUTO: 0 K/UL
NRBC BLD-RTO: 0 /100 WBC (ref 0–0.2)
PLATELET # BLD AUTO: 390 K/UL (ref 164–446)
PMV BLD AUTO: 10.4 FL (ref 9–12.9)
POTASSIUM SERPL-SCNC: 4.3 MMOL/L (ref 3.6–5.5)
RBC # BLD AUTO: 4.08 M/UL (ref 4.2–5.4)
SODIUM SERPL-SCNC: 135 MMOL/L (ref 135–145)
WBC # BLD AUTO: 8.5 K/UL (ref 4.8–10.8)

## 2025-02-12 PROCEDURE — 85025 COMPLETE CBC W/AUTO DIFF WBC: CPT

## 2025-02-12 PROCEDURE — 80048 BASIC METABOLIC PNL TOTAL CA: CPT

## 2025-02-19 ENCOUNTER — HOSPITAL ENCOUNTER (OUTPATIENT)
Facility: MEDICAL CENTER | Age: 65
End: 2025-02-19
Attending: INTERNAL MEDICINE
Payer: COMMERCIAL

## 2025-02-19 PROCEDURE — 85025 COMPLETE CBC W/AUTO DIFF WBC: CPT

## 2025-02-19 PROCEDURE — 80048 BASIC METABOLIC PNL TOTAL CA: CPT

## 2025-02-20 LAB
ANION GAP SERPL CALC-SCNC: 12 MMOL/L (ref 7–16)
BASOPHILS # BLD AUTO: 1 % (ref 0–1.8)
BASOPHILS # BLD: 0.07 K/UL (ref 0–0.12)
BUN SERPL-MCNC: 11 MG/DL (ref 8–22)
CALCIUM SERPL-MCNC: 8.9 MG/DL (ref 8.5–10.5)
CHLORIDE SERPL-SCNC: 105 MMOL/L (ref 96–112)
CO2 SERPL-SCNC: 25 MMOL/L (ref 20–33)
CREAT SERPL-MCNC: 0.69 MG/DL (ref 0.5–1.4)
EOSINOPHIL # BLD AUTO: 0.28 K/UL (ref 0–0.51)
EOSINOPHIL NFR BLD: 3.9 % (ref 0–6.9)
ERYTHROCYTE [DISTWIDTH] IN BLOOD BY AUTOMATED COUNT: 42.6 FL (ref 35.9–50)
GFR SERPLBLD CREATININE-BSD FMLA CKD-EPI: 97 ML/MIN/1.73 M 2
GLUCOSE SERPL-MCNC: 138 MG/DL (ref 65–99)
HCT VFR BLD AUTO: 39.9 % (ref 37–47)
HGB BLD-MCNC: 12.3 G/DL (ref 12–16)
IMM GRANULOCYTES # BLD AUTO: 0.07 K/UL (ref 0–0.11)
IMM GRANULOCYTES NFR BLD AUTO: 1 % (ref 0–0.9)
LYMPHOCYTES # BLD AUTO: 2.02 K/UL (ref 1–4.8)
LYMPHOCYTES NFR BLD: 28.5 % (ref 22–41)
MCH RBC QN AUTO: 28.9 PG (ref 27–33)
MCHC RBC AUTO-ENTMCNC: 30.8 G/DL (ref 32.2–35.5)
MCV RBC AUTO: 93.9 FL (ref 81.4–97.8)
MONOCYTES # BLD AUTO: 0.7 K/UL (ref 0–0.85)
MONOCYTES NFR BLD AUTO: 9.9 % (ref 0–13.4)
NEUTROPHILS # BLD AUTO: 3.96 K/UL (ref 1.82–7.42)
NEUTROPHILS NFR BLD: 55.7 % (ref 44–72)
NRBC # BLD AUTO: 0 K/UL
NRBC BLD-RTO: 0 /100 WBC (ref 0–0.2)
PLATELET # BLD AUTO: 452 K/UL (ref 164–446)
PMV BLD AUTO: 9.4 FL (ref 9–12.9)
POTASSIUM SERPL-SCNC: 4.5 MMOL/L (ref 3.6–5.5)
RBC # BLD AUTO: 4.25 M/UL (ref 4.2–5.4)
SODIUM SERPL-SCNC: 142 MMOL/L (ref 135–145)
WBC # BLD AUTO: 7.1 K/UL (ref 4.8–10.8)

## 2025-02-28 ENCOUNTER — PATIENT MESSAGE (OUTPATIENT)
Dept: PEDIATRIC PULMONOLOGY | Facility: MEDICAL CENTER | Age: 65
End: 2025-02-28
Payer: COMMERCIAL

## 2025-03-11 NOTE — PATIENT INSTRUCTIONS
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, [id number]  CFTR Modulator: Kalydeco, Now eligible for Trikafta and Alyftrek    Respiratory  FEV1 % Pred 2024 5/1/24 62%  11/6/24 63%   FEV1 % Pred 2025       Airway Clearance Routine:  Albuterol (2 x day) / (3-4x/day when sick)  Hypertonic Saline (2 x day) / (3-4x/day when sick)  Pulmozyme (1 x day) / (2x/day when sick)  ACT Vest and/or Acapella (2 x day) / (3-4 x/day when sick)  Inhaled antibiotics: Cayston, COMPLETE 2 MORE WEEKS  Inhaled Meropenem 250mg BID x 14 days next time needed  ADD BUDESONIDE 2 ML TO SINUS RINSES 2 TIMES PER DAY  Equipment Check (Annually) Date scheduled:  CF Culture: [facility]  Nutrition/Gastrointestinal  BMI: Current: _____ Goal: 22 - 23     Enzymes: Creon 6 (0-3 depending on meal content)  Vitamins: general MVi, DEKAs Plus softgel, magnesium, D, K, A , probiotics, Nuun electrolytes  Exercise: goal of 3 days per week = good job!  Social  Insurance: [employer]  Transportation: yes  Has access to food resources: yes  Financial Resources: none  School / Work Needs: none  Mental health screening completed:   3/8/2023      Tasks:    Goals    Last done Next due   Last done Next due   CBC 2/19/25 10/2025 HbA1c 10/15/24 5.6 Annual or per Endo   CMP 10/15/24 10/2025 OGTT CFRD    GGT 10/15/24 10/2025 CXR 2/10/25 2/2026   PT/INR 10/15/24 10/2025 DEXA 2/28/24 osteoporosis 2026    Vit A/D/E 10/15/24 10/2025 Abd US   PRN   Lipid 10/15/24 10/2025  Colonoscopy 3/5/25  1 polyp, bx sent  Per CF guidelines   IgE  10/2025              Sputum/Throat Cultures       Eye Exam (on CFTR modulator--annual until 18, then per ophthalmologist) (Office:    [facility]   ) 6/2024    Notes  Home IV antibiotics PICC 2/11/25 x 14 days

## 2025-03-12 ENCOUNTER — TELEMEDICINE (OUTPATIENT)
Dept: PEDIATRIC PULMONOLOGY | Facility: MEDICAL CENTER | Age: 65
End: 2025-03-12
Attending: INTERNAL MEDICINE
Payer: COMMERCIAL

## 2025-03-12 VITALS — WEIGHT: 130 LBS | OXYGEN SATURATION: 96 % | BODY MASS INDEX: 21.46 KG/M2

## 2025-03-12 DIAGNOSIS — K86.81 EXOCRINE PANCREATIC INSUFFICIENCY: ICD-10-CM

## 2025-03-12 DIAGNOSIS — M85.89 OSTEOPENIA OF MULTIPLE SITES: ICD-10-CM

## 2025-03-12 DIAGNOSIS — E84.0 CYSTIC FIBROSIS WITH PULMONARY MANIFESTATIONS (HCC): ICD-10-CM

## 2025-03-12 DIAGNOSIS — E08.9 DIABETES MELLITUS DUE TO CYSTIC FIBROSIS (HCC): ICD-10-CM

## 2025-03-12 DIAGNOSIS — J18.9 PNEUMONIA OF RIGHT UPPER LOBE DUE TO INFECTIOUS ORGANISM: ICD-10-CM

## 2025-03-12 DIAGNOSIS — E84.9 DIABETES MELLITUS DUE TO CYSTIC FIBROSIS (HCC): ICD-10-CM

## 2025-03-12 PROCEDURE — 99214 OFFICE O/P EST MOD 30 MIN: CPT | Mod: 95 | Performed by: INTERNAL MEDICINE

## 2025-03-12 PROCEDURE — 999999 HB NO CHARGE: Performed by: INTERNAL MEDICINE

## 2025-03-12 RX ORDER — DOXYCYCLINE 100 MG/1
CAPSULE ORAL
COMMUNITY
Start: 2024-12-29

## 2025-03-12 ASSESSMENT — FIBROSIS 4 INDEX: FIB4 SCORE: 0.72

## 2025-03-12 NOTE — PROGRESS NOTES
Adult Cystic Fibrosis Clinic Follow Up     This evaluation was conducted via Teams using secure and encrypted videoconferencing technology. The patient was in their home in the [facility].    The patient's identity was confirmed and verbal consent was obtained for this virtual visit.    CC: follow up cystic fibrosis    PCP:  Marleen Hargrove P.A.-C.   [address] / [place] [id number]     SUBJECTIVE:   Kenia Soto is a 62 y.o. 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[facility]) 06/16/2020    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 11/13/2019    Cystic fibrosis with pulmonary manifestations ([facility]) 11/13/2019    Herpes simplex 07/16/2019    Osteopenia of multiple sites 07/16/2019    CF (3849+10k bC>T, [id number]) ([facility]) 06/13/2019    Exocrine pancreatic insufficiency 06/13/2019    Vitamin K deficiency 06/13/2019    Partial thromboplastin time increased 08/22/2017    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  She did well with Zosyn and ceftazidime.  PICC line has since been removed.  She reports that since she has been off antibiotics she has been feeling overall better.  She continues to be compliant with exercise and airway clearance.  We again discussed change to Trikafta or Alyftrek and she would like to continue 1 choledochal because she has been doing well on it.    Last hospitalization: [10/21/20]    Respiratory:   Cough frequency: episodic, decreased  Cough character: productive  Sputum quantity: baseline  Sputum color: yellow  Shortness of breath:never  Chest Pain:never  Hemoptysis:occasional    Doctor visits: none   Antibiotics:   Meropenem as needed  Pulmonary toilet:   Albuterol: 2/day  7% hypertonic saline: 2/day 8ml mixed  with glutathione since 2012  Pulmozyme: 0/day - off was on trial with it, had blood streaking with it  Chest Physiotherapy: aerobika bid  Oxygen: none  Respiratory assist: none  Kalydeco bid  Advair 1-2 times/day    Compliance: compliant most of the time     Sinus symptoms:  Nasal Congestion: no  Nasal Drainage: no  Headache/sinus pressure: never     Activity / Energy: normal for age   Change in activity/energy: none   School/ Work attendance: no absences   School/ Work performance: no problem  Emotional assessment: positive     GI: no problem   Appetite: normal  Enzymes:  daily  Creon 6K units 1 with dinner  Stool: 1-2/day, characteristics: formed    Medications:     Current Outpatient Medications:     doxycycline, TAKE 1 CAPSULE BY MOUTH 2 TIMES A DAY FOR 28 DAYS. FOR 14 DAYS., PRN    Cayston, INHALE 75 MG THREE TIMES DAILY VIA NEBULIZER FOR 28 DAYS ON, THEN 28 DAYS OFF AS DIRECTED, PRN    Advair Diskus, 1 Puff, Inhalation, BID, Taking    sodium chloride, INHALE 4 ML BY NEBULIZATION 4 TIMES A DAY., Taking    Tranexamic Acid, TAKE 1-2 TABLETS BY MOUTH EVERY 8 HOURS AS NEEDED FOR HEMOPTYSIS, PRN    albuterol, 2.5 mg, Nebulization, TID, Taking    acyclovir, TAKE 1 TABLET BY MOUTH 4 TIMES A DAY FOR 14 DAYS, THEN TWICE DAILY FOR 3 MONTHS, PRN    Kalydeco, TAKE 1 TABLET BY MOUTH TWICE DAILY WITH FAT CONTAINING FOOD, Taking    phytonadione, 5 mg, Oral, DAILY, Taking    fluticasone, 1-2 Spray, Nasal, DAILY, Taking    sterile water, USE TO MIX MEROPENEM 500MG VIAL WITH 8ML, Taking    meropenem,  MG VIAL WITH 8 ML STERILE WATER. THEN NEBULIZE 4 ML TWICE DAILY FOR 14 DAYS., PRN    Creon, TAKE 1-2 CAPSULES BY MOUTH 4 TIMES A DAY AS NEEDED WITH MEALS OR SNACKS **KEEP IN ORIGINAL BOTTLE**, Taking    Tresiba FlexTouch, INJECT 4 UNITS SUBCUTANEOUSLY DAILY (375 DAYS), Taking    sodium chloride 3%, 4 mL, Nebulization, 4X/DAY PRN, Taking    HumaLOG KwikPen, 12-15 Units, Subcutaneous, TID AC, Taking    BD Pen Needle Radha 2nd  Gen, 1 EACH BY OTHER ROUTE 4 TIMES A DAY,BEFORE MEALS AND AT BEDTIME. FOR INSULIN ADMINISTRATION., Taking    OneTouch Verio, TEST 3 TIMES DAILY FOR INSULIN ADJUSTMENT AND AS NEEDED SYMPTOMS OF HIGH OR LOW BLOOD SUGAR, Taking    Dexcom G6 Sensor, 1 Each, Does not apply, Q10 DAYS, Taking    Dexcom G6 Transmitter, 1 Each, Does not apply, Continuous, Taking    mupirocin, Apply BID for up to 10 days, PRN    Multiple Vitamins-Minerals (DEKAS PLUS PO), 1 Capsule, Oral, BID, Taking    magnesium oxide, 200 mg, Oral, BID, Taking    Vitamin C, 1,000 mg, Oral, BID, Taking    Probiotic Product (PROBIOTIC-10 PO), 1 Tablet, Oral, DAILY, Taking    vitamin D3, 10,000 Units, Oral, BID, Taking    Other Medications: none  Central Line: none    ALLERGIES:  Bactrim [sulfamethoxazole-trimethoprim], Ciprofloxacin, Levofloxacin, and Tobramycin    Review of System:  All other systems reviewed and negative    Ears, nose, mouth, throat, and face: negative  Cardiovascular: Negative  Gastrointestinal: Negative  Allergic/Immunologic: negative      Social/Environmental:  Social History     Tobacco Use    Smoking status: Never    Smokeless tobacco: Never   Vaping Use    Vaping status: Never Used   Substance Use Topics    Alcohol use: Yes     Alcohol/week: 1.8 oz     Types: 3 Standard drinks or equivalent per week     Comment: Socially    Drug use: No       Home Environment       Pet Exposures    Dogs Yes     Reptiles Yes        Tobacco use: never  Pets: none    Respiratory hospitalizations: [10/21/20]      OBJECTIVE:  Physical Exam:  Wt 59 kg (130 lb)   SpO2 96%   BMI 21.46 kg/m²      Physical Exam  Constitutional:       General: She is not in acute distress.     Appearance: Normal appearance.   HENT:      Head: Normocephalic.   Eyes:      Conjunctiva/sclera: Conjunctivae normal.   Neurological:      Mental Status: She is alert and oriented to person, place, and time. Mental status is at baseline.   Psychiatric:         Mood and Affect: Mood  normal.         Behavior: Behavior normal.   Virtual visit     Labs reviewed: yes    Lab Results   Component Value Date/Time    SIGIND POS (POS) 11/06/2024 11:40 AM    SIGIND . 11/06/2024 11:40 AM    SOURCE RESP 11/06/2024 11:40 AM    SOURCE RESP 11/06/2024 11:40 AM    SITE Sputum (coughed) 11/06/2024 11:40 AM    SITE Sputum (coughed) 11/06/2024 11:40 AM    CULTRSULT (A) 11/06/2024 11:40 AM     Heavy growth usual upper respiratory demetrius  Respiratory cultures from cystic fibrosis patients are  routinely screened for Staphylococcus aureus, Pseudomonas  aeruginosa, Burkholderia cepacia, Haemophilus influenzae,  Stenotrophomonas maltophilia, Achromobacter sp., and  Streptococcus pneumonia.      CULTRSULT (A) 11/06/2024 11:40 AM     Pseudomonas aeruginosa  Moderate growth  Mucoid phenotype       Immunization History   Administered Date(s) Administered    Hepatitis B Vaccine Adolescent/Pediatric 10/27/1999, 12/14/1999, 08/04/2000    Influenza (IM) Preservative Free - HISTORICAL DATA 10/25/1999, 11/03/2008, 10/27/2009, 10/26/2010, 11/02/2011, 11/06/2012    Influenza Seasonal Injectable - Historical Data 11/04/2015, 10/14/2016, 10/30/2017    Influenza Vaccine Adult HD 10/27/2009, 10/01/2019    Influenza Vaccine Pediatric Split - Historical Data 10/23/1995, 10/21/1996, 10/15/1997, 10/29/1998, 11/14/2000, 11/14/2001, 11/05/2002, 10/27/2003, 10/18/2004, 10/24/2005, 11/13/2006, 10/22/2007, 11/18/2013, 10/22/2014    Influenza Vaccine Quad Inj (Pf) 10/01/2019, 11/02/2020    Influenza, Unspecified - HISTORICAL DATA 10/09/2018    Pneumococcal polysaccharide vaccine (PPSV-23) 10/27/1999    TD Vaccine 10/27/1999, 12/07/2018    Tdap Vaccine 06/01/2008    Zoster Vaccine Live (ZVL) (Zostavax) - HISTORICAL DATA 04/22/2015       ASSESSMENT/PLAN:   1. Cystic fibrosis (HCC)  Grows nonmucoid and mucoid phenotype Pseudomonas  Continue current airway clearance, uses hypertonic but does not use dornase due to hemoptysis  Has h/o recurrent  hemoptysis: keeps TXA on hand if needed  Continue with Kalydeco  Nebulized meropenem as needed for illness  Colonoscopy completed     2. Exocrine pancreatic insufficiency  Stable  Continue enzymes before meals    3. Osteopenia of multiple sites  Highly recommend calcium  Continue with magnesium and vitamin D  Patient does exercise regularly    4. 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

## 2025-03-12 NOTE — PROGRESS NOTES
Nutrition note:  Met with Marina briefly today during virtual CF visit. She had pneumonia after some sinus issues but she is feeling better now.  Had IV antibx therapy at home.  She is now feeling good enough to start exercising again.    She heard of some issues with Creon's new enzyme formulation so she tried ZenPep but it made her constipated. She is back on Creon now. She drinks a lot of fluids, recently changed her electrolyte drink and it has more sodium now.  She is also taking a psyllium fiber supplement.  Marina has no nutrition questions or concerns today.

## 2025-03-12 NOTE — PROGRESS NOTES
Medical Social Work    Referral: CF Clinic / Dr. Mccabe    Intervention: Patient presents to CF clinic for routine cystic fibrosis follow-up, here virtually. Patient has been sick for the past few months, but states she is starting to feel better. Patient states she has not allowed this to affect her mental health, and has taken the time to recover. Patient continues to keep active at home.    SW screened for transportation, food resources, school/work, financial, medication, and mental health needs. Patient denies having any concerns in these areas.    Plan: SW will continue to follow in clinic.

## 2025-03-27 ENCOUNTER — APPOINTMENT (OUTPATIENT)
Dept: RADIOLOGY | Facility: MEDICAL CENTER | Age: 65
End: 2025-03-27
Attending: INTERNAL MEDICINE
Payer: COMMERCIAL

## 2025-03-27 DIAGNOSIS — J18.9 PNEUMONIA OF RIGHT UPPER LOBE DUE TO INFECTIOUS ORGANISM: ICD-10-CM

## 2025-03-27 PROCEDURE — 71046 X-RAY EXAM CHEST 2 VIEWS: CPT

## 2025-03-28 RX ORDER — ACYCLOVIR 400 MG/1
TABLET ORAL
Qty: 208 TABLET | Refills: 1 | Status: SHIPPED | OUTPATIENT
Start: 2025-03-28

## 2025-04-01 ENCOUNTER — PATIENT MESSAGE (OUTPATIENT)
Dept: HEALTH INFORMATION MANAGEMENT | Facility: OTHER | Age: 65
End: 2025-04-01

## 2025-04-09 ENCOUNTER — PATIENT MESSAGE (OUTPATIENT)
Dept: MEDICAL GROUP | Facility: LAB | Age: 65
End: 2025-04-09
Payer: COMMERCIAL

## 2025-04-09 DIAGNOSIS — L30.1 DYSHIDROTIC ECZEMA: ICD-10-CM

## 2025-04-09 RX ORDER — HYDROCORTISONE 25 MG/G
1 CREAM TOPICAL 2 TIMES DAILY
Qty: 45 G | Refills: 0 | Status: SHIPPED | OUTPATIENT
Start: 2025-04-09

## 2025-04-24 ENCOUNTER — TELEPHONE (OUTPATIENT)
Dept: PEDIATRIC PULMONOLOGY | Facility: MEDICAL CENTER | Age: 65
End: 2025-04-24
Payer: COMMERCIAL

## 2025-04-24 DIAGNOSIS — K86.81 EXOCRINE PANCREATIC INSUFFICIENCY: ICD-10-CM

## 2025-04-24 DIAGNOSIS — E56.1 VITAMIN K DEFICIENCY: ICD-10-CM

## 2025-04-24 RX ORDER — PHYTONADIONE 5 MG/1
5 TABLET ORAL DAILY
Qty: 90 TABLET | Refills: 3 | Status: SHIPPED | OUTPATIENT
Start: 2025-04-24

## 2025-04-24 RX ORDER — PANCRELIPASE 30000; 6000; 19000 [USP'U]/1; [USP'U]/1; [USP'U]/1
CAPSULE, DELAYED RELEASE PELLETS ORAL
Qty: 200 CAPSULE | Refills: 11 | Status: SHIPPED | OUTPATIENT
Start: 2025-04-24

## 2025-04-24 NOTE — TELEPHONE ENCOUNTER
Received Refill PA request via [place]  for CREON 6000-21705 units Cap DR Particles. (Quantity:200, Day Supply:25)     Insurance: [employer]  Member ID:  [id number]  BIN: [id number]  PCN: [id number]  Group: [id number]     Ran Test claim via [place] & medication Pays for a $0 copay. Will outreach to patient to offer specialty pharmacy services and or release to preferred pharmacy    Reece [facility]  Central Pharmacy Liaison (Rx Coordinator)  [phone]  4/24/2025 1:58 PM

## 2025-04-24 NOTE — TELEPHONE ENCOUNTER
Last Visit: 3/12/25  Next Visit: 7/16/25    Received request via: Pharmacy    Was the patient seen in the last year in this department? Yes    Does the patient have an active prescription (recently filled or refills available) for medication(s) requested? No     Pharm[facility] Name: [facility]

## 2025-04-24 NOTE — TELEPHONE ENCOUNTER
Drug: CREON 6000-69890 units Cap DR Particles    Spoke with patient and they decline services at this time due to already making trips to current pharmacy for other goods and medications.    Will go ahead and release prescription to patient's preferred pharmacy.    [id number]     Reece Muñoz LESA  Central Pharmacy Liaison (Rx Coordinator)  [phone]  4/24/2025 2:00 PM

## 2025-05-03 ENCOUNTER — OFFICE VISIT (OUTPATIENT)
Dept: URGENT CARE | Facility: CLINIC | Age: 65
End: 2025-05-03
Payer: COMMERCIAL

## 2025-05-03 VITALS
DIASTOLIC BLOOD PRESSURE: 58 MMHG | RESPIRATION RATE: 14 BRPM | WEIGHT: 127 LBS | HEIGHT: 66 IN | TEMPERATURE: 99.6 F | OXYGEN SATURATION: 95 % | SYSTOLIC BLOOD PRESSURE: 108 MMHG | HEART RATE: 91 BPM | BODY MASS INDEX: 20.41 KG/M2

## 2025-05-03 DIAGNOSIS — L03.116 CELLULITIS OF LEFT LOWER EXTREMITY: ICD-10-CM

## 2025-05-03 PROCEDURE — 99213 OFFICE O/P EST LOW 20 MIN: CPT | Performed by: PHYSICIAN ASSISTANT

## 2025-05-03 PROCEDURE — 3078F DIAST BP <80 MM HG: CPT | Performed by: PHYSICIAN ASSISTANT

## 2025-05-03 PROCEDURE — 3074F SYST BP LT 130 MM HG: CPT | Performed by: PHYSICIAN ASSISTANT

## 2025-05-03 RX ORDER — CEPHALEXIN 500 MG/1
500 CAPSULE ORAL 4 TIMES DAILY
Qty: 28 CAPSULE | Refills: 0 | Status: SHIPPED | OUTPATIENT
Start: 2025-05-03 | End: 2025-05-10

## 2025-05-03 RX ORDER — MUPIROCIN 20 MG/G
1 OINTMENT TOPICAL 2 TIMES DAILY
Qty: 22 G | Refills: 0 | Status: SHIPPED | OUTPATIENT
Start: 2025-05-03

## 2025-05-03 ASSESSMENT — FIBROSIS 4 INDEX: FIB4 SCORE: 0.72

## 2025-05-03 NOTE — PROGRESS NOTES
"Subjective:   Kenia Soto is a 64 y.o. female who presents for Insect Bite (\"Chigger bites on left lower leg\" Red and swollen  Noticed Wednesday/Uses Mupirocin ointment, but its )      Insect bites left lower leg \"chiggers\"  Yesterday went from itching to painful  Concerned of infection  Applying topical mupirocin  Taking allegra  No fevers  Mild ankle swelling    ROS    Medications, Allergies, and current problem list reviewed today in Epic.     Objective:     /58 (BP Location: Left arm, Patient Position: Sitting, BP Cuff Size: Adult)   Pulse 91   Temp 37.6 °C (99.6 °F) (Temporal)   Resp 14   Ht 1.664 m (5' 5.5\")   Wt 57.6 kg (127 lb)   SpO2 95%     Physical Exam  Vitals reviewed.   Constitutional:       Appearance: Normal appearance.   HENT:      Head: Normocephalic and atraumatic.      Right Ear: External ear normal.      Left Ear: External ear normal.      Nose: Nose normal.      Mouth/Throat:      Mouth: Mucous membranes are moist.   Eyes:      Conjunctiva/sclera: Conjunctivae normal.   Cardiovascular:      Rate and Rhythm: Normal rate.   Pulmonary:      Effort: Pulmonary effort is normal.   Skin:     General: Skin is warm and dry.      Capillary Refill: Capillary refill takes less than 2 seconds.      Comments: Left lower leg 12cm x 8cm area of erythema surrounding 2 small macular lesions.   Neurological:      Mental Status: She is alert and oriented to person, place, and time.         Assessment/Plan:     Diagnosis and associated orders:     1. Cellulitis of left lower extremity  cephALEXin (KEFLEX) 500 MG Cap    mupirocin (BACTROBAN) 2 % Ointment         Comments/MDM:     No evidence of sepsis or joint space infection  Start keflex and mupirocin immediately  Er precautions reviewed         Differential diagnosis, natural history, supportive care, and indications for immediate follow-up discussed.    Advised the patient to follow-up with the primary care physician for recheck, " reevaluation, and consideration of further management.    Please note that this dictation was created using voice recognition software. I have made a reasonable attempt to correct obvious errors, but I expect that there are errors of grammar and possibly content that I did not discover before finalizing the note.    This note was electronically signed by Beka Cantrell PA-C

## 2025-06-09 ENCOUNTER — PATIENT MESSAGE (OUTPATIENT)
Dept: PEDIATRIC PULMONOLOGY | Facility: MEDICAL CENTER | Age: 65
End: 2025-06-09
Payer: COMMERCIAL

## 2025-06-09 DIAGNOSIS — E84.0 CYSTIC FIBROSIS WITH PULMONARY MANIFESTATIONS (HCC): Primary | ICD-10-CM

## 2025-06-10 NOTE — PATIENT COMMUNICATION
Attempted to submit PA for Alyftrek via CoverMyMeds:    Kenia Soto ([place]: [id number])  Need Help? Call us at [phone]  Outcome: Additional Information Required Available without authorization.  Drug: Alyftrek 10-50-125MG tablets  Form: [employer] [place] Commercial Electronic PA Form (2017 NCPDP)

## 2025-06-11 ENCOUNTER — PATIENT MESSAGE (OUTPATIENT)
Dept: PEDIATRIC PULMONOLOGY | Facility: MEDICAL CENTER | Age: 65
End: 2025-06-11
Payer: COMMERCIAL

## 2025-06-11 DIAGNOSIS — E84.0 CYSTIC FIBROSIS WITH PULMONARY MANIFESTATIONS (HCC): Primary | ICD-10-CM

## 2025-06-12 ENCOUNTER — TELEPHONE (OUTPATIENT)
Dept: PEDIATRIC PULMONOLOGY | Facility: MEDICAL CENTER | Age: 65
End: 2025-06-12
Payer: COMMERCIAL

## 2025-06-12 NOTE — TELEPHONE ENCOUNTER
Received New Start request via MSOT  for Vpmqrsayq-Mtfjfhch-Torosyvncqk 10- MG Tab . (Quantity:168, Day Supply:84)    The plan will only cover a 30 day supply at a time due to the high cost associated with the medication     Insurance: NAYA  Member ID:  [id number]  BIN: [id number]  PCN: [id number]  Group: [id number]     Ran Test claim via [employer] & medication pays for a $0 copay    Patient has specified she would like this medication filled through [facility]    Prescription will be released for processing    Cary Espinosa [facility]   Pediatric Pharmacy Liaison  [phone]

## 2025-06-26 DIAGNOSIS — E84.0 CYSTIC FIBROSIS WITH PULMONARY EXACERBATION (HCC): ICD-10-CM

## 2025-06-26 DIAGNOSIS — R04.2 HEMOPTYSIS: ICD-10-CM

## 2025-06-26 RX ORDER — TRANEXAMIC ACID 650 MG/1
TABLET ORAL
Qty: 30 TABLET | Refills: 2 | Status: SHIPPED | OUTPATIENT
Start: 2025-06-26

## 2025-06-26 NOTE — TELEPHONE ENCOUNTER
Last Visit: 3/12/25  Next Visit: 7/16/25    Received request via: Pharmacy    Was the patient seen in the last year in this department? Yes    Does the patient have an active prescription (recently filled or refills available) for medication(s) requested? No     [facility] Name[facility]

## 2025-06-27 ENCOUNTER — TELEPHONE (OUTPATIENT)
Dept: PEDIATRIC PULMONOLOGY | Facility: MEDICAL CENTER | Age: 65
End: 2025-06-27
Payer: COMMERCIAL

## 2025-06-27 NOTE — TELEPHONE ENCOUNTER
Received Renewal request via MSOT  for   Tranexamic Acid 650 MG Tab . (Quantity:30, Day Supply:5)     Insurance: BCBS  Member ID:  [id number]  BIN: [id number]  PCN: [id number]  Group: [id number]     Ran Test claim via [place] & medication pays for a $0 copay    Prescription will be released to preferred pharmacy for processing: ANA Espinosa [facility]   Pediatric Pharmacy Liaison  [phone]

## 2025-07-14 ENCOUNTER — HOSPITAL ENCOUNTER (OUTPATIENT)
Dept: LAB | Facility: MEDICAL CENTER | Age: 65
End: 2025-07-14
Attending: INTERNAL MEDICINE
Payer: COMMERCIAL

## 2025-07-14 DIAGNOSIS — E84.0 CYSTIC FIBROSIS WITH PULMONARY MANIFESTATIONS (HCC): ICD-10-CM

## 2025-07-14 LAB
ALBUMIN SERPL BCP-MCNC: 4.1 G/DL (ref 3.2–4.9)
ALBUMIN/GLOB SERPL: 1.9 G/DL
ALP SERPL-CCNC: 125 U/L (ref 30–99)
ALT SERPL-CCNC: 23 U/L (ref 2–50)
ANION GAP SERPL CALC-SCNC: 10 MMOL/L (ref 7–16)
AST SERPL-CCNC: 25 U/L (ref 12–45)
BILIRUB SERPL-MCNC: 0.4 MG/DL (ref 0.1–1.5)
BUN SERPL-MCNC: 12 MG/DL (ref 8–22)
CALCIUM ALBUM COR SERPL-MCNC: 9.1 MG/DL (ref 8.5–10.5)
CALCIUM SERPL-MCNC: 9.2 MG/DL (ref 8.5–10.5)
CHLORIDE SERPL-SCNC: 104 MMOL/L (ref 96–112)
CO2 SERPL-SCNC: 25 MMOL/L (ref 20–33)
CREAT SERPL-MCNC: 0.63 MG/DL (ref 0.5–1.4)
GFR SERPLBLD CREATININE-BSD FMLA CKD-EPI: 99 ML/MIN/1.73 M 2
GGT SERPL-CCNC: 9 U/L (ref 7–34)
GLOBULIN SER CALC-MCNC: 2.2 G/DL (ref 1.9–3.5)
GLUCOSE SERPL-MCNC: 83 MG/DL (ref 65–99)
POTASSIUM SERPL-SCNC: 4.4 MMOL/L (ref 3.6–5.5)
PROT SERPL-MCNC: 6.3 G/DL (ref 6–8.2)
SODIUM SERPL-SCNC: 139 MMOL/L (ref 135–145)

## 2025-07-14 PROCEDURE — 36415 COLL VENOUS BLD VENIPUNCTURE: CPT

## 2025-07-14 PROCEDURE — 82977 ASSAY OF GGT: CPT

## 2025-07-14 PROCEDURE — 80053 COMPREHEN METABOLIC PANEL: CPT

## 2025-07-14 NOTE — PROGRESS NOTES
Adult Cystic Fibrosis Clinic Follow Up     CC: follow up cystic fibrosis    PCP:  Marleen Hargrove P.A.-C.   [id number] [id number] / [place] [id number]     SUBJECTIVE:   Kenia Soto is a 62 y.o. 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 ([facility]) 06/16/2020    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 11/13/2019    Cystic fibrosis with pulmonary manifestations ([facility]) 11/13/2019    Herpes simplex 07/16/2019    Osteopenia of multiple sites 07/16/2019    CF (3849+10k bC>T, Y0656B) ([facility]) 06/13/2019    Exocrine pancreatic insufficiency 06/13/2019    Vitamin K deficiency 06/13/2019    Partial thromboplastin time increased 08/22/2017    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  Today, her FEV1 is up 10 points from previous.  She says that initially she had a very significant purge of mucus which has started to taper off.  She is very happy with the improvement in her FEV1 as well as her symptoms.  She continues to have issues with pain in her feet, she has seen a podiatrist, but has not had much relief.       3/12/2025  Patient developed pneumonia and was treated with IV antibiotics from 2/10/2025 to 2/24/2025 at home.  She did well with Zosyn and ceftazidime.  PICC line has since been removed.  She reports that since she has been off antibiotics she has been feeling overall better.  She continues to be compliant with exercise and airway clearance.  We again discussed change to Trikafta or Alyftrek and she would like to continue 1 choledochal because she has been doing well on it.    Last hospitalization: [10/21/20]    Respiratory:   Cough frequency: episodic,  decreased  Cough character: productive  Sputum quantity: baseline  Sputum color: yellow  Shortness of breath:never  Chest Pain:never  Hemoptysis:occasional    Doctor visits: none   Antibiotics:   Meropenem as needed  Pulmonary toilet:   Albuterol: 2/day  7% hypertonic saline: 2/day 8ml mixed with glutathione since 2012  Pulmozyme: 0/day - off was on trial with it, had blood streaking with it  Chest Physiotherapy: aerobika bid  Oxygen: none  Respiratory assist: none  Modulator: Alytrek   Advair 1-2 times/day    Compliance: compliant most of the time     Sinus symptoms:  Nasal Congestion: no  Nasal Drainage: no  Headache/sinus pressure: never     Activity / Energy: normal for age   Change in activity/energy: none   School/ Work attendance: no absences   School/ Work performance: no problem  Emotional assessment: positive     GI: no problem   Appetite: normal  Enzymes:  daily  Stool: 1-2/day, characteristics: formed    Medications:     Current Outpatient Medications:     Tranexamic Acid, TAKE 1-2 TABLETS BY MOUTH EVERY 8 HOURS AS NEEDED FOR HEMOPTYSIS    Xmaxjigha-Qzjefjcp-Tuxbjkkgkmz, 2 Tablet, Oral, DAILY    mupirocin, 1 Application, Topical, BID    Creon, TAKE 1-2 CAPSULES BY MOUTH 4 TIMES A DAY AS NEEDED WITH MEALS OR SNACKS **KEEP IN ORIGINAL BOTTLE**    phytonadione, 5 mg, Oral, DAILY    hydrocortisone, 1 Application, Topical, BID    acyclovir, TAKE 1 TABLET BY MOUTH 4 TIMES A DAY FOR 14 DAYS, THEN TWICE DAILY FOR 3 MONTHS    doxycycline, TAKE 1 CAPSULE BY MOUTH 2 TIMES A DAY FOR 28 DAYS. FOR 14 DAYS.    Cayston, INHALE 75 MG THREE TIMES DAILY VIA NEBULIZER FOR 28 DAYS ON, THEN 28 DAYS OFF AS DIRECTED    Advair Diskus, 1 Puff, Inhalation, BID    sodium chloride, INHALE 4 ML BY NEBULIZATION 4 TIMES A DAY.    albuterol, 2.5 mg, Nebulization, TID    Kalydeco, TAKE 1 TABLET BY MOUTH TWICE DAILY WITH FAT CONTAINING FOOD    fluticasone, 1-2 Spray, Nasal, DAILY    sterile water, USE TO MIX MEROPENEM 500MG VIAL WITH  "8ML    meropenem,  MG VIAL WITH 8 ML STERILE WATER. THEN NEBULIZE 4 ML TWICE DAILY FOR 14 DAYS.    Tresiba FlexTouch, INJECT 4 UNITS SUBCUTANEOUSLY DAILY (375 DAYS)    sodium chloride 3%, 4 mL, Nebulization, 4X/DAY PRN    HumaLOG KwikPen, 12-15 Units, Subcutaneous, TID AC    BD Pen Needle Radha 2nd Gen, 1 EACH BY OTHER ROUTE 4 TIMES A DAY,BEFORE MEALS AND AT BEDTIME. FOR INSULIN ADMINISTRATION.    OneTouch Verio, TEST 3 TIMES DAILY FOR INSULIN ADJUSTMENT AND AS NEEDED SYMPTOMS OF HIGH OR LOW BLOOD SUGAR    Dexcom G6 Sensor, 1 Each, Does not apply, Q10 DAYS    Dexcom G6 Transmitter, 1 Each, Does not apply, Continuous    mupirocin, Apply BID for up to 10 days    Multiple Vitamins-Minerals (DEKAS PLUS PO), 1 Capsule, Oral, BID    magnesium oxide, 200 mg, Oral, BID    Vitamin C, 1,000 mg, Oral, BID    Probiotic Product (PROBIOTIC-10 PO), 1 Tablet, Oral, DAILY    vitamin D3, 10,000 Units, Oral, BID    Other Medications: none  Central Line: none    ALLERGIES:  Bactrim [sulfamethoxazole-trimethoprim], Ciprofloxacin, Levofloxacin, and Tobramycin    Review of System:  All other systems reviewed and negative    Ears, nose, mouth, throat, and face: negative  Cardiovascular: Negative  Gastrointestinal: Negative  Allergic/Immunologic: negative      Social/Environmental:  Social History     Tobacco Use    Smoking status: Never    Smokeless tobacco: Never   Vaping Use    Vaping status: Never Used   Substance Use Topics    Alcohol use: Yes     Alcohol/week: 1.8 oz     Types: 3 Standard drinks or equivalent per week     Comment: Socially    Drug use: No       Home Environment       Pet Exposures    Dogs Yes     Reptiles Yes        Tobacco use: never  Pets: none    Respiratory hospitalizations: [10/21/20]    OBJECTIVE:  Physical Exam:  /58 (BP Location: Right arm, Patient Position: Sitting, BP Cuff Size: Adult)   Pulse 90   Ht 1.647 m (5' 4.86\")   Wt 57.4 kg (126 lb 8 oz)   SpO2 96%   BMI 21.14 kg/m²      Physical " Exam  Constitutional:       General: She is not in acute distress.     Appearance: Normal appearance.   HENT:      Head: Normocephalic.   Eyes:      Conjunctiva/sclera: Conjunctivae normal.   Cardiovascular:      Rate and Rhythm: Normal rate and regular rhythm.   Pulmonary:      Effort: Pulmonary effort is normal.      Breath sounds: Normal breath sounds.   Skin:     General: Skin is warm and dry.   Neurological:      Mental Status: She is alert and oriented to person, place, and time. Mental status is at baseline.   Psychiatric:         Mood and Affect: Mood normal.         Behavior: Behavior normal.     Screenings  DEXA: 2024 with osteoporosis, next due 2026  Colonoscopy: March 2025    Labs reviewed: yes    Lab Results   Component Value Date/Time    SIGIND POS (POS) 11/06/2024 11:40 AM    SIGIND . 11/06/2024 11:40 AM    SOURCE RESP 11/06/2024 11:40 AM    SOURCE RESP 11/06/2024 11:40 AM    SITE Sputum (coughed) 11/06/2024 11:40 AM    SITE Sputum (coughed) 11/06/2024 11:40 AM    CULTRSULT (A) 11/06/2024 11:40 AM     Heavy growth usual upper respiratory demetrius  Respiratory cultures from cystic fibrosis patients are  routinely screened for Staphylococcus aureus, Pseudomonas  aeruginosa, Burkholderia cepacia, Haemophilus influenzae,  Stenotrophomonas maltophilia, Achromobacter sp., and  Streptococcus pneumonia.      CULTRSULT (A) 11/06/2024 11:40 AM     Pseudomonas aeruginosa  Moderate growth  Mucoid phenotype         ASSESSMENT/PLAN:   Problem List Items Addressed This Visit          Pediatric Pulmonology Medicine Problems    Cystic fibrosis with pulmonary manifestations (HCC) - Primary    Continue current airway clearance, uses hypertonic but does not use dornase due to hemoptysis  Has h/o recurrent hemoptysis: keeps TXA on hand if needed  Continue with Alytrek, tolerating well  Nebulized meropenem as needed for illness              Other    Exocrine pancreatic insufficiency    Stable  Continue enzymes before meals          Osteopenia of multiple sites    Highly recommend calcium  Continue with magnesium and vitamin D  Patient does exercise regularly         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

## 2025-07-15 NOTE — PATIENT INSTRUCTIONS
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, L2630I  CFTR Modulator: Kalydeco, Started Alyftrek 7/2025    Respiratory  FEV1 % Pred 2024 5/1/24 62%  11/6/24 63%   FEV1 % Pred 2025       Airway Clearance Routine:  Albuterol (2 x day) / (3-4x/day when sick)  Hypertonic Saline (2 x day) / (3-4x/day when sick)  Pulmozyme (1 x day) / (2x/day when sick)  ACT Vest and/or Acapella (2 x day) / (3-4 x/day when sick)  Inhaled antibiotics: Cayston, COMPLETE 2 MORE WEEKS  Inhaled Meropenem 250mg BID x 14 days next time needed  ADD BUDESONIDE 2 ML TO SINUS RINSES 2 TIMES PER DAY  Equipment Check (Annually) Date scheduled:  CF Culture: [facility]  Nutrition/Gastrointestinal  BMI: Current: 21.1, Goal: 22 - 23     Enzymes: Creon 6 (0-3 depending on meal content)  Vitamins: general MVi, DEKAs Plus softgel, magnesium, D, K, A , probiotics, LMNT electrolytes  Exercise: increase as able   Social  Insurance: [employer]  Transportation: yes  Has access to food resources: yes  Financial Resources: none  School / Work Needs: none  Mental health screening completed:   3/8/2023      Tasks:    Goals:Alyftrek: LFT Monthly x 6 months, q3 months x 12 months    Last done Next due   Last done Next due   CBC 2/19/25 10/2025 HbA1c 10/15/24 5.6 Annual or per Endo   CMP 10/15/24 10/2025 OGTT CFRD    GGT 10/15/24 10/2025 CXR 3/27/25 3/2026   PT/INR 10/15/24 10/2025 DEXA 2/28/24 osteoporosis 2026    Vit A/D/E 10/15/24 10/2025 Abd US   PRN   Lipid 10/15/24 10/2025  Colonoscopy 3/5/25  1 polyp, bx sent  Per CF guidelines   IgE  10/2025              Sputum/Throat Cultures       Eye Exam (on CFTR modulator--annual until 18, then per ophthalmologist) (Office:    [facility]   ) 6/2024    Notes  Home IV antibiotics PICC 2/11/25 x 14 days

## 2025-07-16 ENCOUNTER — OFFICE VISIT (OUTPATIENT)
Dept: PEDIATRIC PULMONOLOGY | Facility: MEDICAL CENTER | Age: 65
End: 2025-07-16
Attending: INTERNAL MEDICINE
Payer: COMMERCIAL

## 2025-07-16 ENCOUNTER — HOSPITAL ENCOUNTER (OUTPATIENT)
Facility: MEDICAL CENTER | Age: 65
End: 2025-07-16
Attending: INTERNAL MEDICINE
Payer: COMMERCIAL

## 2025-07-16 VITALS
BODY MASS INDEX: 21.08 KG/M2 | WEIGHT: 126.5 LBS | SYSTOLIC BLOOD PRESSURE: 106 MMHG | DIASTOLIC BLOOD PRESSURE: 58 MMHG | HEART RATE: 90 BPM | HEIGHT: 65 IN | OXYGEN SATURATION: 96 %

## 2025-07-16 DIAGNOSIS — K86.81 EXOCRINE PANCREATIC INSUFFICIENCY: ICD-10-CM

## 2025-07-16 DIAGNOSIS — Z12.39 ENCOUNTER FOR SCREENING FOR MALIGNANT NEOPLASM OF BREAST, UNSPECIFIED SCREENING MODALITY: ICD-10-CM

## 2025-07-16 DIAGNOSIS — M85.89 OSTEOPENIA OF MULTIPLE SITES: ICD-10-CM

## 2025-07-16 DIAGNOSIS — E08.9 DIABETES MELLITUS DUE TO CYSTIC FIBROSIS (HCC): ICD-10-CM

## 2025-07-16 DIAGNOSIS — E84.0 CYSTIC FIBROSIS WITH PULMONARY MANIFESTATIONS (HCC): Primary | ICD-10-CM

## 2025-07-16 DIAGNOSIS — E84.9 DIABETES MELLITUS DUE TO CYSTIC FIBROSIS (HCC): ICD-10-CM

## 2025-07-16 PROCEDURE — 87181 SC STD AGAR DILUTION PER AGT: CPT

## 2025-07-16 PROCEDURE — 87077 CULTURE AEROBIC IDENTIFY: CPT

## 2025-07-16 PROCEDURE — 3074F SYST BP LT 130 MM HG: CPT | Performed by: INTERNAL MEDICINE

## 2025-07-16 PROCEDURE — 3078F DIAST BP <80 MM HG: CPT | Performed by: INTERNAL MEDICINE

## 2025-07-16 PROCEDURE — 87205 SMEAR GRAM STAIN: CPT

## 2025-07-16 PROCEDURE — 94010 BREATHING CAPACITY TEST: CPT | Performed by: INTERNAL MEDICINE

## 2025-07-16 PROCEDURE — 99214 OFFICE O/P EST MOD 30 MIN: CPT | Mod: 25 | Performed by: INTERNAL MEDICINE

## 2025-07-16 PROCEDURE — 99214 OFFICE O/P EST MOD 30 MIN: CPT | Performed by: INTERNAL MEDICINE

## 2025-07-16 PROCEDURE — 87070 CULTURE OTHR SPECIMN AEROBIC: CPT

## 2025-07-16 PROCEDURE — 94010 BREATHING CAPACITY TEST: CPT | Mod: 26 | Performed by: INTERNAL MEDICINE

## 2025-07-16 ASSESSMENT — FIBROSIS 4 INDEX: FIB4 SCORE: 0.74

## 2025-07-16 NOTE — PROGRESS NOTES
Respiratory -  Cystic Fibrosis Airway Clearance Therapy (ACT)      Kenia seen today at [facility]. Testing today included sputum sample and PFT.  Current plan for Respiratory medications and ACT is:        AM  Albuterol  Hypertonic Saline  Vest     PM  Albuterol  Hypertonic saline  ACT vest     Leo PRN when sick     Pt. given copy of PFT results.

## 2025-07-16 NOTE — PROCEDURES
"Pulmonary Function Test Results (PFT)    Spirometry Actual Predicted % Predicted   FVC (L) 2.95 3.13 94   FEV1 ((L) 1.78 2.45 72   FEV1/FVC (%) 60.27 78.76 76   FEF 25-75% (L/sec) 0.74 2.14 34     Please see  PFT in \"Media Tab\" of Notes activity  (EMR)    Provider Interpretation  Moderate obstruction, FEV1 72%    "

## 2025-07-16 NOTE — ASSESSMENT & PLAN NOTE
Continue current airway clearance, uses hypertonic but does not use dornase due to hemoptysis  Has h/o recurrent hemoptysis: keeps TXA on hand if needed  Continue with Alytrek, tolerating well  Nebulized meropenem as needed for illness

## 2025-07-16 NOTE — PROGRESS NOTES
Nutrition Screen & Progress Note for Adult CF Clinic    BMI: 21.1       Points: 1  IBW (kg): 56.8  % IBW: 101       Points: 0  Current weight (kg): 57.4   Weight last clinic visit: 59.5 kg on 11/6/24  % weight change: down 4%     Points: 1  FEV1 % predicted: 72      Points: 1           Total Points: 3          Nutrition Risk: moderate    BM: dailyi  PERT: Creon 6 (0-3 with meals, usually only with dinner) = avg of 2 per day   Lipase units/kg/meal: 209  Lipase units/kg/day: 209  CFTR modulator: alyftrek x one month  Other GI meds: no  Typical diet: 3 meals and 0-1 snacks  Vitamins: general MVi, DEKAs Plus softgel, magnesium (400 mg), vitamin D (5000 IU), vitamin A (10,000 IU), vitamin K (5 mg)  Other supplements: LMNT electrolyte drink, probiotics, psyllium     Visit details: Marina is here for follow up, she looks great.  She is doing well on Alyftrek, was coughing up a lot initially but now much less.  Feeling good, but her feet still hurt so not exercising as much. Can ride the bike and that is it, no more trampoline.  Podiatrist not really helping her.    Her tummy has been good, she ate a big bowl of homemade ice cream the other night and forgot her PERT but she was fine. Only needs PERT at the bigger meal of the day and now only needing 1-2 instead of 2-3.  She tolerates butter in her oatmeal now and half and half in her coffee.  Not taking Tums for calcium anymore but drinks milk (8 oz/day), eats cheese and yogurt daily.    She has lost some weight but she feels d/t not exercising.  Feel BMI appropriate and she looks very good today.    Recommendations/Plan: continue with CFRD diet and increase physical activity as able.  May want to consider seeing a foot specialist at [facility] since she wants to be active again.   Follow-up: 3 months virtual, 6 months in office

## 2025-07-17 LAB
GRAM STN SPEC: NORMAL
SIGNIFICANT IND 70042: NORMAL
SITE SITE: NORMAL
SOURCE SOURCE: NORMAL

## 2025-07-21 ENCOUNTER — APPOINTMENT (OUTPATIENT)
Dept: MEDICAL GROUP | Facility: LAB | Age: 65
End: 2025-07-21
Payer: COMMERCIAL

## 2025-07-26 DIAGNOSIS — E84.9 CYSTIC FIBROSIS (HCC): ICD-10-CM

## 2025-07-26 LAB — TEST NAME 95000: NORMAL

## 2025-07-28 RX ORDER — FLUTICASONE PROPIONATE AND SALMETEROL 50; 250 UG/1; UG/1
1 POWDER RESPIRATORY (INHALATION) 2 TIMES DAILY
Qty: 60 EACH | Refills: 5 | Status: SHIPPED | OUTPATIENT
Start: 2025-07-28

## 2025-07-28 NOTE — TELEPHONE ENCOUNTER
Last Visit:7/16/25  Next Visit:11/5/25    Received request via: Pharmacy    Was the patient seen in the last year in this department? Yes    Does the patient have an active prescription (recently filled or refills available) for medication(s) requested? No     [facility]

## 2025-07-29 ENCOUNTER — OFFICE VISIT (OUTPATIENT)
Dept: MEDICAL GROUP | Facility: LAB | Age: 65
End: 2025-07-29
Payer: COMMERCIAL

## 2025-07-29 VITALS
SYSTOLIC BLOOD PRESSURE: 112 MMHG | OXYGEN SATURATION: 95 % | BODY MASS INDEX: 20.99 KG/M2 | HEART RATE: 80 BPM | WEIGHT: 126 LBS | DIASTOLIC BLOOD PRESSURE: 58 MMHG | RESPIRATION RATE: 16 BRPM | HEIGHT: 65 IN | TEMPERATURE: 98.5 F

## 2025-07-29 DIAGNOSIS — R20.0 NUMBNESS OF RIGHT FOOT: Primary | ICD-10-CM

## 2025-07-29 ASSESSMENT — FIBROSIS 4 INDEX: FIB4 SCORE: 0.74

## 2025-07-29 NOTE — PROGRESS NOTES
"Subjective:     Chief Complaint   Patient presents with    Lump     Left hand     Foot Pain         HPI:   Kenia presents today for lump at left hand. Seen for this previously in December 2024. No treatment at that time. No triggering. Felt to be a possible flexor tendon nodule. Still bothersome. Worse with grabbing things.     Foot pain:  Right foot pain. Tingling on ball of foot right side. Had NCS which was normal. Saw podiatry they said use an arch support, which causes more tingling and pressure.       New CF drug started, and doing much better.   PFTs have improved, and infections not setting in.     Current Medications and Prescriptions Ordered in Epic[1]      ROS:  Gen: no fevers/chills, no changes in weight  Eyes: no changes in vision  ENT: no sore throat, no hearing loss, no bloody nose  Pulm: no sob, no cough  CV: no chest pain, no palpitations  GI: no nausea/vomiting, no diarrhea  : no dysuria  MSk: no myalgias  Skin: no rash  Neuro: no headaches, no numbness/tingling  Heme/Lymph: no easy bruising      Objective:     Exam:  /58   Pulse 80   Temp 36.9 °C (98.5 °F)   Resp 16   Ht 1.647 m (5' 4.86\")   Wt 57.2 kg (126 lb)   SpO2 95%   BMI 21.06 kg/m²  Body mass index is 21.06 kg/m².    Gen: AAOx3, NAD, well appearing  HEENT: NCAT, EOMI, Nares patent, Mucosa moist  Resp: Normal chest wall rise and fall, not SOB, no tachypnea  Skin: no rash or abnormality of visible skin.   Psych: normal speech, not slurred, good insight, affect full  MSK: Moves all four limbs equally and normally, gait normal  Distal pulses are equal and normal bilaterally.  Sensation is intact.  Normal range of motion at the ankle and midfoot on both sides.  Both lower extremities with some medial lower leg tenderness possibly lower aspect of the soleus bilaterally.  She feels pain and tenderness here when she flexes her toes as well.  Likely related to the flexor digitorum longus.  Capillary refill normal.  Temperature and " color intact.    Assessment & Plan:     64 y.o. female with the following -     1. Numbness of right foot (Primary)  She feels worse after putting in an arch support in her shoe.  She probably does have some soft tissue restriction in the lower leg stemming from use of a trampoline for fitness at home.  Will try to get her into physical therapy for some manual treatment possibly dry needling to see if we can loosen the restriction and limit the irritation of any nerve bundles in the area.  - Referral to Physical Therapy    With regard to her flexor nodule on her hand.  We did discuss considering you doing a steroid injection around this nodule to see if we can shrink it down and limit pain.  The risk of that would be tendon rupture.  She is not have any triggering as I do not think there is a surgical component to this.  She will let me know if things get worse.        No follow-ups on file.    Please note that this dictation was created using voice recognition software. I have made every reasonable attempt to correct obvious errors, but I expect that there are errors of grammar and possibly content that I did not discover before finalizing the note.             [1]   Current Outpatient Medications Ordered in Epic   Medication Sig Dispense Refill    ADVAIR DISKUS 250-50 MCG/ACT AEROSOL POWDER, BREATH ACTIVATED INHALE 1 PUFF 2 TIMES A DAY. INHALE 1 PUFF 2 TIMES A DAY. RINSE MOUTH AFTER EACH USE. 60 Each 5    Tranexamic Acid 650 MG Tab TAKE 1-2 TABLETS BY MOUTH EVERY 8 HOURS AS NEEDED FOR HEMOPTYSIS 30 Tablet 2    Maskjzzwv-Rzauzdro-Sybdkhvhllw 10- MG Tab Take 2 Tablets by mouth every day for 90 days. 180 Tablet 3    mupirocin (BACTROBAN) 2 % Ointment Apply 1 Application topically 2 times a day. 22 g 0    CREON 6000-71131 units Cap DR Particles TAKE 1-2 CAPSULES BY MOUTH 4 TIMES A DAY AS NEEDED WITH MEALS OR SNACKS **KEEP IN ORIGINAL BOTTLE** 200 Capsule 11    phytonadione (MEPHYTON) 5 MG Tab TAKE 1 TABLET BY  MOUTH EVERY DAY 90 Tablet 3    hydrocortisone 2.5 % Cream topical cream Apply 1 Application topically 2 times a day. 45 g 0    acyclovir (ZOVIRAX) 400 MG tablet TAKE 1 TABLET BY MOUTH 4 TIMES A DAY FOR 14 DAYS, THEN TWICE DAILY FOR 3 MONTHS 208 Tablet 1    doxycycline (VIBRAMYCIN) 100 MG Cap TAKE 1 CAPSULE BY MOUTH 2 TIMES A DAY FOR 28 DAYS. FOR 14 DAYS.      CAYSTON 75 MG Recon Soln INHALE 75 MG THREE TIMES DAILY VIA NEBULIZER FOR 28 DAYS ON, THEN 28 DAYS OFF AS DIRECTED 84 mL 2    sodium chloride (HYPER-SAL) 7 % Nebu Soln INHALE 4 ML BY NEBULIZATION 4 TIMES A DAY. 1440 mL 5    albuterol (PROVENTIL) 2.5mg/3ml Nebu Soln solution for nebulization TAKE 3 ML BY NEBULIZATION 3 TIMES A DAY 1050 mL 2    fluticasone (FLONASE) 50 MCG/ACT nasal spray ADMINISTER 1-2 SPRAYS INTO AFFECTED NOSTRIL(S) EVERY DAY. 48 mL 2    Water For Injection Sterile (STERILE WATER) Solution USE TO MIX MEROPENEM 500MG VIAL WITH 8ML (Patient not taking: Reported on 7/16/2025) 150 mL 0    meropenem (MERREM) 500 MG Recon Soln  MG VIAL WITH 8 ML STERILE WATER. THEN NEBULIZE 4 ML TWICE DAILY FOR 14 DAYS. (Patient not taking: Reported on 7/16/2025) 14 Each 0    TRESIBA FLEXTOUCH 100 UNIT/ML Solution Pen-injector INJECT 4 UNITS SUBCUTANEOUSLY DAILY (375 DAYS)      sodium chloride 3% 3 % nebulizer solution Take 4 mL by nebulization 4 times a day as needed (Cough). TAKE 4 ML BY NEBULIZATION 4 TIMES A DAY AS NEEDED (COUGH). 480 mL 3    HUMALOG KWIKPEN 100 UNIT/ML Solution Pen-injector injection PEN INJECT 12-15 UNITS UNDER THE SKIN 3 TIMES A DAY BEFORE MEALS. 45 mL 3    BD PEN NEEDLE TAMIKO 2ND GEN 1 EACH BY OTHER ROUTE 4 TIMES A DAY,BEFORE MEALS AND AT BEDTIME. FOR INSULIN ADMINISTRATION. 400 Each 3    glucose blood (ONETOUCH VERIO) strip TEST 3 TIMES DAILY FOR INSULIN ADJUSTMENT AND AS NEEDED SYMPTOMS OF HIGH OR LOW BLOOD SUGAR 300 Strip 3    Continuous Blood Gluc Sensor (DEXCOM G6 SENSOR) Misc 1 Each every 10 days. 9 Each 4    Continuous Blood Gluc  Transmit (DEXCOM G6 TRANSMITTER) Misc 1 Each continuous. Change every 3 months 1 Each 4    mupirocin (BACTROBAN) 2 % Ointment Apply BID for up to 10 days 30 g 0    Multiple Vitamins-Minerals (DEKAS PLUS PO) Take 1 Cap by mouth 2 times a day.      magnesium oxide (MAG-OX) 400 MG Tab Take 200 mg by mouth 2 times a day.      Ascorbic Acid (VITAMIN C) 1000 MG Tab Take 1,000 mg by mouth 2 Times a Day.      Probiotic Product (PROBIOTIC-10 PO) Take 1 Tab by mouth every day.      Cholecalciferol 5000 units Tab Take 10,000 Units by mouth 2 Times a Day.       No current [facility]-ordered facility-administered medications on file.

## 2025-08-11 ENCOUNTER — HOSPITAL ENCOUNTER (OUTPATIENT)
Dept: LAB | Facility: MEDICAL CENTER | Age: 65
End: 2025-08-11
Attending: INTERNAL MEDICINE
Payer: COMMERCIAL

## 2025-08-11 DIAGNOSIS — E84.0 CYSTIC FIBROSIS WITH PULMONARY MANIFESTATIONS (HCC): ICD-10-CM

## 2025-08-11 LAB
ALBUMIN SERPL BCP-MCNC: 4 G/DL (ref 3.2–4.9)
ALBUMIN/GLOB SERPL: 1.7 G/DL
ALP SERPL-CCNC: 125 U/L (ref 30–99)
ALT SERPL-CCNC: 23 U/L (ref 2–50)
ANION GAP SERPL CALC-SCNC: 11 MMOL/L (ref 7–16)
AST SERPL-CCNC: 26 U/L (ref 12–45)
BILIRUB SERPL-MCNC: 0.4 MG/DL (ref 0.1–1.5)
BUN SERPL-MCNC: 10 MG/DL (ref 8–22)
CALCIUM ALBUM COR SERPL-MCNC: 9.4 MG/DL (ref 8.5–10.5)
CALCIUM SERPL-MCNC: 9.4 MG/DL (ref 8.5–10.5)
CHLORIDE SERPL-SCNC: 106 MMOL/L (ref 96–112)
CO2 SERPL-SCNC: 24 MMOL/L (ref 20–33)
CREAT SERPL-MCNC: 0.77 MG/DL (ref 0.5–1.4)
GFR SERPLBLD CREATININE-BSD FMLA CKD-EPI: 86 ML/MIN/1.73 M 2
GGT SERPL-CCNC: 12 U/L (ref 7–34)
GLOBULIN SER CALC-MCNC: 2.3 G/DL (ref 1.9–3.5)
GLUCOSE SERPL-MCNC: 60 MG/DL (ref 65–99)
POTASSIUM SERPL-SCNC: 4.3 MMOL/L (ref 3.6–5.5)
PROT SERPL-MCNC: 6.3 G/DL (ref 6–8.2)
SODIUM SERPL-SCNC: 141 MMOL/L (ref 135–145)

## 2025-08-11 PROCEDURE — 36415 COLL VENOUS BLD VENIPUNCTURE: CPT

## 2025-08-11 PROCEDURE — 80053 COMPREHEN METABOLIC PANEL: CPT

## 2025-08-11 PROCEDURE — 82977 ASSAY OF GGT: CPT

## (undated) DEVICE — CON SEDATION/>5 YR 1ST 15 MIN

## (undated) DEVICE — CON SEDATION EA ADDL 15 MIN

## (undated) DEVICE — SYRINGE 6 CC 20 GA X 1 1/2 - NDL SAFETY  (50/BX)